# Patient Record
Sex: FEMALE | Race: WHITE | NOT HISPANIC OR LATINO | Employment: FULL TIME | URBAN - METROPOLITAN AREA
[De-identification: names, ages, dates, MRNs, and addresses within clinical notes are randomized per-mention and may not be internally consistent; named-entity substitution may affect disease eponyms.]

---

## 2017-01-16 ENCOUNTER — ALLSCRIPTS OFFICE VISIT (OUTPATIENT)
Dept: OTHER | Facility: OTHER | Age: 47
End: 2017-01-16

## 2017-05-25 DIAGNOSIS — Z12.31 ENCOUNTER FOR SCREENING MAMMOGRAM FOR MALIGNANT NEOPLASM OF BREAST: ICD-10-CM

## 2017-06-06 ENCOUNTER — HOSPITAL ENCOUNTER (OUTPATIENT)
Dept: MAMMOGRAPHY | Facility: MEDICAL CENTER | Age: 47
Discharge: HOME | End: 2017-06-06
Payer: COMMERCIAL

## 2017-06-06 DIAGNOSIS — Z12.31 ENCOUNTER FOR SCREENING MAMMOGRAM FOR MALIGNANT NEOPLASM OF BREAST: ICD-10-CM

## 2017-06-06 PROCEDURE — 77063 BREAST TOMOSYNTHESIS BI: CPT

## 2017-06-06 PROCEDURE — G0202 SCR MAMMO BI INCL CAD: HCPCS

## 2017-06-16 ENCOUNTER — GENERIC CONVERSION - ENCOUNTER (OUTPATIENT)
Dept: OTHER | Facility: OTHER | Age: 47
End: 2017-06-16

## 2017-06-16 ENCOUNTER — ALLSCRIPTS OFFICE VISIT (OUTPATIENT)
Dept: OTHER | Facility: OTHER | Age: 47
End: 2017-06-16

## 2017-06-27 ENCOUNTER — TRANSCRIBE ORDERS (OUTPATIENT)
Dept: ADMINISTRATIVE | Facility: HOSPITAL | Age: 47
End: 2017-06-27

## 2017-06-27 ENCOUNTER — ALLSCRIPTS OFFICE VISIT (OUTPATIENT)
Dept: OTHER | Facility: OTHER | Age: 47
End: 2017-06-27

## 2017-06-27 DIAGNOSIS — Z80.3 FAMILY HISTORY OF BREAST CANCER: ICD-10-CM

## 2017-06-27 DIAGNOSIS — Z12.39 SCREENING BREAST EXAMINATION: Primary | ICD-10-CM

## 2017-07-05 ENCOUNTER — ALLSCRIPTS OFFICE VISIT (OUTPATIENT)
Dept: OTHER | Facility: OTHER | Age: 47
End: 2017-07-05

## 2017-12-04 ENCOUNTER — HOSPITAL ENCOUNTER (OUTPATIENT)
Dept: RADIOLOGY | Facility: HOSPITAL | Age: 47
Discharge: HOME | End: 2017-12-04
Attending: SURGERY
Payer: COMMERCIAL

## 2017-12-04 DIAGNOSIS — Z80.3 FAMILY HISTORY OF MALIGNANT NEOPLASM OF BREAST: ICD-10-CM

## 2017-12-04 DIAGNOSIS — Z12.31 ENCOUNTER FOR SCREENING MAMMOGRAM FOR MALIGNANT NEOPLASM OF BREAST: ICD-10-CM

## 2017-12-04 PROCEDURE — C8908 MRI W/O FOL W/CONT, BREAST,: HCPCS

## 2017-12-04 PROCEDURE — 77059 HB MRI W/O FOL W/CONT, BREAST,: CPT

## 2017-12-04 PROCEDURE — A9585 GADOBUTROL INJECTION: HCPCS | Performed by: SURGERY

## 2017-12-04 RX ADMIN — GADOBUTROL 7 ML: 604.72 INJECTION INTRAVENOUS at 16:55

## 2018-01-13 VITALS
SYSTOLIC BLOOD PRESSURE: 120 MMHG | WEIGHT: 170.13 LBS | BODY MASS INDEX: 26.7 KG/M2 | DIASTOLIC BLOOD PRESSURE: 64 MMHG | HEIGHT: 67 IN

## 2018-01-13 VITALS
HEART RATE: 89 BPM | OXYGEN SATURATION: 98 % | DIASTOLIC BLOOD PRESSURE: 72 MMHG | HEIGHT: 67 IN | WEIGHT: 170 LBS | RESPIRATION RATE: 16 BRPM | BODY MASS INDEX: 26.68 KG/M2 | SYSTOLIC BLOOD PRESSURE: 116 MMHG | TEMPERATURE: 99.2 F

## 2018-01-13 NOTE — MISCELLANEOUS
Message   Date: 16 Jun 2017 7:57 AM EST, Recorded By: Ed Hyatt For: Henny Escort: Dipti Chambers, Edgardo   Phone: [phone] (Home), [phone] (Work)   Reason: Medical Complaint   pt has a vaginal pimple that is irritated and causing pain    pt will schedule apt for evaluation           Active Problems    1  Breast self examination education, encounter for (V65 49) (Z71 89)   2  Encounter for routine gynecological examination (V72 31) (Z01 419)   3  Flu vaccine need (V04 81) (Z23)   4  Visit for screening mammogram (V76 12) (Z12 31)    Current Meds   1  Biotin CAPS; Therapy: (Recorded:16Jan2017) to Recorded   2  Calcium CHEW;   Therapy: (Recorded:16Jan2017) to Recorded   3  Flonase Allergy Relief 50 MCG/ACT Nasal Suspension (Fluticasone Propionate); Therapy: (Recorded:16Jan2017) to Recorded   4  Vitamin D TABS; Therapy: (Recorded:16Jan2017) to Recorded    Allergies    1  No Known Drug Allergies    2   Seasonal    Signatures   Electronically signed by : Eloise Briggs, ; Jun 16 2017  7:58AM EST                       (Author)

## 2018-01-15 VITALS
WEIGHT: 167 LBS | HEIGHT: 67 IN | DIASTOLIC BLOOD PRESSURE: 62 MMHG | SYSTOLIC BLOOD PRESSURE: 118 MMHG | BODY MASS INDEX: 26.21 KG/M2

## 2018-02-27 ENCOUNTER — OFFICE VISIT (OUTPATIENT)
Dept: OBGYN CLINIC | Facility: CLINIC | Age: 48
End: 2018-02-27
Payer: COMMERCIAL

## 2018-02-27 VITALS
DIASTOLIC BLOOD PRESSURE: 70 MMHG | WEIGHT: 176 LBS | SYSTOLIC BLOOD PRESSURE: 117 MMHG | BODY MASS INDEX: 27.62 KG/M2 | HEIGHT: 67 IN

## 2018-02-27 DIAGNOSIS — Z12.31 VISIT FOR SCREENING MAMMOGRAM: ICD-10-CM

## 2018-02-27 DIAGNOSIS — R31.9 HEMATURIA, UNSPECIFIED TYPE: ICD-10-CM

## 2018-02-27 DIAGNOSIS — Z01.419 WOMEN'S ANNUAL ROUTINE GYNECOLOGICAL EXAMINATION: ICD-10-CM

## 2018-02-27 PROCEDURE — 99396 PREV VISIT EST AGE 40-64: CPT | Performed by: OBSTETRICS & GYNECOLOGY

## 2018-02-27 PROCEDURE — G0145 SCR C/V CYTO,THINLAYER,RESCR: HCPCS | Performed by: OBSTETRICS & GYNECOLOGY

## 2018-02-27 PROCEDURE — 87077 CULTURE AEROBIC IDENTIFY: CPT | Performed by: OBSTETRICS & GYNECOLOGY

## 2018-02-27 PROCEDURE — 81001 URINALYSIS AUTO W/SCOPE: CPT | Performed by: OBSTETRICS & GYNECOLOGY

## 2018-02-27 PROCEDURE — 87186 SC STD MICRODIL/AGAR DIL: CPT | Performed by: OBSTETRICS & GYNECOLOGY

## 2018-02-27 PROCEDURE — 87086 URINE CULTURE/COLONY COUNT: CPT | Performed by: OBSTETRICS & GYNECOLOGY

## 2018-02-27 RX ORDER — DIPHENOXYLATE HYDROCHLORIDE AND ATROPINE SULFATE 2.5; .025 MG/1; MG/1
1 TABLET ORAL EVERY MORNING
COMMUNITY

## 2018-02-27 NOTE — PROGRESS NOTES
Assessment/Plan:  1  Yearly exam-Pap smear done, self-breast awareness reviewed, calcium/vitamin-D recommendations discussed, 3D mammogram request given postdated for 6/7/18 with the patient has scheduled mammogram  2  Elevated breast cancer risk-patient's mother had ovarian/breast cancer, she was BRCA test negative although she did have some unknown variant  She does have elevated risk and was followed by Dr Yesy Buckner  She had MRI previously without significant issues  3D mammogram request was given, to be done June 2018 as directed by Dr Yesy Buckner  She will follow up with Dr Yesy Buckner after that  3   History of menorrhagia-patient status post endometrial ablation  She does note some light bleeding sporadically  She was counseled and will call with any menometrorrhagia  4   History of urethral hypermobility-patient status post TVT-O  5  Contraception- is status post vasectomy  6   Concern for hematuria-patient did have light menstrual bleeding which may account for her bleeding  She did leave urine specimen and we will send urinalysis with reflex urine culture  She will follow up in 1 year or as needed  No problem-specific Assessment & Plan notes found for this encounter  Diagnoses and all orders for this visit:    Visit for screening mammogram  -     Mammo screening bilateral w 3d & cad; Future    Other orders  -     multivitamin (THERAGRAN) TABS; Take 1 tablet by mouth daily          Subjective:      Patient ID: Caterina Rod is a 41 y o  female  Patient was seen today for yearly exam   Please see assessment plan for details  Gynecologic Exam   The patient's pertinent negatives include no pelvic pain or vaginal discharge  Pertinent negatives include no abdominal pain, back pain, chills, constipation, diarrhea, dysuria, fever, frequency, headaches, hematuria, nausea, rash, urgency or vomiting         The following portions of the patient's history were reviewed and updated as appropriate: allergies, current medications, past family history, past medical history, past social history, past surgical history and problem list     Review of Systems   Constitutional: Negative for chills, diaphoresis, fatigue and fever  Respiratory: Negative for apnea, cough, chest tightness, shortness of breath and wheezing  Cardiovascular: Negative for chest pain, palpitations and leg swelling  Gastrointestinal: Negative for abdominal distention, abdominal pain, anal bleeding, constipation, diarrhea, nausea, rectal pain and vomiting  Genitourinary: Positive for menstrual problem  Negative for difficulty urinating, dyspareunia, dysuria, frequency, hematuria, pelvic pain, urgency, vaginal bleeding, vaginal discharge and vaginal pain  Musculoskeletal: Negative for arthralgias, back pain and myalgias  Skin: Negative for color change and rash  Neurological: Negative for dizziness, syncope, light-headedness, numbness and headaches  Hematological: Negative for adenopathy  Does not bruise/bleed easily  Psychiatric/Behavioral: Negative for dysphoric mood and sleep disturbance  The patient is not nervous/anxious  Objective:      /70 (BP Location: Left arm, Patient Position: Sitting, Cuff Size: Standard)   Ht 5' 7" (1 702 m)   Wt 79 8 kg (176 lb)   BMI 27 57 kg/m²          Physical Exam    Objective      /70 (BP Location: Left arm, Patient Position: Sitting, Cuff Size: Standard)   Ht 5' 7" (1 702 m)   Wt 79 8 kg (176 lb)   BMI 27 57 kg/m²     General:   alert and oriented, in no acute distress, alert, appears stated age and cooperative   Neck: normal to inspection and palpation   Breast: normal appearance, no masses or tenderness   Heart:    Lungs:    Abdomen: soft, non-tender, without masses or organomegaly   Vulva: normal   Vagina: Small amount of vaginal blood, without erythema or lesions or discharge  Cervix: Small amount of blood noted at the os  Cervix well-healed  Without lesions or discharge or cervicitis noted  No Cervical motion tenderness     Uterus: top normal size, anteverted   Adnexa: no mass, fullness, tenderness   Rectum: negative

## 2018-03-01 LAB
BACTERIA UR QL AUTO: ABNORMAL /HPF
BILIRUB UR QL STRIP: NEGATIVE
CLARITY UR: ABNORMAL
COLOR UR: YELLOW
GLUCOSE UR STRIP-MCNC: NEGATIVE MG/DL
HGB UR QL STRIP.AUTO: ABNORMAL
KETONES UR STRIP-MCNC: NEGATIVE MG/DL
LEUKOCYTE ESTERASE UR QL STRIP: ABNORMAL
NITRITE UR QL STRIP: POSITIVE
NON-SQ EPI CELLS URNS QL MICRO: ABNORMAL /HPF
PH UR STRIP.AUTO: 5.5 [PH] (ref 4.5–8)
PROT UR STRIP-MCNC: NEGATIVE MG/DL
RBC #/AREA URNS AUTO: ABNORMAL /HPF
SP GR UR STRIP.AUTO: 1.02 (ref 1–1.03)
UROBILINOGEN UR QL STRIP.AUTO: 0.2 E.U./DL
WBC #/AREA URNS AUTO: ABNORMAL /HPF

## 2018-03-02 LAB — BACTERIA UR CULT: ABNORMAL

## 2018-03-05 DIAGNOSIS — N39.0 URINARY TRACT INFECTION WITHOUT HEMATURIA, SITE UNSPECIFIED: Primary | ICD-10-CM

## 2018-03-05 RX ORDER — NITROFURANTOIN 25; 75 MG/1; MG/1
100 CAPSULE ORAL 2 TIMES DAILY
Qty: 14 CAPSULE | Refills: 0 | Status: SHIPPED | OUTPATIENT
Start: 2018-03-05 | End: 2018-03-12

## 2018-03-05 RX ORDER — NITROFURANTOIN 25; 75 MG/1; MG/1
100 CAPSULE ORAL 2 TIMES DAILY
Qty: 14 CAPSULE | Refills: 0 | Status: CANCELLED | OUTPATIENT
Start: 2018-03-05 | End: 2018-03-12

## 2018-03-07 LAB
LAB AP GYN PRIMARY INTERPRETATION: NORMAL
Lab: NORMAL

## 2018-03-08 ENCOUNTER — TELEPHONE (OUTPATIENT)
Dept: OBGYN CLINIC | Facility: CLINIC | Age: 48
End: 2018-03-08

## 2018-03-28 ENCOUNTER — TELEPHONE (OUTPATIENT)
Dept: OBGYN CLINIC | Facility: CLINIC | Age: 48
End: 2018-03-28

## 2018-03-29 ENCOUNTER — ULTRASOUND (OUTPATIENT)
Dept: OBGYN CLINIC | Facility: CLINIC | Age: 48
End: 2018-03-29
Payer: COMMERCIAL

## 2018-03-29 ENCOUNTER — OFFICE VISIT (OUTPATIENT)
Dept: OBGYN CLINIC | Facility: CLINIC | Age: 48
End: 2018-03-29
Payer: COMMERCIAL

## 2018-03-29 VITALS
HEART RATE: 74 BPM | WEIGHT: 176 LBS | BODY MASS INDEX: 27.62 KG/M2 | HEIGHT: 67 IN | DIASTOLIC BLOOD PRESSURE: 68 MMHG | SYSTOLIC BLOOD PRESSURE: 120 MMHG

## 2018-03-29 DIAGNOSIS — N92.6 IRREGULAR BLEEDING: Primary | ICD-10-CM

## 2018-03-29 DIAGNOSIS — N83.202 LEFT OVARIAN CYST: ICD-10-CM

## 2018-03-29 DIAGNOSIS — R10.32 LEFT LOWER QUADRANT PAIN: ICD-10-CM

## 2018-03-29 PROBLEM — N60.11 BILATERAL FIBROCYSTIC BREAST CHANGES: Status: ACTIVE | Noted: 2017-06-27

## 2018-03-29 PROBLEM — N60.12 BILATERAL FIBROCYSTIC BREAST CHANGES: Status: ACTIVE | Noted: 2017-06-27

## 2018-03-29 PROCEDURE — 76830 TRANSVAGINAL US NON-OB: CPT

## 2018-03-29 PROCEDURE — 76830 TRANSVAGINAL US NON-OB: CPT | Performed by: OBSTETRICS & GYNECOLOGY

## 2018-03-29 PROCEDURE — 99213 OFFICE O/P EST LOW 20 MIN: CPT | Performed by: OBSTETRICS & GYNECOLOGY

## 2018-03-29 RX ORDER — MONTELUKAST SODIUM 10 MG/1
10 TABLET ORAL AS NEEDED
COMMUNITY
Start: 2014-10-26

## 2018-03-29 RX ORDER — MOMETASONE FUROATE 50 UG/1
SPRAY, METERED NASAL
COMMUNITY
Start: 2014-10-26 | End: 2021-08-17

## 2018-03-29 RX ORDER — LORATADINE 10 MG/1
10 TABLET ORAL AS NEEDED
COMMUNITY
Start: 2014-10-26

## 2018-03-29 NOTE — PROGRESS NOTES
Assessment/Plan:  Irregular spotting, which may be related to the friability of the cervical gland tissue, the ovarian cyst, or possible abnormality within the endometrial cavity  The patient will return for a follow-up pelvic ultrasound in 6-8 weeks and will reassess at that time  There are no diagnoses linked to this encounter  Subjective:     Patient ID: Caterina Rod is a 41 y o  female  Brown Jetty is seen today because of irregular spotting that she has been having for quite some time  The bleeding is bright red, painless, with no pattern  Historically the patient did have a NovaSure ablation in 2014 along with a suburethral sling  The patient did have a bladder infection quite recently and was treated for this  She also had a recent Pap smear that was within normal limits  An ultrasound today shows essentially normal findings with the exception of a 2 8 cm complex left ovarian cyst   The patient denies any overt hematuria, any overt rectal bleeding, fever, chills or other symptoms  Pelvic Pain   The patient's primary symptoms include vaginal bleeding  The patient's pertinent negatives include no pelvic pain  Vaginal Bleeding   The patient's primary symptoms include vaginal bleeding  The patient's pertinent negatives include no pelvic pain  Review of Systems   Genitourinary: Positive for vaginal bleeding  Negative for pelvic pain  Objective:     Physical Exam  the vulvar exam is within normal limits with respect to the glandular tissue  The vagina shows no abnormalities except for some old blood in the posterior fornix  The cervix shows gland tissue that is somewhat prominent and somewhat friable as it does bleed easily when touched  An attempt at an endometrial sampling is performed but unable to be accomplished secondary to the patient's history of uterine ablation  Bimanual exam is all within normal limits

## 2018-03-29 NOTE — PROGRESS NOTES
AMB US Pelvic Non OB  Date/Time: 3/29/2018 11:13 AM  Performed by: Michael Sellers  Authorized by: Dakota Altamirano     Procedure details:     Indications: non-obstetric vaginal bleeding      Technique:  Transvaginal US, Non-OB    Position: lithotomy exam    Uterine findings:     Diameter (mm):  37    Length (mm):  76    Width (mm):  45    Endometrial stripe: identified      Endometrium thickness (mm):  3 5  Left ovary findings:     Left ovary:  Visualized    Diameter (mm):  20 4    Length (mm):  38 2    Width (mm):  23 7  Right ovary findings:     Right ovary:  Visualized    Diameter (mm):  14    Length (mm):  24 5    Width (mm):  23 2  Other findings:     Free pelvic fluid: not identified      Free peritoneal fluid: not identified    Post-Procedure Details:     Impression:  Anteverted uterus and right ovary appear within normal limits  The left ovary demonstrates a complex cyst, 2 4cm  There is normal appearing blood flow on color doppler of the left ovary  No free fluid  Tolerance:   Tolerated well, no immediate complications    Complications: no complications

## 2018-05-15 ENCOUNTER — ULTRASOUND (OUTPATIENT)
Dept: OBGYN CLINIC | Facility: CLINIC | Age: 48
End: 2018-05-15
Payer: COMMERCIAL

## 2018-05-15 ENCOUNTER — OFFICE VISIT (OUTPATIENT)
Dept: OBGYN CLINIC | Facility: CLINIC | Age: 48
End: 2018-05-15
Payer: COMMERCIAL

## 2018-05-15 DIAGNOSIS — N83.201 CYST OF RIGHT OVARY: Primary | ICD-10-CM

## 2018-05-15 DIAGNOSIS — N83.202 CYST OF LEFT OVARY: ICD-10-CM

## 2018-05-15 PROCEDURE — 76830 TRANSVAGINAL US NON-OB: CPT

## 2018-05-15 PROCEDURE — 99213 OFFICE O/P EST LOW 20 MIN: CPT | Performed by: OBSTETRICS & GYNECOLOGY

## 2018-05-15 RX ORDER — PANTOPRAZOLE SODIUM 40 MG/1
40 TABLET, DELAYED RELEASE ORAL EVERY MORNING
COMMUNITY
Start: 2018-04-24

## 2018-05-15 NOTE — PROGRESS NOTES
Alem Nicole is seen today for discussion only  She had a complex cyst of the ovary in addition to some spotting back in March  A repeat ultrasound today shows resolution of the cyst, essentially no changes of the uterus with the exception of the endometrium changing due to the patient's hormonal status  The patient herself states that her spotting has improved  At this point in time nothing further than continue observation is recommended  Patient to call with any questions or concerns

## 2018-06-07 ENCOUNTER — HOSPITAL ENCOUNTER (OUTPATIENT)
Dept: MAMMOGRAPHY | Facility: MEDICAL CENTER | Age: 48
Discharge: HOME | End: 2018-06-07
Payer: COMMERCIAL

## 2018-06-07 DIAGNOSIS — Z12.31 ENCOUNTER FOR SCREENING MAMMOGRAM FOR MALIGNANT NEOPLASM OF BREAST: ICD-10-CM

## 2018-06-07 DIAGNOSIS — Z12.31 VISIT FOR SCREENING MAMMOGRAM: ICD-10-CM

## 2018-06-07 PROCEDURE — 77067 SCR MAMMO BI INCL CAD: CPT

## 2018-06-07 PROCEDURE — 77063 BREAST TOMOSYNTHESIS BI: CPT

## 2018-06-11 ENCOUNTER — TELEPHONE (OUTPATIENT)
Dept: OBGYN CLINIC | Facility: CLINIC | Age: 48
End: 2018-06-11

## 2018-06-11 DIAGNOSIS — R92.8 MAMMOGRAM ABNORMAL: Primary | ICD-10-CM

## 2018-06-11 NOTE — TELEPHONE ENCOUNTER
----- Message from Jose Lima MD sent at 6/8/2018  9:18 AM EDT -----  Patient needs right mammogram diagnostic views and right breast ultrasound as needed  Please arrange for this    Thanks

## 2018-06-13 ENCOUNTER — HOSPITAL ENCOUNTER (OUTPATIENT)
Dept: MAMMOGRAPHY | Facility: CLINIC | Age: 48
Discharge: HOME | End: 2018-06-13
Payer: COMMERCIAL

## 2018-06-13 ENCOUNTER — HOSPITAL ENCOUNTER (OUTPATIENT)
Dept: ULTRASOUND IMAGING | Facility: CLINIC | Age: 48
Discharge: HOME | End: 2018-06-13
Payer: COMMERCIAL

## 2018-06-13 DIAGNOSIS — R92.8 ABNORMAL MAMMOGRAM: ICD-10-CM

## 2018-06-13 PROCEDURE — G0279 TOMOSYNTHESIS, MAMMO: HCPCS

## 2018-06-13 PROCEDURE — 76642 ULTRASOUND BREAST LIMITED: CPT

## 2018-06-13 PROCEDURE — 77065 DX MAMMO INCL CAD UNI: CPT

## 2018-06-13 NOTE — PROGRESS NOTES
Met with patient and Dr Nemesio Mora regarding recommendation for;      __x___ RIGHT ______LEFT      _____Ultrasound guided  __x____Stereotactic  Breast biopsy  __x___Verbalized understanding        Blood thinners:  _____yes __x___no    Date stopped: ____n/a_______    Biopsy teaching sheet given:  __x_____yes ______no

## 2018-06-14 ENCOUNTER — HOSPITAL ENCOUNTER (OUTPATIENT)
Dept: MAMMOGRAPHY | Facility: CLINIC | Age: 48
Discharge: HOME | End: 2018-06-14
Admitting: RADIOLOGY
Payer: COMMERCIAL

## 2018-06-14 ENCOUNTER — HOSPITAL ENCOUNTER (OUTPATIENT)
Dept: MAMMOGRAPHY | Facility: CLINIC | Age: 48
Discharge: HOME | End: 2018-06-14
Payer: COMMERCIAL

## 2018-06-14 VITALS — HEART RATE: 60 BPM | DIASTOLIC BLOOD PRESSURE: 60 MMHG | SYSTOLIC BLOOD PRESSURE: 100 MMHG

## 2018-06-14 DIAGNOSIS — R92.8 ABNORMAL MAMMOGRAM: ICD-10-CM

## 2018-06-14 DIAGNOSIS — R92.8 OTHER ABNORMAL AND INCONCLUSIVE FINDINGS ON DIAGNOSTIC IMAGING OF BREAST: ICD-10-CM

## 2018-06-14 PROCEDURE — 88341 IMHCHEM/IMCYTCHM EA ADD ANTB: CPT | Performed by: PATHOLOGY

## 2018-06-14 PROCEDURE — 88305 TISSUE EXAM BY PATHOLOGIST: CPT | Performed by: PATHOLOGY

## 2018-06-14 PROCEDURE — 88342 IMHCHEM/IMCYTCHM 1ST ANTB: CPT | Performed by: PATHOLOGY

## 2018-06-14 PROCEDURE — 88361 TUMOR IMMUNOHISTOCHEM/COMPUT: CPT | Performed by: PATHOLOGY

## 2018-06-14 PROCEDURE — 19081 BX BREAST 1ST LESION STRTCTC: CPT

## 2018-06-14 RX ORDER — LIDOCAINE HYDROCHLORIDE AND EPINEPHRINE BITARTRATE 20; .01 MG/ML; MG/ML
10 INJECTION, SOLUTION SUBCUTANEOUS ONCE
Status: COMPLETED | OUTPATIENT
Start: 2018-06-14 | End: 2018-06-14

## 2018-06-14 RX ORDER — LIDOCAINE HYDROCHLORIDE 10 MG/ML
5 INJECTION, SOLUTION INFILTRATION; PERINEURAL ONCE
Status: COMPLETED | OUTPATIENT
Start: 2018-06-14 | End: 2018-06-14

## 2018-06-14 RX ADMIN — LIDOCAINE HYDROCHLORIDE AND EPINEPHRINE 10 ML: 20; 10 INJECTION, SOLUTION INFILTRATION; PERINEURAL at 13:10

## 2018-06-14 RX ADMIN — LIDOCAINE HYDROCHLORIDE 5 ML: 10 INJECTION, SOLUTION INFILTRATION; PERINEURAL at 13:10

## 2018-06-14 NOTE — PROGRESS NOTES
Procedure type:    _____ultrasound guided ____x_stereotactic    Breast:    _____Left ____x_Right    Location:    Needle:8ga Revolve    # of passes:3   2 cores with calcs and I core without calcs    Clip: EDEL heath    Performed by:Dr Rad Lockhart    Pressure held for 5 minutes by:  Su Schaffer(PCA)    Janny Handing Strips:    __X___yes _____no    Band aid:    ____X_yes_____no    Tape and guaze:    _____yes _____no    Tolerated procedure:    _____yes __X___no

## 2018-06-14 NOTE — PROGRESS NOTES
Ice pack given:    ____X_yes _____no    Discharge instructions signed by patient:    ___X__yes _____no    Discharge instructions given to patient:    ___X__yes _____no    Discharged via:    ___X__amulatory    _____wheelchair    _____stretcher    Stable on discharge:    ___XPOST LARGE CORE BREAST BIOPSY PATIENT INFORMATION      1  Place an ice pack inside your bra over the top of the dressing every hour for 20 minutes (20 minutes on, 60 minutes off)  Do this until bedtime  2  Do not shower or bathe until the following morning  3  You may bathe your breast carefully with the steri-strips in place  Be careful    Not to loosen them  The steri-strips will fall off in 3-5 days  4  You may have mild discomfort, and you may have some bruising where the   Needle entered the skin  This should clear within 5-7 days  5  If you need medicine for discomfort, take acetaminophen products such as   Tylenol  You may also take Advil or Motrin products  6  Do not participate in strenuous activities such as-tennis, aerobics, skiing,  Weight lifting, etc  for 24 hours  Refrain from swimming/soaking for 72 hours  7  Wearing a bra for sleeping may be more comfortable for the first 24-48 hours  8  Watch for continued bleeding, pain or fever over 101; please call with any questions or concerns  For procedures done at the [facility], [place], operated by [facility] call:  Riddhi Tran RN at [phone]  Sina Berman RN at [phone]                    *After 4 PM call the Interventional Radiology Department                    [phone] and ask to speak with the nurse on call  For procedures done at the [address] [place] [address] call:         Taylor Barthel RN at   *After 4 PM call the Interventional Radiology Department   [phone] and ask to speak with the nurse on call  For procedures done at [address] [place] [address] call:   The Radiology Nurse at [phone]  *After 4 PM call your physician, or go to the Emergency Department  9          The final results of your biopsy are usually available within one week  __yes ____no

## 2018-06-15 NOTE — PROGRESS NOTES
Post procedure call completed 6/15/18 @ 1328    Bleeding: _____yes __x___no    Pain: _____yes __x____no    Redness/Swelling: ______yes ___x___no    Band aid removed: __x___yes _____no    Steri-Strips intact: __x____yes _____no

## 2018-06-21 ENCOUNTER — TELEPHONE (OUTPATIENT)
Dept: MAMMOGRAPHY | Facility: CLINIC | Age: 48
End: 2018-06-21

## 2018-07-02 ENCOUNTER — OFFICE VISIT (OUTPATIENT)
Dept: SURGICAL ONCOLOGY | Facility: CLINIC | Age: 48
End: 2018-07-02
Payer: COMMERCIAL

## 2018-07-02 VITALS
WEIGHT: 178 LBS | HEART RATE: 85 BPM | TEMPERATURE: 99 F | BODY MASS INDEX: 27.94 KG/M2 | DIASTOLIC BLOOD PRESSURE: 80 MMHG | HEIGHT: 67 IN | SYSTOLIC BLOOD PRESSURE: 110 MMHG | RESPIRATION RATE: 16 BRPM

## 2018-07-02 DIAGNOSIS — Z17.0 MALIGNANT NEOPLASM OF LOWER-OUTER QUADRANT OF RIGHT BREAST OF FEMALE, ESTROGEN RECEPTOR POSITIVE (HCC): ICD-10-CM

## 2018-07-02 DIAGNOSIS — Z13.79 ASHKENAZI JEWISH ANCESTRY REQUIRING POPULATION-SPECIFIC GENETIC SCREENING: ICD-10-CM

## 2018-07-02 DIAGNOSIS — C50.511 MALIGNANT NEOPLASM OF LOWER-OUTER QUADRANT OF RIGHT BREAST OF FEMALE, ESTROGEN RECEPTOR POSITIVE (HCC): ICD-10-CM

## 2018-07-02 DIAGNOSIS — Z80.3 FAMILY HISTORY OF BREAST CANCER IN FEMALE: Primary | ICD-10-CM

## 2018-07-02 DIAGNOSIS — Z80.41 FAMILY HISTORY OF OVARIAN CARCINOMA: ICD-10-CM

## 2018-07-02 DIAGNOSIS — N60.11 BILATERAL FIBROCYSTIC BREAST CHANGES: ICD-10-CM

## 2018-07-02 DIAGNOSIS — N60.12 BILATERAL FIBROCYSTIC BREAST CHANGES: ICD-10-CM

## 2018-07-02 PROBLEM — Z12.39 BREAST CANCER SCREENING, HIGH RISK PATIENT: Status: RESOLVED | Noted: 2018-07-02 | Resolved: 2018-07-02

## 2018-07-02 PROCEDURE — 99215 OFFICE O/P EST HI 40 MIN: CPT | Performed by: SURGERY

## 2018-07-02 NOTE — PROGRESS NOTES
Breast Consultation-Surgical Oncology     [address]  [facility] [place] [address]    Name:  Sirena Garcia  YOB: 1970  MRN:  [id number]    Assessment/Plan   Diagnoses and all orders for this visit:    Family history of breast cancer in female  -     Ambulatory referral to Gynecologic Oncology; Future    Bilateral fibrocystic breast changes    Ashkenazi Jehovah's witness ancestry requiring population-specific genetic screening  -     Ambulatory referral to Gynecologic Oncology; Future    Malignant neoplasm of lower-outer quadrant of right breast of female, estrogen receptor positive ([facility] [address] )  -     Ambulatory referral to Genetics; Future  -     Ambulatory referral to Plastic Surgery; Future    Family history of ovarian carcinoma  -     Ambulatory referral to Gynecologic Oncology; Future        Assessment: right breast carcinoma    Plan: refer back to genetics, patient is considering bilateral mastectomy with reconstruction-needs plastics consult, would also like to see gyn oncology given mom's history of ovarian cancer; states she is seeking a second opinion as well    HPI: Sirena Garcia is a 57y o  year old female who presents with a right breast cancer  Pt had her screening mammogram completed on 18 which showed abnormal findings  She then had diagnostic imaging which led to a right breast biopsy  This revealed an IDC  Pt's biopsy site is intact and healing  Surgical treatment to date consisted of n/a  Oncology History:     No history exists         Pertinent reproductive history:  Age at menarche:  17  OB History      Para Term  AB Living    2 2            SAB TAB Ectopic Multiple Live Births                     Obstetric Comments    First pregnancy age 31  Menarche age 17  Hx birth control pills           Age at first live birth:  31  Age at menopause:  n/a      Problem List:   Patient Active Problem List   Diagnosis  Bilateral fibrocystic breast changes    Family history of breast cancer in female    Ashkenazi Jainism ancestry requiring population-specific genetic screening    Malignant neoplasm of lower-outer quadrant of right breast of female, estrogen receptor positive ([address] )     Past Medical History:   Diagnosis Date    Ashkenazi Jainism ancestry requiring population-specific genetic screening     Bilateral fibrocystic breast changes     Breast cancer screening, high risk patient     Family history of breast cancer in female     Human papilloma virus (HPV) infection     Postural lightheadedness     Sinus problem     Sinus problem     Vulvar abscess      Past Surgical History:   Procedure Laterality Date    BLADDER SURGERY  2013    bladder lift    CERVIX LESION DESTRUCTION      COLPOSCOPY W/ BIOPSY / CURETTAGE      GASTRIC BYPASS      GASTRIC BYPASS  2015    for morbid obesity    HYSTEROSCOPY W/ ENDOMETRIAL ABLATION  2013    KNEE ARTHROSCOPY Right 1990    NASAL SEPTUM SURGERY      2002, 2016     Family History   Problem Relation Age of Onset    Other Mother         BRCA negative    Breast cancer Mother 72    Ovarian cancer Mother 61    Diabetes Father     Heart disease Father     Hypertension Father     Migraines Sister     Diabetes Brother     Colon cancer Maternal Grandfather 80    Diabetes type II Maternal Grandfather     Heart disease Paternal Grandfather     Colon cancer Maternal Aunt 62    Skin cancer Maternal Aunt      Social History     Social History    Marital status: Single     Spouse name: N/A    Number of children: 2    Years of education: N/A     Occupational History    Not on file  Social History Main Topics    Smoking status: Never Smoker    Smokeless tobacco: Never Used    Alcohol use Yes      Comment: ocass      Drug use: No    Sexual activity: Not on file     Other Topics Concern    Not on file     Social History Narrative    Uses safety equipment, seatbelts Current Outpatient Prescriptions   Medication Sig Dispense Refill    loratadine (CLARITIN) 10 mg tablet 10 mg      mometasone (NASONEX) 50 mcg/act nasal spray 2 sprays in each nostril as needed      montelukast (SINGULAIR) 10 mg tablet 10 mg      multivitamin (THERAGRAN) TABS Take 1 tablet by mouth daily      pantoprazole (PROTONIX) 40 mg tablet        No current facility-administered medications for this visit  Allergies   Allergen Reactions    Other     Pollen Extract      Annotation - [id number]: trees, pollens         The following portions of the patient's history were reviewed and updated as appropriate: allergies, current medications, past family history, past medical history, past social history, past surgical history and problem list     Review of Systems:  Review of Systems   Constitutional: Negative  Negative for appetite change and fever  Eyes: Negative  Respiratory: Negative for shortness of breath  Cardiovascular: Negative  Gastrointestinal: Negative  Endocrine: Negative  Genitourinary: Negative  Musculoskeletal: Negative  Negative for arthralgias and myalgias  Skin: Negative  Allergic/Immunologic: Negative  Neurological: Negative  Hematological: Negative  Negative for adenopathy  Does not bruise/bleed easily  Psychiatric/Behavioral: Negative  Physical Exam:  Physical Exam   Constitutional: She is oriented to person, place, and time  She appears well-developed and well-nourished  HENT:   Head: Normocephalic and atraumatic  Cardiovascular: Normal heart sounds  Pulmonary/Chest: Breath sounds normal  Right breast exhibits skin change (well healing biopsy site with hematoma lower outer)  Right breast exhibits no inverted nipple, no mass, no nipple discharge and no tenderness  Left breast exhibits no inverted nipple, no mass, no nipple discharge, no skin change and no tenderness  Abdominal: Soft     Lymphadenopathy:        Right axillary: No pectoral and no lateral adenopathy present  Left axillary: No pectoral and no lateral adenopathy present  Right: No supraclavicular adenopathy present  Left: No supraclavicular adenopathy present  Neurological: She is alert and oriented to person, place, and time  Psychiatric: She has a normal mood and affect  Laboratory:  Right stereotactic biopsy    Pathology revealed: invasive ductal carcinoma    Histologic grade: low grade     Angiolymphatic invasion:  absent    Tumor node status: clinically Negative    Hormone receptor status:  Estrogen/progesterone receptor positive; HER2 -    Imagin18  3D Bilateral screening mammogram, B0(2) for calcs and density lower outer right breast  18 3D right diagnostic mammogram and ultrasound, B4 suspicious calcifications, density corresponds to cysts on  ultrasound  18 right breast stereotactic biopsy as noted     Imaging personally reviewed        Treatment options include:  Lumpectomy vs mastectomy    Discussion/Summary:47 yo female with a newly diagnosed carcinoma of the right breast  I discussed the multimodality treatment of breast cancer with her to include surgery, radiation and medical therapy  She previously met with our genetic counselor secondary to her family history and ancestry  Her mom had both breast and ovarian cancer and carried a VUS only  Given Jaylyn's diagnosis, I am recommending she meet again with our genetic counselor  She is leaning toward a bilateral mastectomy with reconstruction  I will refer her to plastics for consultation  She additionally inquired about having oophorectomy given mom's history  I will refer her to gyn oncology to discuss this further  She understands that if she decides to have breast conservation, she will require radiation therapy as well  She also understands that regardless of the type of surgery, she will need to meet with medical oncology to discuss adjuvant medical therapy  She verbalizes today that she has a second opinion set up at [facility]

## 2018-07-02 NOTE — PROGRESS NOTES
Breast Consultation-Surgical Oncology     [address]  [facility] [place] [address]    Name:  Rachel Ochoa  YOB: 1970  MRN:  [id number]    Assessment/Plan   Diagnoses and all orders for this visit:    Family history of breast cancer in female  -     Ambulatory referral to Gynecologic Oncology; Future    Bilateral fibrocystic breast changes    Ashkenazi Faith ancestry requiring population-specific genetic screening  -     Ambulatory referral to Gynecologic Oncology; Future    Malignant neoplasm of lower-outer quadrant of right breast of female, estrogen receptor positive ([facility] [address] )  -     Ambulatory referral to Genetics; Future  -     Ambulatory referral to Plastic Surgery; Future    Family history of ovarian carcinoma  -     Ambulatory referral to Gynecologic Oncology; Future        Assessment: right breast carcinoma    Plan: refer back to genetics, patient is considering bilateral mastectomy with reconstruction-needs plastics consult, would also like to see gyn oncology given mom's history of ovarian cancer; states she is seeking a second opinion as well    HPI: Rachel Ochoa is a 57y o  year old female who presents with a right breast cancer  Pt had her screening mammogram completed on 18 which showed abnormal findings  She then had diagnostic imaging which led to a right breast biopsy  This revealed an IDC  Pt's biopsy site is intact and healing  Surgical treatment to date consisted of n/a  Oncology History:     No history exists         Pertinent reproductive history:  Age at menarche:  17  OB History      Para Term  AB Living    2 2            SAB TAB Ectopic Multiple Live Births                     Obstetric Comments    First pregnancy age 31  Menarche age 17  Hx birth control pills           Age at first live birth:  31  Age at menopause:  n/a      Problem List:   Patient Active Problem List   Diagnosis  Bilateral fibrocystic breast changes    Family history of breast cancer in female    Ashkenazi Religious ancestry requiring population-specific genetic screening    Malignant neoplasm of lower-outer quadrant of right breast of female, estrogen receptor positive ([address] )     Past Medical History:   Diagnosis Date    Ashkenazi Religious ancestry requiring population-specific genetic screening     Bilateral fibrocystic breast changes     Breast cancer screening, high risk patient     Family history of breast cancer in female     Human papilloma virus (HPV) infection     Postural lightheadedness     Sinus problem     Sinus problem     Vulvar abscess      Past Surgical History:   Procedure Laterality Date    BLADDER SURGERY  2013    bladder lift    CERVIX LESION DESTRUCTION      COLPOSCOPY W/ BIOPSY / CURETTAGE      GASTRIC BYPASS      GASTRIC BYPASS  2015    for morbid obesity    HYSTEROSCOPY W/ ENDOMETRIAL ABLATION  2013    KNEE ARTHROSCOPY Right 1990    NASAL SEPTUM SURGERY      2002, 2016     Family History   Problem Relation Age of Onset    Other Mother         BRCA negative    Breast cancer Mother 72    Ovarian cancer Mother 61    Diabetes Father     Heart disease Father     Hypertension Father     Migraines Sister     Diabetes Brother     Colon cancer Maternal Grandfather 80    Diabetes type II Maternal Grandfather     Heart disease Paternal Grandfather     Colon cancer Maternal Aunt 62    Skin cancer Maternal Aunt      Social History     Social History    Marital status: Single     Spouse name: N/A    Number of children: 2    Years of education: N/A     Occupational History    Not on file  Social History Main Topics    Smoking status: Never Smoker    Smokeless tobacco: Never Used    Alcohol use Yes      Comment: ocass      Drug use: No    Sexual activity: Not on file     Other Topics Concern    Not on file     Social History Narrative    Uses safety equipment, seatbelts Current Outpatient Prescriptions   Medication Sig Dispense Refill    loratadine (CLARITIN) 10 mg tablet 10 mg      mometasone (NASONEX) 50 mcg/act nasal spray 2 sprays in each nostril as needed      montelukast (SINGULAIR) 10 mg tablet 10 mg      multivitamin (THERAGRAN) TABS Take 1 tablet by mouth daily      pantoprazole (PROTONIX) 40 mg tablet        No current facility-administered medications for this visit  Allergies   Allergen Reactions    Other     Pollen Extract      Annotation - [id number]: trees, pollens         The following portions of the patient's history were reviewed and updated as appropriate: allergies, current medications, past family history, past medical history, past social history, past surgical history and problem list     Review of Systems:  Review of Systems   Constitutional: Negative  Negative for appetite change and fever  Eyes: Negative  Respiratory: Negative for shortness of breath  Cardiovascular: Negative  Gastrointestinal: Negative  Endocrine: Negative  Genitourinary: Negative  Musculoskeletal: Negative  Negative for arthralgias and myalgias  Skin: Negative  Allergic/Immunologic: Negative  Neurological: Negative  Hematological: Negative  Negative for adenopathy  Does not bruise/bleed easily  Psychiatric/Behavioral: Negative  Physical Exam:  Physical Exam   Constitutional: She is oriented to person, place, and time  She appears well-developed and well-nourished  HENT:   Head: Normocephalic and atraumatic  Cardiovascular: Normal heart sounds  Pulmonary/Chest: Breath sounds normal  Right breast exhibits skin change (well healing biopsy site with hematoma lower outer)  Right breast exhibits no inverted nipple, no mass, no nipple discharge and no tenderness  Left breast exhibits no inverted nipple, no mass, no nipple discharge, no skin change and no tenderness  Abdominal: Soft     Lymphadenopathy:        Right axillary: No pectoral and no lateral adenopathy present  Left axillary: No pectoral and no lateral adenopathy present  Right: No supraclavicular adenopathy present  Left: No supraclavicular adenopathy present  Neurological: She is alert and oriented to person, place, and time  Psychiatric: She has a normal mood and affect  Laboratory:  Right stereotactic biopsy    Pathology revealed: invasive ductal carcinoma    Histologic grade: low grade     Angiolymphatic invasion:  absent    Tumor node status: clinically Negative    Hormone receptor status:  Estrogen/progesterone receptor positive; HER2 -    Imagin18  3D Bilateral screening mammogram, B0(2) for calcs and density lower outer right breast  18 3D right diagnostic mammogram and ultrasound, B4 suspicious calcifications, density corresponds to cysts on  ultrasound  18 right breast stereotactic biopsy as noted     Imaging personally reviewed        Treatment options include:  Lumpectomy vs mastectomy    Discussion/Summary:47 yo female with a newly diagnosed carcinoma of the right breast  I discussed the multimodality treatment of breast cancer with her to include surgery, radiation and medical therapy  She previously met with our genetic counselor secondary to her family history and ancestry  Her mom had both breast and ovarian cancer and carried a VUS only  Given Jaylyn's diagnosis, I am recommending she meet again with our genetic counselor  She is leaning toward a bilateral mastectomy with reconstruction  I will refer her to plastics for consultation  She additionally inquired about having oophorectomy given mom's history  I will refer her to gyn oncology to discuss this further  She understands that if she decides to have breast conservation, she will require radiation therapy as well  She also understands that regardless of the type of surgery, she will need to meet with medical oncology to discuss adjuvant medical therapy  She verbalizes today that she has a second opinion set up at [facility]

## 2018-07-03 ENCOUNTER — DOCUMENTATION (OUTPATIENT)
Dept: HEMATOLOGY ONCOLOGY | Facility: CLINIC | Age: 48
End: 2018-07-03

## 2018-07-03 NOTE — PROGRESS NOTES
Oncology Navigator Visit    Assessment:  Outreach made to pt for purpose of initial navigator outreach  L/M on home phone and sent Mendel  notecard with contact information to pt's home via mail  Will continue to follow up with pt on an as needed basis and PRN       Potential Barriers:    Care Coordination:    Communication/Education:    Cultural/Anglican/Spiritual:    Health Promotion:    Insurance/Medical Costs:    Logistical:    Psychosocial/Distress/Behavioral:    Work/School:    Interventions:    Referrals:        Comments:

## 2018-07-10 ENCOUNTER — OFFICE VISIT (OUTPATIENT)
Dept: GYNECOLOGIC ONCOLOGY | Facility: CLINIC | Age: 48
End: 2018-07-10
Payer: COMMERCIAL

## 2018-07-10 VITALS
TEMPERATURE: 98.9 F | SYSTOLIC BLOOD PRESSURE: 118 MMHG | WEIGHT: 178.4 LBS | RESPIRATION RATE: 18 BRPM | BODY MASS INDEX: 27.04 KG/M2 | DIASTOLIC BLOOD PRESSURE: 86 MMHG | HEIGHT: 68 IN | HEART RATE: 78 BPM

## 2018-07-10 DIAGNOSIS — Z80.3 FAMILY HISTORY OF BREAST CANCER IN FEMALE: ICD-10-CM

## 2018-07-10 DIAGNOSIS — Z17.0 MALIGNANT NEOPLASM OF LOWER-OUTER QUADRANT OF RIGHT BREAST OF FEMALE, ESTROGEN RECEPTOR POSITIVE (HCC): Primary | ICD-10-CM

## 2018-07-10 DIAGNOSIS — C50.511 MALIGNANT NEOPLASM OF LOWER-OUTER QUADRANT OF RIGHT BREAST OF FEMALE, ESTROGEN RECEPTOR POSITIVE (HCC): Primary | ICD-10-CM

## 2018-07-10 DIAGNOSIS — Z13.79 ASHKENAZI JEWISH ANCESTRY REQUIRING POPULATION-SPECIFIC GENETIC SCREENING: ICD-10-CM

## 2018-07-10 DIAGNOSIS — Z80.41 FAMILY HISTORY OF OVARIAN CARCINOMA: ICD-10-CM

## 2018-07-10 PROCEDURE — 99244 OFF/OP CNSLTJ NEW/EST MOD 40: CPT | Performed by: OBSTETRICS & GYNECOLOGY

## 2018-07-10 NOTE — LETTER
July 10, 2018     Maria Eugenia Ash MD  [address] [facility]  [address] [facility]    Patient: Shweta Schneider   YOB: 1970   Date of Visit: 7/10/2018       Dear Dr Ramiro Zepeda: Thank you for referring Shweta Schneider to me for evaluation  Below are my notes for this consultation  If you have questions, please do not hesitate to call me  I look forward to following your patient along with you           Sincerely,        Eduard Novak MD        CC: MD Renata Quinones MD Hunter Latch, MD

## 2018-07-10 NOTE — PATIENT INSTRUCTIONS
-  Surgical preparation as per surgical instructions  -   Keep appointment with Plastic surgery and genetic counselor as discussed

## 2018-07-10 NOTE — ASSESSMENT & PLAN NOTE
-   Patient with hormone receptor positive DCIS  She has a significant family history suggesting increase susceptibility to ovarian and breast cancer  Given home receptor positive tumor as well as increased risk for other malignancies and benefits and overall cancer reduction associated with salpingo-oophorectomy I have recommended and she has agreed to proceed with laparoscopic bilateral salpingo-oophorectomy  She would want to have this done as part of a joint procedure along with bilateral mastectomy and reconstruction  Hence, I have recommended against concurrent hysterectomy as that would increase risk of infectious complications and worse cosmetic results  She understands her abnormal uterine bleeding will resolve as result of surgical menopause  I also explained that the possibility of hysterectomy is not negated if this was indicated for some reason in the future  I will obtain same day type and screen  Otherwise preoperative testing per breast and plastic surgeons  I discussed with patient indication, risks, benefits and alternatives of surgical exploration  We discussed possibility of bleeding requiring blood transfusion, life-threatening infections requiring additional procedures, injuries to surrounding organs such as bladder, ureters, gastrointestinal tract and or neurovascular structures  Additionally, we discussed general risks associated to stress of surgery such as venous thromboembolism, acute myocardial events and stroke  All questions answered to patient's satisfaction  She agrees and wants to proceed  Informed consent form signed

## 2018-07-10 NOTE — H&P
Assessment/Plan:    Problem List Items Addressed This Visit        Other    Family history of breast cancer in female    Ashkenazi Jew ancestry requiring population-specific genetic screening     -   Patient's mother had history of metachronous breast and ovarian cancers  I reviewed genetic testing of her mother which revealed wild type BRCA 1/2  She did have evidence of variant of undetermined significance in SHERIN  I have strongly encouraged patient to undergo genetic testing herself now that she has personal history of early onset  Breast cancer  She is scheduled to see genetic counselor in the near future  Relevant Orders    Case request operating room: SALPINGO-OOPHORECTOMY, LAPAROSCOPIC (Completed)    Malignant neoplasm of lower-outer quadrant of right breast of female, estrogen receptor positive ([facility] [address] ) - Primary     -   Patient with hormone receptor positive DCIS  She has a significant family history suggesting increase susceptibility to ovarian and breast cancer  Given home receptor positive tumor as well as increased risk for other malignancies and benefits and overall cancer reduction associated with salpingo-oophorectomy I have recommended and she has agreed to proceed with laparoscopic bilateral salpingo-oophorectomy  She would want to have this done as part of a joint procedure along with bilateral mastectomy and reconstruction  Hence, I have recommended against concurrent hysterectomy as that would increase risk of infectious complications and worse cosmetic results  She understands her abnormal uterine bleeding will resolve as result of surgical menopause  I also explained that the possibility of hysterectomy is not negated if this was indicated for some reason in the future  I will obtain same day type and screen  Otherwise preoperative testing per breast and plastic surgeons       I discussed with patient indication, risks, benefits and alternatives of surgical exploration  We discussed possibility of bleeding requiring blood transfusion, life-threatening infections requiring additional procedures, injuries to surrounding organs such as bladder, ureters, gastrointestinal tract and or neurovascular structures  Additionally, we discussed general risks associated to stress of surgery such as venous thromboembolism, acute myocardial events and stroke  All questions answered to patient's satisfaction  She agrees and wants to proceed  Informed consent form signed  Relevant Orders    Case request operating room: SALPINGO-OOPHORECTOMY, LAPAROSCOPIC (Completed)    Family history of ovarian carcinoma    Relevant Orders    Case request operating room: SALPINGO-OOPHORECTOMY, LAPAROSCOPIC (Completed)              CHIEF COMPLAINT:       Here for consultation consideration of risk reduction / hormonal ablation by means of salpingo-oophorectomy and possible hysterectomy  Problem:  Cancer Staging  Malignant neoplasm of lower-outer quadrant of right breast of female, estrogen receptor positive ([facility] [address] )  Staging form: Breast, AJCC 8th Edition  - Clinical: cT1, cN0, cM0, ER: Positive, MI: Positive, HER2: Negative - Signed by Olesya Avalos MD on 7/2/2018      Patient ID: Brandee Flores is a 57 y o  female  HPI    82-year-old premenopausal white female, Ashkenazi Spiritism with history of prior endometrial ablation for abnormal uterine bleeding, history of anti incontinence procedure and bilateral tubal fulguration for contraception  She was recently found to have abnormal mammogram   Imaging directed biopsy confirmed ductal carcinoma in situ  She was evaluated by breast some college E and is scheduled to see a plastic surgeon for consideration of bilateral mastectomy with reconstruction in the near future    Given her family history as well as current early onset hormone receptor positive ductal carcinoma in situ of the breast patient was referred to us for consultation and management recommendations  Of note, she had a recent pelvic ultrasound that demonstrated an ovarian cyst    Given abnormal uterine bleeding apparently she was counseled about the possibility of repeat endometrial ablation ( I counseled patient strongly against this intervention today)  Patient's family history is highly concerning for genetic cancer susceptibility syndrome  Review of Systems    Abnormal uterine bleeding, temporarily improved after endometrial ablation  Ovarian cyst   Otherwise 12 point review of systems is unremarkable  Current Outpatient Prescriptions   Medication Sig Dispense Refill    loratadine (CLARITIN) 10 mg tablet 10 mg      mometasone (NASONEX) 50 mcg/act nasal spray 2 sprays in each nostril as needed      montelukast (SINGULAIR) 10 mg tablet 10 mg      multivitamin (THERAGRAN) TABS Take 1 tablet by mouth daily      pantoprazole (PROTONIX) 40 mg tablet        No current facility-administered medications for this visit          Allergies   Allergen Reactions    Other     Pollen Extract      [facility] [place] - [id number]: trees, pollens       Past Medical History:   Diagnosis Date    Ashkenazi Buddhism ancestry requiring population-specific genetic screening     Bilateral fibrocystic breast changes     Breast cancer screening, high risk patient     Family history of breast cancer in female     Human papilloma virus (HPV) infection     Postural lightheadedness     Sinus problem     Sinus problem     Vulvar abscess        Past Surgical History:   Procedure Laterality Date    BLADDER SURGERY      bladder lift    BREAST BIOPSY Right 2018    [facility]    CERVIX LESION DESTRUCTION      COLPOSCOPY W/ BIOPSY / CURETTAGE      GASTRIC BYPASS      GASTRIC BYPASS      for morbid obesity    HYSTEROSCOPY W/ ENDOMETRIAL ABLATION      KNEE ARTHROSCOPY Right     NASAL SEPTUM SURGERY      ,        OB History      Para Term  AB Living    2 2 SAB TAB Ectopic Multiple Live Births                     Obstetric Comments    First pregnancy age 31  Menarche age 17  Hx birth control pills             Family History   Problem Relation Age of Onset    Other Mother         BRCA negative    Breast cancer Mother 72    Ovarian cancer Mother 61    Diabetes Father     Heart disease Father     Hypertension Father     Migraines Sister     Diabetes Brother     Colon cancer Maternal Grandfather 80    Diabetes type II Maternal Grandfather     Heart disease Paternal Grandfather     Colon cancer Maternal Aunt 62    Skin cancer Maternal Aunt        The following portions of the patient's history were reviewed and updated as appropriate: allergies, current medications, past family history, past medical history, past social history, past surgical history and problem list       Objective:    Blood pressure 118/86, pulse 78, temperature 98 9 °F (37 2 °C), temperature source Oral, resp  rate 18, height 5' 7 5" (1 715 m), weight 80 9 kg (178 lb 6 4 oz)  Body mass index is 27 53 kg/m²  Physical Exam   Constitutional: She is oriented to person, place, and time  She appears well-developed and well-nourished  HENT:   Head: Normocephalic and atraumatic  Neck: Normal range of motion  Neck supple  No thyromegaly present  Cardiovascular: Normal rate and regular rhythm  No murmur heard  Pulmonary/Chest: Effort normal and breath sounds normal  She has no wheezes  Abdominal: Soft  She exhibits no distension and no mass  There is no rebound  Genitourinary:   Genitourinary Comments:   Normal genitalia  Vulva and vagina normal   No vaginal lesions  No blood  Cervix with small ectropion  No gross lesions  Uterus small and anteverted  No adnexal masses  Musculoskeletal: Normal range of motion  She exhibits no edema  Lymphadenopathy:     She has no cervical adenopathy  Neurological: She is alert and oriented to person, place, and time     Skin: Skin is warm and dry  No rash noted  Psychiatric: She has a normal mood and affect  Her behavior is normal    Vitals reviewed  5/15/2018: AMB US Pelvic Non OB  Date/Time: 5/15/2018 7:45 AM  Performed by: Jin Foss  Authorized by: Carlyle Bennett   Procedure details:     Indications: ovarian cysts      Technique:  Transvaginal US, Non-OB    Position: lithotomy exam    Uterine findings:     Diameter (mm):  37    Length (mm):  85    Width (mm):  49    Endometrial stripe: identified      Endometrium thickness (mm):  9 7  Left ovary findings:     Left ovary:  Visualized    Diameter (mm):  26    Length (mm):  37 1    Width (mm):  25 6  Right ovary findings:     Right ovary:  Visualized    Diameter (mm):  24 4    Length (mm):  42 6    Width (mm):  28 1  Other findings:     Free pelvic fluid: not identified      Free peritoneal fluid: not identified    Post-Procedure Details:     Impression:  Anteverted uterus and bilateral ovaries appear within normal limits  Each ovary contains a follicle; Rt 1 2FA and Lt 1 4cm  No free fluid  Tolerance:   Tolerated well, no immediate complications    Complications: no complications    Additional Procedure Comments:      Compared to 3/29/2018  Siemens Acuson X150 EC9-4 transvaginal transducer Serial # [id number] was used to perform the examination today and subsequently followed with high level disinfection utilizing Trophon EPR procedure              Electronically signed by Felix Gerardo at 5/15/2018  7:52 AM   Electronically signed by Radha Guerrero MD at 5/15/2018  8:30 AM              No results found for:   Lab Results   Component Value Date    WBC 5 62 08/31/2016    HGB 13 1 08/31/2016    HCT 39 2 08/31/2016    MCV 85 08/31/2016     08/31/2016     Lab Results   Component Value Date     08/31/2016    K 4 1 08/31/2016     08/31/2016    CO2 28 08/31/2016    ANIONGAP 8 08/31/2016    BUN 17 08/31/2016    CREATININE 0 75 08/31/2016    GLUCOSE 73 08/31/2016    CALCIUM 8 8 08/31/2016    EGFR >60 0 08/31/2016     Case Report   Surgical Pathology Report                         Case: [id number]                                    Authorizing Provider: Stephen Valdez MD         Collected:           06/14/2018 1354               Ordering Location:     [facility] Received:            06/14/2018 1638                                      Center                                                                        Pathologist:           Alanis Ambrose MD                                                          Specimens:   A) - Breast, Right, stereo bx, 2 cores with calcifications                                           B) - Breast, Right, stereo bx, 1 core without calcifications                               Final Diagnosis   A  Right breast, stereotactic core biopsy with calcifications:     - Invasive breast carcinoma of no special type (ductal NST/invasive ductal carcinoma)  -- Single focus measuring size of  mm        -- mBR Grade:  Grade I         - Duct formation:      Score 2 of 3         - Nuclear grade:       Score 2 of 3         - Mitotic rate:            Score 1 of 3     - In situ component:  Ductal carcinoma in situ present, extensive component within this specimen       -- Histologic type:  Cribriform       -- Nuclear grade:  Intermediate; Grade 2       -- Necrosis:  Present       -- Calcifications:  Present within DCIS as well as invasive tumor       -- DCIS comprises approximately 45% of lesion     - Lymphovascular invasion:  None identified  - Best representative tumor block:  A1       -- Sufficient tumor present for          Agendia Mammaprint/Blueprint (1 cm2 of invasive tumor in aggregate): No          MI Profile/Foundation One (at least 5 x 5 mm of tumor): No     B  Right breast, stereotactic core biopsy without calcifications:     - Adipose tissue only       - Negative for malignancy         Test Description (Block A)                    Result                                         Prognostic Interpretation  Estrogen Receptor (clone SP-1)              100%                                           Positive   Internal control: Positive                           Staining Intensity: Strong  External control: Positive                          Elaine Score*:  8                                                         Progesterone Receptor (clone 1E2)         100%                                           Positive   Internal control: Positive                           Staining Intensity: Strong  External control: Positive                          Elaine Score*:  8     HER2 by IHC (clone 4B5)                        1+                                   Negative     Fixative Duration of Fixation (hrs)             Cold Ischemia Time (mins)           Sample Adequacy  Formalin            10 hours                                      Not stated                                    Adequate     Appropriate positive and negative controls were reviewed      These immunohistochemical tests were performed at [facility] in [facility] and interpreted by Dr Shweta Coates  An electronic copy of this report will be kept on file in the Medical Records Department at [facility]      * The Elaine score is a semi quantitative system that takes into consideration the proportion of positive cells (scored on a scale of 0-5) and staining intensity (scored on a scale of (0-3)  The proportion and intensity are summed to produce total scores of 0 or 2 through 8  A score of 0-2 is regarded as negative while 3-8 as positive       Electronically signed by Nidia Meza MD on 6/21/2018 at  9:21 AM   Note   Intradepartmental consultation is in agreement  Results telephoned to the [facility]  on 6/21/18 at 0920 hours        Additional Information   All controls performed with the immunohistochemical stains reported above reacted appropriately  These tests were developed and their performance characteristics determined by [facility] or [place]  They may not be cleared or approved by the [place]  Food and Drug Administration  The FDA has determined that such clearance or approval is not necessary  These tests are used for clinical purposes  They should not be regarded as investigational or for research  This laboratory has been approved by [place] [id number], designated as a high-complexity laboratory and is qualified to perform these tests      - Interpretation performed at [facility], [address] [place] [address]       Gross Description   A  The specimen is received in formalin, labeled with the patient's name and hospital number, and is designated "stereotactic right breast biopsy, 2 cores with calcifications  The specimen consists of 3 tan-pink to yellow, focally friable fibrofatty tissue cores ranging from 1 2-3 3 cm in length and ranging from 0 2-0 6 cm in diameter  The specimen is entirely submitted in 2 cassettes  A1:  2 cores  A2:  1 core     B  The specimen is received in formalin, labeled with the patient's name and hospital number, and is designated "right breast stereotactic biopsy, 1 core without calcifications  The specimen consists of multiple yellow, focally friable fibrofatty tissue fragments measuring in aggregate 1 0 x 0 8 x 0 1 cm in greatest dimension    The specimen is entirely submitted in 1 cassette      Note: The estimated total formalin fixation time based upon information provided by the submitting clinician and the standard processing schedule is 10 hours        Zora Walker MD, Kourtney Ph[address]  7/10/2018  3:23 PM

## 2018-07-10 NOTE — ASSESSMENT & PLAN NOTE
-   Patient's mother had history of metachronous breast and ovarian cancers  I reviewed genetic testing of her mother which revealed wild type BRCA 1/2  She did have evidence of variant of undetermined significance in SHERIN  I have strongly encouraged patient to undergo genetic testing herself now that she has personal history of early onset  Breast cancer  She is scheduled to see genetic counselor in the near future

## 2018-07-11 ENCOUNTER — OFFICE VISIT (OUTPATIENT)
Dept: GYNECOLOGIC ONCOLOGY | Facility: CLINIC | Age: 48
End: 2018-07-11

## 2018-07-11 DIAGNOSIS — Z17.0 MALIGNANT NEOPLASM OF LOWER-OUTER QUADRANT OF RIGHT BREAST OF FEMALE, ESTROGEN RECEPTOR POSITIVE (HCC): ICD-10-CM

## 2018-07-11 DIAGNOSIS — C50.511 MALIGNANT NEOPLASM OF LOWER-OUTER QUADRANT OF RIGHT BREAST OF FEMALE, ESTROGEN RECEPTOR POSITIVE (HCC): ICD-10-CM

## 2018-07-11 NOTE — PROGRESS NOTES
Status:  Genetic testing pending, estimated turn around time: 3 weeks    Summary:    The patient returned to the office to discuss additional genetic testing options due to her recent diagnosis of breast cancer    Family information was reviewed and the pedigree was updated  The patient was recently diagnosed with breast cancer at the age of 57  The  family history is significant for her mother who was diagnosed with breast cancer at the age of 72 and ovarian cancer at the age of 79  The patient's mother had genetic testing using the [facility] [place] [facility] panel which was negative for mutations in BRCA1/2 and showed a VUS in SHERIN (c [id number])  Multiple labs classify this variant as benign or likely benign on ClinVar  Please see pedigree for additional family history details  We discussed genetic testing using a larger, multi-gene testing panel which would include the genes that her mother was previously tested for plus additional genes associated with breast and other cancer  We discussed that some of the genes on this panel are not yet well understood  Genetic Testing: We reviewed the genetic testing process, insurance concerns, and possible results  The patient elected to pursue genetic testing for genes associated with hereditary cancer  Informed consent was obtained and a DNA sample was collected  The sample was sent to [facility] for multi-cancer panel  Test results should be available in approximately 3 weeks  The patient elected to have results disclosed by phone and she will be contacted once results are available

## 2018-07-17 ENCOUNTER — OFFICE VISIT (OUTPATIENT)
Dept: SURGICAL ONCOLOGY | Facility: CLINIC | Age: 48
End: 2018-07-17
Payer: COMMERCIAL

## 2018-07-17 VITALS
WEIGHT: 178 LBS | TEMPERATURE: 98 F | HEART RATE: 87 BPM | RESPIRATION RATE: 16 BRPM | SYSTOLIC BLOOD PRESSURE: 100 MMHG | DIASTOLIC BLOOD PRESSURE: 70 MMHG | BODY MASS INDEX: 26.98 KG/M2 | HEIGHT: 68 IN

## 2018-07-17 DIAGNOSIS — Z17.0 MALIGNANT NEOPLASM OF LOWER-OUTER QUADRANT OF RIGHT BREAST OF FEMALE, ESTROGEN RECEPTOR POSITIVE (HCC): Primary | ICD-10-CM

## 2018-07-17 DIAGNOSIS — C50.511 MALIGNANT NEOPLASM OF LOWER-OUTER QUADRANT OF RIGHT BREAST OF FEMALE, ESTROGEN RECEPTOR POSITIVE (HCC): Primary | ICD-10-CM

## 2018-07-17 PROCEDURE — 99215 OFFICE O/P EST HI 40 MIN: CPT | Performed by: SURGERY

## 2018-07-17 NOTE — LETTER
July 17, 2018     [address]  [address] [address]    Patient: Brandee Flores   YOB: 1970   Date of Visit: 7/17/2018       Dear Dr Gina Kaba: Thank you for referring Brandee Flores to me for evaluation  Below are my notes for this consultation  If you have questions, please do not hesitate to call me  I look forward to following your patient along with you  Sincerely,        Olesya Avalos MD        CC: MD Yuri Rich MD Erline Gaba, MD  7/17/2018 10:01 AM  Sign at close encounter     Surgical Oncology Follow Up       [address]  1970  [id number]  [address] [place] [address] [address] [address]    Chief Complaint   Patient presents with    Breast Cancer     Pt is here for surgery consent        Assessment/Plan   Diagnoses and all orders for this visit:    Malignant neoplasm of lower-outer quadrant of right breast of female, estrogen receptor positive ([facility] [address] )    Other orders  -     ceFAZolin (ANCEF) IVPB (premix) 1,000 mg; Infuse 50 mL (1,000 mg total) into a venous catheter once   -     Apply Sequential Compression Device; Standing  -     enoxaparin (LOVENOX) subcutaneous injection 30 mg; Inject 0 3 mL (30 mg total) under the skin 60 minutes pre-procedure         Advance Care Planning/Advance Directives:  Did not discuss  with the patient  Oncology History:     No history exists  History of Present Illness: right breast cancer  -Interval History: met with genetics, plastics and gyn/onc    Review of Systems:  Review of Systems   Constitutional: Negative  Negative for appetite change and fever  Eyes: Negative  Respiratory: Negative for shortness of breath  Cardiovascular: Negative  Gastrointestinal: Negative  Endocrine: Negative  Genitourinary: Negative  Musculoskeletal: Negative  Negative for arthralgias and myalgias  Skin: Negative  Allergic/Immunologic: Negative  Neurological: Negative  Hematological: Negative  Negative for adenopathy  Does not bruise/bleed easily  Psychiatric/Behavioral: Negative          Patient Active Problem List   Diagnosis    Bilateral fibrocystic breast changes    Family history of breast cancer in female   [facility] Ashkenazi Christianity ancestry requiring population-specific genetic screening    Malignant neoplasm of lower-outer quadrant of right breast of female, estrogen receptor positive ([address] )    Family history of ovarian carcinoma     Past Medical History:   Diagnosis Date    Ashkenazi Christianity ancestry requiring population-specific genetic screening     Bilateral fibrocystic breast changes     Breast cancer screening, high risk patient     Family history of breast cancer in female     Human papilloma virus (HPV) infection     Postural lightheadedness     Sinus problem     Sinus problem     Vulvar abscess      Past Surgical History:   Procedure Laterality Date    BLADDER SURGERY  2013    bladder lift    BREAST BI[facility]SY Right 06/14/2018    [facility]    CERVIX LESION DESTRUCTION      COLPOSCOPY W/ BIOPSY / CURETTAGE      GASTRIC BYPASS      GASTRIC BYPASS  2015    for morbid obesity    HYSTEROSCOPY W/ ENDOMETRIAL ABLATION  2013    KNEE ARTHROSCOPY Right 1990    NASAL SEPTUM SURGERY      2002, 2016     Family History   Problem Relation Age of Onset    Other Mother         BRCA negative    Breast cancer Mother 72    Ovarian cancer Mother 61    Diabetes Father     Heart disease Father     Hypertension Father     Migraines Sister     Diabetes Brother     Colon cancer Maternal Grandfather 80    Diabetes type II Maternal Grandfather     Heart disease Paternal Grandfather     Colon cancer Maternal Aunt 62    Skin cancer Maternal Aunt      Social History     Social History    Marital status: Single     Spouse name: N/A    Number of children: 2    Years of education: N/A     Occupational History    Not on file  Social History Main Topics    Smoking status: Never Smoker    Smokeless tobacco: Never Used    Alcohol use Yes      Comment: ocass   Drug use: No    Sexual activity: Not on file     Other Topics Concern    Not on file     Social History Narrative    Uses safety equipment, seatbelts       Current Outpatient Prescriptions:     loratadine (CLARITIN) 10 mg tablet, 10 mg, Disp: , Rfl:     mometasone (NASONEX) 50 mcg/act nasal spray, 2 sprays in each nostril as needed, Disp: , Rfl:     montelukast (SINGULAIR) 10 mg tablet, 10 mg, Disp: , Rfl:     multivitamin (THERAGRAN) TABS, Take 1 tablet by mouth daily, Disp: , Rfl:     pantoprazole (PROTONIX) 40 mg tablet, , Disp: , Rfl:   Allergies   Allergen Reactions    Other     Pollen Extract      Annotation - [id number]: trees, pollens       The following portions of the patient's history were reviewed and updated as appropriate: allergies, current medications, past family history, past medical history, past social history, past surgical history and problem list         Vitals:    07/17/18 0922   BP: 100/70   Pulse: 87   Resp: 16   Temp: 98 °F (36 7 °C)       Physical Exam   Constitutional: She is oriented to person, place, and time  She appears well-developed and well-nourished  Neurological: She is alert and oriented to person, place, and time  Psychiatric: She has a normal mood and affect  Results:  Labs:  Genetic testing is pending      Discussion/Summary:  68-year-old female here today for a follow-up visit secondary to her right breast carcinoma  She met with our genetic counselor and genetic testing is pending  She has however decided to proceed with bilateral mastectomy and reconstruction  She met with Dr Min Castellon and is planning on expander/implant reconstruction    Additionally she met with Dr Imani Monson to discuss prophylactic salpingo oophorectomy  I reviewed the procedure of bilateral mastectomy along with right-sided lymphatic mapping and sentinel node biopsy  I answered all of her questions  Consent was signed today in the office  She prefers to have surgery toward the mid to latter portion of August   We are tentatively looking at August 17th    She will need to return for a preoperative history and physical

## 2018-07-17 NOTE — PROGRESS NOTES
Surgical Oncology Follow Up       [place]  [facility] [place]  [address] [facility] [address]    Margaret Mina  1970  [id number]  [address]  [facility] [place]  [address] [place] [address]    Chief Complaint   Patient presents with    Breast Cancer     Pt is here for surgery consent        Assessment/Plan   Diagnoses and all orders for this visit:    Malignant neoplasm of lower-outer quadrant of right breast of female, estrogen receptor positive ([address] )    Other orders  -     ceFAZolin (ANCEF) IVPB (premix) 1,000 mg; Infuse 50 mL (1,000 mg total) into a venous catheter once   -     Apply Sequential Compression Device; Standing  -     enoxaparin (LOVENOX) subcutaneous injection 30 mg; Inject 0 3 mL (30 mg total) under the skin 60 minutes pre-procedure         Advance Care Planning/Advance Directives:  Did not discuss  with the patient  Oncology History:     No history exists  History of Present Illness: right breast cancer  -Interval History: met with genetics, plastics and gyn/onc    Review of Systems:  Review of Systems   Constitutional: Negative  Negative for appetite change and fever  Eyes: Negative  Respiratory: Negative for shortness of breath  Cardiovascular: Negative  Gastrointestinal: Negative  Endocrine: Negative  Genitourinary: Negative  Musculoskeletal: Negative  Negative for arthralgias and myalgias  Skin: Negative  Allergic/Immunologic: Negative  Neurological: Negative  Hematological: Negative  Negative for adenopathy  Does not bruise/bleed easily  Psychiatric/Behavioral: Negative          Patient Active Problem List   Diagnosis    Bilateral fibrocystic breast changes    Family history of breast cancer in female    Ashkenazi Orthodox ancestry requiring population-specific genetic screening    Malignant neoplasm of lower-outer quadrant of right breast of female, estrogen receptor positive ([facility] [address] )    Family history of ovarian carcinoma     Past Medical History:   Diagnosis Date    Ashkenazi Amish ancestry requiring population-specific genetic screening     Bilateral fibrocystic breast changes     Breast cancer screening, high risk patient     Family history of breast cancer in female     Human papilloma virus (HPV) infection     Postural lightheadedness     Sinus problem     Sinus problem     Vulvar abscess      Past Surgical History:   Procedure Laterality Date    BLADDER SURGERY  2013    bladder lift    BREAST BIOPSY Right 06/14/2018    [facility]    CERVIX LESION DESTRUCTION      COLPOSCOPY W/ BIOPSY / CURETTAGE      GASTRIC BYPASS      GASTRIC BYPASS  2015    for morbid obesity    HYSTEROSCOPY W/ ENDOMETRIAL ABLATION  2013    KNEE ARTHROSCOPY Right 1990    NASAL SEPTUM SURGERY      2002, 2016     Family History   Problem Relation Age of Onset    Other Mother         BRCA negative    Breast cancer Mother 72    Ovarian cancer Mother 61    Diabetes Father     Heart disease Father     Hypertension Father     Migraines Sister     Diabetes Brother     Colon cancer Maternal Grandfather 80    Diabetes type II Maternal Grandfather     Heart disease Paternal Grandfather     Colon cancer Maternal Aunt 62    Skin cancer Maternal Aunt      Social History     Social History    Marital status: Single     Spouse name: N/A    Number of children: 2    Years of education: N/A     Occupational History    Not on file  Social History Main Topics    Smoking status: Never Smoker    Smokeless tobacco: Never Used    Alcohol use Yes      Comment: ocass      Drug use: No    Sexual activity: Not on file     Other Topics Concern    Not on file     Social History Narrative    Uses safety equipment, seatbelts       Current Outpatient Prescriptions:     loratadine (CLARITIN) 10 mg tablet, 10 mg, Disp: , Rfl:     mometasone (NASONEX) 50 mcg/act nasal spray, 2 sprays in each nostril as needed, Disp: , Rfl:     montelukast (SINGULAIR) 10 mg tablet, 10 mg, Disp: , Rfl:     multivitamin (THERAGRAN) TABS, Take 1 tablet by mouth daily, Disp: , Rfl:     pantoprazole (PROTONIX) 40 mg tablet, , Disp: , Rfl:   Allergies   Allergen Reactions    Other     Pollen Extract      Annotation - [id number]: trees, pollens       The following portions of the patient's history were reviewed and updated as appropriate: allergies, current medications, past family history, past medical history, past social history, past surgical history and problem list         Vitals:    07/17/18 0922   BP: 100/70   Pulse: 87   Resp: 16   Temp: 98 °F (36 7 °C)       Physical Exam   Constitutional: She is oriented to person, place, and time  She appears well-developed and well-nourished  Cardiovascular: Normal heart sounds  Pulmonary/Chest: Breath sounds normal    Neurological: She is alert and oriented to person, place, and time  Psychiatric: She has a normal mood and affect  Results:  Labs:  Genetic testing is pending      Discussion/Summary:  47-year-old female here today for a follow-up visit secondary to her right breast carcinoma  She met with our genetic counselor and genetic testing is pending  She has however decided to proceed with bilateral mastectomy and reconstruction  She met with Dr Adan Pappas and is planning on expander/implant reconstruction  Additionally she met with Dr Esau Thornton to discuss prophylactic salpingo oophorectomy  I reviewed the procedure of bilateral mastectomy along with right-sided lymphatic mapping and sentinel node biopsy  I answered all of her questions  Consent was signed today in the office

## 2018-07-17 NOTE — LETTER
July 17, 2018     [address]  [address] [address]    Patient: Lisa Delaney   YOB: 1970   Date of Visit: 7/17/2018       Dear Dr Serge Slater: Thank you for referring Lisa Delaney to me for evaluation  Below are my notes for this consultation  If you have questions, please do not hesitate to call me  I look forward to following your patient along with you  Sincerely,        Mat Martinez MD        CC: No Recipients  Mat Martinez MD  7/17/2018 10:01 AM  Sign at close encounter     Surgical Oncology Follow Up       [address]  1970  [id number]  [address] [place] [address] [address] Hyman [address]    Chief Complaint   Patient presents with    Breast Cancer     Pt is here for surgery consent        Assessment/Plan   Diagnoses and all orders for this visit:    Malignant neoplasm of lower-outer quadrant of right breast of female, estrogen receptor positive ([address] )    Other orders  -     ceFAZolin (ANCEF) IVPB (premix) 1,000 mg; Infuse 50 mL (1,000 mg total) into a venous catheter once   -     Apply Sequential Compression Device; Standing  -     enoxaparin (LOVENOX) subcutaneous injection 30 mg; Inject 0 3 mL (30 mg total) under the skin 60 minutes pre-procedure         Advance Care Planning/Advance Directives:  Did not discuss  with the patient  Oncology History:     No history exists  History of Present Illness: right breast cancer  -Interval History: met with genetics, plastics and gyn/onc    Review of Systems:  Review of Systems   Constitutional: Negative  Negative for appetite change and fever  Eyes: Negative  Respiratory: Negative for shortness of breath  Cardiovascular: Negative  Gastrointestinal: Negative  Endocrine: Negative  Genitourinary: Negative  Musculoskeletal: Negative  Negative for arthralgias and myalgias  Skin: Negative  Allergic/Immunologic: Negative  Neurological: Negative  Hematological: Negative  Negative for adenopathy  Does not bruise/bleed easily  Psychiatric/Behavioral: Negative          Patient Active Problem List   Diagnosis    Bilateral fibrocystic breast changes    Family history of breast cancer in female   [employer] Ashkenazi Rastafarian ancestry requiring population-specific genetic screening    Malignant neoplasm of lower-outer quadrant of right breast of female, estrogen receptor positive ([address] )    Family history of ovarian carcinoma     Past Medical History:   Diagnosis Date    Ashkenazi Rastafarian ancestry requiring population-specific genetic screening     Bilateral fibrocystic breast changes     Breast cancer screening, high risk patient     Family history of breast cancer in female     Human papilloma virus (HPV) infection     Postural lightheadedness     Sinus problem     Sinus problem     Vulvar abscess      Past Surgical History:   Procedure Laterality Date    BLADDER SURGERY  2013    bladder lift    BREAST BIOPSY Right 06/14/2018    [facility]    CERVIX LESION DESTRUCTION      COLPOSCOPY W/ BIOPSY / CURETTAGE      GASTRIC BYPASS      GASTRIC BYPASS  2015    for morbid obesity    HYSTEROSCOPY W/ ENDOMETRIAL ABLATION  2013    KNEE ARTHROSCOPY Right 1990    NASAL SEPTUM SURGERY      2002, 2016     Family History   Problem Relation Age of Onset    Other Mother         BRCA negative    Breast cancer Mother 72    Ovarian cancer Mother 61    Diabetes Father     Heart disease Father     Hypertension Father     Migraines Sister     Diabetes Brother     Colon cancer Maternal Grandfather 80    Diabetes type II Maternal Grandfather     Heart disease Paternal Grandfather     Colon cancer Maternal Aunt 62    Skin cancer Maternal Aunt      Social History     Social History    Marital status: Single     Spouse name: N/A    Number of children: 2    Years of education: N/A     Occupational History    Not on file  Social History Main Topics    Smoking status: Never Smoker    Smokeless tobacco: Never Used    Alcohol use Yes      Comment: ocass   Drug use: No    Sexual activity: Not on file     Other Topics Concern    Not on file     Social History Narrative    Uses safety equipment, seatbelts       Current Outpatient Prescriptions:     loratadine (CLARITIN) 10 mg tablet, 10 mg, Disp: , Rfl:     mometasone (NASONEX) 50 mcg/act nasal spray, 2 sprays in each nostril as needed, Disp: , Rfl:     montelukast (SINGULAIR) 10 mg tablet, 10 mg, Disp: , Rfl:     multivitamin (THERAGRAN) TABS, Take 1 tablet by mouth daily, Disp: , Rfl:     pantoprazole (PROTONIX) 40 mg tablet, , Disp: , Rfl:   Allergies   Allergen Reactions    Other     Pollen Extract      Annotation - [id number]: trees, pollens       The following portions of the patient's history were reviewed and updated as appropriate: allergies, current medications, past family history, past medical history, past social history, past surgical history and problem list         Vitals:    07/17/18 0922   BP: 100/70   Pulse: 87   Resp: 16   Temp: 98 °F (36 7 °C)       Physical Exam   Constitutional: She is oriented to person, place, and time  She appears well-developed and well-nourished  Neurological: She is alert and oriented to person, place, and time  Psychiatric: She has a normal mood and affect  Results:  Labs:  Genetic testing is pending      Discussion/Summary:  72-year-old female here today for a follow-up visit secondary to her right breast carcinoma  She met with our genetic counselor and genetic testing is pending  She has however decided to proceed with bilateral mastectomy and reconstruction  She met with Dr Daniel Bush and is planning on expander/implant reconstruction    Additionally she met with Dr Rosetta Calero to discuss prophylactic salpingo oophorectomy  I reviewed the procedure of bilateral mastectomy along with right-sided lymphatic mapping and sentinel node biopsy  I answered all of her questions  Consent was signed today in the office  She prefers to have surgery toward the mid to latter portion of August   We are tentatively looking at August 17th    She will need to return for a preoperative history and physical

## 2018-07-31 ENCOUNTER — TELEPHONE (OUTPATIENT)
Dept: GYNECOLOGIC ONCOLOGY | Facility: CLINIC | Age: 48
End: 2018-07-31

## 2018-08-02 ENCOUNTER — ANESTHESIA EVENT (OUTPATIENT)
Dept: PERIOP | Facility: HOSPITAL | Age: 48
DRG: 581 | End: 2018-08-02
Payer: COMMERCIAL

## 2018-08-02 ENCOUNTER — DOCUMENTATION (OUTPATIENT)
Dept: GYNECOLOGIC ONCOLOGY | Facility: CLINIC | Age: 48
End: 2018-08-02

## 2018-08-02 RX ORDER — SODIUM CHLORIDE 9 MG/ML
125 INJECTION, SOLUTION INTRAVENOUS CONTINUOUS
Status: CANCELLED | OUTPATIENT
Start: 2018-08-02 | End: 2019-08-02

## 2018-08-02 NOTE — PROGRESS NOTES
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 [id number]    Discussion-   Genetic testing results were disclosed to the patient  We discussed that a change was found in the [place] gene  At this time it is unclear if this particular variant is associated with in increased risk for cancer  The FH gene is associated with two two distinct syndromes: Fumarate Hydratase (FH) deficiency and papillary Renal Cell Carcinoma with Hereditary Leiomyomatosis (HLRCC)  Fumarate Hydratase deficiency is an autosomal recessive condition  For a person to have FH deficiency, they must carry two FH mutations  This FH variant is considered likely pathogenic for autosomal recessive FH  Fumarate Hydratase deficiency involves progressive neurological impairment in infants  Newborns and infants may experience muscle weakness, seizures, poor feeding, and failure to thrive  Hereditary Leiomyomatosis and Renal Cell Carcinoma (HLRCC) is also related to the [place] gene  HLRCC is an autosomal dominant condition only requiring one gene mutation  HLRCC involves cutaneous leiomyomata, one or more, in 76% of individuals  These are firm, skin colored or brown bumps on the skin ranging from 3-13mm  usually these are found by age 22 and can appear on limbs, face, or trunk  Some individuals experience pain or sensitivity around these lesions  Nearly all women with HLRCC also have uterine leiomyomata (fibroids)  In addition, 10-16% of individuals may have a single renal tumor, typically identified around age 36  Renal tumors can cause blood in ones urine and/or lower back pain  Current research indicates that this particular variant does not cause HLRCC  The patient does not report any personal or family history suggestive of HLRCC  This FH variant is not likely to be associated with the patient's personal or family history of breast cancer      No mutations were identified in any of the additional genes tested: ALK, APC, SHERIN, AXIN2, BAP1, BARD1, BLM, BMPR1A, BRCA1, BRCA2, BRIP1, CASR, CDC73, CDH1, CDK4, CDKN1B, CDKN1C, CDKN2A, CEBPA, CHEK2, DICER1, DIS3L2, EGFR, EPCAM, FLCN, GATA2, GPC3, GREM1, HOXB13, HRAS, KIT, MAX, MEN1, MET, MITF, MLH1, MSH2, MSH6, MUTYH, NBN, NF1, NF2 ,PALB2, PDGFRA, PHOX2B, PMS2, POLD1, POLE, POT1, PJDQS7Q, PTCH1, PTEN, RAD50, RAD51C, RAD51D, RB1, RECQL4, RET, RUNX1, SDHA, SDHAF2, SDHB, SDHC, SDHD, SMAD4, SMARCA4, SMARCB1, SMARCE1, STK11, SUFU, TERC, TERT, XDDM483, TP53, TSC1, TSC2, VHL, WRN, WT1 ([employer] Multi-Cancer Panel)    We discussed that these results do not explain the history of cancer in the family, and it is possible that an unidentifiable mutation or a mutation in another gene(s) is leading to an increased risk of cancer in the family  Therefore it is important that the patient and family members maintain regular cancer screening as directed by their physicians  T    The patients questions were answered and she was encouraged to call with any future questions or concerns

## 2018-08-03 ENCOUNTER — APPOINTMENT (OUTPATIENT)
Dept: PREADMISSION TESTING | Facility: HOSPITAL | Age: 48
End: 2018-08-03
Payer: COMMERCIAL

## 2018-08-03 ENCOUNTER — HOSPITAL ENCOUNTER (OUTPATIENT)
Dept: RADIOLOGY | Facility: HOSPITAL | Age: 48
Discharge: HOME | End: 2018-08-03
Attending: SURGERY
Payer: COMMERCIAL

## 2018-08-03 ENCOUNTER — HOSPITAL ENCOUNTER (OUTPATIENT)
Dept: NON INVASIVE DIAGNOSTICS | Facility: HOSPITAL | Age: 48
Discharge: HOME | End: 2018-08-03
Attending: SURGERY
Payer: COMMERCIAL

## 2018-08-03 ENCOUNTER — APPOINTMENT (OUTPATIENT)
Dept: LAB | Facility: HOSPITAL | Age: 48
End: 2018-08-03
Attending: SURGERY
Payer: COMMERCIAL

## 2018-08-03 ENCOUNTER — TELEPHONE (OUTPATIENT)
Dept: SURGICAL ONCOLOGY | Facility: CLINIC | Age: 48
End: 2018-08-03

## 2018-08-03 DIAGNOSIS — C50.511 MALIGNANT NEOPLASM OF LOWER-OUTER QUADRANT OF RIGHT BREAST OF FEMALE, ESTROGEN RECEPTOR POSITIVE (HCC): ICD-10-CM

## 2018-08-03 DIAGNOSIS — N30.01 ACUTE CYSTITIS WITH HEMATURIA: Primary | ICD-10-CM

## 2018-08-03 DIAGNOSIS — Z17.0 MALIGNANT NEOPLASM OF LOWER-OUTER QUADRANT OF RIGHT BREAST OF FEMALE, ESTROGEN RECEPTOR POSITIVE (HCC): ICD-10-CM

## 2018-08-03 PROBLEM — N39.0 UTI (URINARY TRACT INFECTION): Status: ACTIVE | Noted: 2018-08-03

## 2018-08-03 LAB
ALBUMIN SERPL BCP-MCNC: 3.4 G/DL (ref 3.5–5)
ALP SERPL-CCNC: 57 U/L (ref 46–116)
ALT SERPL W P-5'-P-CCNC: 14 U/L (ref 12–78)
ANION GAP SERPL CALCULATED.3IONS-SCNC: 3 MMOL/L (ref 4–13)
APTT PPP: 31 SECONDS (ref 24–36)
AST SERPL W P-5'-P-CCNC: 15 U/L (ref 5–45)
ATRIAL RATE: 73 BPM
BACTERIA UR QL AUTO: ABNORMAL /HPF
BASOPHILS # BLD AUTO: 0.02 THOUSANDS/ΜL (ref 0–0.1)
BASOPHILS NFR BLD AUTO: 0 % (ref 0–1)
BILIRUB SERPL-MCNC: 0.45 MG/DL (ref 0.2–1)
BILIRUB UR QL STRIP: NEGATIVE
BUN SERPL-MCNC: 13 MG/DL (ref 5–25)
CALCIUM SERPL-MCNC: 8.6 MG/DL (ref 8.3–10.1)
CHLORIDE SERPL-SCNC: 104 MMOL/L (ref 100–108)
CLARITY UR: CLEAR
CO2 SERPL-SCNC: 30 MMOL/L (ref 21–32)
COLOR UR: YELLOW
CREAT SERPL-MCNC: 0.78 MG/DL (ref 0.6–1.3)
EOSINOPHIL # BLD AUTO: 0.24 THOUSAND/ΜL (ref 0–0.61)
EOSINOPHIL NFR BLD AUTO: 5 % (ref 0–6)
ERYTHROCYTE [DISTWIDTH] IN BLOOD BY AUTOMATED COUNT: 13.1 % (ref 11.6–15.1)
GFR SERPL CREATININE-BSD FRML MDRD: 91 ML/MIN/1.73SQ M
GLUCOSE SERPL-MCNC: 72 MG/DL (ref 65–140)
GLUCOSE UR STRIP-MCNC: NEGATIVE MG/DL
HCT VFR BLD AUTO: 37.4 % (ref 34.8–46.1)
HGB BLD-MCNC: 12 G/DL (ref 11.5–15.4)
HGB UR QL STRIP.AUTO: ABNORMAL
INR PPP: 0.98 (ref 0.86–1.17)
KETONES UR STRIP-MCNC: NEGATIVE MG/DL
LEUKOCYTE ESTERASE UR QL STRIP: ABNORMAL
LYMPHOCYTES # BLD AUTO: 1.15 THOUSANDS/ΜL (ref 0.6–4.47)
LYMPHOCYTES NFR BLD AUTO: 25 % (ref 14–44)
MCH RBC QN AUTO: 27.2 PG (ref 26.8–34.3)
MCHC RBC AUTO-ENTMCNC: 32.1 G/DL (ref 31.4–37.4)
MCV RBC AUTO: 85 FL (ref 82–98)
MONOCYTES # BLD AUTO: 0.29 THOUSAND/ΜL (ref 0.17–1.22)
MONOCYTES NFR BLD AUTO: 6 % (ref 4–12)
NEUTROPHILS # BLD AUTO: 2.87 THOUSANDS/ΜL (ref 1.85–7.62)
NEUTS SEG NFR BLD AUTO: 63 % (ref 43–75)
NITRITE UR QL STRIP: POSITIVE
NON-SQ EPI CELLS URNS QL MICRO: ABNORMAL /HPF
NRBC BLD AUTO-RTO: 0 /100 WBCS
P AXIS: 0 DEGREES
PH UR STRIP.AUTO: 5.5 [PH] (ref 4.5–8)
PLATELET # BLD AUTO: 316 THOUSANDS/UL (ref 149–390)
PMV BLD AUTO: 11.4 FL (ref 8.9–12.7)
POTASSIUM SERPL-SCNC: 3.9 MMOL/L (ref 3.5–5.3)
PR INTERVAL: 126 MS
PROT SERPL-MCNC: 6.8 G/DL (ref 6.4–8.2)
PROT UR STRIP-MCNC: NEGATIVE MG/DL
PROTHROMBIN TIME: 13.1 SECONDS (ref 11.8–14.2)
QRS AXIS: 18 DEGREES
QRSD INTERVAL: 76 MS
QT INTERVAL: 406 MS
QTC INTERVAL: 447 MS
RBC # BLD AUTO: 4.41 MILLION/UL (ref 3.81–5.12)
RBC #/AREA URNS AUTO: ABNORMAL /HPF
SODIUM SERPL-SCNC: 137 MMOL/L (ref 136–145)
SP GR UR STRIP.AUTO: >=1.03 (ref 1–1.03)
T WAVE AXIS: 22 DEGREES
UROBILINOGEN UR QL STRIP.AUTO: 0.2 E.U./DL
VENTRICULAR RATE: 73 BPM
WBC # BLD AUTO: 4.57 THOUSAND/UL (ref 4.31–10.16)
WBC #/AREA URNS AUTO: ABNORMAL /HPF

## 2018-08-03 PROCEDURE — 80053 COMPREHEN METABOLIC PANEL: CPT

## 2018-08-03 PROCEDURE — 93005 ELECTROCARDIOGRAM TRACING: CPT

## 2018-08-03 PROCEDURE — 36415 COLL VENOUS BLD VENIPUNCTURE: CPT

## 2018-08-03 PROCEDURE — 85025 COMPLETE CBC W/AUTO DIFF WBC: CPT

## 2018-08-03 PROCEDURE — 85610 PROTHROMBIN TIME: CPT

## 2018-08-03 PROCEDURE — 81001 URINALYSIS AUTO W/SCOPE: CPT | Performed by: SURGERY

## 2018-08-03 PROCEDURE — 93010 ELECTROCARDIOGRAM REPORT: CPT | Performed by: INTERNAL MEDICINE

## 2018-08-03 PROCEDURE — 85730 THROMBOPLASTIN TIME PARTIAL: CPT

## 2018-08-03 PROCEDURE — 71046 X-RAY EXAM CHEST 2 VIEWS: CPT

## 2018-08-03 RX ORDER — IBUPROFEN 200 MG
400 TABLET ORAL EVERY 6 HOURS PRN
COMMUNITY
End: 2018-11-30 | Stop reason: HOSPADM

## 2018-08-03 RX ORDER — ALBUTEROL SULFATE 90 UG/1
2 AEROSOL, METERED RESPIRATORY (INHALATION) EVERY 6 HOURS PRN
COMMUNITY
End: 2020-06-02 | Stop reason: CLARIF

## 2018-08-03 RX ORDER — SCOLOPAMINE TRANSDERMAL SYSTEM 1 MG/1
1 PATCH, EXTENDED RELEASE TRANSDERMAL ONCE
Status: CANCELLED | OUTPATIENT
Start: 2018-08-10 | End: 2018-08-10

## 2018-08-03 RX ORDER — ACETAMINOPHEN 325 MG/1
650 TABLET ORAL EVERY 6 HOURS PRN
COMMUNITY

## 2018-08-03 RX ORDER — SULFAMETHOXAZOLE AND TRIMETHOPRIM 800; 160 MG/1; MG/1
1 TABLET ORAL EVERY 12 HOURS SCHEDULED
Qty: 10 TABLET | Refills: 0 | Status: SHIPPED | OUTPATIENT
Start: 2018-08-03 | End: 2018-08-08

## 2018-08-03 NOTE — PRE-PROCEDURE INSTRUCTIONS
Pre-Surgery Instructions:   Medication Instructions    acetaminophen (TYLENOL) 325 mg tablet Patient was instructed by Physician and understands   albuterol (PROVENTIL HFA,VENTOLIN HFA) 90 mcg/act inhaler Patient was instructed by Physician and understands   ibuprofen (MOTRIN) 200 mg tablet Patient was instructed by Physician and understands   loratadine (CLARITIN) 10 mg tablet Patient was instructed by Physician and understands   mometasone (NASONEX) 50 mcg/act nasal spray Patient was instructed by Physician and understands   montelukast (SINGULAIR) 10 mg tablet Patient was instructed by Physician and understands   multivitamin [facility]) TABS Patient was instructed by Physician and understands   pantoprazole (PROTONIX) 40 mg tablet Patient was instructed by Physician and understands  Seen by Dr Chip Tom and was told to stop NSAIDS and supplements one week preop and on DOS was told to use inhaler and take Protonix with sip of water  Instructed on use of Chlorhexidine for preoperative bathing per hospital prototocol

## 2018-08-03 NOTE — ANESTHESIA PREPROCEDURE EVALUATION
Review of Systems/Medical History  Patient summary reviewed  Chart reviewed  History of anesthetic complications PONV    Cardiovascular   Pulmonary  Asthma , well controlled/ stable Asthma type of rescue: daily inhaler,        GI/Hepatic    GERD well controlled, Bariatric surgery,        Negative  ROS        Endo/Other  Negative endo/other ROS      GYN    Breast cancer        Hematology  Negative hematology ROS      Musculoskeletal  Negative musculoskeletal ROS        Neurology  Negative neurology ROS      Psychology   Negative psychology ROS              Physical Exam    Airway    Mallampati score: II  TM Distance: >3 FB  Neck ROM: full     Dental   No notable dental hx     Cardiovascular  Rhythm: regular, Rate: normal, Cardiovascular exam normal    Pulmonary  Pulmonary exam normal Breath sounds clear to auscultation,     Other Findings        Anesthesia Plan  ASA Score- 2     Anesthesia Type- general with ASA Monitors  Additional Monitors:   Airway Plan: ETT  Comment: SCOP patch ordered  Plan Factors-Patient not instructed to abstain from smoking on day of procedure  Patient did not smoke on day of surgery  Induction- intravenous  Postoperative Plan- Plan for postoperative opioid use  Informed Consent- Anesthetic plan and risks discussed with patient

## 2018-08-03 NOTE — TELEPHONE ENCOUNTER
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

## 2018-08-09 RX ORDER — SODIUM CHLORIDE 9 MG/ML
125 INJECTION, SOLUTION INTRAVENOUS CONTINUOUS
Status: DISCONTINUED | OUTPATIENT
Start: 2018-08-09 | End: 2018-08-09

## 2018-08-10 ENCOUNTER — HOSPITAL ENCOUNTER (INPATIENT)
Facility: HOSPITAL | Age: 48
LOS: 1 days | Discharge: HOME HEALTH | DRG: 581 | End: 2018-08-11
Attending: SURGERY | Admitting: PLASTIC SURGERY
Payer: COMMERCIAL

## 2018-08-10 ENCOUNTER — ANESTHESIA (OUTPATIENT)
Dept: PERIOP | Facility: HOSPITAL | Age: 48
DRG: 581 | End: 2018-08-10
Payer: COMMERCIAL

## 2018-08-10 ENCOUNTER — HOSPITAL ENCOUNTER (OUTPATIENT)
Dept: NUCLEAR MEDICINE | Facility: HOSPITAL | Age: 48
Discharge: HOME | DRG: 581 | End: 2018-08-10
Attending: SURGERY
Payer: COMMERCIAL

## 2018-08-10 ENCOUNTER — CONVERSION ENCOUNTER (OUTPATIENT)
Dept: MAMMOGRAPHY | Facility: HOSPITAL | Age: 48
End: 2018-08-10

## 2018-08-10 DIAGNOSIS — Z80.41 FAMILY HISTORY OF OVARIAN CARCINOMA: ICD-10-CM

## 2018-08-10 DIAGNOSIS — Z17.0 MALIGNANT NEOPLASM OF LOWER-OUTER QUADRANT OF RIGHT BREAST OF FEMALE, ESTROGEN RECEPTOR POSITIVE (HCC): ICD-10-CM

## 2018-08-10 DIAGNOSIS — C50.511 MALIGNANT NEOPLASM OF LOWER-OUTER QUADRANT OF RIGHT BREAST OF FEMALE, ESTROGEN RECEPTOR POSITIVE (HCC): ICD-10-CM

## 2018-08-10 DIAGNOSIS — Z80.3 FAMILY HISTORY OF BREAST CANCER IN FEMALE: ICD-10-CM

## 2018-08-10 DIAGNOSIS — Z13.79 ASHKENAZI JEWISH ANCESTRY REQUIRING POPULATION-SPECIFIC GENETIC SCREENING: ICD-10-CM

## 2018-08-10 PROBLEM — Z90.722 STATUS POST BILATERAL SALPINGO-OOPHORECTOMY (BSO): Status: ACTIVE | Noted: 2018-08-10

## 2018-08-10 LAB
ABO GROUP BLD: NORMAL
BLD GP AB SCN SERPL QL: NEGATIVE
EXT PREGNANCY TEST URINE: NEGATIVE
RH BLD: POSITIVE
SPECIMEN EXPIRATION DATE: NORMAL

## 2018-08-10 PROCEDURE — 86900 BLOOD TYPING SEROLOGIC ABO: CPT | Performed by: OBSTETRICS & GYNECOLOGY

## 2018-08-10 PROCEDURE — 19303 MAST SIMPLE COMPLETE: CPT | Performed by: SURGERY

## 2018-08-10 PROCEDURE — C1781 MESH (IMPLANTABLE): HCPCS | Performed by: OBSTETRICS & GYNECOLOGY

## 2018-08-10 PROCEDURE — 88112 CYTOPATH CELL ENHANCE TECH: CPT | Performed by: PATHOLOGY

## 2018-08-10 PROCEDURE — 58661 LAPAROSCOPY REMOVE ADNEXA: CPT | Performed by: OBSTETRICS & GYNECOLOGY

## 2018-08-10 PROCEDURE — 88305 TISSUE EXAM BY PATHOLOGIST: CPT | Performed by: PATHOLOGY

## 2018-08-10 PROCEDURE — 78195 LYMPH SYSTEM IMAGING: CPT

## 2018-08-10 PROCEDURE — 88307 TISSUE EXAM BY PATHOLOGIST: CPT | Performed by: PATHOLOGY

## 2018-08-10 PROCEDURE — A9541 TC99M SULFUR COLLOID: HCPCS

## 2018-08-10 PROCEDURE — 86850 RBC ANTIBODY SCREEN: CPT | Performed by: OBSTETRICS & GYNECOLOGY

## 2018-08-10 PROCEDURE — 81025 URINE PREGNANCY TEST: CPT | Performed by: ANESTHESIOLOGY

## 2018-08-10 PROCEDURE — C1789 PROSTHESIS, BREAST, IMP: HCPCS | Performed by: OBSTETRICS & GYNECOLOGY

## 2018-08-10 PROCEDURE — 0HRV0JZ REPLACEMENT OF BILATERAL BREAST WITH SYNTHETIC SUBSTITUTE, OPEN APPROACH: ICD-10-PCS | Performed by: PLASTIC SURGERY

## 2018-08-10 PROCEDURE — 86901 BLOOD TYPING SEROLOGIC RH(D): CPT | Performed by: OBSTETRICS & GYNECOLOGY

## 2018-08-10 PROCEDURE — 0UT24ZZ RESECTION OF BILATERAL OVARIES, PERCUTANEOUS ENDOSCOPIC APPROACH: ICD-10-PCS | Performed by: PLASTIC SURGERY

## 2018-08-10 PROCEDURE — 07B50ZX EXCISION OF RIGHT AXILLARY LYMPHATIC, OPEN APPROACH, DIAGNOSTIC: ICD-10-PCS | Performed by: PLASTIC SURGERY

## 2018-08-10 PROCEDURE — 19303 MAST SIMPLE COMPLETE: CPT | Performed by: PHYSICIAN ASSISTANT

## 2018-08-10 PROCEDURE — 0UT74ZZ RESECTION OF BILATERAL FALLOPIAN TUBES, PERCUTANEOUS ENDOSCOPIC APPROACH: ICD-10-PCS | Performed by: PLASTIC SURGERY

## 2018-08-10 DEVICE — (320 SQ CM) FLEXHD PLIABLE BRST 16 X 20CM 0.7-1.4MM THCK: Type: IMPLANTABLE DEVICE | Site: BREAST | Status: FUNCTIONAL

## 2018-08-10 DEVICE — SMOOTH, HIGH PROFILE, SUTURE TABS, INTEGRAL INJECTION DOME, 500CC
Type: IMPLANTABLE DEVICE | Site: BREAST | Status: FUNCTIONAL
Brand: ARTOURA BREAST TISSUE EXPANDER

## 2018-08-10 RX ORDER — ALBUTEROL SULFATE 90 UG/1
2 AEROSOL, METERED RESPIRATORY (INHALATION) EVERY 6 HOURS PRN
Status: DISCONTINUED | OUTPATIENT
Start: 2018-08-10 | End: 2018-08-11 | Stop reason: HOSPADM

## 2018-08-10 RX ORDER — EPHEDRINE SULFATE 50 MG/ML
INJECTION, SOLUTION INTRAVENOUS AS NEEDED
Status: DISCONTINUED | OUTPATIENT
Start: 2018-08-10 | End: 2018-08-10 | Stop reason: SURG

## 2018-08-10 RX ORDER — ONDANSETRON 2 MG/ML
4 INJECTION INTRAMUSCULAR; INTRAVENOUS EVERY 4 HOURS PRN
Status: DISCONTINUED | OUTPATIENT
Start: 2018-08-10 | End: 2018-08-11 | Stop reason: HOSPADM

## 2018-08-10 RX ORDER — DEXAMETHASONE SODIUM PHOSPHATE 4 MG/ML
INJECTION, SOLUTION INTRA-ARTICULAR; INTRALESIONAL; INTRAMUSCULAR; INTRAVENOUS; SOFT TISSUE AS NEEDED
Status: DISCONTINUED | OUTPATIENT
Start: 2018-08-10 | End: 2018-08-10 | Stop reason: SURG

## 2018-08-10 RX ORDER — SODIUM CHLORIDE, SODIUM LACTATE, POTASSIUM CHLORIDE, CALCIUM CHLORIDE 600; 310; 30; 20 MG/100ML; MG/100ML; MG/100ML; MG/100ML
100 INJECTION, SOLUTION INTRAVENOUS CONTINUOUS
Status: DISCONTINUED | OUTPATIENT
Start: 2018-08-10 | End: 2018-08-11 | Stop reason: HOSPADM

## 2018-08-10 RX ORDER — PROMETHAZINE HYDROCHLORIDE 25 MG/1
25 TABLET ORAL EVERY 6 HOURS PRN
Status: DISCONTINUED | OUTPATIENT
Start: 2018-08-10 | End: 2018-08-11 | Stop reason: HOSPADM

## 2018-08-10 RX ORDER — FENTANYL CITRATE 50 UG/ML
INJECTION, SOLUTION INTRAMUSCULAR; INTRAVENOUS AS NEEDED
Status: DISCONTINUED | OUTPATIENT
Start: 2018-08-10 | End: 2018-08-10 | Stop reason: SURG

## 2018-08-10 RX ORDER — ROCURONIUM BROMIDE 10 MG/ML
INJECTION, SOLUTION INTRAVENOUS AS NEEDED
Status: DISCONTINUED | OUTPATIENT
Start: 2018-08-10 | End: 2018-08-10 | Stop reason: SURG

## 2018-08-10 RX ORDER — OXYCODONE HYDROCHLORIDE AND ACETAMINOPHEN 5; 325 MG/1; MG/1
2 TABLET ORAL EVERY 4 HOURS PRN
Status: DISCONTINUED | OUTPATIENT
Start: 2018-08-10 | End: 2018-08-11 | Stop reason: HOSPADM

## 2018-08-10 RX ORDER — BUPIVACAINE HYDROCHLORIDE 2.5 MG/ML
INJECTION, SOLUTION INFILTRATION; PERINEURAL AS NEEDED
Status: DISCONTINUED | OUTPATIENT
Start: 2018-08-10 | End: 2018-08-10 | Stop reason: HOSPADM

## 2018-08-10 RX ORDER — SODIUM CHLORIDE 9 MG/ML
INJECTION, SOLUTION INTRAVENOUS CONTINUOUS PRN
Status: DISCONTINUED | OUTPATIENT
Start: 2018-08-10 | End: 2018-08-10 | Stop reason: SURG

## 2018-08-10 RX ORDER — ONDANSETRON 2 MG/ML
4 INJECTION INTRAMUSCULAR; INTRAVENOUS EVERY 6 HOURS PRN
Status: DISCONTINUED | OUTPATIENT
Start: 2018-08-10 | End: 2018-08-10 | Stop reason: SDUPTHER

## 2018-08-10 RX ORDER — MIDAZOLAM HYDROCHLORIDE 1 MG/ML
INJECTION INTRAMUSCULAR; INTRAVENOUS AS NEEDED
Status: DISCONTINUED | OUTPATIENT
Start: 2018-08-10 | End: 2018-08-10 | Stop reason: SURG

## 2018-08-10 RX ORDER — FLUTICASONE PROPIONATE 50 MCG
1 SPRAY, SUSPENSION (ML) NASAL 2 TIMES DAILY PRN
Status: DISCONTINUED | OUTPATIENT
Start: 2018-08-10 | End: 2018-08-11 | Stop reason: HOSPADM

## 2018-08-10 RX ORDER — SODIUM CHLORIDE 9 MG/ML
125 INJECTION, SOLUTION INTRAVENOUS CONTINUOUS
Status: DISCONTINUED | OUTPATIENT
Start: 2018-08-10 | End: 2018-08-10 | Stop reason: HOSPADM

## 2018-08-10 RX ORDER — MAGNESIUM HYDROXIDE 1200 MG/15ML
LIQUID ORAL AS NEEDED
Status: DISCONTINUED | OUTPATIENT
Start: 2018-08-10 | End: 2018-08-10 | Stop reason: HOSPADM

## 2018-08-10 RX ORDER — MORPHINE SULFATE 4 MG/ML
3 INJECTION, SOLUTION INTRAMUSCULAR; INTRAVENOUS
Status: DISCONTINUED | OUTPATIENT
Start: 2018-08-10 | End: 2018-08-11 | Stop reason: HOSPADM

## 2018-08-10 RX ORDER — PROPOFOL 10 MG/ML
INJECTION, EMULSION INTRAVENOUS AS NEEDED
Status: DISCONTINUED | OUTPATIENT
Start: 2018-08-10 | End: 2018-08-10 | Stop reason: SURG

## 2018-08-10 RX ORDER — SCOLOPAMINE TRANSDERMAL SYSTEM 1 MG/1
1 PATCH, EXTENDED RELEASE TRANSDERMAL ONCE
Status: DISCONTINUED | OUTPATIENT
Start: 2018-08-10 | End: 2018-08-10

## 2018-08-10 RX ORDER — FENTANYL CITRATE 50 UG/ML
50 INJECTION, SOLUTION INTRAMUSCULAR; INTRAVENOUS
Status: DISCONTINUED | OUTPATIENT
Start: 2018-08-10 | End: 2018-08-10 | Stop reason: HOSPADM

## 2018-08-10 RX ORDER — DOCUSATE SODIUM 100 MG/1
100 CAPSULE, LIQUID FILLED ORAL 2 TIMES DAILY
Status: DISCONTINUED | OUTPATIENT
Start: 2018-08-10 | End: 2018-08-10 | Stop reason: SDUPTHER

## 2018-08-10 RX ORDER — SODIUM CHLORIDE 9 MG/ML
125 INJECTION, SOLUTION INTRAVENOUS CONTINUOUS
Status: DISCONTINUED | OUTPATIENT
Start: 2018-08-10 | End: 2018-08-11

## 2018-08-10 RX ORDER — PANTOPRAZOLE SODIUM 40 MG/1
40 TABLET, DELAYED RELEASE ORAL
Status: DISCONTINUED | OUTPATIENT
Start: 2018-08-11 | End: 2018-08-11 | Stop reason: HOSPADM

## 2018-08-10 RX ORDER — LORATADINE 10 MG/1
10 TABLET ORAL DAILY
Status: DISCONTINUED | OUTPATIENT
Start: 2018-08-11 | End: 2018-08-11 | Stop reason: HOSPADM

## 2018-08-10 RX ORDER — PROMETHAZINE HYDROCHLORIDE 25 MG/ML
12.5 INJECTION, SOLUTION INTRAMUSCULAR; INTRAVENOUS ONCE
Status: COMPLETED | OUTPATIENT
Start: 2018-08-10 | End: 2018-08-10

## 2018-08-10 RX ORDER — DOCUSATE SODIUM 100 MG/1
100 CAPSULE, LIQUID FILLED ORAL 2 TIMES DAILY
Status: DISCONTINUED | OUTPATIENT
Start: 2018-08-10 | End: 2018-08-11 | Stop reason: HOSPADM

## 2018-08-10 RX ORDER — GLYCOPYRROLATE 0.2 MG/ML
INJECTION INTRAMUSCULAR; INTRAVENOUS AS NEEDED
Status: DISCONTINUED | OUTPATIENT
Start: 2018-08-10 | End: 2018-08-10 | Stop reason: SURG

## 2018-08-10 RX ORDER — DIPHENHYDRAMINE HCL 25 MG
50 TABLET ORAL EVERY 6 HOURS PRN
Status: DISCONTINUED | OUTPATIENT
Start: 2018-08-10 | End: 2018-08-11 | Stop reason: HOSPADM

## 2018-08-10 RX ORDER — ONDANSETRON 2 MG/ML
4 INJECTION INTRAMUSCULAR; INTRAVENOUS EVERY 6 HOURS PRN
Status: DISCONTINUED | OUTPATIENT
Start: 2018-08-10 | End: 2018-08-10 | Stop reason: HOSPADM

## 2018-08-10 RX ORDER — ONDANSETRON 2 MG/ML
INJECTION INTRAMUSCULAR; INTRAVENOUS AS NEEDED
Status: DISCONTINUED | OUTPATIENT
Start: 2018-08-10 | End: 2018-08-10 | Stop reason: SURG

## 2018-08-10 RX ORDER — SODIUM CHLORIDE 9 MG/ML
INJECTION, SOLUTION INTRAVENOUS AS NEEDED
Status: DISCONTINUED | OUTPATIENT
Start: 2018-08-10 | End: 2018-08-10 | Stop reason: HOSPADM

## 2018-08-10 RX ORDER — VANCOMYCIN HYDROCHLORIDE 1 G/200ML
1000 INJECTION, SOLUTION INTRAVENOUS ONCE
Status: COMPLETED | OUTPATIENT
Start: 2018-08-10 | End: 2018-08-10

## 2018-08-10 RX ORDER — 0.9 % SODIUM CHLORIDE 0.9 %
VIAL (ML) INJECTION AS NEEDED
Status: DISCONTINUED | OUTPATIENT
Start: 2018-08-10 | End: 2018-08-10 | Stop reason: HOSPADM

## 2018-08-10 RX ORDER — TOBRAMYCIN 3 MG/ML
2 SOLUTION/ DROPS OPHTHALMIC 4 TIMES DAILY
Status: COMPLETED | OUTPATIENT
Start: 2018-08-10 | End: 2018-08-10

## 2018-08-10 RX ADMIN — ONDANSETRON 4 MG: 2 INJECTION INTRAMUSCULAR; INTRAVENOUS at 23:56

## 2018-08-10 RX ADMIN — SODIUM CHLORIDE, SODIUM LACTATE, POTASSIUM CHLORIDE, AND CALCIUM CHLORIDE 100 ML/HR: .6; .31; .03; .02 INJECTION, SOLUTION INTRAVENOUS at 20:37

## 2018-08-10 RX ADMIN — FENTANYL CITRATE 100 MCG: 50 INJECTION, SOLUTION INTRAMUSCULAR; INTRAVENOUS at 11:26

## 2018-08-10 RX ADMIN — HYDROMORPHONE HYDROCHLORIDE 0.5 MG: 1 INJECTION, SOLUTION INTRAMUSCULAR; INTRAVENOUS; SUBCUTANEOUS at 14:40

## 2018-08-10 RX ADMIN — FENTANYL CITRATE 50 MCG: 50 INJECTION INTRAMUSCULAR; INTRAVENOUS at 19:05

## 2018-08-10 RX ADMIN — DEXAMETHASONE SODIUM PHOSPHATE 4 MG: 4 INJECTION, SOLUTION INTRAMUSCULAR; INTRAVENOUS at 14:34

## 2018-08-10 RX ADMIN — FENTANYL CITRATE 50 MCG: 50 INJECTION, SOLUTION INTRAMUSCULAR; INTRAVENOUS at 13:06

## 2018-08-10 RX ADMIN — ENOXAPARIN SODIUM 30 MG: 30 INJECTION SUBCUTANEOUS at 09:25

## 2018-08-10 RX ADMIN — SODIUM CHLORIDE: 0.9 INJECTION, SOLUTION INTRAVENOUS at 11:22

## 2018-08-10 RX ADMIN — SODIUM CHLORIDE: 0.9 INJECTION, SOLUTION INTRAVENOUS at 11:33

## 2018-08-10 RX ADMIN — ROCURONIUM BROMIDE 20 MG: 10 INJECTION INTRAVENOUS at 13:49

## 2018-08-10 RX ADMIN — NEOSTIGMINE METHYLSULFATE 3 MG: 1 INJECTION, SOLUTION INTRAMUSCULAR; INTRAVENOUS; SUBCUTANEOUS at 17:48

## 2018-08-10 RX ADMIN — VANCOMYCIN HYDROCHLORIDE 1250 MG: 1 INJECTION, POWDER, LYOPHILIZED, FOR SOLUTION INTRAVENOUS at 23:43

## 2018-08-10 RX ADMIN — SCOPALAMINE 1 PATCH: 1 PATCH, EXTENDED RELEASE TRANSDERMAL at 09:16

## 2018-08-10 RX ADMIN — FENTANYL CITRATE 50 MCG: 50 INJECTION, SOLUTION INTRAMUSCULAR; INTRAVENOUS at 14:35

## 2018-08-10 RX ADMIN — FENTANYL CITRATE 50 MCG: 50 INJECTION INTRAMUSCULAR; INTRAVENOUS at 18:30

## 2018-08-10 RX ADMIN — OXYCODONE HYDROCHLORIDE AND ACETAMINOPHEN 2 TABLET: 5; 325 TABLET ORAL at 21:49

## 2018-08-10 RX ADMIN — LIDOCAINE HYDROCHLORIDE 100 MG: 20 INJECTION, SOLUTION INTRAVENOUS at 11:26

## 2018-08-10 RX ADMIN — HYDROMORPHONE HYDROCHLORIDE 0.5 MG: 1 INJECTION, SOLUTION INTRAMUSCULAR; INTRAVENOUS; SUBCUTANEOUS at 16:39

## 2018-08-10 RX ADMIN — FENTANYL CITRATE 50 MCG: 50 INJECTION, SOLUTION INTRAMUSCULAR; INTRAVENOUS at 13:34

## 2018-08-10 RX ADMIN — VANCOMYCIN HYDROCHLORIDE 1000 MG: 1 INJECTION, SOLUTION INTRAVENOUS at 11:25

## 2018-08-10 RX ADMIN — ONDANSETRON HYDROCHLORIDE 4 MG: 2 INJECTION, SOLUTION INTRAVENOUS at 12:08

## 2018-08-10 RX ADMIN — ONDANSETRON HYDROCHLORIDE 4 MG: 2 INJECTION, SOLUTION INTRAVENOUS at 16:01

## 2018-08-10 RX ADMIN — GLYCOPYRROLATE 0.6 MG: 0.2 INJECTION, SOLUTION INTRAMUSCULAR; INTRAVENOUS at 17:48

## 2018-08-10 RX ADMIN — EPHEDRINE SULFATE 10 MG: 50 INJECTION, SOLUTION INTRAMUSCULAR; INTRAVENOUS; SUBCUTANEOUS at 11:30

## 2018-08-10 RX ADMIN — PROMETHAZINE HYDROCHLORIDE 12.5 MG: 25 INJECTION INTRAMUSCULAR; INTRAVENOUS at 14:59

## 2018-08-10 RX ADMIN — CEFAZOLIN SODIUM 1000 MG: 1 SOLUTION INTRAVENOUS at 15:33

## 2018-08-10 RX ADMIN — MIDAZOLAM 2 MG: 1 INJECTION INTRAMUSCULAR; INTRAVENOUS at 11:17

## 2018-08-10 RX ADMIN — MORPHINE SULFATE 3 MG: 4 INJECTION, SOLUTION INTRAMUSCULAR; INTRAVENOUS at 20:19

## 2018-08-10 RX ADMIN — ROCURONIUM BROMIDE 50 MG: 10 INJECTION INTRAVENOUS at 11:26

## 2018-08-10 RX ADMIN — MORPHINE SULFATE 3 MG: 4 INJECTION, SOLUTION INTRAMUSCULAR; INTRAVENOUS at 23:44

## 2018-08-10 RX ADMIN — SODIUM CHLORIDE 125 ML/HR: 0.9 INJECTION, SOLUTION INTRAVENOUS at 09:36

## 2018-08-10 RX ADMIN — EPHEDRINE SULFATE 10 MG: 50 INJECTION, SOLUTION INTRAMUSCULAR; INTRAVENOUS; SUBCUTANEOUS at 11:41

## 2018-08-10 RX ADMIN — TOBRAMYCIN 2 DROP: 3 SOLUTION OPHTHALMIC at 23:44

## 2018-08-10 RX ADMIN — ROCURONIUM BROMIDE 30 MG: 10 INJECTION INTRAVENOUS at 12:36

## 2018-08-10 RX ADMIN — PROPOFOL 200 MG: 10 INJECTION, EMULSION INTRAVENOUS at 11:26

## 2018-08-10 RX ADMIN — DOCUSATE SODIUM 100 MG: 100 CAPSULE, LIQUID FILLED ORAL at 21:49

## 2018-08-10 RX ADMIN — DEXAMETHASONE SODIUM PHOSPHATE 4 MG: 4 INJECTION, SOLUTION INTRAMUSCULAR; INTRAVENOUS at 12:01

## 2018-08-10 RX ADMIN — FENTANYL CITRATE 50 MCG: 50 INJECTION, SOLUTION INTRAMUSCULAR; INTRAVENOUS at 13:01

## 2018-08-10 RX ADMIN — CEFAZOLIN SODIUM 1000 MG: 1 SOLUTION INTRAVENOUS at 11:34

## 2018-08-10 NOTE — OP NOTE
OPERATIVE REPORT  PATIENT NAME: Benita Hernández    :  1970  MRN: [id number]  Pt Location: [facility] OR ROOM 07    SURGERY DATE: 8/10/2018    Surgeon(s) and Role:  Panel 1:     * Angelina Oliveros MD - Primary     * Robin Tijerina DO - Assisting    Panel 2:     * Fred Jin MD - Primary     * Dieudonne Murry PA-C - Assisting    Panel 3:     * Clara Cevallos MD - Primary    Preop Diagnosis:  Malignant neoplasm of lower-outer quadrant of right breast of female, estrogen receptor positive ([facility] [address] ) [C50 511, Z17 0]  Family history of breast cancer in female [Z80 3]  Family history of ovarian carcinoma [Z80 41]  Ashkenazi Tenriism ancestry requiring population-specific genetic screening [Z13 79]    Post-Op Diagnosis Codes:     * Malignant neoplasm of lower-outer quadrant of right breast of female, estrogen receptor positive ([facility] [address] ) [C50 511, Z17 0]     * Family history of breast cancer in female [Z80 3]     * Family history of ovarian carcinoma [Z80 41]     * Ashkenazi Tenriism ancestry requiring population-specific genetic screening [Z13 79]    Procedure(s) (LRB):  SALPINGO-OOPHORECTOMY, LAPAROSCOPIC (N/A)  MASTECTOMY SIMPLE (Bilateral)  BIOPSY LYMPH NODE SENTINEL (Right)  INSERTION/PLACEMENT TISSUE EXPANDER (EXCHANGE) (Bilateral)  RECONSTRUCTION BREAST W/ IMPLANT (Bilateral)    Specimen(s):  ID Type Source Tests Collected by Time Destination   1 :  Washing Pelvic Washing NON-GYNECOLOGIC CYTOLOGY Angelina Oliveros MD 8/10/2018 1204    2 : BILATERAL OVARIES INCLUDED Tissue Fallopian Tubes, Bilateral TISSUE EXAM Angelina Oliveros MD 8/10/2018 1205    3 :  Tissue Breast, Left TISSUE EXAM Angelina Oliveros MD 8/10/2018 1355    4 : SENTINEL NODE #1 - RIGHT AXILLA Tissue Lymph Node, [place] TISSUE EXAM Fred Jin MD 8/10/2018 1450    5 : SENTINEL NODE #2 - RIGHT AXILLA Tissue Lymph Node, [place] TISSUE EXAM Fred Jin MD 8/10/2018 1450    6 :  Tissue Breast, Right TISSUE EXAM Fred Jin MD 8/10/[phone] Estimated Blood Loss:   50 mL    Drains:  Urethral Catheter Non-latex 16 Fr  (Active)   Number of days: 0       Anesthesia Type:   General    Operative Indications:  Malignant neoplasm of lower-outer quadrant of right breast of female, estrogen receptor positive ([address] ) [C50 511, Z17 0]  Family history of breast cancer in female [Z80 3]  Family history of ovarian carcinoma [Z80 41]  Ashkenazi Rastafari ancestry requiring population-specific genetic screening [Z13 79]      Operative Findings:  n/a    Complications:   None    Procedure and Technique:  Govind Castillo is a 44-year-old female who presented to my office with a right-sided carcinoma  Given her family history and her current age, she decided to proceed with bilateral mastectomy with reconstruction  She did meet with our genetic counselor to pursue genetic testing as well  Additionally she met with GYN Oncology and had prophylactic oophorectomy as well  Following this procedure, she was re-prepped and draped  She had a 5 cc injection of blue dye which was a combination of to methylene and three of injectable saline  This was into the right subareolar plexus for lymphatic mapping  Attention was turned initially to the left breast   An elliptical incision was created around the nipple areolar complex  Electrocautery was then used to dissect her flaps to the level of the clavicle superior, to the edge of latissimus dorsi muscle lateral, to the inframammary fold inferior and to the edge of the sternal border medial  The breast was then excised from the pectoralis including the underlying pectoralis fascia  This was marked with a short stitch superior and a long stitch lateral and sent to pathology in formalin  Her wound was irrigated and hemostasis was achieved  Attention was turned to the right breast   A similar elliptical incision was created around the nipple areolar complex    Electrocautery was used to dissect the mastectomy flaps to the level of the clavicle superior, to the medial sternal border medially, to the inframammary fold inferior and the lateral pectoralis edge laterally  Breast was then partially excised from the underlying pectoralis fascia in the upper outer aspect  Clavipectoral fascia was then incised  The lateral edge of the pectoralis was elevated using a Barba retractor  The gamma probe was employed  There was an area of increased radioactive uptake in the mid axillary line  This traced to two adjacent lymph nodes, which were radioactive in nature  These were elevated into the wound with an Allis clamp and excised completely  They were  in submitted and pathology as sentinel nodes from the right axilla, one and two  Following removal of these nodes, there were no additional radioactive, blue or palpable nodes  Remaining portion the breast was then excised from the pectoralis including the underlying fascia  This was marked with a short stitch superior and a long stitch lateral and sent to pathology in formalin  Both sentinel nodes were sent to pathology in formalin as well  Her wound was irrigated and hemostasis was achieved  All counts were correct  Patient remained intubated in stable condition for the remaining portion of her procedure which will be dictated by Dr Ginny Grubbs   A physician assistant was required during the procedure for retraction, tissue handling,dissection and suturing  No residents were available       Patient Disposition:  hemodynamically stable    SIGNATURE: Liliana Camacho MD  DATE: August 10, 2018  TIME: 3:10 PM

## 2018-08-10 NOTE — PROGRESS NOTES
Called by PACU nurse who stated that the patient was experiencing R eye pain with thwe feeling as if there was an eyelash in her eye  I requested that the nurse flush the right eye with normal saline  The patient stated that the saline irrigation helped a little but that she still felt right eye discomfort  I stained the right eye with Fluorescein and examined the cornea with a [employer]  A corneal abrasion was noted centrally over the cornea and pupil region  Tobrex eye drops were ordered and I will speak with Dr Shira Langley who is on call tomorrow for our service to see her  and follow up  I instructed the patient that if she is discharged tomorrow and the eye feels better that I will have her continue the Tobrex eye drops two drops 4 times a day for 4 days in the right eye

## 2018-08-10 NOTE — OP NOTE
OPERATIVE REPORT  PATIENT NAME: Sirena Garcia    :  1970  MRN: [id number]  Pt Location: [facility] OR ROOM 07    SURGERY DATE: 8/10/2018    Surgeon(s) and Role:  Panel 1:     * Lester Bartlett MD - Primary     * Bhumi Zepeda DO - Assisting    Panel 2:     * Dee Mac MD - Primary     * Cyndie Chung PA-C - Assisting     * Cyndie Chung PA-C - Assisting    Panel 3:     * Khang Briceño MD - Primary     * W Romayne Stengel Hohenshilt, PA-C - Assisting    Preop Diagnosis:  Malignant neoplasm of lower-outer quadrant of right breast of female, estrogen receptor positive ([facility] [address] ) [C50 511, Z17 0]  Family history of breast cancer in female [Z80 3]  Family history of ovarian carcinoma [Z80 41]  Ashkenazi Mandaeism ancestry requiring population-specific genetic screening [Z13 79]    Post-Op Diagnosis Codes:     * Malignant neoplasm of lower-outer quadrant of right breast of female, estrogen receptor positive ([facility] [address] ) [C50 511, Z17 0]     * Family history of breast cancer in female [Z80 3]     * Family history of ovarian carcinoma [Z80 41]     * Ashkenazi Mandaeism ancestry requiring population-specific genetic screening [Z13 79]    Procedure(s) (LRB):  SALPINGO-OOPHORECTOMY, LAPAROSCOPIC; PELVIC WASHINGS  (N/A)  MASTECTOMY SIMPLE (Bilateral)  BIOPSY LYMPH NODE SENTINEL (Right)  INSERTION/PLACEMENT TISSUE EXPANDER (EXCHANGE) (Bilateral)  RECONSTRUCTION BREAST W/ IMPLANT (Bilateral)    Specimen(s):  ID Type Source Tests Collected by Time Destination   1 :  Washing Pelvic Washing NON-GYNECOLOGIC CYTOLOGY Lester Bartlett MD 8/10/2018 1204    2 : BILATERAL OVARIES INCLUDED Tissue Fallopian Tubes, Bilateral TISSUE EXAM Lseter Bartlett MD 8/10/2018 1205    3 :  Tissue Breast, Left TISSUE EXAM Lester Bartlett MD 8/10/2018 1355    4 : SENTINEL NODE #1 - RIGHT AXILLA Tissue Lymph Node, Bruno TISSUE EXAM Dee Mac MD 8/10/2018 1450    5 : SENTINEL NODE #2 - RIGHT AXILLA Tissue Lymph Node, Bruno TISSUE EXAM Mickie Brennan Carrie Jones MD 8/10/2018 1450    6 :  Tissue Breast, Right TISSUE EXAM Zohra Swift MD 8/10/2018 1454        Estimated Blood Loss:   50 mL    Drains:  Closed/Suction Drain Right;Lateral Breast Bulb 15 Fr  (Active)   Number of days: 0       Closed/Suction Drain Left;Lateral Breast Bulb 15 Fr  (Active)   Number of days: 0       Urethral Catheter Non-latex 16 Fr  (Active)   Number of days: 0       Anesthesia Type:   General    Operative Indications:  Malignant neoplasm of lower-outer quadrant of right breast of female, estrogen receptor positive ([facility] [address] ) [C50 511, Z17 0]  Family history of breast cancer in female [Z80 3]  Family history of ovarian carcinoma [Z80 41]  Ashkenazi Holiness ancestry requiring population-specific genetic screening [Z13 79]      Operative Findings:      Complications:   None    Procedure and Technique:  The patient was marked while standing prior to surgery  The patient was brought to the operating room and placed supine on the operating room table  Time out procedure was performed, SCDs were applied and IV antibiotics were given  The patient underwent bilateral mastectomy and will be dictated separately  The chest was re-prepped and draped using standard surgical technique  Attention was turned to the right breast  The wound was examined and excellent hemostasis was obtained  The lateral border of the pectoralis major was grasped and subpectoral elevation was performed using electrocautery and a lighted retractor to the medal origin  The dissection was performed superficial to the pectoralis minor muscle  The Inframammary fold was reconstructed using an internal hiram flap  A 2-0 PDS suture was used to suture the deep dermis of the marked area of the inframammary fold  The skin was elevated 1 cm and secured to rib periosteum, this was performed across the length of the inframammary fold for and total flap plus elevation of 14cmsq   The superior extent of the subpectoral dissection was determined by the height of the expander  The inferior border of the pectoralis muscle was released  A piece of flexhd was rehydrated according to protocol  This was cut to the shape of the inframammary fold  The dermal element side was placed facing the skin  The flexhd was inset to the inframammary fold using 2-0 PDS in hoizontal mattress technique  A 15 Solomon Islander round bronson drain was tunneled laterally and brought out through the lateral chest  This was secured with a 2-0 nylon suture  Excellent hemostasis was achieved  The pocket was irrigated with antibiotic solution and allowed to dwell for 5 minutes  Attention was turned to the left breast  The wound was examined and excellent hemostasis was obtained  The lateral border of the pectoralis major was grasped and subpectoral elevation was performed using electrocautery and a lighted retractor to the medal origin  The dissection was performed superficial to the pectoralis minor muscle  The Inframammary fold was reconstructed using an internal hiram flap  A 2-0 PDS suture was used to suture the deep dermis of the marked area of the inframammary fold  The skin was elevated 1 cm and secured to rib periosteum, this was performed across the length of the inframammary fold for and total flap plus elevation of 14cmsq  The superior extent of the subpectoral dissection was determined by the height of the expander  The inferior border of the pectoralis muscle was released  A piece of flexhd was rehydrated according to protocol  This was cut to the shape of the inframammary fold  The dermal element side was placed facing the skin  The flexhd was inset to the inframammary fold using 2-0 PDS in hoizontal mattress technique  A 15 [place] Islander round bronson drain was tunneled laterally and brought out through the lateral chest  This was secured with a 2-0 nylon suture  Excellent hemostasis was achieved   The pocket was irrigated with antibiotic solution and allowed to dwell for 5 minutes  The skin was re prepped, all surgical team members changed their gloves  Pettigrew artoura 500ml high profile expanders were prepared  All air was removed from the devices  50 ml of saline was injected into each device  The expanders were placed into the subpectoral pockets  The expander tabs were inset to the inframammary folds using 2-0 PDS suture  The pectoralis muscles were placed under resting tension and the flexhd was cut to match the pectoralis muscles  The pectoralis muscles were sutured to the flexhd using 2-0 PDS in running technique  The skin was closed using 3-0 vicryl and 3-0 PDS suture in interrupted deep dermal technique  The superficial skin was closed using 3-0 monocryl suture in running subcuticular technique  The wounds were cleaned and dried  Benzoin, steri strips and skin glue were applied  Biopatches were placed around the drain sites  Sterile gauze and a surgical bra was placed  The patient tolerated the procedure well and was taken to the recovery room in stable condition         I was present for the entire procedure and A qualified resident physician was not available    Patient Disposition:  hemodynamically stable and extubated and stable    SIGNATURE: Tegan Luna MD  DATE: August 10, 2018  TIME: 6:01 PM

## 2018-08-10 NOTE — OP NOTE
OPERATIVE REPORT  PATIENT NAME: Hardik Wakefield    :  1970  MRN: [id number]  Pt Location: [facility] OR ROOM 07    SURGERY DATE: 8/10/2018    Surgeon(s) and Role:  Panel 1:     * Amy Gar MD - Primary     * Cheyenne Elliott DO - Assisting    Panel 2:     * Sara Calix MD - Primary    Panel 3:     * Reese Rogers MD - Primary    Preop Diagnosis:  Malignant neoplasm of lower-outer quadrant of right breast of female, estrogen receptor positive ([facility] [address] ) [C50 511, Z17 0]  Family history of breast cancer in female [Z80 3]  Family history of ovarian carcinoma [Z80 41]  Ashkenazi Adventism ancestry requiring population-specific genetic screening [Z13 79]    Post-Op Diagnosis Codes:     * Malignant neoplasm of lower-outer quadrant of right breast of female, estrogen receptor positive ([facility] [address] ) [C50 511, Z17 0]     * Family history of breast cancer in female [Z80 3]     * Family history of ovarian carcinoma [Z80 41]     * Ashkenazi Adventism ancestry requiring population-specific genetic screening [Z13 79]    Procedure(s) (LRB):  SALPINGO-OOPHORECTOMY, LAPAROSCOPIC (N/A)  MASTECTOMY SIMPLE (Bilateral)  BIOPSY LYMPH NODE SENTINEL (Right)  INSERTION/PLACEMENT TISSUE EXPANDER (EXCHANGE) (Bilateral)  RECONSTRUCTION BREAST W/ IMPLANT (Bilateral)    Specimen(s):  ID Type Source Tests Collected by Time Destination   1 :  Washing Pelvic Washing NON-GYNECOLOGIC CYTOLOGY Amy Gar MD 8/10/2018 1204    2 : BILATERAL OVARIES INCLUDED Tissue Fallopian Tubes, Bilateral TISSUE EXAM Amy Gar MD 8/10/2018 1205        Estimated Blood Loss:   Minimal    Drains:  Urethral Catheter Non-latex 16 Fr  (Active)   Number of days: 0       Anesthesia Type:   General    Operative Indications:  Patient with estrogen receptor positive breast cancer and concern for cancer genetic susceptibility to breast and ovarian cancer    After counseling, she elected to undergo definitive surgery by means of risk reduction/ovarian ablation laparoscopic bilateral salpingo-oophorectomy  Operative Findings:  1  Grossly normal bilateral fallopian tubes and ovaries  2   Grossly normal uterus  Complications:   None    Procedure and Technique:  The patient was taken to the operating room where general endotracheal anesthesia was induced without complications  Sequential compression devices were activated and medical deep vein thrombosis prophylaxis was administered per hospital protocol  The patient was placed in the dorsal lithotomy position using Robert stirrups and her abdomen, perineum and vagina were prepped and draped in the usual sterile fashion  Under direct visualization a uterine dilator and tenaculum were secured in place for uterine manipulation  A King catheter was inserted  Attention was turned to the abdomen  A 10 mm skin incision was made at the base of the umbilicus with an 11 blade knife  Please note that this and all other incisions were infiltrated with 0 25% bupivacaine at the beginning and completion of the procedure  Using a 5 mm Endopath Excel trocar and a 5 mm laparoscope the peritoneal cavity was entered under direct visualization  Good intraperitoneal location was confirmed and pneumoperitoneum was created to maximal pressure of 15 mmHg  The patient was placed in steep Trendelenburg and the above-mentioned findings were noted  Two  5 mm trocars were placed in a similar fashion in the right and left lower quadrants and the umbilical port was upsized 20 11 mm cannula under direct visualization without incident  The ureters were clearly identified on both sides  Each adnexa was elevated and the utero-ovarian ligaments and fallopian tubes were cauterized and transected at the cornual regions  Then, the anterior leaf of broad ligament was divided and the ovarian vessels were elevated above the level of the pelvic brim    The medial leaf of the broad ligament was then divided with above the level of the ureter and the ovarian vessels were cauterized and divided on both sides at the level of the pelvic brim  The tubes and ovaries were retrieved individually and extracted using a laparoscopic specimen retrieval bag through the largest trocar site  Then, copious irrigation was used and hemostasis was confirmed at low intraperitoneal pressures  The fascia at the umbilical trocar site was closed using a deep stitch of zero Vicryl   Laparoscopic assessment revealed good airtight umbilical trocar site closure  All trocars were removed and pneumoperitoneum was completely released with the assistance of Valsalva maneuvers  The skin at all port sites was closed using a subcuticular stitch of 4-0 Monocryl and Histoacryl was applied  Uterine manipulation instruments were removed and vaginal exam revealed excellent hemostasis  The King catheter was removed  The patient tolerated the procedure well, sponge, lap, needle and instrument counts were all reported as correct ×2  The patient remained in the operating room for subsequent procedures by ezequiel Potter      I was present for the entire procedure    Patient Disposition:  Remained in the operating room for subsequent procedure by ezequiel Rivera and Jace   Please see their note for details      SIGNATURE: Brittany Calero MD, Stephen Powell  DATE: August 10, 2018  TIME: 12:55 PM

## 2018-08-11 VITALS
HEART RATE: 67 BPM | OXYGEN SATURATION: 95 % | HEIGHT: 67 IN | RESPIRATION RATE: 20 BRPM | DIASTOLIC BLOOD PRESSURE: 51 MMHG | SYSTOLIC BLOOD PRESSURE: 100 MMHG | BODY MASS INDEX: 28.69 KG/M2 | TEMPERATURE: 99.2 F | WEIGHT: 182.8 LBS

## 2018-08-11 RX ORDER — ONDANSETRON 2 MG/ML
4 INJECTION INTRAMUSCULAR; INTRAVENOUS EVERY 8 HOURS PRN
Status: DISCONTINUED | OUTPATIENT
Start: 2018-08-11 | End: 2018-08-11 | Stop reason: HOSPADM

## 2018-08-11 RX ADMIN — MORPHINE SULFATE 3 MG: 4 INJECTION, SOLUTION INTRAMUSCULAR; INTRAVENOUS at 05:05

## 2018-08-11 RX ADMIN — MORPHINE SULFATE 3 MG: 4 INJECTION, SOLUTION INTRAMUSCULAR; INTRAVENOUS at 09:09

## 2018-08-11 RX ADMIN — ENOXAPARIN SODIUM 40 MG: 40 INJECTION SUBCUTANEOUS at 08:31

## 2018-08-11 RX ADMIN — MORPHINE SULFATE 3 MG: 4 INJECTION, SOLUTION INTRAMUSCULAR; INTRAVENOUS at 15:50

## 2018-08-11 RX ADMIN — PANTOPRAZOLE SODIUM 40 MG: 40 TABLET, DELAYED RELEASE ORAL at 05:06

## 2018-08-11 RX ADMIN — LORATADINE 10 MG: 10 TABLET ORAL at 08:31

## 2018-08-11 RX ADMIN — DOCUSATE SODIUM 100 MG: 100 CAPSULE, LIQUID FILLED ORAL at 08:31

## 2018-08-11 RX ADMIN — VANCOMYCIN HYDROCHLORIDE 1250 MG: 1 INJECTION, POWDER, LYOPHILIZED, FOR SOLUTION INTRAVENOUS at 11:29

## 2018-08-11 RX ADMIN — MORPHINE SULFATE 3 MG: 4 INJECTION, SOLUTION INTRAMUSCULAR; INTRAVENOUS at 13:57

## 2018-08-11 RX ADMIN — OXYCODONE HYDROCHLORIDE AND ACETAMINOPHEN 2 TABLET: 5; 325 TABLET ORAL at 11:01

## 2018-08-11 RX ADMIN — OXYCODONE HYDROCHLORIDE AND ACETAMINOPHEN 2 TABLET: 5; 325 TABLET ORAL at 16:39

## 2018-08-11 RX ADMIN — ONDANSETRON 4 MG: 2 INJECTION INTRAMUSCULAR; INTRAVENOUS at 05:06

## 2018-08-11 RX ADMIN — ONDANSETRON 4 MG: 2 INJECTION INTRAMUSCULAR; INTRAVENOUS at 09:09

## 2018-08-11 NOTE — NURSING NOTE
Discharge instructions reviewed with patient and mother  Pt verbalizes understanding of home medication regimen, follow-up appointments, s/s of infection, and when to call MD   Extra supplies for NIDHI drains and ABDs for breast incisions provided  Pt and mother deny questions/concerns at this time, will continue to monitor pt until discharge

## 2018-08-11 NOTE — DISCHARGE INSTRUCTIONS
[address], [address] [facility], [address] [facility], [facility], [address] [facility] Torres /K / [employer]       No heavy lifting >20 pounds    Do not raise hands above head    Consider using button up shirts so you don't have to raise your hands above your head to put the shirt on    Wear the surgical bra at all times except the shower  If the bra is tight and is leaving marks on the skin unclasp the bottom clasps of the bra    No ice or heating pack to chest    Ok to shower    Measure drain output three times per day    Keep the drains secured below the level of the breast  Securing at the waist level is best    No bathing    Change dressing daily    Apply antibiotic ointment to drain site three times/day if [place] dressing falls off    Do not lay on stomach    Use a necklace or lanyard in the shower to support drains    No smoking    No pushing or pulling    Call the office for an appointment in 5-10 days - [phone]      Salpingectomy   WHAT YOU NEED TO KNOW:   A salpingectomy is surgery to remove one or both of your fallopian tubes  The fallopian tubes carry eggs from the ovaries to the uterus  They are part of a woman's reproductive system  A salpingectomy may be done to treat an ectopic pregnancy, cancer, endometriosis, or an infection  It may also be done to prevent pregnancy or some types of cancer  DISCHARGE INSTRUCTIONS:   Call 911 for any of the following:   · You feel lightheaded, short of breath, and have chest pain  · You cough up blood  · You have trouble breathing  Seek care immediately if:   · Your arm or leg feels warm, tender, and painful  It may look swollen and red  · Blood soaks through your bandage  · Your stitches come apart  · You soak through 1 sanitary pad in 1 hour  · You have trouble urinating or cannot urinate at all    Contact your healthcare provider if:   · You have a fever or chills  · Your wound is red, swollen, or draining pus  · You have pus or a foul-smelling odor coming from your vagina  · Your pain does not get better after you take your medicine  · You have nausea or are vomiting  · Your skin is itchy, swollen, or you have a rash  · You have questions or concerns about your condition or care  Medicines: You may need any of the following:  · Prescription pain medicine  may be given  Ask your healthcare provider how to take this medicine safely  · NSAIDs , such as ibuprofen, help decrease swelling, pain, and fever  NSAIDs can cause stomach bleeding or kidney problems in certain people  If you take blood thinner medicine, always ask your healthcare provider if NSAIDs are safe for you  Always read the medicine label and follow directions  · Take your medicine as directed  Contact your healthcare provider if you think your medicine is not helping or if you have side effects  Tell him or her if you are allergic to any medicine  Keep a list of the medicines, vitamins, and herbs you take  Include the amounts, and when and why you take them  Bring the list or the pill bottles to follow-up visits  Carry your medicine list with you in case of an emergency  Care for your wound as directed:  Ask your healthcare provider when your wound can get wet  Do not take a bath until your healthcare provider says it is okay  Take a shower only  Carefully wash around the wound with soap and water  Let the soap and water gently run over your incision  Do not  scrub your incision  Dry the area and put on new, clean bandages as directed  Change your bandages when they get wet or dirty  If you have strips of medical tape, let them fall off on their own  Activity:  Ask your healthcare provider when you can return to your normal activities  Do not douche, use tampons, or have sex until your healthcare provider says it is okay  These activities may cause infection   Do not exercise or lift anything heavy until your healthcare provider says it is okay  This may put too much stress on your incision  Follow up with your healthcare provider as directed:  Write down your questions so you remember to ask them during your visits  © 2017 [employer] Information is for End User's use only and may not be sold, redistributed or otherwise used for commercial purposes  All illustrations and images included in CareNotes® are the copyrighted property of A D A M , Inc  or [employer]  The above information is an  only  It is not intended as medical advice for individual conditions or treatments  Talk to your doctor, nurse or pharmacist before following any medical regimen to see if it is safe and effective for you

## 2018-08-11 NOTE — PROGRESS NOTES
Rd Hook in to see patient early this afternoon  Order written for pt discharge, but order d/c reconciliation is not complete  Paged Rd Hook regarding incomplete d/c rec, was told he is unable to finalize the d/c because he is away  Was instructed not to contact the other surgeons on the case, but to contact nursing supervisor  Per nursing supervisor, only the MD can finalize this portion of the discharge, or patient cannot be discharged from our care  Page placed to general surgery/gynecologic oncology surgery to assess d/c issue

## 2018-08-11 NOTE — ANESTHESIA POSTPROCEDURE EVALUATION
Post-Op Assessment Note      CV Status:  Stable    Mental Status:  Alert and awake    Hydration Status:  Euvolemic    PONV Controlled:  Controlled    Airway Patency:  Patent  Airway: intubated    Post Op Vitals Reviewed: Yes          Staff: Anesthesiologist       Comments: corneal abrasion noted in PACU    see progress note          BP      Temp      Pulse     Resp      SpO2

## 2018-08-11 NOTE — PROGRESS NOTES
Took over care of this patient at 0100, patient resting comfortably in bed  No complaints or concerns at this time

## 2018-08-11 NOTE — CASE MANAGEMENT
Thank you,  Laisha Aqq  [facility] Utilization Review Department  Phone: [phone]; Fax [phone]  ATTENTION: The Network Utilization Review Department is now centralized for our 9 Facilities  Make a note that we have a new phone and fax numbers for our Department  Please call with any questions or concerns to [phone] and carefully follow the prompts so that you are directed to the right person  All voicemails are confidential  Fax any determinations, approvals, denials, and requests for initial or continue stay review clinical to [phone]  Due to HIGH CALL volume, it would be easier if you could please send faxed requests to expedite your requests and in part, help us provide discharge notifications faster      Initial Clinical Review  ELECTIVE INPATIENT SURGERY, [address], [address] [address] [address] AUTH# [id number]    Age/Sex: 57 y o  female    Surgery Date: 8/10/18    Procedure: SALPINGO-OOPHORECTOMY, LAPAROSCOPIC; PELVIC WASHINGS  (N/A)  MASTECTOMY SIMPLE (Bilateral)  BIOPSY LYMPH NODE SENTINEL (Right)  INSERTION/PLACEMENT TISSUE EXPANDER (EXCHANGE) (Bilateral)  RECONSTRUCTION BREAST W/ IMPLANT (Bilateral)    Anesthesia: general    Admission Orders: Date/Time/Statement: inpatient 8/10/18 @ [address]     Orders Placed This Encounter   Procedures    Inpatient Admission     Standing Status:   Standing     Number of Occurrences:   1     Order Specific Question:   Admitting Physician     Answer:   Ranjith Stewart     Order Specific Question:   Level of Care     Answer:   Med Surg [16]     Order Specific Question:   Estimated length of stay     Answer:   Inpatient Only Surgery       Vital Signs: /59 (BP Location: Right leg)   Pulse 63   Temp 98 3 °F (36 8 °C) (Temporal)   Resp 20   Ht 5' 7" (1 702 m)   Wt 82 9 kg (182 lb 12 8 oz)   LMP 07/31/2018   SpO2 96%   BMI 28 63 kg/m²     Diet:        Diet Orders            Start     Ordered    08/10/18 2012  Diet Regular; Regular House  Diet effective now     Question Answer Comment   Diet Type Regular    Regular Regular House    RD to adjust diet per protocol?  Yes        08/10/18 2011          Mobility: up as zara    DVT Prophylaxis: SCD's    Pain Control:   Pain Medications             acetaminophen (TYLENOL) 325 mg tablet Take 650 mg by mouth every 6 (six) hours as needed for mild pain    ibuprofen (MOTRIN) 200 mg tablet Take 400 mg by mouth every 6 (six) hours as needed for mild pain        PO percocet 2 tabs (x 2)  IV morphine prn (x 4)

## 2018-08-11 NOTE — PROGRESS NOTES
Pt seen POD #1 with reported corneal abrasion on day of surgery  rec tobrex drops overnight  Currently pt is asymptomatic denies pain blurry vision or photophobia  Eye looks normal no redness EOM intact   Pt relays no other complaints in regards to the eye    Will cont drops per protocol    Danae Fry DO

## 2018-08-11 NOTE — DISCHARGE SUMMARY
Discharge Summary - Medical Tomy Jain 57 y o  female MRN: [id number]    [address] Room / Bed: [place] 5 ite [address] 552/E5 2018 e [facility] Encounter: [id number]    BRIEF OVERVIEW  Admitting Provider: Sathya Diaz MD  Discharge Provider: Sathya Diaz MD  Primary Care Physician at Discharge: Sandor Holter, [phone]    Discharge To: Home      Admission Date: 8/10/2018     Discharge Date: No discharge date for patient encounter  Code Status: Level 1 - Full Code  Advance Directive and Living Will: <no information>  Power of :        Primary Discharge Diagnosis  Principal Problem:    Malignant neoplasm of lower-outer quadrant of right breast of female, estrogen receptor positive ([facility] [address] )  Active Problems:    Status post bilateral salpingo-oophorectomy (BSO)  Resolved Problems:    * No resolved hospital problems   *        Discharge Disposition: [address] [place] [address]    Presenting Problem/History of Present Illness  Malignant neoplasm of lower-outer quadrant of right breast of female, estrogen receptor positive ([facility] [address] )      Discharge Condition: stable    Patient tolerated the procedure well, recovered in PACU and was discharged home in stable condition    Sathya Diaz MD  8/11/2018  12:41 PM

## 2018-08-13 NOTE — CASE MANAGEMENT
Ivelisse Villafana, RN Registered Nurse Signed   Case Management Date of Service: 8/11/2018  1:14 PM         []Hide copied text  Thank you,  Laisha Aqq  [facility] Utilization Review Department  Phone: [phone]; Fax [phone]  ATTENTION: The Network Utilization Review Department is now centralized for our 9 Facilities  Make a note that we have a new phone and fax numbers for our Department  Please call with any questions or concerns to [phone] and carefully follow the prompts so that you are directed to the right person  All voicemails are confidential  Fax any determinations, approvals, denials, and requests for initial or continue stay review clinical to [phone]   Due to HIGH CALL volume, it would be easier if you could please send faxed requests to expedite your requests and in part, help us provide discharge notifications faster      Initial Clinical Review  ELECTIVE INPATIENT SURGERY, [address] [id number] AUTH# [id number]     Age/Sex: 57 y o  female     Surgery Date: 8/10/18     Procedure: SALPINGO-OOPHORECTOMY, LAPAROSCOPIC; PELVIC WASHINGS  (N/A)  MASTECTOMY SIMPLE (Bilateral)  BIOPSY LYMPH NODE SENTINEL (Right)  INSERTION/PLACEMENT TISSUE EXPANDER (EXCHANGE) (Bilateral)  RECONSTRUCTION BREAST W/ IMPLANT (Bilateral)     Anesthesia: general     Admission Orders: Date/Time/Statement: inpatient 8/10/18 @ 1813            Orders Placed This Encounter   Procedures    Inpatient Admission       Standing Status:   Standing       Number of Occurrences:   1       Order Specific Question:   Admitting Physician       Answer:   [de-identified] [[id number]]       Order Specific Question:   Level of Care       Answer:   Med Surg [16]       Order Specific Question:   Estimated length of stay       Answer:   Inpatient Only Surgery         Vital Signs: /59 (BP Location: Right leg)   Pulse 63   Temp 98 3 °F (36 8 °C) (Temporal)   Resp 20   Ht 5' 7" (1 702 m)   Wt 82 9 kg (182 lb 12 8 oz)   LMP 07/31/2018   SpO2 96%   BMI 28 63 kg/m²      Diet:                   Diet Orders                      Start     Ordered     08/10/18 2012   Diet Regular; Regular House  Diet effective now     Question Answer Comment   Diet Type Regular     Regular Regular House     RD to adjust diet per protocol?  Yes         08/10/18 2011             Mobility: up as zara     DVT Prophylaxis: SCD's     Pain Control:        Pain Medications                 acetaminophen (TYLENOL) 325 mg tablet Take 650 mg by mouth every 6 (six) hours as needed for mild pain     ibuprofen (MOTRIN) 200 mg tablet Take 400 mg by mouth every 6 (six) hours as needed for mild pain          PO percocet 2 tabs (x 2)  IV morphine prn (x 4)

## 2018-08-21 ENCOUNTER — OFFICE VISIT (OUTPATIENT)
Dept: SURGICAL ONCOLOGY | Facility: CLINIC | Age: 48
End: 2018-08-21

## 2018-08-21 VITALS
HEART RATE: 85 BPM | WEIGHT: 174 LBS | BODY MASS INDEX: 27.31 KG/M2 | HEIGHT: 67 IN | SYSTOLIC BLOOD PRESSURE: 100 MMHG | RESPIRATION RATE: 16 BRPM | TEMPERATURE: 98.3 F | DIASTOLIC BLOOD PRESSURE: 70 MMHG

## 2018-08-21 DIAGNOSIS — C50.511 MALIGNANT NEOPLASM OF LOWER-OUTER QUADRANT OF RIGHT BREAST OF FEMALE, ESTROGEN RECEPTOR POSITIVE (HCC): Primary | ICD-10-CM

## 2018-08-21 DIAGNOSIS — Z17.0 MALIGNANT NEOPLASM OF LOWER-OUTER QUADRANT OF RIGHT BREAST OF FEMALE, ESTROGEN RECEPTOR POSITIVE (HCC): Primary | ICD-10-CM

## 2018-08-21 PROCEDURE — 99024 POSTOP FOLLOW-UP VISIT: CPT | Performed by: SURGERY

## 2018-08-21 NOTE — PROGRESS NOTES
57 y o  female is here today s/p bilateral mastectomies, right SLNB, and bilateral reconstruction   She reports feeling well  She experiences mild discomfort from incision lines radiating around to her back  She has two remaining NIDHI drains which are draining 40cc/24 hour period  She is applying triple antibiotic cream to left breast incision as per Dr Jorge Alberto Calix      Physical Exam   Constitutional: She appears well-developed and well-nourished  Pulmonary/Chest:       Well healing mastectomy incisions bilateral with drains in place       Data:       Staging:    3 mm IDC  Estrogen receptor and progesterone receptor status 100%  HER2 status and test method negative   Lymph node assessment/status 0/2 sentinel nodes        Diagnoses and all orders for this visit:    Malignant neoplasm of lower-outer quadrant of right breast of female, estrogen receptor positive ([facility] [address] )  -     Ambulatory referral to Hematology / Oncology; Future        Assessment:  53-year-old female status post bilateral mastectomy for a right-sided carcinoma  She had residual DCIS in the breast   Her final staging as a T1 a N0 she is healing well with no signs of infection  She did have expanders placed by Dr Jorge Alberto Calix   She also had bilateral oophorectomy done at the same time  Plan:  I am referring her to Medical Oncology for consultation  She does not have a follow-up appointment with Dr Alyce Marshall  I encouraged her to schedule postop appointment with him  She will also continue her reconstruction with plastics  I will plan on seeing her again in three months or sooner should the need arise

## 2018-08-24 ENCOUNTER — TELEPHONE (OUTPATIENT)
Dept: SURGICAL ONCOLOGY | Facility: CLINIC | Age: 48
End: 2018-08-24

## 2018-08-28 ENCOUNTER — OFFICE VISIT (OUTPATIENT)
Dept: GYNECOLOGIC ONCOLOGY | Facility: CLINIC | Age: 48
End: 2018-08-28

## 2018-08-28 VITALS
SYSTOLIC BLOOD PRESSURE: 102 MMHG | BODY MASS INDEX: 27.31 KG/M2 | RESPIRATION RATE: 16 BRPM | WEIGHT: 174 LBS | DIASTOLIC BLOOD PRESSURE: 64 MMHG | HEIGHT: 67 IN

## 2018-08-28 DIAGNOSIS — Z90.722 STATUS POST BILATERAL SALPINGO-OOPHORECTOMY (BSO): Primary | ICD-10-CM

## 2018-08-28 PROBLEM — Z80.41 FAMILY HISTORY OF OVARIAN CARCINOMA: Status: RESOLVED | Noted: 2018-07-02 | Resolved: 2018-08-28

## 2018-08-28 PROCEDURE — 99024 POSTOP FOLLOW-UP VISIT: CPT | Performed by: OBSTETRICS & GYNECOLOGY

## 2018-08-28 NOTE — PROGRESS NOTES
Assessment/Plan:    Problem List Items Addressed This Visit        Other    Status post bilateral salpingo-oophorectomy (BSO) - Primary     -   Underwent laparoscopic bilateral salpingo-oophorectomy on August 10, 2018  At the same time, she underwent bilateral mastectomies  Final pathology demonstrated benign follicular cysts and no evidence of atypia or malignancy  Pelvic washings were negative  Patient has recovered well from surgery  At this time, she can resume all activities of daily living with no restrictions  I have recommended consideration of decrease core exercise activity for the next 1-2 weeks to prevent hernias  Her activities are limited anyways given ongoing breast reconstruction  Patient will return to routine gynecologic surveillance with her primary care providers  I would be happy to see her in the future if she has any questions or problems related to surgery  CHIEF COMPLAINT:       Postoperative follow-up  Problem:  Cancer Staging  Malignant neoplasm of lower-outer quadrant of right breast of female, estrogen receptor positive ([facility] [address] )  Staging form: Breast, AJCC 8th Edition  - Clinical: cT1, cN0, cM0, ER: Positive, AK: Positive, HER2: Negative - Signed by Sara Calix MD on 7/2/2018  - Pathologic: pT1a, pN0(sn), cM0, ER: Positive, AK: Positive, HER2: Negative - Signed by Sara Calix MD on 8/21/2018        Patient ID: Hardik Wakefield is a 57 y o  female  HPI    Patient underwent laparoscopic bilateral salpingo-oophorectomy, bilateral mastectomy and right sentinel lymph node biopsy followed by insertion of bilateral tissue expanders on August 10, 2018  Final pathology from tubes and ovaries demonstrated no evidence of atypia or malignancy  Small estrogen receptor positive breast carcinoma was identified as expected  Patient is recovering well  Denies vaginal bleeding, drainage or discharge  Normal appetite    She was seen today by Plastic surgery with removal of drains and injection of her tissue expanders  The following portions of the patient's history were reviewed and updated as appropriate: allergies, current medications, past family history, past medical history, past social history, past surgical history and problem list     Review of Systems   as above  Otherwise 12 point review of systems is unremarkable  Current Outpatient Prescriptions:     acetaminophen (TYLENOL) 325 mg tablet, Take 650 mg by mouth every 6 (six) hours as needed for mild pain, Disp: , Rfl:     albuterol (PROVENTIL HFA,VENTOLIN HFA) 90 mcg/act inhaler, Inhale 2 puffs every 6 (six) hours as needed for wheezing, Disp: , Rfl:     ibuprofen (MOTRIN) 200 mg tablet, Take 400 mg by mouth every 6 (six) hours as needed for mild pain, Disp: , Rfl:     loratadine (CLARITIN) 10 mg tablet, Take 10 mg by mouth as needed  , Disp: , Rfl:     mometasone (NASONEX) 50 mcg/act nasal spray, 2 sprays in each nostril as needed, Disp: , Rfl:     montelukast (SINGULAIR) 10 mg tablet, Take 10 mg by mouth as needed  , Disp: , Rfl:     multivitamin (THERAGRAN) TABS, Take 1 tablet by mouth daily, Disp: , Rfl:     pantoprazole (PROTONIX) 40 mg tablet, Take 40 mg by mouth every morning  , Disp: , Rfl:     Objective:    Blood pressure 102/64, resp  rate 16, height 5' 7" (1 702 m), weight 78 9 kg (174 lb), last menstrual period 07/31/2018  Body mass index is 27 25 kg/m²  Body surface area is 1 91 meters squared  Physical Exam      Abdomen: Soft, nontender, nondistended  Incisions well healed  No hernias        Brittany Calero MD, [address]  8/28/2018  3:38 PM

## 2018-08-28 NOTE — ASSESSMENT & PLAN NOTE
-   Underwent laparoscopic bilateral salpingo-oophorectomy on August 10, 2018  At the same time, she underwent bilateral mastectomies  Final pathology demonstrated benign follicular cysts and no evidence of atypia or malignancy  Pelvic washings were negative  Patient has recovered well from surgery  At this time, she can resume all activities of daily living with no restrictions  I have recommended consideration of decrease core exercise activity for the next 1-2 weeks to prevent hernias  Her activities are limited anyways given ongoing breast reconstruction  Patient will return to routine gynecologic surveillance with her primary care providers  I would be happy to see her in the future if she has any questions or problems related to surgery

## 2018-08-28 NOTE — PATIENT INSTRUCTIONS
-   Resume routine gynecologic care  Contact us if you have any questions or problems related to surgery

## 2018-09-05 ENCOUNTER — TELEPHONE (OUTPATIENT)
Dept: SURGICAL ONCOLOGY | Facility: CLINIC | Age: 48
End: 2018-09-05

## 2018-09-05 NOTE — TELEPHONE ENCOUNTER
Made f/u call re: auth for genetic testing-patient received notice that [facility] didn't cover due to no pre auth-has not gotten any lab bills-advised her to call me if she does receive any lab bill and I will assist her with handling with lab   Emailed her my contact info at her request

## 2018-09-07 ENCOUNTER — OFFICE VISIT (OUTPATIENT)
Dept: HEMATOLOGY ONCOLOGY | Facility: CLINIC | Age: 48
End: 2018-09-07
Payer: COMMERCIAL

## 2018-09-07 VITALS
WEIGHT: 173 LBS | OXYGEN SATURATION: 98 % | BODY MASS INDEX: 26.22 KG/M2 | DIASTOLIC BLOOD PRESSURE: 71 MMHG | TEMPERATURE: 97.2 F | SYSTOLIC BLOOD PRESSURE: 121 MMHG | RESPIRATION RATE: 18 BRPM | HEART RATE: 100 BPM | HEIGHT: 68 IN

## 2018-09-07 DIAGNOSIS — Z17.0 MALIGNANT NEOPLASM OF LOWER-OUTER QUADRANT OF RIGHT BREAST OF FEMALE, ESTROGEN RECEPTOR POSITIVE (HCC): Primary | ICD-10-CM

## 2018-09-07 DIAGNOSIS — C50.511 MALIGNANT NEOPLASM OF LOWER-OUTER QUADRANT OF RIGHT BREAST OF FEMALE, ESTROGEN RECEPTOR POSITIVE (HCC): Primary | ICD-10-CM

## 2018-09-07 PROCEDURE — 99245 OFF/OP CONSLTJ NEW/EST HI 55: CPT | Performed by: INTERNAL MEDICINE

## 2018-09-07 RX ORDER — VENLAFAXINE HYDROCHLORIDE 37.5 MG/1
37.5 CAPSULE, EXTENDED RELEASE ORAL DAILY
Qty: 90 CAPSULE | Refills: 3 | Status: SHIPPED | OUTPATIENT
Start: 2018-09-07 | End: 2018-11-29 | Stop reason: ALTCHOICE

## 2018-09-07 RX ORDER — METHOCARBAMOL 500 MG/1
500 TABLET, FILM COATED ORAL 3 TIMES DAILY
COMMUNITY
Start: 2018-08-24

## 2018-09-07 RX ORDER — ANASTROZOLE 1 MG/1
1 TABLET ORAL DAILY
Qty: 90 TABLET | Refills: 3 | Status: SHIPPED | OUTPATIENT
Start: 2018-09-07 | End: 2019-08-19 | Stop reason: SDUPTHER

## 2018-09-07 NOTE — PROGRESS NOTES
Oncology Consult Note  Alvenia Lennox 57 y o  female MRN: [id number]  Unit/Bed#:  Encounter: [id number]      Presenting Complaint: stage IA right-sided breast cancer, status post bilateral mastectomies, status post bilateral oophorectomy    History of Presenting Illness:   she is 57-year-old premenopausal female, now surgically postmenopausal with bilateral oophorectomy is who had an abnormal mammogram, initially in June 2017, diagnosed with fibrocystic changes and there is a family history of breast cancer and ovarian cancer in her mother, she had genetic counselor, she was tested negative for BRCA1/ 2 mutation however 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, she had an abnormal MRI of the right breast later on, and because of Ash[employer]azi [employer] inheritance, the patient elected to have bilateral oophorectomies and mastectomies  The patient underwent right breast core biopsy showed invasive breast carcinoma of no specific type, grade 1 with ductal carcinoma in situ present, comprising 45% of the lesion, % positive, % positive, her 2+ 1 by IHC, status post bilateral mastectomies with immediate expanders, final pathology showed invasive ductal carcinoma of no specific type, tumor size 3 mm, grade 1 no evidence of lymphovascular invasion stage IA ( pT1a, pN0, grade 1) with 2- sentinel lymph nodes  Later on she underwent bilateral oophorectomies no evidence of malignancy  She had a history of GERD, POTS, Raynaud phenomena,  She drinks liquor and wine like for glasses every week, she does not smoke cigarette   Family history significant again for breast and ovarian cancer in her mother who was tested negative for BRCA1/2 mutation  She denies any headache blurred vision nausea vomiting diarrhea constipation dysuria hematuria melena hematochezia    Review of Systems - As stated in the HPI otherwise the fourteen point review of systems was negative      Past Medical History:   Diagnosis Date    Ashkenazi Orthodox ancestry requiring population-specific genetic screening     Asthma     Bilateral fibrocystic breast changes     Breathing difficulty     after gastric bypass 2014-"felt elephant on chest"    Chest pain, non-cardiac     Seen in ER and is from esophageal spasms      Family history of breast cancer in female     GERD (gastroesophageal reflux disease)     History of MRSA infection     Leg    History of UTI     treated 8/1/2018    Human papilloma virus (HPV) infection     Hx MRSA infection     on leg-2009 treated    PONV (postoperative nausea and vomiting)     Postural lightheadedness     Raynaud's disease     Seasonal allergies     Sinus problem     Sinus problem        Social History     Social History    Marital status: Single     Spouse name: N/A    Number of children: 2    Years of education: N/A     Social History Main Topics    Smoking status: Never Smoker    Smokeless tobacco: Never Used    Alcohol use 2 4 oz/week     4 Glasses of wine per week      Comment: 2 weekly  -4  glasses total    Drug use: No    Sexual activity: Not on file     Other Topics Concern    Not on file     Social History Narrative    Uses safety equipment, seatbelts       Family History   Problem Relation Age of Onset    Other Mother         BRCA negative    Breast cancer Mother 72    Ovarian cancer Mother 61    Diabetes Father     Heart disease Father     Hypertension Father     Migraines Sister     Diabetes Brother     Colon cancer Maternal Grandfather 80    Diabetes type II Maternal Grandfather     Heart disease Paternal Grandfather     Colon cancer Maternal Aunt 62    Skin cancer Maternal Aunt        Allergies   Allergen Reactions    Pollen Extract      Annotation - [id number]: trees, pollens    Adhesive [Medical Tape] Rash         Current Outpatient Prescriptions:     acetaminophen (TYLENOL) 325 mg tablet, Take 650 mg by mouth every 6 (six) hours as needed for mild pain, Disp: , Rfl:     albuterol (PROVENTIL HFA,VENTOLIN HFA) 90 mcg/act inhaler, Inhale 2 puffs every 6 (six) hours as needed for wheezing, Disp: , Rfl:     ibuprofen (MOTRIN) 200 mg tablet, Take 400 mg by mouth every 6 (six) hours as needed for mild pain, Disp: , Rfl:     loratadine (CLARITIN) 10 mg tablet, Take 10 mg by mouth as needed  , Disp: , Rfl:     methocarbamol (ROBAXIN) 500 mg tablet, , Disp: , Rfl:     mometasone (NASONEX) 50 mcg/act nasal spray, 2 sprays in each nostril as needed, Disp: , Rfl:     montelukast (SINGULAIR) 10 mg tablet, Take 10 mg by mouth as needed  , Disp: , Rfl:     multivitamin (THERAGRAN) TABS, Take 1 tablet by mouth daily, Disp: , Rfl:     pantoprazole (PROTONIX) 40 mg tablet, Take 40 mg by mouth every morning  , Disp: , Rfl:     anastrozole (ARIMIDEX) 1 mg tablet, Take 1 tablet (1 mg total) by mouth daily, Disp: 90 tablet, Rfl: 3    venlafaxine (EFFEXOR-XR) 37 5 mg 24 hr capsule, Take 1 capsule (37 5 mg total) by mouth daily, Disp: 90 capsule, Rfl: 3      /71 (BP Location: Left arm, Cuff Size: Large)   Pulse 100   Temp (!) 97 2 °F (36 2 °C) (Tympanic)   Resp 18   Ht 5' 7 72" (1 72 m)   Wt 78 5 kg (173 lb)   SpO2 98%   BMI 26 52 kg/m²       General Appearance:    Alert, oriented        Eyes:    PERRL   Ears:    Normal external ear canals, both ears   Nose:   Nares normal, septum midline   Throat:   Mucosa moist  Pharynx without injection  Neck:   Supple       Lungs:     Clear to auscultation bilaterally   Chest Wall:    No tenderness or deformity the bilateral mastectomy sites are healing very well with immediate expanders    Heart:    Regular rate and rhythm       Abdomen:     Soft, non-tender, bowel sounds +, no organomegaly           Extremities:   Extremities no cyanosis or edema       Skin:   no rash or icterus      Lymph nodes:   Cervical, supraclavicular, and axillary nodes normal   Neurologic:   CNII-XII intact, normal strength, sensation and reflexes Throughout               No results found for this or any previous visit (from the past 48 hour(s))  Nm Lymphatic Breast    Result Date: 8/10/2018  Narrative: SENTINEL NODE LYMPHOSCINTIGRAPHY INDICATION: Right breast carcinoma FINDINGS: 0 52 mCi Tc-99m sulfur colloid (0 6 cc volume) was administered in divided doses in the right periareolar region  Scintigraphic images were obtained over the right hemithorax and axilla in multiple projections  Right axillary node identified  Using scintigraphic guidance, the corresponding skin site was marked with an indelible marker  The patient was transferred to the operating room in satisfactory condition  Impression: [place] lymph node localized to right axilla  Workstation performed: [id number]       Assessment and plan:  1  Stage IA ( pT1a, N0, grade 1) invasive ductal carcinoma of the right breast non otherwise specified, status post bilateral mastectomies, the left 1 was done for prophylactic measures, status post immediate expanders, the patient is from Ep[employer] inheritance, her mother was diagnosed with ovarian and breast cancer, the patient herself was tested negative for BRCA1 / 2 mutation, she was found to have FH mutation of unknown significance, the patient mother was tested negative for BRCA1/ 2 mutation  2  Status post bilateral oophorectomy, negative for malignancy, now she is surgically postmenopausal  3  The patient is candidate for aromatase inhibitor, we talked about anastrozole 1 mg p  O  Daily for the next 5 years, side effects of anastrozole that are but not limited to bloating, arthralgia, DVT, osteopenia/ osteoporosis, she agree to proceed   4  Patient to have DEXA scan in the next 2 year  5  She also inquired about hot flashes treatment, I believe treatment with Effexor 37 5 mg ER 1 capsule every night is good idea  6   Follow-up in 6 months with CBC, CMP

## 2018-09-07 NOTE — LETTER
September 7, 2018     Chavez Trinidad MD  [address] [facility]  [address] [facility],[address]    Patient: Tomy Jain   YOB: 1970   Date of Visit: 9/7/2018       Dear Dr Cornell Manual: Thank you for referring Toym Jain to me for evaluation  Below are my notes for this consultation  If you have questions, please do not hesitate to call me  I look forward to following your patient along with you           Sincerely,        Uriel Sorenson MD        CC: Sandor Holter, MD Lebron Dear, MD Gina Bates MD Victory Brock, MD  9/7/2018 11:30 AM  Sign at close encounter  Oncology Consult Note  Tomy Jain 57 y o  female MRN: [id number]  Unit/Bed#:  Encounter: [id number]      Presenting Complaint: stage IA right-sided breast cancer, status post bilateral mastectomies, status post bilateral oophorectomy    History of Presenting Illness:   she is 72-year-old premenopausal female, now surgically postmenopausal with bilateral oophorectomy is who had an abnormal mammogram, initially in June 2017, diagnosed with fibrocystic changes and there is a family history of breast cancer and ovarian cancer in her mother, she had genetic counselor, she was tested negative for BRCA1/ 2 mutation however 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, she had an abnormal MRI of the right breast later on, and because of Ashkenazi Islam inheritance, the patient elected to have bilateral oophorectomies and mastectomies  The patient underwent right breast core biopsy showed invasive breast carcinoma of no specific type, grade 1 with ductal carcinoma in situ present, comprising 45% of the lesion, % positive, % positive, her 2+ 1 by IHC, status post bilateral mastectomies with immediate expanders, final pathology showed invasive ductal carcinoma of no specific type, tumor size 3 mm, grade 1 no evidence of lymphovascular invasion stage IA ( pT1a, pN0, grade 1) with 2- sentinel lymph nodes  Later on she underwent bilateral oophorectomies no evidence of malignancy  She had a history of GERD, POTS, Raynaud phenomena,  She drinks liquor and wine like for glasses every week, she does not smoke cigarette   Family history significant again for breast and ovarian cancer in her mother who was tested negative for BRCA1/2 mutation  She denies any headache blurred vision nausea vomiting diarrhea constipation dysuria hematuria melena hematochezia    Review of Systems - As stated in the HPI otherwise the fourteen point review of systems was negative  Past Medical History:   Diagnosis Date    Ashkenazi Temple ancestry requiring population-specific genetic screening     Asthma     Bilateral fibrocystic breast changes     Breathing difficulty     after gastric bypass 2014-"felt elephant on chest"    Chest pain, non-cardiac     Seen in ER and is from esophageal spasms      Family history of breast cancer in female     GERD (gastroesophageal reflux disease)     History of MRSA infection     Leg    History of UTI     treated 8/1/2018    Human papilloma virus (HPV) infection     Hx MRSA infection     on leg-2009 treated    PONV (postoperative nausea and vomiting)     Postural lightheadedness     Raynaud's disease     Seasonal allergies     Sinus problem     Sinus problem        Social History     Social History    Marital status: Single     Spouse name: N/A    Number of children: 2    Years of education: N/A     Social History Main Topics    Smoking status: Never Smoker    Smokeless tobacco: Never Used    Alcohol use 2 4 oz/week     4 Glasses of wine per week      Comment: 2 weekly  -4  glasses total    Drug use: No    Sexual activity: Not on file     Other Topics Concern    Not on file     Social History Narrative    Uses safety equipment, seatbelts       Family History   Problem Relation Age of Onset    Other Mother         BRCA negative    Breast cancer Mother 72    Ovarian cancer Mother 61    Diabetes Father     Heart disease Father     Hypertension Father     Migraines Sister     Diabetes Brother     Colon cancer Maternal Grandfather 80    Diabetes type II Maternal Grandfather     Heart disease Paternal Grandfather     Colon cancer Maternal Aunt 62    Skin cancer Maternal Aunt        Allergies   Allergen Reactions    Pollen Extract      Annotation - [id number]: trees, pollens    Adhesive [Medical Tape] Rash         Current Outpatient Prescriptions:     acetaminophen (TYLENOL) 325 mg tablet, Take 650 mg by mouth every 6 (six) hours as needed for mild pain, Disp: , Rfl:     albuterol (PROVENTIL HFA,VENTOLIN HFA) 90 mcg/act inhaler, Inhale 2 puffs every 6 (six) hours as needed for wheezing, Disp: , Rfl:     ibuprofen (MOTRIN) 200 mg tablet, Take 400 mg by mouth every 6 (six) hours as needed for mild pain, Disp: , Rfl:     loratadine (CLARITIN) 10 mg tablet, Take 10 mg by mouth as needed  , Disp: , Rfl:     methocarbamol (ROBAXIN) 500 mg tablet, , Disp: , Rfl:     mometasone (NASONEX) 50 mcg/act nasal spray, 2 sprays in each nostril as needed, Disp: , Rfl:     montelukast (SINGULAIR) 10 mg tablet, Take 10 mg by mouth as needed  , Disp: , Rfl:     multivitamin (THERAGRAN) TABS, Take 1 tablet by mouth daily, Disp: , Rfl:     pantoprazole (PROTONIX) 40 mg tablet, Take 40 mg by mouth every morning  , Disp: , Rfl:     anastrozole (ARIMIDEX) 1 mg tablet, Take 1 tablet (1 mg total) by mouth daily, Disp: 90 tablet, Rfl: 3    venlafaxine (EFFEXOR-XR) 37 5 mg 24 hr capsule, Take 1 capsule (37 5 mg total) by mouth daily, Disp: 90 capsule, Rfl: 3      /71 (BP Location: Left arm, Cuff Size: Large)   Pulse 100   Temp (!) 97 2 °F (36 2 °C) (Tympanic)   Resp 18   Ht 5' 7 72" (1 72 m)   Wt 78 5 kg (173 lb)   SpO2 98%   BMI 26 52 kg/m²        General Appearance:    Alert, oriented        Eyes:    PERRL   Ears:    Normal external ear canals, both ears   Nose:   Nares normal, septum midline   Throat:   Mucosa moist  Pharynx without injection  Neck:   Supple       Lungs:     Clear to auscultation bilaterally   Chest Wall:    No tenderness or deformity the bilateral mastectomy sites are healing very well with immediate expanders    Heart:    Regular rate and rhythm       Abdomen:     Soft, non-tender, bowel sounds +, no organomegaly           Extremities:   Extremities no cyanosis or edema       Skin:   no rash or icterus  Lymph nodes:   Cervical, supraclavicular, and axillary nodes normal   Neurologic:   CNII-XII intact, normal strength, sensation and reflexes     Throughout               No results found for this or any previous visit (from the past 48 hour(s))  Nm Lymphatic Breast    Result Date: 8/10/2018  Narrative: SENTINEL NODE LYMPHOSCINTIGRAPHY INDICATION: Right breast carcinoma FINDINGS: 0 52 mCi Tc-99m sulfur colloid (0 6 cc volume) was administered in divided doses in the right periareolar region  Scintigraphic images were obtained over the right hemithorax and axilla in multiple projections  Right axillary node identified  Using scintigraphic guidance, the corresponding skin site was marked with an indelible marker  The patient was transferred to the operating room in satisfactory condition  Impression: [place] lymph node localized to right axilla  Workstation performed: [id number]       Assessment and plan:  1  Stage IA ( pT1a, N0, grade 1) invasive ductal carcinoma of the right breast non otherwise specified, status post bilateral mastectomies, the left 1 was done for prophylactic measures, status post immediate expanders, the patient is from Orthodox inheritance, her mother was diagnosed with ovarian and breast cancer, the patient herself was tested negative for BRCA1 / 2 mutation, she was found to have SHERIN mutation of unknown significance, the patient mother was tested negative for BRCA1/ 2 mutation  2   Status post bilateral oophorectomy, negative for malignancy, now she is surgically postmenopausal  3  The patient is candidate for aromatase inhibitor, we talked about anastrozole 1 mg p  O  Daily for the next 5 years, side effects of anastrozole that are but not limited to bloating, arthralgia, DVT, osteopenia/ osteoporosis, she agree to proceed   4  Patient to have DEXA scan in the next 2 year  5  She also inquired about hot flashes treatment, I believe treatment with Effexor 37 5 mg ER 1 capsule every night is good idea  6   Follow-up in 6 months with CBC, CMP

## 2018-09-13 ENCOUNTER — DOCUMENTATION (OUTPATIENT)
Dept: INFUSION CENTER | Facility: HOSPITAL | Age: 48
End: 2018-09-13

## 2018-09-13 NOTE — SOCIAL WORK
MSW received a Distress Thermometer from [facility], where the pt self scored a 2 to indicate her level of stress  The pt did not kumar off any psychosocial stressors  The pt marked off 5 out of 21 physical stressors  Due to the pt's score, no further MSW intervention is warranted  If the pt requests or is referred, a cancer counselor will be available to her

## 2018-11-27 ENCOUNTER — ANESTHESIA EVENT (OUTPATIENT)
Dept: PERIOP | Facility: HOSPITAL | Age: 48
End: 2018-11-27
Payer: COMMERCIAL

## 2018-11-27 ENCOUNTER — OFFICE VISIT (OUTPATIENT)
Dept: SURGICAL ONCOLOGY | Facility: CLINIC | Age: 48
End: 2018-11-27
Payer: COMMERCIAL

## 2018-11-27 VITALS
SYSTOLIC BLOOD PRESSURE: 118 MMHG | RESPIRATION RATE: 16 BRPM | BODY MASS INDEX: 28.04 KG/M2 | HEART RATE: 93 BPM | HEIGHT: 68 IN | TEMPERATURE: 98.6 F | WEIGHT: 185 LBS | DIASTOLIC BLOOD PRESSURE: 80 MMHG

## 2018-11-27 DIAGNOSIS — Z79.811 USE OF AROMATASE INHIBITORS: ICD-10-CM

## 2018-11-27 DIAGNOSIS — Z17.0 MALIGNANT NEOPLASM OF LOWER-OUTER QUADRANT OF RIGHT BREAST OF FEMALE, ESTROGEN RECEPTOR POSITIVE (HCC): Primary | ICD-10-CM

## 2018-11-27 DIAGNOSIS — C50.511 MALIGNANT NEOPLASM OF LOWER-OUTER QUADRANT OF RIGHT BREAST OF FEMALE, ESTROGEN RECEPTOR POSITIVE (HCC): Primary | ICD-10-CM

## 2018-11-27 PROBLEM — N60.12 BILATERAL FIBROCYSTIC BREAST CHANGES: Status: RESOLVED | Noted: 2017-06-27 | Resolved: 2018-11-27

## 2018-11-27 PROBLEM — N60.11 BILATERAL FIBROCYSTIC BREAST CHANGES: Status: RESOLVED | Noted: 2017-06-27 | Resolved: 2018-11-27

## 2018-11-27 PROCEDURE — 99214 OFFICE O/P EST MOD 30 MIN: CPT | Performed by: SURGERY

## 2018-11-27 RX ORDER — ALPRAZOLAM 0.25 MG/1
0.25 TABLET ORAL
Qty: 5 TABLET | Refills: 0 | Status: SHIPPED | OUTPATIENT
Start: 2018-11-27 | End: 2018-11-29 | Stop reason: SDDI

## 2018-11-27 RX ORDER — LIDOCAINE 50 MG/G
1 PATCH TOPICAL DAILY
COMMUNITY
End: 2021-06-03

## 2018-11-27 NOTE — PROGRESS NOTES
Surgical Oncology Follow Up       [facility] [address]  [place] [address]    Lane Rivera  1970  [id number]  [facility] [place]  [address] [place] [address]    Chief Complaint   Patient presents with    Breast Cancer     Pt is here for 6 month follow up        Assessment/Plan   Diagnoses and all orders for this visit:    Malignant neoplasm of lower-outer quadrant of right breast of female, estrogen receptor positive ([facility] [address] )  -     ALPRAZolam (XANAX) 0 25 mg tablet; Take 1 tablet (0 25 mg total) by mouth daily at bedtime as needed for anxiety for up to 5 days    Use of aromatase inhibitors    Other orders  -     lidocaine (LIDODERM) 5 %; Apply 1 patch topically daily Remove & Discard patch within 12 hours or as directed by MD        Advance Care Planning/Advance Directives:  Did not discuss  with the patient  Oncology History:     No history exists  History of Present Illness: breast cancer follow up   -Interval History: none    Review of Systems:  Review of Systems   Constitutional: Negative for appetite change and fever  Hot flashes   Eyes: Negative  Respiratory: Negative for shortness of breath  Cardiovascular: Negative  Gastrointestinal: Negative  Endocrine: Negative  Genitourinary: Negative  Musculoskeletal: Negative  Negative for arthralgias and myalgias  Skin: Negative  Allergic/Immunologic: Negative  Neurological: Negative  Hematological: Negative  Negative for adenopathy  Does not bruise/bleed easily  Psychiatric/Behavioral: Positive for sleep disturbance         Patient Active Problem List   Diagnosis    Family history of breast cancer in female   [facility] Ashkenazi Mandaeism ancestry requiring population-specific genetic screening    Malignant neoplasm of lower-outer quadrant of right breast of female, estrogen receptor positive ([facility] [address] )    UTI (urinary tract infection)    Status post bilateral salpingo-oophorectomy (BSO)    Use of aromatase inhibitors     Past Medical History:   Diagnosis Date    Ashkenazi Taoism ancestry requiring population-specific genetic screening     Asthma     Bilateral fibrocystic breast changes     Breathing difficulty     after gastric bypass 2014-"felt elephant on chest"    Chest pain, non-cardiac     Seen in ER and is from esophageal spasms   Family history of breast cancer in female     GERD (gastroesophageal reflux disease)     History of MRSA infection     Leg    History of UTI     treated 8/1/2018    Human papilloma virus (HPV) infection     Hx MRSA infection     on leg-2009 treated    PONV (postoperative nausea and vomiting)     Postural lightheadedness     Raynaud's disease     Seasonal allergies     Sinus problem     Sinus problem      Past Surgical History:   Procedure Laterality Date    BLADDER SURGERY  2013    bladder lift    BREAST BIOPSY Right 06/14/2018    [facility]    CERVIX LESION DESTRUCTION      COLONOSCOPY      COLPOSCOPY W/ BIOPSY / CURETTAGE      ENDOMETRIAL ABLATION      GASTRIC BYPASS  2014 2014 wt loss 90 lbs(regained 20 lbs)    HYSTEROSCOPIC STERILIZATION W/ IMPLANTS      HYSTEROSCOPY W/ ENDOMETRIAL ABLATION  2013    KNEE ARTHROSCOPY Right 1990    LYMPH NODE BIOPSY Right 8/10/2018    Procedure: BIOPSY LYMPH NODE SENTINEL;  Surgeon: Charanjit Sidhu MD;  [facility];  Service: Surgical Oncology    MAMMO STEREOTACTIC BREAST BIOPSY RIGHT (ALL INC) Right 6/14/2018    NASAL SEPTUM SURGERY      2002, 2016    GA BREAST RECONSTRUC W TISS EXPANDR Bilateral 8/10/2018    Procedure: INSERTION/PLACEMENT TISSUE EXPANDER (EXCHANGE);   Surgeon: Scotty Rivera MD;  [facility];  Service: Plastics    GA IMPLNT BIO IMPLNT FOR SOFT TISSUE REINFORCEMENT Bilateral 8/10/2018    Procedure: RECONSTRUCTION BREAST W/ IMPLANT;  Surgeon: Scotty Rivera MD;  [facility];  Service: Plastics    MI LAP,RMV  ADNEXAL STRUCTURE N/A 8/10/2018    Procedure: SALPINGO-OOPHORECTOMY, LAPAROSCOPIC; PELVIC WASHINGS ;  Surgeon: Sarah Caldwell MD;  [facility];  Service: Gynecology Oncology    MI MASTECTOMY, SIMPLE, COMPLETE Bilateral 8/10/2018    Procedure: MASTECTOMY SIMPLE;  Surgeon: Ana Maya MD;  [facility];  Service: Surgical Oncology    SINUS SURGERY       Family History   Problem Relation Age of Onset    Other Mother         BRCA negative    Breast cancer Mother 72    Ovarian cancer Mother 61    Diabetes Father     Heart disease Father     Hypertension Father     Migraines Sister     Diabetes Brother     Colon cancer Maternal Grandfather 80    Diabetes type II Maternal Grandfather     Heart disease Paternal Grandfather     Colon cancer Maternal Aunt 62    Skin cancer Maternal Aunt      Social History     Social History    Marital status: Single     Spouse name: N/A    Number of children: 2    Years of education: N/A     Occupational History    Not on file       Social History Main Topics    Smoking status: Never Smoker    Smokeless tobacco: Never Used    Alcohol use 2 4 oz/week     4 Glasses of wine per week      Comment: 2 weekly  -4  glasses total    Drug use: No    Sexual activity: Not on file     Other Topics Concern    Not on file     Social History Narrative    Uses safety equipment, seatbelts       Current Outpatient Prescriptions:     acetaminophen (TYLENOL) 325 mg tablet, Take 650 mg by mouth every 6 (six) hours as needed for mild pain, Disp: , Rfl:     albuterol (PROVENTIL HFA,VENTOLIN HFA) 90 mcg/act inhaler, Inhale 2 puffs every 6 (six) hours as needed for wheezing, Disp: , Rfl:     anastrozole (ARIMIDEX) 1 mg tablet, Take 1 tablet (1 mg total) by mouth daily, Disp: 90 tablet, Rfl: 3    lidocaine (LIDODERM) 5 %, Apply 1 patch topically daily Remove & Discard patch within 12 hours or as directed by MD, Disp: , Rfl:     loratadine (CLARITIN) 10 mg tablet, Take 10 mg by mouth as needed  , Disp: , Rfl:     methocarbamol (ROBAXIN) 500 mg tablet, , Disp: , Rfl:     mometasone (NASONEX) 50 mcg/act nasal spray, 2 sprays in each nostril as needed, Disp: , Rfl:     montelukast (SINGULAIR) 10 mg tablet, Take 10 mg by mouth as needed  , Disp: , Rfl:     multivitamin (THERAGRAN) TABS, Take 1 tablet by mouth daily, Disp: , Rfl:     pantoprazole (PROTONIX) 40 mg tablet, Take 40 mg by mouth every morning  , Disp: , Rfl:     ALPRAZolam (XANAX) 0 25 mg tablet, Take 1 tablet (0 25 mg total) by mouth daily at bedtime as needed for anxiety for up to 5 days, Disp: 5 tablet, Rfl: 0    ibuprofen (MOTRIN) 200 mg tablet, Take 400 mg by mouth every 6 (six) hours as needed for mild pain, Disp: , Rfl:     venlafaxine (EFFEXOR-XR) 37 5 mg 24 hr capsule, Take 1 capsule (37 5 mg total) by mouth daily (Patient not taking: Reported on 11/27/2018 ), Disp: 90 capsule, Rfl: 3  Allergies   Allergen Reactions    Pollen Extract      [facility] - [id number]: trees, pollens    Adhesive [Medical Tape] Rash       The following portions of the patient's history were reviewed and updated as appropriate: allergies, current medications, past family history, past medical history, past social history, past surgical history and problem list         Vitals:    11/27/18 0837   BP: 118/80   Pulse: 93   Resp: 16   Temp: 98 6 °F (37 °C)       Physical Exam   Constitutional: She is oriented to person, place, and time  She appears well-developed and well-nourished  HENT:   Head: Normocephalic and atraumatic  Cardiovascular: Normal heart sounds  Pulmonary/Chest: Breath sounds normal  Right breast exhibits skin change (mastectomy scar and expander)  Right breast exhibits no mass and no tenderness  Left breast exhibits skin change (mastectomy scar and expander)  Left breast exhibits no mass and no tenderness  Abdominal: Soft     Lymphadenopathy:        Right axillary: No pectoral and no lateral adenopathy present  Left axillary: No pectoral and no lateral adenopathy present  Right: No supraclavicular adenopathy present  Left: No supraclavicular adenopathy present  Neurological: She is alert and oriented to person, place, and time  Psychiatric: She has a normal mood and affect  Discussion/Summary:  53-year-old female status post bilateral mastectomy and reconstruction for a right-sided stage IA carcinoma  She also had bilateral salpingo oophorectomy and is currently on anastrozole  She reports hot flashes  She does not want to take the Effexor that was prescribed  I advised her to try evening primrose oil to see if that has any effect  She is also having sleep difficulty and asked for prescription for a sleep aid  I advised her to discuss any long-term options with her PCP  I will provide her with five days of low-dose Xanax  I will see her again in six months or sooner should the need arise

## 2018-11-29 RX ORDER — ASPIRIN 81 MG/1
81 TABLET ORAL 2 TIMES WEEKLY
COMMUNITY
End: 2018-11-30 | Stop reason: HOSPADM

## 2018-11-29 NOTE — PRE-PROCEDURE INSTRUCTIONS
Pre-Surgery Instructions:   Medication Instructions    acetaminophen (TYLENOL) 325 mg tablet Instructed patient per Anesthesia Guidelines   albuterol (PROVENTIL HFA,VENTOLIN HFA) 90 mcg/act inhaler Instructed patient per Anesthesia Guidelines   anastrozole (ARIMIDEX) 1 mg tablet Instructed patient per Anesthesia Guidelines   BIOTIN PO Instructed patient per Anesthesia Guidelines   ibuprofen (MOTRIN) 200 mg tablet Instructed patient per Anesthesia Guidelines   lidocaine (LIDODERM) 5 % Instructed patient per Anesthesia Guidelines   loratadine (CLARITIN) 10 mg tablet Instructed patient per Anesthesia Guidelines   methocarbamol (ROBAXIN) 500 mg tablet Instructed patient per Anesthesia Guidelines   mometasone (NASONEX) 50 mcg/act nasal spray Instructed patient per Anesthesia Guidelines   montelukast (SINGULAIR) 10 mg tablet Instructed patient per Anesthesia Guidelines   multivitamin (THERAGRAN) TABS Instructed patient per Anesthesia Guidelines   pantoprazole (PROTONIX) 40 mg tablet Instructed patient per Anesthesia Guidelines   Probiotic Product (PRO-BIOTIC BLEND) CAPS Instructed patient per Anesthesia Guidelines  Patient  instructed to take protonix with a sip of water the morning of surgery  Patient instructed on use of chlorhexidine soap per hospital protocol, pt states she has soap    Patient instructed to stop all ASA, NSAIDS, vitamins and herbal supplements today for surgery  11/30/2018

## 2018-11-30 ENCOUNTER — ANESTHESIA (OUTPATIENT)
Dept: PERIOP | Facility: HOSPITAL | Age: 48
End: 2018-11-30
Payer: COMMERCIAL

## 2018-11-30 ENCOUNTER — HOSPITAL ENCOUNTER (OUTPATIENT)
Facility: HOSPITAL | Age: 48
Setting detail: OUTPATIENT SURGERY
Discharge: HOME | End: 2018-11-30
Attending: PLASTIC SURGERY | Admitting: PLASTIC SURGERY
Payer: COMMERCIAL

## 2018-11-30 VITALS
WEIGHT: 180 LBS | TEMPERATURE: 97.7 F | RESPIRATION RATE: 15 BRPM | DIASTOLIC BLOOD PRESSURE: 55 MMHG | OXYGEN SATURATION: 93 % | SYSTOLIC BLOOD PRESSURE: 93 MMHG | HEART RATE: 72 BPM | HEIGHT: 67 IN | BODY MASS INDEX: 28.25 KG/M2

## 2018-11-30 DIAGNOSIS — N65.1 DISPROPORTION OF RECONSTRUCTED BREAST: ICD-10-CM

## 2018-11-30 DIAGNOSIS — N65.0 DEFORMITY OF RECONSTRUCTED BREAST: ICD-10-CM

## 2018-11-30 LAB — EXT PREGNANCY TEST URINE: NEGATIVE

## 2018-11-30 PROCEDURE — 81025 URINE PREGNANCY TEST: CPT | Performed by: ANESTHESIOLOGY

## 2018-11-30 PROCEDURE — 88304 TISSUE EXAM BY PATHOLOGIST: CPT | Performed by: PATHOLOGY

## 2018-11-30 PROCEDURE — 19340 INSJ BREAST IMPLT SM D MAST: CPT | Performed by: PHYSICIAN ASSISTANT

## 2018-11-30 PROCEDURE — 19371 PERI-IMPLT CAPSLC BRST COMPL: CPT | Performed by: PHYSICIAN ASSISTANT

## 2018-11-30 PROCEDURE — C1789 PROSTHESIS, BREAST, IMP: HCPCS | Performed by: PLASTIC SURGERY

## 2018-11-30 DEVICE — SMOOTH ROUND HIGH PROFILE GEL-FILLED BREAST IMPLANT, 750CC  SMOOTH ROUND SILICONE
Type: IMPLANTABLE DEVICE | Site: BREAST | Status: FUNCTIONAL
Brand: MENTOR MEMORYGEL BREAST IMPLANT

## 2018-11-30 RX ORDER — ONDANSETRON 2 MG/ML
4 INJECTION INTRAMUSCULAR; INTRAVENOUS ONCE AS NEEDED
Status: COMPLETED | OUTPATIENT
Start: 2018-11-30 | End: 2018-11-30

## 2018-11-30 RX ORDER — SCOLOPAMINE TRANSDERMAL SYSTEM 1 MG/1
1 PATCH, EXTENDED RELEASE TRANSDERMAL ONCE AS NEEDED
Status: DISCONTINUED | OUTPATIENT
Start: 2018-11-30 | End: 2018-11-30 | Stop reason: HOSPADM

## 2018-11-30 RX ORDER — SODIUM CHLORIDE 9 MG/ML
125 INJECTION, SOLUTION INTRAVENOUS CONTINUOUS
Status: DISCONTINUED | OUTPATIENT
Start: 2018-11-30 | End: 2018-11-30 | Stop reason: HOSPADM

## 2018-11-30 RX ORDER — OXYCODONE HYDROCHLORIDE AND ACETAMINOPHEN 5; 325 MG/1; MG/1
2 TABLET ORAL EVERY 4 HOURS PRN
Status: DISCONTINUED | OUTPATIENT
Start: 2018-11-30 | End: 2018-11-30 | Stop reason: HOSPADM

## 2018-11-30 RX ORDER — ONDANSETRON 2 MG/ML
INJECTION INTRAMUSCULAR; INTRAVENOUS AS NEEDED
Status: DISCONTINUED | OUTPATIENT
Start: 2018-11-30 | End: 2018-11-30 | Stop reason: SURG

## 2018-11-30 RX ORDER — BUPIVACAINE HYDROCHLORIDE AND EPINEPHRINE 2.5; 5 MG/ML; UG/ML
INJECTION, SOLUTION EPIDURAL; INFILTRATION; INTRACAUDAL; PERINEURAL AS NEEDED
Status: DISCONTINUED | OUTPATIENT
Start: 2018-11-30 | End: 2018-11-30 | Stop reason: HOSPADM

## 2018-11-30 RX ORDER — PROPOFOL 10 MG/ML
INJECTION, EMULSION INTRAVENOUS AS NEEDED
Status: DISCONTINUED | OUTPATIENT
Start: 2018-11-30 | End: 2018-11-30 | Stop reason: SURG

## 2018-11-30 RX ORDER — ROCURONIUM BROMIDE 10 MG/ML
INJECTION, SOLUTION INTRAVENOUS AS NEEDED
Status: DISCONTINUED | OUTPATIENT
Start: 2018-11-30 | End: 2018-11-30 | Stop reason: SURG

## 2018-11-30 RX ORDER — HYDROMORPHONE HCL/PF 1 MG/ML
0.5 SYRINGE (ML) INJECTION
Status: DISCONTINUED | OUTPATIENT
Start: 2018-11-30 | End: 2018-11-30 | Stop reason: HOSPADM

## 2018-11-30 RX ORDER — METOCLOPRAMIDE HYDROCHLORIDE 5 MG/ML
10 INJECTION INTRAMUSCULAR; INTRAVENOUS ONCE AS NEEDED
Status: DISCONTINUED | OUTPATIENT
Start: 2018-11-30 | End: 2018-11-30 | Stop reason: HOSPADM

## 2018-11-30 RX ORDER — DEXAMETHASONE SODIUM PHOSPHATE 4 MG/ML
INJECTION, SOLUTION INTRA-ARTICULAR; INTRALESIONAL; INTRAMUSCULAR; INTRAVENOUS; SOFT TISSUE AS NEEDED
Status: DISCONTINUED | OUTPATIENT
Start: 2018-11-30 | End: 2018-11-30 | Stop reason: SURG

## 2018-11-30 RX ORDER — VANCOMYCIN HYDROCHLORIDE 1 G/200ML
1000 INJECTION, SOLUTION INTRAVENOUS
Status: DISCONTINUED | OUTPATIENT
Start: 2018-11-30 | End: 2018-11-30 | Stop reason: HOSPADM

## 2018-11-30 RX ORDER — LIDOCAINE HYDROCHLORIDE AND EPINEPHRINE 10; 10 MG/ML; UG/ML
INJECTION, SOLUTION INFILTRATION; PERINEURAL AS NEEDED
Status: DISCONTINUED | OUTPATIENT
Start: 2018-11-30 | End: 2018-11-30 | Stop reason: HOSPADM

## 2018-11-30 RX ORDER — FENTANYL CITRATE 50 UG/ML
INJECTION, SOLUTION INTRAMUSCULAR; INTRAVENOUS AS NEEDED
Status: DISCONTINUED | OUTPATIENT
Start: 2018-11-30 | End: 2018-11-30 | Stop reason: SURG

## 2018-11-30 RX ORDER — MIDAZOLAM HYDROCHLORIDE 1 MG/ML
INJECTION INTRAMUSCULAR; INTRAVENOUS AS NEEDED
Status: DISCONTINUED | OUTPATIENT
Start: 2018-11-30 | End: 2018-11-30 | Stop reason: SURG

## 2018-11-30 RX ORDER — ONDANSETRON 2 MG/ML
4 INJECTION INTRAMUSCULAR; INTRAVENOUS EVERY 8 HOURS PRN
Status: DISCONTINUED | OUTPATIENT
Start: 2018-11-30 | End: 2018-11-30 | Stop reason: HOSPADM

## 2018-11-30 RX ADMIN — PROPOFOL 200 MG: 10 INJECTION, EMULSION INTRAVENOUS at 11:44

## 2018-11-30 RX ADMIN — FENTANYL CITRATE 50 MCG: 50 INJECTION, SOLUTION INTRAMUSCULAR; INTRAVENOUS at 13:23

## 2018-11-30 RX ADMIN — ONDANSETRON HYDROCHLORIDE 4 MG: 2 INJECTION, SOLUTION INTRAVENOUS at 11:52

## 2018-11-30 RX ADMIN — SODIUM CHLORIDE: 0.9 INJECTION, SOLUTION INTRAVENOUS at 13:02

## 2018-11-30 RX ADMIN — ROCURONIUM BROMIDE 10 MG: 10 INJECTION INTRAVENOUS at 12:24

## 2018-11-30 RX ADMIN — VANCOMYCIN HYDROCHLORIDE 1000 MG: 1 INJECTION, SOLUTION INTRAVENOUS at 11:45

## 2018-11-30 RX ADMIN — ROCURONIUM BROMIDE 50 MG: 10 INJECTION INTRAVENOUS at 11:44

## 2018-11-30 RX ADMIN — SODIUM CHLORIDE 125 ML/HR: 0.9 INJECTION, SOLUTION INTRAVENOUS at 14:39

## 2018-11-30 RX ADMIN — HYDROMORPHONE HYDROCHLORIDE 0.5 MG: 1 INJECTION, SOLUTION INTRAMUSCULAR; INTRAVENOUS; SUBCUTANEOUS at 14:43

## 2018-11-30 RX ADMIN — LIDOCAINE HYDROCHLORIDE 60 MG: 20 INJECTION, SOLUTION INTRAVENOUS at 11:44

## 2018-11-30 RX ADMIN — SCOPALAMINE 1 PATCH: 1 PATCH, EXTENDED RELEASE TRANSDERMAL at 10:06

## 2018-11-30 RX ADMIN — FENTANYL CITRATE 50 MCG: 50 INJECTION, SOLUTION INTRAMUSCULAR; INTRAVENOUS at 13:24

## 2018-11-30 RX ADMIN — MIDAZOLAM 2 MG: 1 INJECTION INTRAMUSCULAR; INTRAVENOUS at 11:32

## 2018-11-30 RX ADMIN — ONDANSETRON 4 MG: 2 INJECTION INTRAMUSCULAR; INTRAVENOUS at 14:31

## 2018-11-30 RX ADMIN — HYDROMORPHONE HYDROCHLORIDE 0.5 MG: 1 INJECTION, SOLUTION INTRAMUSCULAR; INTRAVENOUS; SUBCUTANEOUS at 14:30

## 2018-11-30 RX ADMIN — SODIUM CHLORIDE 125 ML/HR: 0.9 INJECTION, SOLUTION INTRAVENOUS at 09:59

## 2018-11-30 RX ADMIN — FENTANYL CITRATE 100 MCG: 50 INJECTION, SOLUTION INTRAMUSCULAR; INTRAVENOUS at 11:44

## 2018-11-30 RX ADMIN — DEXAMETHASONE SODIUM PHOSPHATE 4 MG: 4 INJECTION, SOLUTION INTRAMUSCULAR; INTRAVENOUS at 11:52

## 2018-11-30 RX ADMIN — HYDROMORPHONE HYDROCHLORIDE 0.5 MG: 1 INJECTION, SOLUTION INTRAMUSCULAR; INTRAVENOUS; SUBCUTANEOUS at 15:00

## 2018-11-30 NOTE — DISCHARGE INSTRUCTIONS
[address] [facility], [address] [facility], [address] [facility], [facility], [address] [facility]   [facility] /F / [employer]  [employer]       No heavy lifting >20 pounds    Do not raise hands above head    Consider using button up shirts so you don't have to raise your hands above your head to put the shirt on    Wear the surgical bra at all times except the shower   If the bra is tight and is leaving marks on the skin unclasp the bottom clasps of the bra    No ice or heating pack to chest    Ok to shower    No bathing    Change dressing daily    Do not lay on stomach    No smoking    No pushing or pulling    Call the office for an appointment in 5-10 days - [phone]

## 2018-11-30 NOTE — OP NOTE
OPERATIVE REPORT  PATIENT NAME: Herman Hopkins    :  1970  MRN: [id number]  Pt Location: [facility] 04    SURGERY DATE: 2018    Surgeon(s) and Role:     Tyrese Keenan MD - Primary     * Reyes Murguia PA-C - Assisting    Preop Diagnosis:  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]  Disproportion of reconstructed breast [N65 1]    Post-Op Diagnosis Codes: * 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]     * Disproportion of reconstructed breast [N65 1]    Procedure(s) (LRB):  BREAST IMPLANT EXCHANGE (Bilateral)  CAPSULECTOMY (Bilateral)    Specimen(s):  ID Type Source Tests Collected by Time Destination   1 : left breast capsule Tissue Breast, Left TISSUE EXAM Leda Moraes MD 2018 132    2 : right breast capsule Tissue Breast, Right TISSUE EXAM Leda Moraes MD 2018 1325        Estimated Blood Loss:   Minimal    Drains:  Closed/Suction Drain Right;Lateral Breast Bulb 15 Fr  (Active)   Number of days: 112       Closed/Suction Drain Left;Lateral Breast Bulb 15 Fr  (Active)   Number of days: 112       Anesthesia Type:   General    Operative Indications:  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]  Disproportion of reconstructed breast [N65 1]      Operative Findings:      Complications:   None    Procedure and Technique:  The patient was marked while standing prior to surgery  The patient was brought to the operating room and placed supine on the operating room table  Time out procedure was performed, SCDs were applied and IV antibiotics were given  The chest was prepped and draped using standard surgical technique  Attention was turned to the right breast  The previous scar was excised and dissection was carried down to the capsule using electrocautery  The capsule was found to be very thick and stiff  The capsule was opened and the fluid was removed from the expander with a cesar suction  The expander was removed  Due to the thick capsule the decision was made to perform a capsulectomy   A total capsulectomy was performed  The capsule was sent as a specimen  The pocket was opened to fit the exact dimensions of the chosen implant  Attention was turned to the left breast  The previous scar was excised and dissection was carried down to the capsule using electrocautery  The capsule was found to be very thick and stiff  The capsule was opened and the fluid was removed from the expander with a cesar suction  The expander was removed  Due to the thick capsule the decision was made to perform a capsulectomy  A total capsulectomy was performed   The capsule was sent as a specimen  The pocket was opened to fit the exact dimensions of the chosen implant  The pockets were irrigated with antibiotic solution which was allowed to dwell for 5 minutes  Excellent hemostasis was achieved  15 round bronson drains were placed into the subpectoral pockets, tunneled to the lateral chest wall and secured with a 2-0 nylon suture  The skin was re prepped, all surgical team members changed their gloves  Silicone sizers were prepared  The sizers were placed into the subpectoral pockets and excellent shape and contour was confirmed  [place] 550DO high profile silicone implants were placed into the subpectoral pockets  The orientation markers were used to confirm anatomic position of the implant  The deep soft tissue was re approximated using 2-0 PDS suture in running technique  The skin was closed using 3-0 vicryl and 3-0 PDS suture in interrupted deep dermal technique  The superficial skin was closed using 3-0 monocryl suture in running subcuticular technique  The wounds were cleaned and dried  Benzoin, steri strips and skin glue were applied  Biopatches were placed around the drain sites  Sterile gauze and a surgical bra was placed  The patient tolerated the procedure well and was taken to the recovery room in stable condition         I was present for the entire procedure and A qualified resident physician was not available    Patient Disposition:  hemodynamically stable and extubated and stable    SIGNATURE: Neha Carcamo MD  DATE: November 30, 2018  TIME: 2:01 PM

## 2018-11-30 NOTE — PROGRESS NOTES
D/C instructions reviewed w/ pt & father, verbalized understanding  B/L horizontal breast incisions remain CD&I, closed w/ hstocryl & HENNA  Loose kirlex between surgical bra  Given extra for home  HOB remained elevated   Has Rx filled at home

## 2018-11-30 NOTE — ANESTHESIA PREPROCEDURE EVALUATION
Review of Systems/Medical History  Patient summary reviewed  Chart reviewed  History of anesthetic complications PONV    Cardiovascular  Negative cardio ROS    Pulmonary  Asthma , well controlled/ stable Asthma type of rescue: daily inhaler,        GI/Hepatic    GERD well controlled, Bariatric surgery,        Negative  ROS        Endo/Other  Negative endo/other ROS      GYN    Breast cancer Right mastectomy and left mastectomy       Hematology  Negative hematology ROS      Musculoskeletal  Negative musculoskeletal ROS        Neurology  Negative neurology ROS      Psychology   Negative psychology ROS              Physical Exam    Airway    Mallampati score: II  TM Distance: >3 FB  Neck ROM: full     Dental   No notable dental hx     Cardiovascular  Comment: Negative ROS, Rhythm: regular, Rate: normal, Cardiovascular exam normal    Pulmonary  Pulmonary exam normal Breath sounds clear to auscultation,     Other Findings        Anesthesia Plan  ASA Score- 2     Anesthesia Type- general with ASA Monitors  Additional Monitors:   Airway Plan: ETT  Comment: SCOP patch ordered  Used inhaler in SDS  Had a corneal abrasion with 8/18 surgery  Eye doctor recommended lubricant and taping corners of eyes        Plan Factors-Patient not instructed to abstain from smoking on day of procedure  Patient did not smoke on day of surgery  Induction- intravenous  Postoperative Plan- Plan for postoperative opioid use  Informed Consent- Anesthetic plan and risks discussed with patient

## 2018-11-30 NOTE — DISCHARGE SUMMARY
Discharge Summary - Medical Steven Valdes 57 y o  female MRN: [id number]    [facility] Room / Bed: OR POOL/OR POOL Encounter: [id number]    BRIEF OVERVIEW  Admitting Provider: Lottie Branch MD  Discharge Provider: Lottie Branch MD  Primary Care Physician at Discharge: Jody Anguiano [phone]    Discharge To: Home      Admission Date: 11/30/2018     Discharge Date: No discharge date for patient encounter  Code Status: Prior  Advance Directive and Living Will: <no information>  Power of :        Primary Discharge Diagnosis  Active Problems:    * No active hospital problems  *  Resolved Problems:    * No resolved hospital problems   *        Discharge Disposition: Home with [address]    Presenting Problem/History of Present Illness  <principal problem not specified>      Discharge Condition: stable    Patient tolerated the procedure well, recovered in PACU and was discharged home in stable condition    Lottie Branch MD  11/30/2018  11:51 AM

## 2018-11-30 NOTE — ANESTHESIA POSTPROCEDURE EVALUATION
Post-Op Assessment Note      CV Status:  Stable    Mental Status:  Alert and awake    Hydration Status:  Euvolemic    PONV Controlled:  Controlled    Airway Patency:  Patent    Post Op Vitals Reviewed:  Yes              BP 95/54 (11/30/18 1441)    Temp      Pulse 64 (11/30/18 1443)   Resp 13 (11/30/18 1443)    SpO2 100 % (11/30/18 1443)

## 2019-02-04 NOTE — PRE-PROCEDURE INSTRUCTIONS
Pre-Surgery Instructions:   Medication Instructions    acetaminophen (TYLENOL) 325 mg tablet Instructed patient per Anesthesia Guidelines   albuterol (PROVENTIL HFA,VENTOLIN HFA) 90 mcg/act inhaler Instructed patient per Anesthesia Guidelines   anastrozole (ARIMIDEX) 1 mg tablet Instructed patient per Anesthesia Guidelines   BIOTIN PO Instructed patient per Anesthesia Guidelines   lidocaine (LIDODERM) 5 % Instructed patient per Anesthesia Guidelines   Lifitegrast (Princella Golas OP) Instructed patient per Anesthesia Guidelines   loratadine (CLARITIN) 10 mg tablet Instructed patient per Anesthesia Guidelines   methocarbamol (ROBAXIN) 500 mg tablet Instructed patient per Anesthesia Guidelines   mometasone (NASONEX) 50 mcg/act nasal spray Instructed patient per Anesthesia Guidelines   montelukast (SINGULAIR) 10 mg tablet Instructed patient per Anesthesia Guidelines   multivitamin (THERAGRAN) TABS Instructed patient per Anesthesia Guidelines   pantoprazole (PROTONIX) 40 mg tablet Instructed patient per Anesthesia Guidelines   Probiotic Product (PRO-BIOTIC BLEND) CAPS Instructed patient per Anesthesia Guidelines  Patient via TC and per anesth guidelines  instructed to take protonix with a sip of water the morning of surgery  Patient given/ instructed on use of chlorhexidine soap per hospital protocol    Patient instructed to stop all ASA, NSAIDS, vitamins and herbal supplements one week prior to surgery or per Dr Saqib Banks

## 2019-02-15 ENCOUNTER — APPOINTMENT (OUTPATIENT)
Dept: LAB | Age: 49
End: 2019-02-15
Payer: COMMERCIAL

## 2019-02-15 ENCOUNTER — TRANSCRIBE ORDERS (OUTPATIENT)
Dept: ADMINISTRATIVE | Facility: HOSPITAL | Age: 49
End: 2019-02-15

## 2019-02-15 DIAGNOSIS — Z85.3 PERSONAL HISTORY OF MALIGNANT NEOPLASM OF BREAST: ICD-10-CM

## 2019-02-15 DIAGNOSIS — Z17.0 MALIGNANT NEOPLASM OF LOWER-OUTER QUADRANT OF RIGHT BREAST OF FEMALE, ESTROGEN RECEPTOR POSITIVE (HCC): ICD-10-CM

## 2019-02-15 DIAGNOSIS — N65.0 DEFORMITY OF RECONSTRUCTED BREAST: ICD-10-CM

## 2019-02-15 DIAGNOSIS — Z01.818 PREOP EXAMINATION: ICD-10-CM

## 2019-02-15 DIAGNOSIS — Z01.812 PRE-OPERATIVE LABORATORY EXAMINATION: ICD-10-CM

## 2019-02-15 DIAGNOSIS — N65.0 DEFORMITY OF RECONSTRUCTED BREAST: Primary | ICD-10-CM

## 2019-02-15 DIAGNOSIS — C50.511 MALIGNANT NEOPLASM OF LOWER-OUTER QUADRANT OF RIGHT BREAST OF FEMALE, ESTROGEN RECEPTOR POSITIVE (HCC): ICD-10-CM

## 2019-02-15 LAB
ALBUMIN SERPL BCP-MCNC: 3.8 G/DL (ref 3.5–5)
ALP SERPL-CCNC: 93 U/L (ref 46–116)
ALT SERPL W P-5'-P-CCNC: 22 U/L (ref 12–78)
ANION GAP SERPL CALCULATED.3IONS-SCNC: 5 MMOL/L (ref 4–13)
AST SERPL W P-5'-P-CCNC: 20 U/L (ref 5–45)
BASOPHILS # BLD AUTO: 0.03 THOUSANDS/ΜL (ref 0–0.1)
BASOPHILS NFR BLD AUTO: 1 % (ref 0–1)
BILIRUB SERPL-MCNC: 0.47 MG/DL (ref 0.2–1)
BUN SERPL-MCNC: 18 MG/DL (ref 5–25)
CALCIUM SERPL-MCNC: 9.2 MG/DL (ref 8.3–10.1)
CHLORIDE SERPL-SCNC: 104 MMOL/L (ref 100–108)
CO2 SERPL-SCNC: 29 MMOL/L (ref 21–32)
CREAT SERPL-MCNC: 0.73 MG/DL (ref 0.6–1.3)
EOSINOPHIL # BLD AUTO: 0.25 THOUSAND/ΜL (ref 0–0.61)
EOSINOPHIL NFR BLD AUTO: 5 % (ref 0–6)
ERYTHROCYTE [DISTWIDTH] IN BLOOD BY AUTOMATED COUNT: 13 % (ref 11.6–15.1)
GFR SERPL CREATININE-BSD FRML MDRD: 99 ML/MIN/1.73SQ M
GLUCOSE P FAST SERPL-MCNC: 83 MG/DL (ref 65–99)
HCT VFR BLD AUTO: 39.8 % (ref 34.8–46.1)
HGB BLD-MCNC: 12.8 G/DL (ref 11.5–15.4)
IMM GRANULOCYTES # BLD AUTO: 0.02 THOUSAND/UL (ref 0–0.2)
IMM GRANULOCYTES NFR BLD AUTO: 0 % (ref 0–2)
LYMPHOCYTES # BLD AUTO: 1.34 THOUSANDS/ΜL (ref 0.6–4.47)
LYMPHOCYTES NFR BLD AUTO: 26 % (ref 14–44)
MCH RBC QN AUTO: 26.7 PG (ref 26.8–34.3)
MCHC RBC AUTO-ENTMCNC: 32.2 G/DL (ref 31.4–37.4)
MCV RBC AUTO: 83 FL (ref 82–98)
MONOCYTES # BLD AUTO: 0.33 THOUSAND/ΜL (ref 0.17–1.22)
MONOCYTES NFR BLD AUTO: 7 % (ref 4–12)
NEUTROPHILS # BLD AUTO: 3.11 THOUSANDS/ΜL (ref 1.85–7.62)
NEUTS SEG NFR BLD AUTO: 61 % (ref 43–75)
NRBC BLD AUTO-RTO: 0 /100 WBCS
PLATELET # BLD AUTO: 341 THOUSANDS/UL (ref 149–390)
PMV BLD AUTO: 11.5 FL (ref 8.9–12.7)
POTASSIUM SERPL-SCNC: 4.2 MMOL/L (ref 3.5–5.3)
PROT SERPL-MCNC: 7.7 G/DL (ref 6.4–8.2)
RBC # BLD AUTO: 4.8 MILLION/UL (ref 3.81–5.12)
SODIUM SERPL-SCNC: 138 MMOL/L (ref 136–145)
WBC # BLD AUTO: 5.08 THOUSAND/UL (ref 4.31–10.16)

## 2019-02-15 PROCEDURE — 85025 COMPLETE CBC W/AUTO DIFF WBC: CPT

## 2019-02-15 PROCEDURE — 36415 COLL VENOUS BLD VENIPUNCTURE: CPT

## 2019-02-15 PROCEDURE — 80053 COMPREHEN METABOLIC PANEL: CPT

## 2019-02-21 ENCOUNTER — ANESTHESIA EVENT (OUTPATIENT)
Dept: PERIOP | Facility: HOSPITAL | Age: 49
End: 2019-02-21
Payer: COMMERCIAL

## 2019-02-22 ENCOUNTER — ANESTHESIA (OUTPATIENT)
Dept: PERIOP | Facility: HOSPITAL | Age: 49
End: 2019-02-22
Payer: COMMERCIAL

## 2019-02-22 ENCOUNTER — HOSPITAL ENCOUNTER (OUTPATIENT)
Facility: HOSPITAL | Age: 49
Setting detail: OUTPATIENT SURGERY
Discharge: HOME | End: 2019-02-22
Attending: PLASTIC SURGERY | Admitting: PLASTIC SURGERY
Payer: COMMERCIAL

## 2019-02-22 VITALS
HEIGHT: 67 IN | OXYGEN SATURATION: 99 % | HEART RATE: 109 BPM | RESPIRATION RATE: 14 BRPM | DIASTOLIC BLOOD PRESSURE: 68 MMHG | TEMPERATURE: 98.4 F | WEIGHT: 180 LBS | SYSTOLIC BLOOD PRESSURE: 117 MMHG | BODY MASS INDEX: 28.25 KG/M2

## 2019-02-22 RX ORDER — BUPIVACAINE HYDROCHLORIDE AND EPINEPHRINE 2.5; 5 MG/ML; UG/ML
INJECTION, SOLUTION EPIDURAL; INFILTRATION; INTRACAUDAL; PERINEURAL AS NEEDED
Status: DISCONTINUED | OUTPATIENT
Start: 2019-02-22 | End: 2019-02-22 | Stop reason: HOSPADM

## 2019-02-22 RX ORDER — ONDANSETRON 2 MG/ML
INJECTION INTRAMUSCULAR; INTRAVENOUS AS NEEDED
Status: DISCONTINUED | OUTPATIENT
Start: 2019-02-22 | End: 2019-02-22 | Stop reason: SURG

## 2019-02-22 RX ORDER — FENTANYL CITRATE 50 UG/ML
INJECTION, SOLUTION INTRAMUSCULAR; INTRAVENOUS AS NEEDED
Status: DISCONTINUED | OUTPATIENT
Start: 2019-02-22 | End: 2019-02-22 | Stop reason: SURG

## 2019-02-22 RX ORDER — SCOLOPAMINE TRANSDERMAL SYSTEM 1 MG/1
1 PATCH, EXTENDED RELEASE TRANSDERMAL ONCE AS NEEDED
Status: DISCONTINUED | OUTPATIENT
Start: 2019-02-22 | End: 2019-02-22 | Stop reason: HOSPADM

## 2019-02-22 RX ORDER — MINERAL OIL
OIL (ML) MISCELLANEOUS AS NEEDED
Status: DISCONTINUED | OUTPATIENT
Start: 2019-02-22 | End: 2019-02-22 | Stop reason: HOSPADM

## 2019-02-22 RX ORDER — DEXAMETHASONE SODIUM PHOSPHATE 4 MG/ML
INJECTION, SOLUTION INTRA-ARTICULAR; INTRALESIONAL; INTRAMUSCULAR; INTRAVENOUS; SOFT TISSUE AS NEEDED
Status: DISCONTINUED | OUTPATIENT
Start: 2019-02-22 | End: 2019-02-22 | Stop reason: SURG

## 2019-02-22 RX ORDER — MIDAZOLAM HYDROCHLORIDE 1 MG/ML
INJECTION INTRAMUSCULAR; INTRAVENOUS AS NEEDED
Status: DISCONTINUED | OUTPATIENT
Start: 2019-02-22 | End: 2019-02-22 | Stop reason: SURG

## 2019-02-22 RX ORDER — OXYCODONE HYDROCHLORIDE AND ACETAMINOPHEN 5; 325 MG/1; MG/1
2 TABLET ORAL EVERY 4 HOURS PRN
Status: DISCONTINUED | OUTPATIENT
Start: 2019-02-22 | End: 2019-02-22 | Stop reason: HOSPADM

## 2019-02-22 RX ORDER — FENTANYL CITRATE/PF 50 MCG/ML
25 SYRINGE (ML) INJECTION
Status: DISCONTINUED | OUTPATIENT
Start: 2019-02-22 | End: 2019-02-22 | Stop reason: HOSPADM

## 2019-02-22 RX ORDER — EPHEDRINE SULFATE 50 MG/ML
INJECTION, SOLUTION INTRAVENOUS AS NEEDED
Status: DISCONTINUED | OUTPATIENT
Start: 2019-02-22 | End: 2019-02-22 | Stop reason: SURG

## 2019-02-22 RX ORDER — ONDANSETRON 2 MG/ML
4 INJECTION INTRAMUSCULAR; INTRAVENOUS ONCE
Status: COMPLETED | OUTPATIENT
Start: 2019-02-22 | End: 2019-02-22

## 2019-02-22 RX ORDER — PROPOFOL 10 MG/ML
INJECTION, EMULSION INTRAVENOUS AS NEEDED
Status: DISCONTINUED | OUTPATIENT
Start: 2019-02-22 | End: 2019-02-22 | Stop reason: SURG

## 2019-02-22 RX ORDER — ONDANSETRON 2 MG/ML
4 INJECTION INTRAMUSCULAR; INTRAVENOUS EVERY 8 HOURS PRN
Status: DISCONTINUED | OUTPATIENT
Start: 2019-02-22 | End: 2019-02-22 | Stop reason: HOSPADM

## 2019-02-22 RX ORDER — HYDROMORPHONE HCL/PF 1 MG/ML
0.5 SYRINGE (ML) INJECTION
Status: DISCONTINUED | OUTPATIENT
Start: 2019-02-22 | End: 2019-02-22 | Stop reason: HOSPADM

## 2019-02-22 RX ORDER — VANCOMYCIN HYDROCHLORIDE 1 G/200ML
1000 INJECTION, SOLUTION INTRAVENOUS
Status: DISCONTINUED | OUTPATIENT
Start: 2019-02-22 | End: 2019-02-22 | Stop reason: HOSPADM

## 2019-02-22 RX ORDER — SODIUM CHLORIDE 9 MG/ML
125 INJECTION, SOLUTION INTRAVENOUS CONTINUOUS
Status: DISCONTINUED | OUTPATIENT
Start: 2019-02-22 | End: 2019-02-22 | Stop reason: HOSPADM

## 2019-02-22 RX ORDER — LIDOCAINE HYDROCHLORIDE AND EPINEPHRINE 10; 10 MG/ML; UG/ML
INJECTION, SOLUTION INFILTRATION; PERINEURAL AS NEEDED
Status: DISCONTINUED | OUTPATIENT
Start: 2019-02-22 | End: 2019-02-22 | Stop reason: HOSPADM

## 2019-02-22 RX ADMIN — LIDOCAINE HYDROCHLORIDE 80 MG: 20 INJECTION, SOLUTION INTRAVENOUS at 12:57

## 2019-02-22 RX ADMIN — OXYCODONE HYDROCHLORIDE AND ACETAMINOPHEN 1 TABLET: 5; 325 TABLET ORAL at 16:17

## 2019-02-22 RX ADMIN — FENTANYL CITRATE 25 MCG: 50 INJECTION, SOLUTION INTRAMUSCULAR; INTRAVENOUS at 14:11

## 2019-02-22 RX ADMIN — SODIUM CHLORIDE 125 ML/HR: 0.9 INJECTION, SOLUTION INTRAVENOUS at 15:47

## 2019-02-22 RX ADMIN — EPHEDRINE SULFATE 10 MG: 50 INJECTION, SOLUTION INTRAMUSCULAR; INTRAVENOUS; SUBCUTANEOUS at 13:27

## 2019-02-22 RX ADMIN — FENTANYL CITRATE 25 MCG: 50 INJECTION, SOLUTION INTRAMUSCULAR; INTRAVENOUS at 13:05

## 2019-02-22 RX ADMIN — ONDANSETRON 4 MG: 2 INJECTION INTRAMUSCULAR; INTRAVENOUS at 15:01

## 2019-02-22 RX ADMIN — EPHEDRINE SULFATE 5 MG: 50 INJECTION, SOLUTION INTRAMUSCULAR; INTRAVENOUS; SUBCUTANEOUS at 13:31

## 2019-02-22 RX ADMIN — PROPOFOL 200 MG: 10 INJECTION, EMULSION INTRAVENOUS at 12:57

## 2019-02-22 RX ADMIN — FENTANYL CITRATE 25 MCG: 50 INJECTION, SOLUTION INTRAMUSCULAR; INTRAVENOUS at 13:41

## 2019-02-22 RX ADMIN — MIDAZOLAM 2 MG: 1 INJECTION INTRAMUSCULAR; INTRAVENOUS at 12:50

## 2019-02-22 RX ADMIN — SCOPALAMINE 1 PATCH: 1 PATCH, EXTENDED RELEASE TRANSDERMAL at 11:37

## 2019-02-22 RX ADMIN — ONDANSETRON 4 MG: 2 INJECTION INTRAMUSCULAR; INTRAVENOUS at 14:13

## 2019-02-22 RX ADMIN — FENTANYL CITRATE 25 MCG: 50 INJECTION, SOLUTION INTRAMUSCULAR; INTRAVENOUS at 13:38

## 2019-02-22 RX ADMIN — SODIUM CHLORIDE 125 ML/HR: 0.9 INJECTION, SOLUTION INTRAVENOUS at 11:37

## 2019-02-22 RX ADMIN — EPHEDRINE SULFATE 5 MG: 50 INJECTION, SOLUTION INTRAMUSCULAR; INTRAVENOUS; SUBCUTANEOUS at 13:33

## 2019-02-22 RX ADMIN — SODIUM CHLORIDE: 0.9 INJECTION, SOLUTION INTRAVENOUS at 13:44

## 2019-02-22 RX ADMIN — EPHEDRINE SULFATE 10 MG: 50 INJECTION, SOLUTION INTRAMUSCULAR; INTRAVENOUS; SUBCUTANEOUS at 13:37

## 2019-02-22 RX ADMIN — FENTANYL CITRATE 25 MCG: 50 INJECTION, SOLUTION INTRAMUSCULAR; INTRAVENOUS at 15:11

## 2019-02-22 RX ADMIN — FENTANYL CITRATE 25 MCG: 50 INJECTION, SOLUTION INTRAMUSCULAR; INTRAVENOUS at 14:03

## 2019-02-22 RX ADMIN — DEXAMETHASONE SODIUM PHOSPHATE 4 MG: 4 INJECTION, SOLUTION INTRAMUSCULAR; INTRAVENOUS at 12:57

## 2019-02-22 RX ADMIN — VANCOMYCIN HYDROCHLORIDE 1000 MG: 1 INJECTION, SOLUTION INTRAVENOUS at 13:43

## 2019-02-22 RX ADMIN — FENTANYL CITRATE 25 MCG: 50 INJECTION, SOLUTION INTRAMUSCULAR; INTRAVENOUS at 14:54

## 2019-02-22 RX ADMIN — FENTANYL CITRATE 25 MCG: 50 INJECTION, SOLUTION INTRAMUSCULAR; INTRAVENOUS at 14:59

## 2019-02-22 RX ADMIN — FENTANYL CITRATE 25 MCG: 50 INJECTION, SOLUTION INTRAMUSCULAR; INTRAVENOUS at 13:19

## 2019-02-22 NOTE — OP NOTE
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]     * Personal history of malignant neoplasm of breast [Z85 3]    Procedure(s) (LRB):  BREAST MOUND REVISION; FAT GRAFTING (Bilateral)    Specimen(s):  * No specimens in log *    Estimated Blood Loss:   Minimal    Drains:  Closed/Suction Drain Right;Lateral Breast Bulb 15 Fr  (Active)   Number of days: 196       Closed/Suction Drain Left;Lateral Breast Bulb 15 Fr  (Active)   Number of days: 196       Anesthesia Type:   General    Operative Indications:  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]  Personal history of malignant neoplasm of breast [Z85 3]      Operative Findings:      Complications:   None    Procedure and Technique:  The patient was marked while standing in the preoperative holding    area  The patient was brought to the operating room and placed supine    on the operating table  A time-out procedure was performed  Sequential    compression devices were applied  IV antibiotics were given  After    adequate anesthesia was obtained, the abdomen and chest were prepped    and draped using standard surgical technique       Stab incisions were made in the abdomen  Next, 1 liter of tumescent    anesthesia was infiltrated in a subcutaneous plane in the abdomen and    flank areas  After an adequate time for hemostatic effect, liposuction    was performed in a sub-Alexey plane using a 3-Peruvian Mercedes-style    cannula for the upper and lower abdomen and flanks  This was    connected to the mBlox system, and the fat was prepared according to    protocol   The marked areas to be injected of both breasts were    injected with 1% lidocaine with epinephrine       Attention was first turned to the right breast  Stab incisions were    made, and a 1-Stateless fat grafting cannula was used to inject 100 mL in    a multithreading technique with feathering at the borders of the    injected areas  This was performed in the upper, medial and lateral    poles of the breast       Attention was first turned to the left breast  Stab incisions were    made, and a 1-Stateless fat grafting cannula was used to inject 150 mL in    a multithreading technique with feathering at the borders of the    injected areas  This was performed in the upper, medial and lateral    poles of the breast       All the wounds were cleaned and dried     The abdominal wounds were closed using 4-0 PDS suture in an    interrupted buried deep dermal technique  The chest incisions were    closed using 6-0 Prolene suture in an interrupted technique  All the    wounds were cleaned and dried  Skin glue, ABD dressings, a bra and a    binder were applied  The patient tolerated the procedure well, was    extubated in the operating room and taken to the recovery room in    stable condition        I was present for the entire procedure and A qualified resident physician was not available    Patient Disposition:  hemodynamically stable and extubated and stable    SIGNATURE: Tanmay Leahy MD  DATE: February 22, 2019  TIME: 12:57 PM

## 2019-02-22 NOTE — ANESTHESIA PREPROCEDURE EVALUATION
Review of Systems/Medical History  Patient summary reviewed  Chart reviewed  History of anesthetic complications PONV    Cardiovascular  Negative cardio ROS    Pulmonary  Asthma , well controlled/ stable Asthma type of rescue: PRN medication usage,        GI/Hepatic    GERD well controlled, Bariatric surgery,   Comment: Gastric bypass surgery     H/o esophageal spasms      Negative  ROS        Endo/Other  Negative endo/other ROS      GYN    Breast cancer Right mastectomy and left mastectomy  Comment: B/l salpingo-oopherectomy     Hematology  Negative hematology ROS      Musculoskeletal  Negative musculoskeletal ROS        Neurology      Comment: Raynaud s  Psychology   Negative psychology ROS              Physical Exam    Airway    Mallampati score: II  TM Distance: >3 FB  Neck ROM: full     Dental   No notable dental hx     Cardiovascular  Comment: Negative ROS, Rhythm: regular, Rate: normal, Cardiovascular exam normal    Pulmonary  Pulmonary exam normal Breath sounds clear to auscultation,     Other Findings        Anesthesia Plan  ASA Score- 2     Anesthesia Type- general with ASA Monitors  Additional Monitors:   Airway Plan:     Comment: SCOP patch ordered  Had a corneal abrasion with 8/18 surgery  Eye doctor recommended lubricant and taping corners of eyes  --    please use eye lube   Plan Factors-Patient not instructed to abstain from smoking on day of procedure  Patient did not smoke on day of surgery  Induction- intravenous  Postoperative Plan- Plan for postoperative opioid use  Informed Consent- Anesthetic plan and risks discussed with patient  I personally reviewed this patient with the CRNA  Discussed and agreed on the Anesthesia Plan with the CRNA  Gerhardt Sep

## 2019-02-22 NOTE — DISCHARGE SUMMARY
Discharge Summary - Medical Pauly Smalls 57 y o  female MRN: [id number]    [facility] Room / Bed: OR POOL/OR POOL Encounter: [id number]    BRIEF OVERVIEW  Admitting Provider: Zulma Faust MD  Discharge Provider: Zulma Faust MD  Primary Care Physician at Discharge: Brendan Bai [phone]    Discharge To: Home      Admission Date: 2/22/2019     Discharge Date: No discharge date for patient encounter  Code Status: Prior  Advance Directive and Living Will: <no information>  Power of :        Primary Discharge Diagnosis  Active Problems:    * No active hospital problems  *  Resolved Problems:    * No resolved hospital problems   *        Discharge Disposition: [address] [place] [address]    Presenting Problem/History of Present Illness  <principal problem not specified>      Discharge Condition: stable    Patient tolerated the procedure well, recovered in PACU and was discharged home in stable condition    Zulma Faust MD  2/22/2019  1:00 PM

## 2019-02-22 NOTE — DISCHARGE INSTRUCTIONS
[address] Hector Joy, [address] [facility], [address] [facility], [facility], [address] [facility] [employer] /[employer]       No strenuous activity 1 week    Apply gauze to incision sites daily    No ice or heating pack    Wear your binder/bra for 3 weeks    Drainage from the incision sites is normal    Avoid aspirin/motrin/advil/alleve for 1 week    No smoking    It is ok to shower    Swelling and bruising is normal    Call [phone] for an appointment in 10-14 days          Scopolamine (Absorbed through the skin)   Scopolamine ([id number])  Treat nausea and vomiting  Brand Name(s): Transderm Scop   There may be other brand names for this medicine  When This Medicine Should Not Be Used: You should not use this medicine if you have had an allergic reaction to scopolamine, or if you have narrow angle glaucoma  How to Use This Medicine:   Patch  · Your doctor will tell you how many patches to use, where to apply them, and how often to apply them  Do not use more patches or apply them more often than your doctor tells you to  · Read and follow the patient instructions that come with this medicine  Talk to your doctor or pharmacist if you have any questions  · To prevent motion sickness, apply the patch at least 4 hours before you need it  · Wash and dry your hands thoroughly before applying the patch  · Leave the patch in its sealed wrapper until you are ready to put it on  Tear the wrapper open carefully  NEVER CUT the wrapper or the patch with scissors  Do not use any patch that has been cut by accident  · Take the liner off the sticky side before applying  · Apply the patch to dry, hairless skin behind the ear  · If the patch is loose or falls off, apply a new patch at a different place behind the ear  · After you take off the patch, wash the place where the patch was and your hands thoroughly    · Only one patch should be used at any time   If a dose is missed:   · If you forget to wear or change a patch, put one on as soon as you can  If it is almost time to put on your next patch, wait until then to apply a new patch and skip the one you missed  Do not apply extra patches to make up for a missed dose  How to Store and Dispose of This Medicine:   · Store the patches at room temperature in a closed container, away from heat, moisture, and direct light  · Fold the used patch in half with the sticky sides together  Throw any used patch away so that children or pets cannot get to it  You will also need to throw away old patches after the expiration date has passed  · Keep all medicine out of the reach of children  Never share your medicine with anyone  Drugs and Foods to Avoid:   Ask your doctor or pharmacist before using any other medicine, including over-the-counter medicines, vitamins, and herbal products  · Tell your doctor if you use anything else that makes you sleepy  Some examples are allergy medicine, narcotic pain medicine, and alcohol  · Do not drink alcohol while you are using this medicine  Warnings While Using This Medicine:   · Make sure your doctor knows if you are pregnant or breastfeeding, or if you have glaucoma, prostate problems, trouble urinating, blocked bowels, liver disease, kidney disease, or a history of seizures or mental illness  · This medicine can cause blurring of vision and other vision problems if it comes in contact with the eyes  This medicine may also cause problems with urination  If any of these reactions occur, remove the patch and call your doctor right away  · This medicine may make you dizzy or drowsy  Avoid driving, using machines, or doing anything else that could be dangerous if you are not alert  If you plan to participate in underwater sports, this medicine may cause disorienting effects  If this is a concern for you, talk with your doctor    · This medicine may make you sweat less and cause your body to get too hot  Be careful in hot weather, when you are exercising, or if using a sauna or whirlpool  · Tell any doctor or dentist who treats you that you are using this medicine  This medicine may affect certain medical test results  · Skin burns have been reported at the patch site in several patients wearing an aluminized transdermal system during a magnetic resonance imaging scan (MRI)  Because Transderm Sc?p® contains aluminum, it is recommended to remove the system before undergoing an MRI  Possible Side Effects While Using This Medicine:   Call your doctor right away if you notice any of these side effects:  · Allergic reaction: Itching or hives, swelling in your face or hands, swelling or tingling in your mouth or throat, chest tightness, trouble breathing  · Blurred vision  · Confusion or memory loss  · Fast, slow, or uneven heartbeat  · Lightheadedness, dizziness, drowsiness, or fainting  · Seeing, hearing, or feeling things that are not there  · Severe eye pain  · Trouble urinating  If you notice these less serious side effects, talk with your doctor:   · Dry mouth  · Dry, itchy, or red eyes  · Restlessness  · Skin rash or redness  If you notice other side effects that you think are caused by this medicine, tell your doctor  Call your doctor for medical advice about side effects  You may report side effects to FDA at 2-191-FDA-0134  © 2017 [address] [employer] Information is for End User's use only and may not be sold, redistributed or otherwise used for commercial purposes  The above information is an  only  It is not intended as medical advice for individual conditions or treatments  Talk to your doctor, nurse or pharmacist before following any medical regimen to see if it is safe and effective for you

## 2019-03-14 PROBLEM — J33.9 NASAL POLYPOSIS: Status: ACTIVE | Noted: 2019-03-14

## 2019-03-26 ENCOUNTER — TELEPHONE (OUTPATIENT)
Dept: HEMATOLOGY ONCOLOGY | Facility: CLINIC | Age: 49
End: 2019-03-26

## 2019-03-27 ENCOUNTER — TELEPHONE (OUTPATIENT)
Dept: HEMATOLOGY ONCOLOGY | Facility: CLINIC | Age: 49
End: 2019-03-27

## 2019-04-02 ENCOUNTER — OFFICE VISIT (OUTPATIENT)
Dept: HEMATOLOGY ONCOLOGY | Facility: CLINIC | Age: 49
End: 2019-04-02
Payer: COMMERCIAL

## 2019-04-02 VITALS
DIASTOLIC BLOOD PRESSURE: 76 MMHG | HEART RATE: 97 BPM | HEIGHT: 67 IN | BODY MASS INDEX: 29.03 KG/M2 | SYSTOLIC BLOOD PRESSURE: 128 MMHG | RESPIRATION RATE: 16 BRPM | WEIGHT: 185 LBS | TEMPERATURE: 98.9 F

## 2019-04-02 DIAGNOSIS — Z90.722 STATUS POST BILATERAL SALPINGO-OOPHORECTOMY (BSO): ICD-10-CM

## 2019-04-02 DIAGNOSIS — Z17.0 MALIGNANT NEOPLASM OF LOWER-OUTER QUADRANT OF RIGHT BREAST OF FEMALE, ESTROGEN RECEPTOR POSITIVE (HCC): ICD-10-CM

## 2019-04-02 DIAGNOSIS — Z80.3 FAMILY HISTORY OF BREAST CANCER IN FEMALE: Primary | ICD-10-CM

## 2019-04-02 DIAGNOSIS — Z79.811 USE OF AROMATASE INHIBITORS: ICD-10-CM

## 2019-04-02 DIAGNOSIS — C50.511 MALIGNANT NEOPLASM OF LOWER-OUTER QUADRANT OF RIGHT BREAST OF FEMALE, ESTROGEN RECEPTOR POSITIVE (HCC): ICD-10-CM

## 2019-04-02 PROBLEM — Z98.84 HISTORY OF GASTRIC BYPASS: Status: ACTIVE | Noted: 2019-04-02

## 2019-04-02 PROCEDURE — 99214 OFFICE O/P EST MOD 30 MIN: CPT | Performed by: PHYSICIAN ASSISTANT

## 2019-04-02 RX ORDER — MULTIVITAMIN WITH IRON
100 TABLET ORAL
COMMUNITY

## 2019-04-02 RX ORDER — FERROUS SULFATE 325(65) MG
TABLET ORAL DAILY
COMMUNITY

## 2019-04-02 RX ORDER — COVID-19 ANTIGEN TEST
KIT MISCELLANEOUS AS NEEDED
COMMUNITY

## 2019-04-10 ENCOUNTER — TELEPHONE (OUTPATIENT)
Dept: SURGICAL ONCOLOGY | Facility: CLINIC | Age: 49
End: 2019-04-10

## 2019-05-23 ENCOUNTER — ANNUAL EXAM (OUTPATIENT)
Dept: OBGYN CLINIC | Facility: CLINIC | Age: 49
End: 2019-05-23
Payer: COMMERCIAL

## 2019-05-23 VITALS
SYSTOLIC BLOOD PRESSURE: 102 MMHG | WEIGHT: 188.2 LBS | BODY MASS INDEX: 29.54 KG/M2 | HEIGHT: 67 IN | DIASTOLIC BLOOD PRESSURE: 60 MMHG

## 2019-05-23 DIAGNOSIS — C50.511 MALIGNANT NEOPLASM OF LOWER-OUTER QUADRANT OF RIGHT BREAST OF FEMALE, ESTROGEN RECEPTOR POSITIVE (HCC): ICD-10-CM

## 2019-05-23 DIAGNOSIS — N95.1 MENOPAUSAL SYMPTOMS: ICD-10-CM

## 2019-05-23 DIAGNOSIS — Z11.3 SCREENING FOR STD (SEXUALLY TRANSMITTED DISEASE): ICD-10-CM

## 2019-05-23 DIAGNOSIS — Z01.411 ENCOUNTER FOR GYNECOLOGICAL EXAMINATION WITH ABNORMAL FINDING: Primary | ICD-10-CM

## 2019-05-23 DIAGNOSIS — Z17.0 MALIGNANT NEOPLASM OF LOWER-OUTER QUADRANT OF RIGHT BREAST OF FEMALE, ESTROGEN RECEPTOR POSITIVE (HCC): ICD-10-CM

## 2019-05-23 DIAGNOSIS — Z11.51 SCREENING FOR HPV (HUMAN PAPILLOMAVIRUS): ICD-10-CM

## 2019-05-23 PROCEDURE — 99396 PREV VISIT EST AGE 40-64: CPT | Performed by: OBSTETRICS & GYNECOLOGY

## 2019-05-23 PROCEDURE — G0145 SCR C/V CYTO,THINLAYER,RESCR: HCPCS | Performed by: OBSTETRICS & GYNECOLOGY

## 2019-05-23 PROCEDURE — 87624 HPV HI-RISK TYP POOLED RSLT: CPT | Performed by: OBSTETRICS & GYNECOLOGY

## 2019-05-23 RX ORDER — ZOLPIDEM TARTRATE 5 MG/1
5 TABLET ORAL
Qty: 7 TABLET | Refills: 1 | Status: SHIPPED | OUTPATIENT
Start: 2019-05-23 | End: 2019-10-02

## 2019-05-23 RX ORDER — CHOLECALCIFEROL (VITAMIN D3) 125 MCG
TABLET ORAL
COMMUNITY

## 2019-05-24 LAB
HPV HR 12 DNA CVX QL NAA+PROBE: POSITIVE
HPV16 DNA CVX QL NAA+PROBE: NEGATIVE
HPV18 DNA CVX QL NAA+PROBE: NEGATIVE

## 2019-05-31 LAB
LAB AP GYN PRIMARY INTERPRETATION: NORMAL
Lab: NORMAL

## 2019-06-07 ENCOUNTER — TELEPHONE (OUTPATIENT)
Dept: HEMATOLOGY ONCOLOGY | Facility: CLINIC | Age: 49
End: 2019-06-07

## 2019-06-11 ENCOUNTER — OFFICE VISIT (OUTPATIENT)
Dept: SURGICAL ONCOLOGY | Facility: CLINIC | Age: 49
End: 2019-06-11
Payer: COMMERCIAL

## 2019-06-11 VITALS
TEMPERATURE: 98.4 F | WEIGHT: 186.2 LBS | RESPIRATION RATE: 16 BRPM | DIASTOLIC BLOOD PRESSURE: 70 MMHG | SYSTOLIC BLOOD PRESSURE: 112 MMHG | BODY MASS INDEX: 29.22 KG/M2 | HEART RATE: 88 BPM | HEIGHT: 67 IN

## 2019-06-11 DIAGNOSIS — C50.511 MALIGNANT NEOPLASM OF LOWER-OUTER QUADRANT OF RIGHT BREAST OF FEMALE, ESTROGEN RECEPTOR POSITIVE (HCC): Primary | ICD-10-CM

## 2019-06-11 DIAGNOSIS — Z79.811 USE OF AROMATASE INHIBITORS: ICD-10-CM

## 2019-06-11 DIAGNOSIS — Z17.0 MALIGNANT NEOPLASM OF LOWER-OUTER QUADRANT OF RIGHT BREAST OF FEMALE, ESTROGEN RECEPTOR POSITIVE (HCC): Primary | ICD-10-CM

## 2019-06-11 DIAGNOSIS — Z13.71 BRCA NEGATIVE: ICD-10-CM

## 2019-06-11 PROCEDURE — 99214 OFFICE O/P EST MOD 30 MIN: CPT | Performed by: SURGERY

## 2019-08-19 DIAGNOSIS — Z17.0 MALIGNANT NEOPLASM OF LOWER-OUTER QUADRANT OF RIGHT BREAST OF FEMALE, ESTROGEN RECEPTOR POSITIVE (HCC): ICD-10-CM

## 2019-08-19 DIAGNOSIS — C50.511 MALIGNANT NEOPLASM OF LOWER-OUTER QUADRANT OF RIGHT BREAST OF FEMALE, ESTROGEN RECEPTOR POSITIVE (HCC): ICD-10-CM

## 2019-08-19 RX ORDER — ANASTROZOLE 1 MG/1
TABLET ORAL
Qty: 90 TABLET | Refills: 4 | Status: SHIPPED | OUTPATIENT
Start: 2019-08-19 | End: 2020-10-14 | Stop reason: SDUPTHER

## 2019-09-26 ENCOUNTER — HOSPITAL ENCOUNTER (OUTPATIENT)
Dept: BONE DENSITY | Facility: MEDICAL CENTER | Age: 49
Discharge: HOME | End: 2019-09-26
Payer: COMMERCIAL

## 2019-09-26 DIAGNOSIS — Z80.3 FAMILY HISTORY OF BREAST CANCER IN FEMALE: ICD-10-CM

## 2019-09-26 DIAGNOSIS — Z79.811 USE OF AROMATASE INHIBITORS: ICD-10-CM

## 2019-09-26 PROCEDURE — 77080 DXA BONE DENSITY AXIAL: CPT

## 2019-09-27 ENCOUNTER — APPOINTMENT (OUTPATIENT)
Dept: LAB | Age: 49
End: 2019-09-27
Payer: COMMERCIAL

## 2019-09-27 DIAGNOSIS — Z90.722 STATUS POST BILATERAL SALPINGO-OOPHORECTOMY (BSO): ICD-10-CM

## 2019-09-27 DIAGNOSIS — Z80.3 FAMILY HISTORY OF BREAST CANCER IN FEMALE: ICD-10-CM

## 2019-09-27 LAB
ALBUMIN SERPL BCP-MCNC: 4.1 G/DL (ref 3.5–5)
ALP SERPL-CCNC: 90 U/L (ref 46–116)
ALT SERPL W P-5'-P-CCNC: 18 U/L (ref 12–78)
ANION GAP SERPL CALCULATED.3IONS-SCNC: 6 MMOL/L (ref 4–13)
AST SERPL W P-5'-P-CCNC: 13 U/L (ref 5–45)
BASOPHILS # BLD AUTO: 0.02 THOUSANDS/ΜL (ref 0–0.1)
BASOPHILS NFR BLD AUTO: 1 % (ref 0–1)
BILIRUB SERPL-MCNC: 0.51 MG/DL (ref 0.2–1)
BUN SERPL-MCNC: 16 MG/DL (ref 5–25)
CALCIUM SERPL-MCNC: 9.6 MG/DL (ref 8.3–10.1)
CHLORIDE SERPL-SCNC: 106 MMOL/L (ref 100–108)
CO2 SERPL-SCNC: 30 MMOL/L (ref 21–32)
CREAT SERPL-MCNC: 0.77 MG/DL (ref 0.6–1.3)
EOSINOPHIL # BLD AUTO: 0.14 THOUSAND/ΜL (ref 0–0.61)
EOSINOPHIL NFR BLD AUTO: 3 % (ref 0–6)
ERYTHROCYTE [DISTWIDTH] IN BLOOD BY AUTOMATED COUNT: 12.2 % (ref 11.6–15.1)
FERRITIN SERPL-MCNC: 18 NG/ML (ref 8–388)
GFR SERPL CREATININE-BSD FRML MDRD: 92 ML/MIN/1.73SQ M
GLUCOSE SERPL-MCNC: 79 MG/DL (ref 65–140)
HCT VFR BLD AUTO: 42.4 % (ref 34.8–46.1)
HGB BLD-MCNC: 13.7 G/DL (ref 11.5–15.4)
IMM GRANULOCYTES # BLD AUTO: 0.01 THOUSAND/UL (ref 0–0.2)
IMM GRANULOCYTES NFR BLD AUTO: 0 % (ref 0–2)
IRON SATN MFR SERPL: 25 %
IRON SERPL-MCNC: 92 UG/DL (ref 50–170)
LYMPHOCYTES # BLD AUTO: 1.25 THOUSANDS/ΜL (ref 0.6–4.47)
LYMPHOCYTES NFR BLD AUTO: 30 % (ref 14–44)
MCH RBC QN AUTO: 29.3 PG (ref 26.8–34.3)
MCHC RBC AUTO-ENTMCNC: 32.3 G/DL (ref 31.4–37.4)
MCV RBC AUTO: 91 FL (ref 82–98)
MONOCYTES # BLD AUTO: 0.33 THOUSAND/ΜL (ref 0.17–1.22)
MONOCYTES NFR BLD AUTO: 8 % (ref 4–12)
NEUTROPHILS # BLD AUTO: 2.45 THOUSANDS/ΜL (ref 1.85–7.62)
NEUTS SEG NFR BLD AUTO: 58 % (ref 43–75)
NRBC BLD AUTO-RTO: 0 /100 WBCS
PLATELET # BLD AUTO: 284 THOUSANDS/UL (ref 149–390)
PMV BLD AUTO: 11.2 FL (ref 8.9–12.7)
POTASSIUM SERPL-SCNC: 3.9 MMOL/L (ref 3.5–5.3)
PROT SERPL-MCNC: 7.3 G/DL (ref 6.4–8.2)
RBC # BLD AUTO: 4.68 MILLION/UL (ref 3.81–5.12)
SODIUM SERPL-SCNC: 142 MMOL/L (ref 136–145)
TIBC SERPL-MCNC: 373 UG/DL (ref 250–450)
VIT B12 SERPL-MCNC: 922 PG/ML (ref 100–900)
WBC # BLD AUTO: 4.2 THOUSAND/UL (ref 4.31–10.16)

## 2019-09-27 PROCEDURE — 85025 COMPLETE CBC W/AUTO DIFF WBC: CPT

## 2019-09-27 PROCEDURE — 82607 VITAMIN B-12: CPT

## 2019-09-27 PROCEDURE — 83550 IRON BINDING TEST: CPT

## 2019-09-27 PROCEDURE — 82728 ASSAY OF FERRITIN: CPT

## 2019-09-27 PROCEDURE — 83540 ASSAY OF IRON: CPT

## 2019-09-27 PROCEDURE — 36415 COLL VENOUS BLD VENIPUNCTURE: CPT

## 2019-09-27 PROCEDURE — 80053 COMPREHEN METABOLIC PANEL: CPT

## 2019-09-30 ENCOUNTER — TELEPHONE (OUTPATIENT)
Dept: HEMATOLOGY ONCOLOGY | Facility: CLINIC | Age: 49
End: 2019-09-30

## 2019-09-30 NOTE — TELEPHONE ENCOUNTER
Patient called  She is going to get a tattoo  She recently had lab work done and looked on my chart to see that her WBC was low  She has 2 questions  1 ) Can she still have a tattoo done? 2 ) Does she need to be on a antibiotic when she gets it done? Please reach out to the patient   Thanks

## 2019-10-02 ENCOUNTER — OFFICE VISIT (OUTPATIENT)
Dept: HEMATOLOGY ONCOLOGY | Facility: CLINIC | Age: 49
End: 2019-10-02
Payer: COMMERCIAL

## 2019-10-02 VITALS
DIASTOLIC BLOOD PRESSURE: 88 MMHG | OXYGEN SATURATION: 98 % | WEIGHT: 192 LBS | HEIGHT: 68 IN | HEART RATE: 76 BPM | SYSTOLIC BLOOD PRESSURE: 122 MMHG | RESPIRATION RATE: 16 BRPM | TEMPERATURE: 98.5 F | BODY MASS INDEX: 29.1 KG/M2

## 2019-10-02 DIAGNOSIS — C50.511 MALIGNANT NEOPLASM OF LOWER-OUTER QUADRANT OF RIGHT BREAST OF FEMALE, ESTROGEN RECEPTOR POSITIVE (HCC): Primary | ICD-10-CM

## 2019-10-02 DIAGNOSIS — F51.01 PRIMARY INSOMNIA: ICD-10-CM

## 2019-10-02 DIAGNOSIS — Z17.0 MALIGNANT NEOPLASM OF LOWER-OUTER QUADRANT OF RIGHT BREAST OF FEMALE, ESTROGEN RECEPTOR POSITIVE (HCC): Primary | ICD-10-CM

## 2019-10-02 DIAGNOSIS — Z98.84 HISTORY OF GASTRIC BYPASS: ICD-10-CM

## 2019-10-02 PROCEDURE — 99214 OFFICE O/P EST MOD 30 MIN: CPT | Performed by: INTERNAL MEDICINE

## 2019-10-02 RX ORDER — ZOLPIDEM TARTRATE 5 MG/1
5 TABLET ORAL
Qty: 30 TABLET | Refills: 3 | Status: SHIPPED | OUTPATIENT
Start: 2019-10-02 | End: 2021-12-28 | Stop reason: SDUPTHER

## 2019-10-02 NOTE — PROGRESS NOTES
Hematology Outpatient Follow - Up Note  Kandis Stevenson 54 y o  female MRN: @ Encounter: [id number]        Date:  10/2/2019        Assessment/ Plan:    1  Stage IA invasive ductal carcinoma the right breast diagnosed on 06/2018 status post bilateral mastectomies (pT1a, N0, grade 1) %, %, HER2 negative, left breast was done for prophylactic measures negative for malignancy, status post immediate expanders and bilateral salpingo oophorectomy in August 2018, negative for BRCA1/2 mutation, she was found to have FH mutation of unknown significance    Anastrozole 1 mg p o  Daily initiated on 09/2018, no evidence of disease    2  Osteopenia on the DEXA scan, calcium plus vitamin-D 1-2 tablets every day  3  Insomnia with hot flashes Ambien 5 mg p o  Q h s  P r n  30 tablet prescription with 3 refills only    Follow-up in 1 year          HPI: Kandis Stevenson was seen initially 9/7/2018 re: right sided, ER/%, her-2 negative stage IA breast cancer  She is status post bilateral mastectomy      She underwent screening mammogram 6/7/2018 which showed abnormal findings  Diagnostic imaging led to right breast biopsy 6/14/2018 which identified invasive breast carcinoma  Right breast core biopsy showed invasive breast carcinoma of no specific type, grade 1 with ductal carcinoma in situ present, comprising 45% of the lesion, % positive, % positive, her 2+ 1 by IHC  She elected to proceed with bilateral mastectomy and Bilateral oophorectomy performed by doctors Brooklynn Munoz and Chucky respectively 8/10/2018  Expander placement/reconstruction per Dr Shae Man     Final pathology showed invasive ductal carcinoma of no specific type, tumor size 3 mm, grade 1 no evidence of lymphovascular invasion stage IA ( pT1a, pN0, grade 1) with 2- sentinel lymph nodes     She has family history of breast and ovarian cancer in her mother    Patient's genetic testing was negative for BRCA1 or 2 mutation, her mother was tested negative for BRCA1/2 mutation, the patient was found to have FH mutation of unknown significance    Initiated on anastrozole 1 mg p o  Daily on 09/07/2018    She had a history of GERD, gastric bypass surgery, Raynaud phenomena  Interval History:        Previous Treatment:         Test Results:    Imaging: Dxa Bone Density Spine Hip And Pelvis    Result Date: 9/27/2019  Narrative: CENTRAL  DXA SCAN CLINICAL HISTORY:   54year old post-menopausal  female risk factors include estrogen deficiency  Personal history breast cancer 2018 and with bone losing medication  TECHNIQUE: Bone densitometry was performed using a [employer] W bone densitometer  Regions of interest appear properly placed  There are no obvious fractures or other confounding variables which could limit the study  COMPARISON:  Baseline  RESULTS: LUMBAR SPINE:  L1-L4: BMD 0 909 gm/cm2 T-score below normal, -1 3 Z-score -0 7 LEFT TOTAL HIP: BMD 0 841 gm/cm2 T-score normal, -0 8 Z-score -0 5 A LEFT FEMORAL NECK: BMD 0 664 gm/cm2 T-score below normal, -1 7 Z-score -1 1     Impression: 1  Based on the [facility] classification, this study identifies a diagnosis of osteopenia, moderate at the femoral neck area and the patient is considered at current low risk for fracture  2  A daily intake of calcium of at least 1200 mg and vitamin D, 800-1000 IU, as well as weight bearing and muscle strengthening exercise, fall prevention and avoidance of tobacco and excessive alcohol intake as basic preventive measures are recommended  3  Repeat DXA scan on the same equipment in 24-36 months as clinically indicated  The 10 year risk of hip fracture is 0 4%, with the 10 year risk of major osteoporotic fracture being 3 8%, as calculated by the [facility] fracture risk assessment tool (FRAX)  The current NOF guidelines recommend treating patients with FRAX 10 year risk score  of >3% for hip fracture and >20% for major osteoporotic fracture   WHO CLASSIFICATION: Normal (a T-score of -1 0 or higher) Low bone mineral density (a T-score of less than -1 0 but higher than -2 5) Osteoporosis (a T-score of -2 5 or less) Severe osteoporosis (a T-score of -2 5 or less with a fragility fracture) Thank you for allowing us the opportunity to participate in your patient care  The expanded DEXA report will no longer be arriving in your mail  If you desire to view the full report please contact [facility]'s medical records or access the PACS system Workstation performed: [id number]       Labs:   Lab Results   Component Value Date    WBC 4 20 (L) 09/27/2019    HGB 13 7 09/27/2019    HCT 42 4 09/27/2019    MCV 91 09/27/2019     09/27/2019     Lab Results   Component Value Date    K 3 9 09/27/2019     09/27/2019    CO2 30 09/27/2019    BUN 16 09/27/2019    CREATININE 0 77 09/27/2019    GLUCOSE 129 07/14/2016    GLUF 83 02/15/2019    CALCIUM 9 6 09/27/2019    AST 13 09/27/2019    ALT 18 09/27/2019    ALKPHOS 90 09/27/2019    EGFR 92 09/27/2019       Lab Results   Component Value Date    IRON 92 09/27/2019    TIBC 373 09/27/2019    FERRITIN 18 09/27/2019       Lab Results   Component Value Date    ECNBLENH94 922 (H) 09/27/2019         ROS:   Review of Systems   Constitutional: Negative for activity change, appetite change, diaphoresis, fatigue, fever and unexpected weight change  HENT: Negative for facial swelling, hearing loss, rhinorrhea, sinus pressure, sinus pain, sneezing, sore throat and tinnitus  Eyes: Negative for photophobia, pain, discharge, redness, itching and visual disturbance  Respiratory: Negative for apnea and chest tightness  Cardiovascular: Negative for chest pain, palpitations and leg swelling  Gastrointestinal: Negative for abdominal distention, abdominal pain, blood in stool, constipation, diarrhea, nausea, rectal pain and vomiting  Endocrine: Negative for cold intolerance, heat intolerance, polydipsia and polyphagia     Genitourinary: Negative for difficulty urinating, dyspareunia, frequency, hematuria, pelvic pain and urgency  Musculoskeletal: Negative for arthralgias, back pain, gait problem, joint swelling and myalgias  Skin: Negative for color change, pallor and rash  Allergic/Immunologic: Negative for environmental allergies and food allergies  Neurological: Negative for dizziness, tremors, seizures, syncope, speech difficulty, numbness and headaches  Insomnia bothering at night   Hematological: Negative for adenopathy  Does not bruise/bleed easily  Psychiatric/Behavioral: Negative for agitation, confusion, dysphoric mood, hallucinations and suicidal ideas  Current Medications: Reviewed  Allergies: Reviewed  PMH/FH/SH:  Reviewed      Physical Exam:    Body surface area is 2 01 meters squared  Wt Readings from Last 3 Encounters:   10/02/19 87 1 kg (192 lb)   06/11/19 84 5 kg (186 lb 3 2 oz)   05/23/19 85 4 kg (188 lb 3 2 oz)        Temp Readings from Last 3 Encounters:   10/02/19 98 5 °F (36 9 °C) (Tympanic)   06/11/19 98 4 °F (36 9 °C) (Tympanic)   04/02/19 98 9 °F (37 2 °C) (Oral)        BP Readings from Last 3 Encounters:   10/02/19 122/88   06/11/19 112/70   05/23/19 102/60         Pulse Readings from Last 3 Encounters:   10/02/19 76   06/11/19 88   04/02/19 97        Physical Exam   Constitutional: She is oriented to person, place, and time  She appears well-developed and well-nourished  No distress  HENT:   Head: Normocephalic and atraumatic  Eyes: Conjunctivae are normal    Neck: Normal range of motion  Neck supple  No tracheal deviation present  Cardiovascular: Normal rate and regular rhythm  Exam reveals no gallop and no friction rub  No murmur heard  Pulmonary/Chest: Effort normal and breath sounds normal  No respiratory distress  She has no wheezes  She has no rales  She exhibits no tenderness  Abdominal: Soft  She exhibits no distension  There is no tenderness     Lymphadenopathy:     She has no cervical adenopathy  Neurological: She is alert and oriented to person, place, and time  Skin: Skin is warm and dry  She is not diaphoretic  No erythema  No pallor  Psychiatric: She has a normal mood and affect  Her behavior is normal  Judgment and thought content normal    Vitals reviewed  ECOG:    Goals and Barriers:  Current Goal: Minimize effects of disease  Barriers: None  Patient's Capacity to Self Care:  Patient is able to self care      Code Status: @[facility]@

## 2019-10-15 ENCOUNTER — AMB VIDEO VISIT (OUTPATIENT)
Dept: OTHER | Facility: HOSPITAL | Age: 49
End: 2019-10-15

## 2019-10-15 PROCEDURE — EVISIT: Performed by: FAMILY MEDICINE

## 2019-10-15 NOTE — CARE ANYWHERE EVISITS
Visit Summary for [facility] - Gender: Female - Date of Birth: [id number]  Date: [id number] - Duration: 4 minutes  Patient: Sadiq Crouch   [facility]  Provider: Asuncion Flores    Patient Contact Information  Address  [address]   [address] [facility]; [address] [place] [address]  [phone]    Visit Topics    Conversation Transcripts  [0A][0A] [Notification] You are connected with Asuncion Flores, Family Physician [0A][Notification] Maryan Cunningham is located in [place]  [0A][Notification] Maryan Pritchardtering has shared health history  Santiago Beckett  [0A][Notification] Asuncion Flores has added a   diagnosis/procedure code (see the "Visit Notes" tab)  [0A][Notification] Asuncion Flores has added a diagnosis/procedure code (see the "Visit Notes" tab)  [0A][Notification] Asuncion Flores has added a prescription (see the "Visit Notes" tab)  [0A]    Diagnosis  Influenza due to oth ident influenza virus w oth manifest    Procedures  Value: [id number] Code: CPT-4 ONLINE E/M BY PHYS/QHP    Medications Prescribed    Tamiflu  Dose : 1 capsule  Route : oral  Frequency : 2 times a day  Refills : 0  Instructions to the Pharmacist : Substitutions allowed      Provider Notes  [0A][0A] HPI:  Pt with 24 hrs of fever and chills - tmax 104 - feels ill and is worsening - chest tightness and cough - has scant mucus - has no sinus or ST - has no GI s/s  [0A]NKDA,[0A]takes Protonix, Ambien, Anastrazole  [0A]PMH:  breast CA, insomnia,   GErd[0A]PE: [0A]Pt is alert in NAD or severe pain â pt is breathing well  [0A]A/P:  Flu - will tx with Tamiflu[0A]Reason[0A]for treatment: Flu[0A]Make[0A]sure to hydrate well and use the otc meds we discussed for symptomatic relief,   NSAID[0A]Pharmacy[0A]line is [phone], please call this number with any questions about your[0A]prescription  [0A]Please[0A]reconnect or message with any questions or changes in illness or condition you[0A]would like to discuss    If you   are[0A]worsening despite the treatment we discussed please see your PCP or urgent[0A]care/ER provider [0A]    Electronically signed by:  Jose Luis Medrano(NPI [id number])

## 2019-12-02 ENCOUNTER — OFFICE VISIT (OUTPATIENT)
Dept: SURGICAL ONCOLOGY | Facility: CLINIC | Age: 49
End: 2019-12-02
Payer: COMMERCIAL

## 2019-12-02 VITALS
TEMPERATURE: 97.6 F | SYSTOLIC BLOOD PRESSURE: 120 MMHG | BODY MASS INDEX: 29.4 KG/M2 | WEIGHT: 194 LBS | HEIGHT: 68 IN | RESPIRATION RATE: 16 BRPM | HEART RATE: 100 BPM | DIASTOLIC BLOOD PRESSURE: 68 MMHG

## 2019-12-02 DIAGNOSIS — Z79.811 USE OF AROMATASE INHIBITORS: ICD-10-CM

## 2019-12-02 DIAGNOSIS — Z17.0 MALIGNANT NEOPLASM OF LOWER-OUTER QUADRANT OF RIGHT BREAST OF FEMALE, ESTROGEN RECEPTOR POSITIVE (HCC): Primary | ICD-10-CM

## 2019-12-02 DIAGNOSIS — C50.511 MALIGNANT NEOPLASM OF LOWER-OUTER QUADRANT OF RIGHT BREAST OF FEMALE, ESTROGEN RECEPTOR POSITIVE (HCC): Primary | ICD-10-CM

## 2019-12-02 PROCEDURE — 99214 OFFICE O/P EST MOD 30 MIN: CPT | Performed by: SURGERY

## 2019-12-02 NOTE — PROGRESS NOTES
Surgical Oncology Follow Up       [facility]  [facility] SURGICAL ONCOLOGY [facility] [address]    [facility]  1970  [id number]  [address]  [facility] SURGICAL ONCOLOGY [place]  [address] [place] [address]    Chief Complaint   Patient presents with    Follow-up       Assessment/Plan   Diagnoses and all orders for this visit:    Malignant neoplasm of lower-outer quadrant of right breast of female, estrogen receptor positive ([facility] [address] )    Use of aromatase inhibitors        Advance Care Planning/Advance Directives:  Discussed disease status, cancer treatment plans and/or cancer treatment goals with the patient  Oncology History:     No history exists  History of Present Illness: breast cancer follow up, continues on anastrazole, worried about left implant  -Interval History: none    Review of Systems:  Review of Systems   Constitutional: Negative  Negative for appetite change and fever  Eyes: Negative  Respiratory: Negative for shortness of breath  Cardiovascular: Negative  Gastrointestinal: Negative  Endocrine: Negative  Genitourinary: Negative  Musculoskeletal: Negative  Negative for arthralgias and myalgias  Skin: Negative  Allergic/Immunologic: Negative  Neurological: Negative  Hematological: Negative  Negative for adenopathy  Does not bruise/bleed easily  Psychiatric/Behavioral: Negative          Patient Active Problem List   Diagnosis    Family history of breast cancer in female   Yana Longo Ashkenazi Restorationist ancestry requiring population-specific genetic screening    Malignant neoplasm of lower-outer quadrant of right breast of female, estrogen receptor positive ([address] )    UTI (urinary tract infection)    Status post bilateral salpingo-oophorectomy (BSO)    Use of aromatase inhibitors    Deformity of reconstructed breast    Nasal polyposis    History of gastric bypass    BRCA negative     Past Medical History:   Diagnosis Date    Abnormal Pap smear of cervix     Ashkenazi Bahai ancestry requiring population-specific genetic screening     Asthma     Breathing difficulty     after gastric bypass 2014-"felt elephant on chest"    Chest pain, non-cardiac     Seen in ER and is from esophageal spasms   Constipation     occasional    Family history of breast cancer in female     GERD (gastroesophageal reflux disease)     History of MRSA infection     Leg    History of UTI     treated 8/1/2018    Human papilloma virus (HPV) infection     Hx MRSA infection     on leg-2009 treated    Pollen allergies     PONV (postoperative nausea and vomiting)     nausea    Postural lightheadedness     Raynaud's disease     Seasonal allergies     Sinus problem     Sinus problem      Past Surgical History:   Procedure Laterality Date    BLADDER SURGERY  2013    bladder lift    BREAST BIOPSY Right 06/14/2018    IDC    CERVIX LESION DESTRUCTION      COLONOSCOPY      COLPOSCOPY W/ BIOPSY / CURETTAGE      ENDOMETRIAL ABLATION      GASTRIC BYPASS  2014 2014 wt loss 90 lbs(regained 20 lbs)    HYSTEROSCOPIC STERILIZATION W/ IMPLANTS      HYSTEROSCOPY W/ ENDOMETRIAL ABLATION  2013    KNEE ARTHROSCOPY Right 1990    LYMPH NODE BIOPSY Right 8/10/2018    Procedure: BIOPSY LYMPH NODE SENTINEL;  Surgeon: Katheryn Kramer MD;  [facility];  Service: Surgical Oncology    MAMMO STEREOTACTIC BREAST BIOPSY RIGHT (ALL INC) Right 6/14/2018    MASTECTOMY Bilateral     NASAL SEPTUM SURGERY      2002, 2016    WA BREAST RECONSTRUC W TISS EXPANDR Bilateral 8/10/2018    Procedure: INSERTION/PLACEMENT TISSUE EXPANDER (EXCHANGE);   Surgeon: Tolu Veras MD;  [facility];  Service: Plastics    WA DELAY BREAST PROS AFTER BREAST SURG Bilateral 11/30/2018    Procedure: BREAST IMPLANT EXCHANGE;  Surgeon: Tolu Veras MD;  [facility];  Service: Plastics    WA IMPLNT BIO IMPLNT FOR SOFT TISSUE REINFORCEMENT Bilateral 8/10/2018    Procedure: RECONSTRUCTION BREAST W/ IMPLANT;  Surgeon: Humberto Robles MD;  [facility];  Service: Plastics    WY LAP,RMV  ADNEXAL STRUCTURE N/A 8/10/2018    Procedure: SALPINGO-OOPHORECTOMY, LAPAROSCOPIC; PELVIC WASHINGS ;  Surgeon: Nazanin Cerrato MD;  [facility];  Service: Gynecology Oncology    WY MASTECTOMY, SIMPLE, COMPLETE Bilateral 8/10/2018    Procedure: MASTECTOMY SIMPLE;  Surgeon: Niharika Carlton MD;  [facility];  Service: Surgical Oncology    WY REMOVAL OF BREAST CAPSULE Bilateral 11/30/2018    Procedure: CAPSULECTOMY;  Surgeon: Humberto Robles MD;  [facility];  Service: Plastics    WY REVISE BREAST RECONSTRUCTION Bilateral 2/22/2019    Procedure: BREAST MOUND REVISION; FAT GRAFTING;  Surgeon: Humberto Robles MD;  [facility];  Service: Plastics    SINUS SURGERY       Family History   Problem Relation Age of Onset    Other Mother         BRCA negative    Breast cancer Mother 72    Ovarian cancer Mother 61    Diabetes Father     Heart disease Father     Hypertension Father     Migraines Sister     Diabetes Brother     Colon cancer Maternal Grandfather 80    Diabetes type II Maternal Grandfather     Heart disease Paternal Grandfather     Colon cancer Maternal Aunt 62    Skin cancer Maternal Aunt      Social History     Socioeconomic History    Marital status: Single     Spouse name: Not on file    Number of children: 2    Years of education: Not on file    Highest education level: Not on file   Occupational History    Not on file   Social Needs    Financial resource strain: Not on file    Food insecurity:     Worry: Not on file     Inability: Not on file    Transportation needs:     Medical: Not on file     Non-medical: Not on file   Tobacco Use    Smoking status: Never Smoker    Smokeless tobacco: Never Used   Substance and Sexual Activity    Alcohol use:  Yes     Alcohol/week: 4 0 standard drinks     Types: 4 Glasses of wine per week     Drinks per session: 3 or 4     Comment: 2 weekly  -4  glasses total    Drug use: Yes     Types: Marijuana     Comment: edible- last time 11/23/2018      Sexual activity: Not on file   Lifestyle    Physical activity:     Days per week: Not on file     Minutes per session: Not on file    Stress: Not on file   Relationships    Social connections:     Talks on phone: Not on file     Gets together: Not on file     Attends Judaism service: Not on file     Active member of club or organization: Not on file     Attends meetings of clubs or organizations: Not on file     Relationship status: Not on file    Intimate partner violence:     Fear of current or ex partner: Not on file     Emotionally abused: Not on file     Physically abused: Not on file     Forced sexual activity: Not on file   Other Topics Concern    Not on file   Social History Narrative    Uses safety equipment, seatbelts       Current Outpatient Medications:     acetaminophen (TYLENOL) 325 mg tablet, Take 650 mg by mouth every 6 (six) hours as needed for mild pain, Disp: , Rfl:     albuterol (PROVENTIL HFA,VENTOLIN HFA) 90 mcg/act inhaler, Inhale 2 puffs every 6 (six) hours as needed for wheezing, Disp: , Rfl:     anastrozole (ARIMIDEX) 1 mg tablet, TAKE 1 TABLET DAILY, Disp: 90 tablet, Rfl: 4    BIOTIN PO, Take 1 tablet by mouth every morning, Disp: , Rfl:     Calcium Carb-Cholecalciferol (CALCIUM 1000 + D) 1000-800 MG-UNIT TABS, calcium, Disp: , Rfl:     calcium-vitamin D 250-100 MG-UNIT per tablet, Take 1 tablet by mouth, Disp: , Rfl:     cyanocobalamin 50 MCG tablet, Take 50 mcg by mouth, Disp: , Rfl:     Diclofenac Epolamine (FLECTOR) 1 3 % PTCH, as needed, Disp: , Rfl:     Ergocalciferol (VITAMIN D2 PO), Vitamin D2 50,000 unit capsule  Take 1 capsule every week by oral route , Disp: , Rfl:     ferrous sulfate 325 (65 Fe) mg tablet, Daily, Disp: , Rfl:     Inulin-Calcium-Vitamin D 2-250-100 GM-MG-UNIT CHEW, Fiber Plus Mulitvitamin, Disp: , Rfl:     lidocaine (LIDODERM) 5 %, Apply 1 patch topically daily Remove & Discard patch within 12 hours or as directed by MD  , Disp: , Rfl:     lidocaine 0 5% 10 mL and sodium bicarb 8 4% 1 mL, 8 mL, Disp: , Rfl:     Lifitegrast (XIIDRA OP), Apply 1 vial to eye 2 (two) times a day, Disp: , Rfl:     loratadine (CLARITIN) 10 mg tablet, Take 10 mg by mouth as needed  , Disp: , Rfl:     methocarbamol (ROBAXIN) 500 mg tablet, Take 500 mg by mouth 3 (three) times a day  , Disp: , Rfl:     mometasone (NASONEX) 50 mcg/act nasal spray, 2 sprays in each nostril as needed, Disp: , Rfl:     montelukast (SINGULAIR) 10 mg tablet, Take 10 mg by mouth as needed  , Disp: , Rfl:     Multiple Vitamins-Minerals (HAIR SKIN & NAILS ADVANCED PO), Hair, Skin, Nails with Biotin, Disp: , Rfl:     multivitamin (THERAGRAN) TABS, Take 1 tablet by mouth every morning  , Disp: , Rfl:     Naproxen Sodium (ALEVE) 220 MG CAPS, as needed, Disp: , Rfl:     pantoprazole (PROTONIX) 40 mg tablet, Take 40 mg by mouth every morning  , Disp: , Rfl:     Probiotic Product (PRO-BIOTIC BLEND) CAPS, Take 1 capsule by mouth every morning, Disp: , Rfl:     pyridoxine (VITAMIN B6) 100 mg tablet, Take 100 mg by mouth, Disp: , Rfl:     zolpidem (AMBIEN) 5 mg tablet, Take 1 tablet (5 mg total) by mouth daily at bedtime as needed for sleep, Disp: 30 tablet, Rfl: 3    albuterol (PROVENTIL HFA,VENTOLIN HFA) 90 mcg/act inhaler, Proventil HFA 90 mcg/actuation aerosol inhaler, Disp: , Rfl:     Ergocalciferol (VITAMIN D2) 2000 units TABS, Vitamin D2 50,000 unit capsule, Disp: , Rfl:     Influenza Vac Split Quad (AFLURIA QUADRIVALENT IM), Afluria Quad 1527-0695 (PF) 60 mcg (15 mcg x 4)/0 5 mL IM syringe  TO BE ADMINISTERED BY PHARMACIST FOR IMMUNIZATION, Disp: , Rfl:     Probiotic Product (PROBIOTIC-10 PO), Probiotic, Disp: , Rfl:   Allergies   Allergen Reactions    Adhesive [Medical Tape] Rash     And small blister at sight       The following portions of the patient's history were reviewed and updated as appropriate: allergies, current medications, past family history, past medical history, past social history, past surgical history and problem list         Vitals:    12/02/19 0844   BP: 120/68   Pulse: 100   Resp: 16   Temp: 97 6 °F (36 4 °C)       Physical Exam   Constitutional: She is oriented to person, place, and time  She appears well-developed and well-nourished  HENT:   Head: Normocephalic and atraumatic  Cardiovascular: Normal heart sounds  Pulmonary/Chest: Breath sounds normal  Right breast exhibits no mass, no skin change (mastectomy scar and implant) and no tenderness  Left breast exhibits no mass, no skin change (mastectomy scar and implant) and no tenderness  Abdominal: Soft  Lymphadenopathy:        Right axillary: No pectoral and no lateral adenopathy present  Left axillary: No pectoral and no lateral adenopathy present  Right: No supraclavicular adenopathy present  Left: No supraclavicular adenopathy present  Neurological: She is alert and oriented to person, place, and time  Psychiatric: She has a normal mood and affect  Discussion/Summary:  82-year-old female status post bilateral mastectomy and reconstruction for a stage IA invasive ductal carcinoma of the right breast   She continues on anastrozole with no concerns  She does however have some concern about her left implant  She experiences some rippling when in the lateral decubitus position  I have no concerns on my exam   I advised her to discuss this further with Dr Nba Thompson   I will see her again in six months or sooner should the need arise

## 2020-01-28 ENCOUNTER — APPOINTMENT (OUTPATIENT)
Dept: URGENT CARE | Age: 50
End: 2020-01-28
Payer: COMMERCIAL

## 2020-01-28 ENCOUNTER — APPOINTMENT (OUTPATIENT)
Dept: RADIOLOGY | Age: 50
End: 2020-01-28
Payer: COMMERCIAL

## 2020-01-28 DIAGNOSIS — M25.521 RIGHT ELBOW PAIN: ICD-10-CM

## 2020-01-28 DIAGNOSIS — M54.2 NECK PAIN: ICD-10-CM

## 2020-01-28 DIAGNOSIS — M25.521 RIGHT ELBOW PAIN: Primary | ICD-10-CM

## 2020-01-28 PROCEDURE — 72040 X-RAY EXAM NECK SPINE 2-3 VW: CPT

## 2020-01-28 PROCEDURE — G0382 LEV 3 HOSP TYPE B ED VISIT: HCPCS | Performed by: PHYSICIAN ASSISTANT

## 2020-01-28 PROCEDURE — S9083 URGENT CARE CENTER GLOBAL: HCPCS | Performed by: PHYSICIAN ASSISTANT

## 2020-01-28 PROCEDURE — 73080 X-RAY EXAM OF ELBOW: CPT

## 2020-02-04 ENCOUNTER — APPOINTMENT (OUTPATIENT)
Dept: URGENT CARE | Age: 50
End: 2020-02-04
Payer: COMMERCIAL

## 2020-02-04 PROCEDURE — 99213 OFFICE O/P EST LOW 20 MIN: CPT | Performed by: PHYSICIAN ASSISTANT

## 2020-02-11 ENCOUNTER — APPOINTMENT (OUTPATIENT)
Dept: URGENT CARE | Age: 50
End: 2020-02-11
Payer: COMMERCIAL

## 2020-02-11 PROCEDURE — 99213 OFFICE O/P EST LOW 20 MIN: CPT | Performed by: PHYSICIAN ASSISTANT

## 2020-03-11 ENCOUNTER — OFFICE VISIT (OUTPATIENT)
Dept: OBGYN CLINIC | Facility: CLINIC | Age: 50
End: 2020-03-11
Payer: COMMERCIAL

## 2020-03-11 VITALS
WEIGHT: 200 LBS | HEART RATE: 71 BPM | BODY MASS INDEX: 30.31 KG/M2 | SYSTOLIC BLOOD PRESSURE: 113 MMHG | HEIGHT: 68 IN | DIASTOLIC BLOOD PRESSURE: 77 MMHG

## 2020-03-11 DIAGNOSIS — M50.30 DDD (DEGENERATIVE DISC DISEASE), CERVICAL: Primary | ICD-10-CM

## 2020-03-11 PROCEDURE — 99204 OFFICE O/P NEW MOD 45 MIN: CPT | Performed by: ORTHOPAEDIC SURGERY

## 2020-03-11 RX ORDER — LORAZEPAM 1 MG/1
1 TABLET ORAL
Qty: 2 TABLET | Refills: 0 | Status: SHIPPED | OUTPATIENT
Start: 2020-03-11 | End: 2021-03-11
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
## 2020-03-17 ENCOUNTER — OFFICE VISIT (OUTPATIENT)
Dept: PAIN MEDICINE | Facility: CLINIC | Age: 50
End: 2020-03-17
Payer: COMMERCIAL

## 2020-03-17 VITALS
DIASTOLIC BLOOD PRESSURE: 72 MMHG | HEART RATE: 80 BPM | BODY MASS INDEX: 30.31 KG/M2 | TEMPERATURE: 98.3 F | RESPIRATION RATE: 18 BRPM | SYSTOLIC BLOOD PRESSURE: 118 MMHG | WEIGHT: 200 LBS | HEIGHT: 68 IN

## 2020-03-17 DIAGNOSIS — M50.30 DDD (DEGENERATIVE DISC DISEASE), CERVICAL: ICD-10-CM

## 2020-03-17 PROCEDURE — 99244 OFF/OP CNSLTJ NEW/EST MOD 40: CPT | Performed by: PHYSICAL MEDICINE & REHABILITATION
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
## 2020-03-17 NOTE — PROGRESS NOTES
Assessment  1  DDD (degenerative disc disease), cervical        Plan  Ms Lillie Pedroza is a pleasant 44-year-old female who presents for initial evaluation regarding neck pain radiating to the right upper extremity as well as low back pain  She has been following with Dr Sendy Irving and is considering surgical intervention regarding cervical DDD with areas of foraminal stenosis  She will be going for closed cervical MRI tomorrow March 18, 2020  During today's evaluation she is demonstrating clinical and diagnostic evidence of cervical radiculopathy with radicular symptoms into the right upper extremity  In office we discussed possible epidural injections prior to surgery and the patient is weighing her options now  She is interested in pursuing this direction prior to surgery but will wait for cervical MRI results  I have advised her to discuss results with Dr Cholo Finney office and if she plans on pursuing an epidural 1st I have no problem doing so  We will await her call and review the cervical MRI results when available  My impressions and treatment recommendations were discussed in detail with the patient who verbalized understanding and had no further questions  Discharge instructions were provided  I personally saw and examined the patient and I agree with the above discussed plan of care  No orders of the defined types were placed in this encounter  No orders of the defined types were placed in this encounter  History of Present Illness    Kandis Stevenson is a 54 y o  female presents to [place] [address] [facility] with 2 months duration of neck, midback and low back pain  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 and suffered subsequent neck and midback pain soon after  She has been evaluated by Dr Sendy Irving as a 2nd opinion for surgical consideration    Her for surgeon had advised ACDF C5-C6 and C6-C7, however, Dr Sendy Irving is recommending a 2nd MRI prior to surgical consideration and also pain management evaluation for possible epidurals verses facet blocks  During today's evaluation patient is reporting 8/10 pain that is moderate to severe in the neck and low back  Pain is nearly constant 60-95% of the time that is worse in the morning and evening  Describes the pain as cramping, numbness, dull and aching and admits to intermittent upper extremity weakness  Pain is worsened with sitting and she has had moderate relief with physical therapy, heat and ice, acupuncture, chiropractic treatments  Over-the-counter NSAIDs have provided minimal relief including Tylenol and Motrin and currently taking Robaxin which is providing some relief  Presents today for evaluation of worsening neck and low back pain  I have personally reviewed and/or updated the patient's past medical history, past surgical history, family history, social history, current medications, allergies, and vital signs today  Review of Systems   Constitutional: Negative for fever and unexpected weight change  HENT: Negative for trouble swallowing  Eyes: Negative for visual disturbance  Respiratory: Negative for shortness of breath and wheezing  Cardiovascular: Negative for chest pain and palpitations  Gastrointestinal: Negative for constipation, diarrhea, nausea and vomiting  Endocrine: Negative for cold intolerance, heat intolerance and polydipsia  Genitourinary: Negative for difficulty urinating and frequency  Musculoskeletal: Negative for arthralgias, gait problem, joint swelling and myalgias  Skin: Negative for rash  Neurological: Positive for numbness and headaches  Negative for dizziness, seizures, syncope and weakness  Hematological: Does not bruise/bleed easily  Psychiatric/Behavioral: Negative for dysphoric mood  All other systems reviewed and are negative        Patient Active Problem List   Diagnosis    Family history of breast cancer in female   Jayna Cole Ashkenazi Pentecostal ancestry requiring population-specific genetic screening    Malignant neoplasm of lower-outer quadrant of right breast of female, estrogen receptor positive ([address] )    UTI (urinary tract infection)    Status post bilateral salpingo-oophorectomy (BSO)    Use of aromatase inhibitors    Deformity of reconstructed breast    Nasal polyposis    History of gastric bypass    BRCA negative    DDD (degenerative disc disease), cervical       Past Medical History:   Diagnosis Date    Abnormal Pap smear of cervix     Ashkenazi Pentecostal ancestry requiring population-specific genetic screening     Asthma     Breathing difficulty     after gastric bypass 2014-"felt elephant on chest"    Chest pain, non-cardiac     Seen in ER and is from esophageal spasms      Constipation     occasional    Family history of breast cancer in female     GERD (gastroesophageal reflux disease)     History of MRSA infection     Leg    History of UTI     treated 8/1/2018    Human papilloma virus (HPV) infection     Hx MRSA infection     on leg-2009 treated    Pollen allergies     PONV (postoperative nausea and vomiting)     nausea    Postural lightheadedness     Raynaud's disease     Seasonal allergies     Sinus problem     Sinus problem        Past Surgical History:   Procedure Laterality Date    BLADDER SURGERY  2013    bladder lift    BREAST BIOPSY Right 06/14/2018    [facility]    CERVIX LESION DESTRUCTION      COLONOSCOPY      COLPOSCOPY W/ BIOPSY / CURETTAGE      ENDOMETRIAL ABLATION      GASTRIC BYPASS  2014 2014 wt loss 90 lbs(regained 20 lbs)    HYSTEROSCOPIC STERILIZATION W/ IMPLANTS      HYSTEROSCOPY W/ ENDOMETRIAL ABLATION  2013    KNEE ARTHROSCOPY Right 1990    LYMPH NODE BIOPSY Right 8/10/2018    Procedure: BIOPSY LYMPH NODE SENTINEL;  Surgeon: Lexie Mayfield MD;  [facility];  Service: Surgical Oncology    MAMMO STEREOTACTIC BREAST BIOPSY RIGHT (ALL INC) Right 6/14/2018    MASTECTOMY Bilateral     NASAL SEPTUM SURGERY      2002, 2016    OR BREAST RECONSTRUC W TISS EXPANDR Bilateral 8/10/2018    Procedure: INSERTION/PLACEMENT TISSUE EXPANDER (EXCHANGE);   Surgeon: Mini Daily MD;  [facility];  Service: Plastics    OR DELAY BREAST PROS AFTER BREAST SURG Bilateral 11/30/2018    Procedure: BREAST IMPLANT EXCHANGE;  Surgeon: Mini Daily MD;  [facility];  Service: Plastics    OR IMPLNT BIO IMPLNT FOR SOFT TISSUE REINFORCEMENT Bilateral 8/10/2018    Procedure: RECONSTRUCTION BREAST W/ IMPLANT;  Surgeon: Mini Daily MD;  [facility];  Service: Plastics    OR LAP,RMV  ADNEXAL STRUCTURE N/A 8/10/2018    Procedure: SALPINGO-OOPHORECTOMY, LAPAROSCOPIC; PELVIC WASHINGS ;  Surgeon: Talia Sims MD;  [facility];  Service: Gynecology Oncology    OR MASTECTOMY, SIMPLE, COMPLETE Bilateral 8/10/2018    Procedure: MASTECTOMY SIMPLE;  Surgeon: Verona Dela Cruz MD;  [facility];  Service: Surgical Oncology    OR REMOVAL OF BREAST CAPSULE Bilateral 11/30/2018    Procedure: CAPSULECTOMY;  Surgeon: Mini Daily MD;  [facility];  Service: Plastics    OR REVISE BREAST RECONSTRUCTION Bilateral 2/22/2019    Procedure: BREAST MOUND REVISION; FAT GRAFTING;  Surgeon: Mini Daily MD;  [facility];  Service: Plastics    SINUS SURGERY         Family History   Problem Relation Age of Onset    Other Mother         BRCA negative    Breast cancer Mother 72    Ovarian cancer Mother 61    Diabetes Father     Heart disease Father     Hypertension Father     Migraines Sister     Diabetes Brother     Colon cancer Maternal Grandfather 80    Diabetes type II Maternal Grandfather     Heart disease Paternal Grandfather     Colon cancer Maternal Aunt 62    Skin cancer Maternal Aunt        Social History     Occupational History    Not on file   Tobacco Use    Smoking status: Never Smoker    Smokeless tobacco: Never Used   Substance and Sexual Activity    Alcohol use: Yes     Alcohol/week: 4 0 standard drinks     Types: 4 Glasses of wine per week     Drinks per session: 3 or 4     Comment: 2 weekly  -4  glasses total    Drug use: Yes     Types: Marijuana     Comment: edible- last time 11/23/2018   Sexual activity: Not on file       Current Outpatient Medications on File Prior to Visit   Medication Sig    acetaminophen (TYLENOL) 325 mg tablet Take 650 mg by mouth every 6 (six) hours as needed for mild pain    albuterol (PROVENTIL HFA,VENTOLIN HFA) 90 mcg/act inhaler Inhale 2 puffs every 6 (six) hours as needed for wheezing    albuterol (PROVENTIL HFA,VENTOLIN HFA) 90 mcg/act inhaler Proventil HFA 90 mcg/actuation aerosol inhaler    anastrozole (ARIMIDEX) 1 mg tablet TAKE 1 TABLET DAILY    BIOTIN PO Take 1 tablet by mouth every morning    Calcium Carb-Cholecalciferol (CALCIUM 1000 + D) 1000-800 MG-UNIT TABS calcium    calcium-vitamin D 250-100 MG-UNIT per tablet Take 1 tablet by mouth    cyanocobalamin 50 MCG tablet Take 50 mcg by mouth    Diclofenac Epolamine (FLECTOR) 1 3 % PTCH as needed    Ergocalciferol (VITAMIN D2 PO) Vitamin D2 50,000 unit capsule   Take 1 capsule every week by oral route      Ergocalciferol (VITAMIN D2) 2000 units TABS Vitamin D2 50,000 unit capsule    ferrous sulfate 325 (65 Fe) mg tablet Daily    Influenza Vac Split Quad (AFLURIA QUADRIVALENT IM) Afluria Quad 2974-5745 (PF) 60 mcg (15 mcg x 4)/0 5 mL IM syringe   TO BE ADMINISTERED BY PHARMACIST FOR IMMUNIZATION    Inulin-Calcium-Vitamin D 2-250-100 GM-MG-UNIT CHEW Fiber Plus Mulitvitamin    lidocaine (LIDODERM) 5 % Apply 1 patch topically daily Remove & Discard patch within 12 hours or as directed by MD Grant Flores (XIIDRA OP) Apply 1 vial to eye 2 (two) times a day    loratadine (CLARITIN) 10 mg tablet Take 10 mg by mouth as needed      LORazepam (ATIVAN) 1 mg tablet Take 1 tablet (1 mg total) by mouth once in imaging (to be taken 30 min before cervical spine MRI) for up to 2 doses    methocarbamol (ROBAXIN) 500 mg tablet Take 500 mg by mouth 3 (three) times a day      mometasone (NASONEX) 50 mcg/act nasal spray 2 sprays in each nostril as needed    montelukast (SINGULAIR) 10 mg tablet Take 10 mg by mouth as needed      Multiple Vitamins-Minerals (HAIR SKIN & NAILS ADVANCED PO) Hair, Skin, Nails with Biotin    multivitamin (THERAGRAN) TABS Take 1 tablet by mouth every morning      Naproxen Sodium (ALEVE) 220 MG CAPS as needed    pantoprazole (PROTONIX) 40 mg tablet Take 40 mg by mouth every morning      Probiotic Product (PRO-BIOTIC BLEND) CAPS Take 1 capsule by mouth every morning    Probiotic Product (PROBIOTIC-10 PO) Probiotic    pyridoxine (VITAMIN B6) 100 mg tablet Take 100 mg by mouth    zolpidem (AMBIEN) 5 mg tablet Take 1 tablet (5 mg total) by mouth daily at bedtime as needed for sleep    lidocaine 0 5% 10 mL and sodium bicarb 8 4% 1 mL 8 mL     No current facility-administered medications on file prior to visit  Allergies   Allergen Reactions    Adhesive [Medical Tape] Rash     And small blister at sight       Physical Exam    /72   Pulse 80   Temp 98 3 °F (36 8 °C)   Resp 18   Ht 5' 8" (1 727 m)   Wt 90 7 kg (200 lb)   LMP 07/31/2018   BMI 30 41 kg/m²   General: Well-developed, well-nourished individual in no acute distress  Mental: Appropriate mood and affect  Grossly oriented with coherent speech and thought processing  Neuro:  Cranial nerves: Cranial nerve function is grossly intact bilaterally  Strength: Bilateral upper extremity strength is normal and symmetric  No atrophy or tone abnormalities noted  Reflexes: Bilateral upper extremity muscle stretch reflexes are physiologic and symmetric  No Carter sign  Sensation:  Decreased sensation to light touch in patchy distribution right upper extremity    Foraminal Compression Maneuvers:  Spurling sign is positive with radicular symptoms into the right upper extremity  Gait:  Gait/gross motor: Gait is normal  Station is normal      Musculoskeletal:  Palpation: Inspection and palpation of the spine and extremities are remarkable for tenderness to palpation bilateral cervical paraspinals  Negative cervical compression testing  Spine:  Decreased active and passive range of motion with cervical flexion and extension limited by pain  No gross axial skeletal deformities  Skin: Skin inspection grossly negative for erythema, breakdown, or concerning lesions in affected area  Lymph: No lymphadenopathy is appreciated in the involved extremity  Vessels: No lower extremity francy  Lungs: Breathing is comfortable and regular  No dyspnea noted during examination  Eyes: Visual field grossly intact to confrontation  No redness appreciated  ENT: No craniofacial deformities or asymmetry  No neck masses appreciated         Imaging

## 2020-03-20 ENCOUNTER — TRANSCRIBE ORDERS (OUTPATIENT)
Dept: ADMINISTRATIVE | Facility: HOSPITAL | Age: 50
End: 2020-03-20

## 2020-03-20 ENCOUNTER — HOSPITAL ENCOUNTER (OUTPATIENT)
Dept: RADIOLOGY | Facility: IMAGING CENTER | Age: 50
Discharge: HOME | End: 2020-03-20
Payer: COMMERCIAL

## 2020-03-20 DIAGNOSIS — M50.30 DDD (DEGENERATIVE DISC DISEASE), CERVICAL: ICD-10-CM

## 2020-03-20 PROCEDURE — 72141 MRI NECK SPINE W/O DYE: CPT

## 2020-03-23 ENCOUNTER — TELEPHONE (OUTPATIENT)
Dept: PAIN MEDICINE | Facility: CLINIC | Age: 50
End: 2020-03-23

## 2020-06-02 ENCOUNTER — ANNUAL EXAM (OUTPATIENT)
Dept: OBGYN CLINIC | Facility: CLINIC | Age: 50
End: 2020-06-02
Payer: COMMERCIAL

## 2020-06-02 VITALS — BODY MASS INDEX: 32.3 KG/M2 | DIASTOLIC BLOOD PRESSURE: 80 MMHG | WEIGHT: 206.2 LBS | SYSTOLIC BLOOD PRESSURE: 126 MMHG

## 2020-06-02 DIAGNOSIS — Z17.0 MALIGNANT NEOPLASM OF LOWER-OUTER QUADRANT OF RIGHT BREAST OF FEMALE, ESTROGEN RECEPTOR POSITIVE (HCC): ICD-10-CM

## 2020-06-02 DIAGNOSIS — Z01.419 ENCOUNTER FOR GYNECOLOGICAL EXAMINATION WITHOUT ABNORMAL FINDING: Primary | ICD-10-CM

## 2020-06-02 DIAGNOSIS — Z90.722 STATUS POST BILATERAL SALPINGO-OOPHORECTOMY (BSO): ICD-10-CM

## 2020-06-02 DIAGNOSIS — C50.511 MALIGNANT NEOPLASM OF LOWER-OUTER QUADRANT OF RIGHT BREAST OF FEMALE, ESTROGEN RECEPTOR POSITIVE (HCC): ICD-10-CM

## 2020-06-02 PROCEDURE — 99396 PREV VISIT EST AGE 40-64: CPT | Performed by: OBSTETRICS & GYNECOLOGY

## 2020-06-10 ENCOUNTER — TELEPHONE (OUTPATIENT)
Dept: GYNECOLOGIC ONCOLOGY | Facility: CLINIC | Age: 50
End: 2020-06-10

## 2020-06-10 ENCOUNTER — TELEPHONE (OUTPATIENT)
Dept: HEMATOLOGY ONCOLOGY | Facility: CLINIC | Age: 50
End: 2020-06-10

## 2020-06-15 ENCOUNTER — OFFICE VISIT (OUTPATIENT)
Dept: SURGICAL ONCOLOGY | Facility: CLINIC | Age: 50
End: 2020-06-15
Payer: COMMERCIAL

## 2020-06-15 VITALS
HEIGHT: 68 IN | WEIGHT: 209.8 LBS | BODY MASS INDEX: 31.8 KG/M2 | SYSTOLIC BLOOD PRESSURE: 120 MMHG | TEMPERATURE: 98 F | RESPIRATION RATE: 18 BRPM | HEART RATE: 76 BPM | DIASTOLIC BLOOD PRESSURE: 80 MMHG

## 2020-06-15 DIAGNOSIS — N64.9 BREAST VARIANT: ICD-10-CM

## 2020-06-15 DIAGNOSIS — Z17.0 MALIGNANT NEOPLASM OF LOWER-OUTER QUADRANT OF RIGHT BREAST OF FEMALE, ESTROGEN RECEPTOR POSITIVE (HCC): Primary | ICD-10-CM

## 2020-06-15 DIAGNOSIS — N65.0 DEFORMITY OF RECONSTRUCTED BREAST: ICD-10-CM

## 2020-06-15 DIAGNOSIS — C50.511 MALIGNANT NEOPLASM OF LOWER-OUTER QUADRANT OF RIGHT BREAST OF FEMALE, ESTROGEN RECEPTOR POSITIVE (HCC): Primary | ICD-10-CM

## 2020-06-15 DIAGNOSIS — Z79.811 USE OF AROMATASE INHIBITORS: ICD-10-CM

## 2020-06-15 PROCEDURE — 99214 OFFICE O/P EST MOD 30 MIN: CPT | Performed by: SURGERY

## 2020-06-15 RX ORDER — ALPRAZOLAM 0.5 MG/1
0.5 TABLET ORAL
Qty: 2 TABLET | Refills: 0 | Status: SHIPPED | OUTPATIENT
Start: 2020-06-15 | End: 2021-03-11

## 2020-06-30 NOTE — ANESTHESIA POSTPROCEDURE EVALUATION
Post-Op Assessment Note    CV Status:  Stable    Pain management: adequate     Mental Status:  Alert and awake   Hydration Status:  Euvolemic   PONV Controlled:  Controlled   Airway Patency:  Patent   Post Op Vitals Reviewed:  Yes              BP      Temp      Pulse    Resp      SpO2 Alert and oriented, no focal deficits, no motor or sensory deficits.

## 2020-08-11 ENCOUNTER — PATIENT MESSAGE (OUTPATIENT)
Dept: HEMATOLOGY ONCOLOGY | Facility: CLINIC | Age: 50
End: 2020-08-11

## 2020-08-12 ENCOUNTER — TELEPHONE (OUTPATIENT)
Dept: HEMATOLOGY ONCOLOGY | Facility: CLINIC | Age: 50
End: 2020-08-12

## 2020-08-12 NOTE — TELEPHONE ENCOUNTER
Spoke with Radha Loyd advised her forms are ready to be picked up, she requested for them to be emailed  Regarding: Non-Urgent Medical Question  Please let patient know complete    ----- Message -----  From: Marci Ball MA  Sent: 8/12/2020  12:45 PM EDT  To: Rashida Boyd PA-C  Subject: Non-Urgent Medical Question                      Please review  ----- Message -----  From: Chino Paiz MA  Sent: 8/12/2020  11:52 AM EDT  To: Marci Ball MA  Subject: Non-Urgent Medical Question                      ----- Message from Chino Paiz MA sent at 8/12/2020 11:52 AM EDT -----       ----- Message from Angelika Riojas to Aida Jordan MD sent at 8/11/2020  8:51 AM -----   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  Concerned as most of the world with return to work   The distract has said technically I am working virtually from the building but in reality I will be the first call for:   the non compliant, disruptive, spitters and upset buggery criers who wipe on everything, kids afraid to leave their parents and need to be physically assisted to class or out of class when disruptive/noncompliant  If you feel comfortable completing the attached accomodation form it is appreciated  (and understood if you do not  Thank you for your time and consideration   Hoping that you, your family and staff are all well

## 2020-09-02 ENCOUNTER — APPOINTMENT (OUTPATIENT)
Dept: LAB | Facility: CLINIC | Age: 50
End: 2020-09-02
Payer: COMMERCIAL

## 2020-09-02 ENCOUNTER — TRANSCRIBE ORDERS (OUTPATIENT)
Dept: LAB | Facility: CLINIC | Age: 50
End: 2020-09-02

## 2020-09-02 DIAGNOSIS — F51.01 PRIMARY INSOMNIA: ICD-10-CM

## 2020-09-02 DIAGNOSIS — C50.511 MALIGNANT NEOPLASM OF LOWER-OUTER QUADRANT OF RIGHT BREAST OF FEMALE, ESTROGEN RECEPTOR POSITIVE (HCC): ICD-10-CM

## 2020-09-02 DIAGNOSIS — Z17.0 MALIGNANT NEOPLASM OF LOWER-OUTER QUADRANT OF RIGHT BREAST OF FEMALE, ESTROGEN RECEPTOR POSITIVE (HCC): ICD-10-CM

## 2020-09-02 DIAGNOSIS — Z98.84 HISTORY OF GASTRIC BYPASS: ICD-10-CM

## 2020-09-02 LAB
ALBUMIN SERPL BCP-MCNC: 3.8 G/DL (ref 3.5–5)
ALP SERPL-CCNC: 95 U/L (ref 46–116)
ALT SERPL W P-5'-P-CCNC: 23 U/L (ref 12–78)
ANION GAP SERPL CALCULATED.3IONS-SCNC: 8 MMOL/L (ref 4–13)
AST SERPL W P-5'-P-CCNC: 18 U/L (ref 5–45)
BASOPHILS # BLD AUTO: 0.03 THOUSANDS/ΜL (ref 0–0.1)
BASOPHILS NFR BLD AUTO: 1 % (ref 0–1)
BILIRUB SERPL-MCNC: 0.38 MG/DL (ref 0.2–1)
BUN SERPL-MCNC: 15 MG/DL (ref 5–25)
CALCIUM SERPL-MCNC: 8.9 MG/DL (ref 8.3–10.1)
CHLORIDE SERPL-SCNC: 103 MMOL/L (ref 100–108)
CO2 SERPL-SCNC: 29 MMOL/L (ref 21–32)
CREAT SERPL-MCNC: 0.81 MG/DL (ref 0.6–1.3)
EOSINOPHIL # BLD AUTO: 0.29 THOUSAND/ΜL (ref 0–0.61)
EOSINOPHIL NFR BLD AUTO: 5 % (ref 0–6)
ERYTHROCYTE [DISTWIDTH] IN BLOOD BY AUTOMATED COUNT: 12.6 % (ref 11.6–15.1)
GFR SERPL CREATININE-BSD FRML MDRD: 86 ML/MIN/1.73SQ M
GLUCOSE SERPL-MCNC: 83 MG/DL (ref 65–140)
HCT VFR BLD AUTO: 42.9 % (ref 34.8–46.1)
HGB BLD-MCNC: 13.7 G/DL (ref 11.5–15.4)
IMM GRANULOCYTES # BLD AUTO: 0.02 THOUSAND/UL (ref 0–0.2)
IMM GRANULOCYTES NFR BLD AUTO: 0 % (ref 0–2)
LYMPHOCYTES # BLD AUTO: 1.68 THOUSANDS/ΜL (ref 0.6–4.47)
LYMPHOCYTES NFR BLD AUTO: 27 % (ref 14–44)
MCH RBC QN AUTO: 29.3 PG (ref 26.8–34.3)
MCHC RBC AUTO-ENTMCNC: 31.9 G/DL (ref 31.4–37.4)
MCV RBC AUTO: 92 FL (ref 82–98)
MONOCYTES # BLD AUTO: 0.59 THOUSAND/ΜL (ref 0.17–1.22)
MONOCYTES NFR BLD AUTO: 9 % (ref 4–12)
NEUTROPHILS # BLD AUTO: 3.74 THOUSANDS/ΜL (ref 1.85–7.62)
NEUTS SEG NFR BLD AUTO: 58 % (ref 43–75)
NRBC BLD AUTO-RTO: 0 /100 WBCS
PLATELET # BLD AUTO: 305 THOUSANDS/UL (ref 149–390)
PMV BLD AUTO: 10.9 FL (ref 8.9–12.7)
POTASSIUM SERPL-SCNC: 4.2 MMOL/L (ref 3.5–5.3)
PROT SERPL-MCNC: 7.5 G/DL (ref 6.4–8.2)
RBC # BLD AUTO: 4.68 MILLION/UL (ref 3.81–5.12)
SODIUM SERPL-SCNC: 140 MMOL/L (ref 136–145)
WBC # BLD AUTO: 6.35 THOUSAND/UL (ref 4.31–10.16)

## 2020-09-02 PROCEDURE — 36415 COLL VENOUS BLD VENIPUNCTURE: CPT

## 2020-09-02 PROCEDURE — 80053 COMPREHEN METABOLIC PANEL: CPT

## 2020-09-02 PROCEDURE — 85025 COMPLETE CBC W/AUTO DIFF WBC: CPT

## 2020-10-13 ENCOUNTER — DOCUMENTATION (OUTPATIENT)
Dept: HEMATOLOGY ONCOLOGY | Facility: MEDICAL CENTER | Age: 50
End: 2020-10-13

## 2020-10-14 ENCOUNTER — TELEPHONE (OUTPATIENT)
Dept: HEMATOLOGY ONCOLOGY | Facility: CLINIC | Age: 50
End: 2020-10-14

## 2020-10-14 ENCOUNTER — OFFICE VISIT (OUTPATIENT)
Dept: HEMATOLOGY ONCOLOGY | Facility: CLINIC | Age: 50
End: 2020-10-14
Payer: COMMERCIAL

## 2020-10-14 VITALS
OXYGEN SATURATION: 97 % | RESPIRATION RATE: 16 BRPM | HEART RATE: 79 BPM | TEMPERATURE: 98 F | BODY MASS INDEX: 32.86 KG/M2 | DIASTOLIC BLOOD PRESSURE: 80 MMHG | WEIGHT: 216.8 LBS | SYSTOLIC BLOOD PRESSURE: 120 MMHG | HEIGHT: 68 IN

## 2020-10-14 DIAGNOSIS — Z17.0 MALIGNANT NEOPLASM OF LOWER-OUTER QUADRANT OF RIGHT BREAST OF FEMALE, ESTROGEN RECEPTOR POSITIVE (HCC): ICD-10-CM

## 2020-10-14 DIAGNOSIS — C50.511 MALIGNANT NEOPLASM OF LOWER-OUTER QUADRANT OF RIGHT BREAST OF FEMALE, ESTROGEN RECEPTOR POSITIVE (HCC): ICD-10-CM

## 2020-10-14 DIAGNOSIS — Z98.84 HISTORY OF GASTRIC BYPASS: Primary | ICD-10-CM

## 2020-10-14 PROCEDURE — 99213 OFFICE O/P EST LOW 20 MIN: CPT | Performed by: PHYSICIAN ASSISTANT

## 2020-10-14 RX ORDER — ANASTROZOLE 1 MG/1
1 TABLET ORAL DAILY
Qty: 90 TABLET | Refills: 4 | Status: SHIPPED | OUTPATIENT
Start: 2020-10-14 | End: 2021-12-20

## 2020-10-14 RX ORDER — ANASTROZOLE 1 MG/1
1 TABLET ORAL DAILY
Qty: 90 TABLET | Refills: 4 | Status: SHIPPED | OUTPATIENT
Start: 2020-10-14 | End: 2020-10-14 | Stop reason: SDUPTHER

## 2020-10-24 ENCOUNTER — HOSPITAL ENCOUNTER (OUTPATIENT)
Dept: RADIOLOGY | Facility: HOSPITAL | Age: 50
Discharge: HOME | End: 2020-10-24
Attending: SURGERY
Payer: COMMERCIAL

## 2020-10-24 DIAGNOSIS — C50.511 MALIGNANT NEOPLASM OF LOWER-OUTER QUADRANT OF RIGHT BREAST OF FEMALE, ESTROGEN RECEPTOR POSITIVE (HCC): ICD-10-CM

## 2020-10-24 DIAGNOSIS — N64.9 BREAST VARIANT: ICD-10-CM

## 2020-10-24 DIAGNOSIS — Z17.0 MALIGNANT NEOPLASM OF LOWER-OUTER QUADRANT OF RIGHT BREAST OF FEMALE, ESTROGEN RECEPTOR POSITIVE (HCC): ICD-10-CM

## 2020-10-24 DIAGNOSIS — N65.0 DEFORMITY OF RECONSTRUCTED BREAST: ICD-10-CM

## 2020-10-24 PROCEDURE — C8908 MRI W/O FOL W/CONT, BREAST,: HCPCS

## 2020-10-24 PROCEDURE — 77049 MRI BREAST C-+ W/CAD BI: CPT

## 2020-10-24 PROCEDURE — A9585 GADOBUTROL INJECTION: HCPCS | Performed by: SURGERY

## 2020-10-24 RX ADMIN — GADOBUTROL 9 ML: 604.72 INJECTION INTRAVENOUS at 14:32

## 2020-12-23 ENCOUNTER — TELEPHONE (OUTPATIENT)
Dept: SURGICAL ONCOLOGY | Facility: CLINIC | Age: 50
End: 2020-12-23

## 2021-01-24 ENCOUNTER — IMMUNIZATIONS (OUTPATIENT)
Dept: FAMILY MEDICINE CLINIC | Facility: HOSPITAL | Age: 51
End: 2021-01-24

## 2021-01-24 DIAGNOSIS — Z23 ENCOUNTER FOR IMMUNIZATION: Primary | ICD-10-CM

## 2021-01-24 PROCEDURE — 0011A SARS-COV-2 / COVID-19 MRNA VACCINE (MODERNA) 100 MCG: CPT

## 2021-01-24 PROCEDURE — 91301 SARS-COV-2 / COVID-19 MRNA VACCINE (MODERNA) 100 MCG: CPT

## 2021-01-29 ENCOUNTER — TELEPHONE (OUTPATIENT)
Dept: SURGICAL ONCOLOGY | Facility: CLINIC | Age: 51
End: 2021-01-29

## 2021-02-24 ENCOUNTER — IMMUNIZATIONS (OUTPATIENT)
Dept: FAMILY MEDICINE CLINIC | Facility: HOSPITAL | Age: 51
End: 2021-02-24

## 2021-02-24 DIAGNOSIS — Z23 ENCOUNTER FOR IMMUNIZATION: Primary | ICD-10-CM

## 2021-02-24 PROCEDURE — 0012A SARS-COV-2 / COVID-19 MRNA VACCINE (MODERNA) 100 MCG: CPT

## 2021-02-24 PROCEDURE — 91301 SARS-COV-2 / COVID-19 MRNA VACCINE (MODERNA) 100 MCG: CPT

## 2021-03-03 ENCOUNTER — OFFICE VISIT (OUTPATIENT)
Dept: SURGERY | Facility: CLINIC | Age: 51
End: 2021-03-03
Payer: COMMERCIAL

## 2021-03-03 VITALS
BODY MASS INDEX: 33.04 KG/M2 | SYSTOLIC BLOOD PRESSURE: 122 MMHG | HEIGHT: 68 IN | DIASTOLIC BLOOD PRESSURE: 60 MMHG | WEIGHT: 218 LBS | TEMPERATURE: 98.4 F | HEART RATE: 104 BPM

## 2021-03-03 DIAGNOSIS — K80.10 CALCULOUS CHOLECYSTITIS: Primary | ICD-10-CM

## 2021-03-03 DIAGNOSIS — K80.20 GALL BLADDER STONES: ICD-10-CM

## 2021-03-03 PROCEDURE — 1036F TOBACCO NON-USER: CPT | Performed by: SPECIALIST

## 2021-03-03 PROCEDURE — 99204 OFFICE O/P NEW MOD 45 MIN: CPT | Performed by: SPECIALIST

## 2021-03-03 PROCEDURE — 3008F BODY MASS INDEX DOCD: CPT | Performed by: SPECIALIST

## 2021-03-03 RX ORDER — ONDANSETRON 4 MG/1
TABLET, ORALLY DISINTEGRATING ORAL
COMMUNITY
End: 2021-12-28

## 2021-03-03 NOTE — H&P
Chief Complaint:  Symptomatic gallstones      History of Present Illness:  Patient is a 44-year-old white female who is status post laparoscopic Bennett-en-Y gastric bypass for morbid obesity 09/22/2014  At that time she weighed 246 lb  She lost almost 100 lb and was doing well  Since that time she has developed carcinoma of the left breast  In 2018 she underwent bilateral mastectomies with immediate reconstruction  She subsequently underwent bilateral salpingo oophorectomy  After that she actually put on some weight  At that time she developed some right flank and right upper quadrant abdominal pain that she thought was musculoskeletal in origin  It has recurred over the past few years and is becoming worse and lasting longer  She recently underwent an MRI of the  "breasts" and an incidental finding was cholelithiasis  After doing some research she felt that the gallstones were causing her symptoms  Symptoms primarily or postprandial abdominal pain with bloating and occasional heartburn  Also right upper quadrant abdominal pain radiating to the right flank  She is here to discuss the situation and also laparoscopic cholecystectomy  Past Medical History:   Past Medical History:   Diagnosis Date    Abnormal Pap smear of cervix     Ashkenazi Mormonism ancestry requiring population-specific genetic screening     Asthma     BRCA1 negative     BRCA2 negative     Breathing difficulty     after gastric bypass 2014-"felt elephant on chest"    Chest pain, non-cardiac     Seen in ER and is from esophageal spasms      Constipation     occasional    Family history of breast cancer in female     GERD (gastroesophageal reflux disease)     History of MRSA infection     Leg    History of UTI     treated 8/1/2018    Human papilloma virus (HPV) infection     Hx MRSA infection     on leg-2009 treated    Pollen allergies     PONV (postoperative nausea and vomiting)     nausea    Postural lightheadedness     Raynaud's disease     Seasonal allergies     Sinus problem     Sinus problem          Past Surgical History:    Past Surgical History:   Procedure Laterality Date    BLADDER SURGERY  2013    bladder lift    BREAST BIOPSY Right 06/14/2018    [facility]    CERVIX LESION DESTRUCTION      COLONOSCOPY      COLPOSCOPY W/ BIOPSY / CURETTAGE      ENDOMETRIAL ABLATION      GASTRIC BYPASS  2014 2014 wt loss 90 lbs(regained 20 lbs)    HYSTEROSCOPIC STERILIZATION W/ IMPLANTS      HYSTEROSCOPY W/ ENDOMETRIAL ABLATION  2013    KNEE ARTHROSCOPY Right 1990    LYMPH NODE BIOPSY Right 8/10/2018    Procedure: BIOPSY LYMPH NODE SENTINEL;  Surgeon: Lawyer Chuy MD;  [facility];  Service: Surgical Oncology    MAMMO STEREOTACTIC BREAST BIOPSY RIGHT (ALL INC) Right 6/14/2018    MASTECTOMY Bilateral     NASAL SEPTUM SURGERY      2002, 2016    ND IMPLNT BIO IMPLNT FOR SOFT TISSUE REINFORCEMENT Bilateral 8/10/2018    Procedure: RECONSTRUCTION BREAST W/ IMPLANT;  Surgeon: Pegge Cogan, MD;  [facility];  Service: Plastics    ND INSJ/RPLCMT BREAST IMPLANT SEP DAY MASTECTOMY Bilateral 11/30/2018    Procedure: BREAST IMPLANT EXCHANGE;  Surgeon: Pegge Cogan, MD;  [facility];  Service: Plastics    ND LAP,RMV  ADNEXAL STRUCTURE N/A 8/10/2018    Procedure: SALPINGO-OOPHORECTOMY, LAPAROSCOPIC; PELVIC WASHINGS ;  Surgeon: Niru Whiteside MD;  [facility];  Service: Gynecology Oncology    ND MASTECTOMY, SIMPLE, COMPLETE Bilateral 8/10/2018    Procedure: MASTECTOMY SIMPLE;  Surgeon: Lawyer Chuy MD;  [facility];  Service: Surgical Oncology    ND LENNIE-IMPLANT CAPSULECTOMY BREAST COMPLETE Bilateral 11/30/2018    Procedure: CAPSULECTOMY;  Surgeon: Pegge Cogan, MD;  [facility];  Service: Plastics    ND REVISION OF RECONSTRUCTED BREAST Bilateral 2/22/2019    Procedure: BREAST MOUND REVISION; FAT GRAFTING;  Surgeon: Pegge Cogan, MD;  [facility];  Service: Plastics    ND TISSUE EXPANDER PLACEMENT BREAST RECONSTRUCTION Bilateral 8/10/2018    Procedure: INSERTION/PLACEMENT TISSUE EXPANDER (EXCHANGE); Surgeon: Lenny Powell MD;  [facility];  Service: Plastics    SINUS SURGERY           Allergies:     Allergies   Allergen Reactions    Adhesive [Medical Tape] Rash     And small blister at sight         Medications:    Current Outpatient Medications:     acetaminophen (TYLENOL) 325 mg tablet, Take 650 mg by mouth every 6 (six) hours as needed for mild pain, Disp: , Rfl:     albuterol (PROVENTIL HFA,VENTOLIN HFA) 90 mcg/act inhaler, Proventil HFA 90 mcg/actuation aerosol inhaler, Disp: , Rfl:     anastrozole (ARIMIDEX) 1 mg tablet, Take 1 tablet (1 mg total) by mouth daily, Disp: 90 tablet, Rfl: 4    BIOTIN PO, Take 1 tablet by mouth every morning, Disp: , Rfl:     Calcium Carb-Cholecalciferol (CALCIUM 1000 + D) 1000-800 MG-UNIT TABS, calcium, Disp: , Rfl:     Diclofenac Epolamine (FLECTOR) 1 3 % PTCH, as needed, Disp: , Rfl:     Ergocalciferol (VITAMIN D2) 2000 units TABS, Vitamin D2 50,000 unit capsule, Disp: , Rfl:     ferrous sulfate 325 (65 Fe) mg tablet, Daily, Disp: , Rfl:     Influenza Vac Split Quad (AFLURIA QUADRIVALENT IM), Afluria Quad 1003-2580 (PF) 60 mcg (15 mcg x 4)/0 5 mL IM syringe  TO BE ADMINISTERED BY PHARMACIST FOR IMMUNIZATION, Disp: , Rfl:     Lifitegrast (XIIDRA OP), Apply 1 vial to eye 2 (two) times a day, Disp: , Rfl:     loratadine (CLARITIN) 10 mg tablet, Take 10 mg by mouth as needed  , Disp: , Rfl:     methocarbamol (ROBAXIN) 500 mg tablet, Take 500 mg by mouth 3 (three) times a day  , Disp: , Rfl:     mometasone (NASONEX) 50 mcg/act nasal spray, 2 sprays in each nostril as needed, Disp: , Rfl:     montelukast (SINGULAIR) 10 mg tablet, Take 10 mg by mouth as needed  , Disp: , Rfl:     Multiple Vitamins-Minerals (HAIR SKIN & NAILS ADVANCED PO), Hair, Skin, Nails with Biotin, Disp: , Rfl:     multivitamin (THERAGRAN) TABS, Take 1 tablet by mouth every morning  , Disp: , Rfl:     Naproxen Sodium (ALEVE) 220 MG CAPS, as needed, Disp: , Rfl:     pantoprazole (PROTONIX) 40 mg tablet, Take 40 mg by mouth every morning  , Disp: , Rfl:     pyridoxine (VITAMIN B6) 100 mg tablet, Take 100 mg by mouth, Disp: , Rfl:     zolpidem (AMBIEN) 5 mg tablet, Take 1 tablet (5 mg total) by mouth daily at bedtime as needed for sleep, Disp: 30 tablet, Rfl: 3    ALPRAZolam (XANAX) 0 5 mg tablet, Take 1 tablet (0 5 mg total) by mouth 30 min pre-procedure (Patient not taking: Reported on 3/3/2021), Disp: 2 tablet, Rfl: 0    calcium-vitamin D 250-100 MG-UNIT per tablet, Take 1 tablet by mouth, Disp: , Rfl:     cyanocobalamin 50 MCG tablet, Take 50 mcg by mouth, Disp: , Rfl:     Diclofenac Epolamine (Flector) 1 3 % PTCH, Flector 1 3 % transdermal 12 hour patch, Disp: , Rfl:     Inulin-Calcium-Vitamin D 2-250-100 GM-MG-UNIT CHEW, Fiber Plus Mulitvitamin, Disp: , Rfl:     lidocaine (LIDODERM) 5 %, Apply 1 patch topically daily Remove & Discard patch within 12 hours or as directed by MD  , Disp: , Rfl:     lidocaine 0 5% 10 mL and sodium bicarb 8 4% 1 mL, 8 mL, Disp: , Rfl:     LORazepam (ATIVAN) 1 mg tablet, Take 1 tablet (1 mg total) by mouth once in imaging (to be taken 30 min before cervical spine MRI) for up to 2 doses (Patient not taking: Reported on 6/15/2020), Disp: 2 tablet, Rfl: 0    Probiotic Product (PROBIOTIC-10 PO), Probiotic, Disp: , Rfl:       Social History:  Social History     Social History     Substance and Sexual Activity   Alcohol Use Yes    Alcohol/week: 4 0 standard drinks    Types: 4 Glasses of wine per week    Drinks per session: 3 or 4    Comment: 2 weekly  -4  glasses total     Social History     Substance and Sexual Activity   Drug Use Yes    Types: Marijuana    Comment: edible- last time 11/23/2018       Social History     Tobacco Use   Smoking Status Never Smoker   Smokeless Tobacco Never Used         Family History:    Family History Problem Relation Age of Onset    Other Mother         BRCA negative    Breast cancer Mother 72    Ovarian cancer Mother 61    Diabetes Father     Heart disease Father     Hypertension Father     Migraines Sister     Diabetes Brother     Colon cancer Maternal Grandfather 80    Diabetes type II Maternal Grandfather     Heart disease Paternal Grandfather     Colon cancer Maternal Aunt 62    Skin cancer Maternal Aunt          Review of Systems:    As per the HPI  Occasional right upper quadrant abdominal pain postprandially  Radiating to the right flank  Heartburn  Weight gain  Mild left upper extremity edema  She denies fever chills night sweats chest pain nausea vomiting diarrhea constipation shortness of breath chronic cough dysuria headaches blurry vision double vision  [employer]  All other review of systems are negative      Vitals:  Vitals:    03/03/21 1058   BP: 122/60   Pulse: 104   Temp: 98 4 °F (36 9 °C)       Physical Exam:    Patient is an adult white female 5 ft 8 in 218 lb  She is awake alert no distress   Vital signs as above   Skin warm dry  Head normocephalic and atraumatic   Eyes FLAKITO a EOM intact  Ears nose within normal limits  Throat gag reflex intact  Neck no masses thyromegaly lymphadenopathy palpable  Back no CVA or spinal tenderness   Lungs clear to a and P  Cor regular rate and rhythm no murmurs carotid bruits   Abdomen obese soft mild right upper quadrant tenderness  Old laparoscopic incisions are healing well with no evidence of infection or hernia  Excellent cosmetic result  Extremities negative CC E   Neurologically A&O x3 cranial nerves 2-12 intact   Lymphatics no obvious lymphadenopathy palpable  Lab Results: I have personally reviewed pertinent reports  See below  Imaging: I have personally reviewed pertinent   Imaging studies primarily the MRI of the  "breasts"  EKG, Pathology, and Other Studies: I have personally reviewed pertinent reports       No visits with results within 1 Day(s) from this visit  Latest known visit with results is:   Appointment on 09/02/2020   Component Date Value    WBC 09/02/2020 6 35     RBC 09/02/2020 4 68     Hemoglobin 09/02/2020 13 7     Hematocrit 09/02/2020 42 9     MCV 09/02/2020 92     MCH 09/02/2020 29 3     MCHC 09/02/2020 31 9     RDW 09/02/2020 12 6     MPV 09/02/2020 10 9     Platelets 09/33/8747 305     nRBC 09/02/2020 0     Neutrophils Relative 09/02/2020 58     Immat GRANS % 09/02/2020 0     Lymphocytes Relative 09/02/2020 27     Monocytes Relative 09/02/2020 9     Eosinophils Relative 09/02/2020 5     Basophils Relative 09/02/2020 1     Neutrophils Absolute 09/02/2020 3 74     Immature Grans Absolute 09/02/2020 0 02     Lymphocytes Absolute 09/02/2020 1 68     Monocytes Absolute 09/02/2020 0 59     Eosinophils Absolute 09/02/2020 0 29     Basophils Absolute 09/02/2020 0 03     Sodium 09/02/2020 140     Potassium 09/02/2020 4 2     Chloride 09/02/2020 103     CO2 09/02/2020 29     ANION GAP 09/02/2020 8     BUN 09/02/2020 15     Creatinine 09/02/2020 0 81     Glucose 09/02/2020 83     Calcium 09/02/2020 8 9     AST 09/02/2020 18     ALT 09/02/2020 23     Alkaline Phosphatase 09/02/2020 95     Total Protein 09/02/2020 7 5     Albumin 09/02/2020 3 8     Total Bilirubin 09/02/2020 0 38     eGFR 09/02/2020 86          Impression:    Symptomatic gallstones  Plan: At this time we will schedule the patient for laparoscopic cholecystectomy with cholangiogram at the earliest possible date

## 2021-03-03 NOTE — H&P (VIEW-ONLY)
Chief Complaint:  Symptomatic gallstones      History of Present Illness:  Patient is a 56-year-old white female who is status post laparoscopic Bennett-en-Y gastric bypass for morbid obesity 09/22/2014  At that time she weighed 246 lb  She lost almost 100 lb and was doing well  Since that time she has developed carcinoma of the left breast  In 2018 she underwent bilateral mastectomies with immediate reconstruction  She subsequently underwent bilateral salpingo oophorectomy  After that she actually put on some weight  At that time she developed some right flank and right upper quadrant abdominal pain that she thought was musculoskeletal in origin  It has recurred over the past few years and is becoming worse and lasting longer  She recently underwent an MRI of the  "breasts" and an incidental finding was cholelithiasis  After doing some research she felt that the gallstones were causing her symptoms  Symptoms primarily or postprandial abdominal pain with bloating and occasional heartburn  Also right upper quadrant abdominal pain radiating to the right flank  She is here to discuss the situation and also laparoscopic cholecystectomy  Past Medical History:   Past Medical History:   Diagnosis Date    Abnormal Pap smear of cervix     Ashkenazi Caodaism ancestry requiring population-specific genetic screening     Asthma     BRCA1 negative     BRCA2 negative     Breathing difficulty     after gastric bypass 2014-"felt elephant on chest"    Chest pain, non-cardiac     Seen in ER and is from esophageal spasms      Constipation     occasional    Family history of breast cancer in female     GERD (gastroesophageal reflux disease)     History of MRSA infection     Leg    History of UTI     treated 8/1/2018    Human papilloma virus (HPV) infection     Hx MRSA infection     on leg-2009 treated    Pollen allergies     PONV (postoperative nausea and vomiting)     nausea    Postural lightheadedness     Raynaud's disease     Seasonal allergies     Sinus problem     Sinus problem          Past Surgical History:    Past Surgical History:   Procedure Laterality Date    BLADDER SURGERY  2013    bladder lift    BRE[facility] BI[facility]SY Right 06/14/2018    [facility]    CERVIX LESION DESTRUCTION      COLONOSCOPY      COLPOSCOPY W/ BIOPSY / CURETTAGE      ENDOMETRIAL ABLATION      GASTRIC BYPASS  2014 2014 wt loss 90 lbs(regained 20 lbs)    HYSTEROSCOPIC STERILIZATION W/ IMPLANTS      HYSTEROSCOPY W/ ENDOMETRIAL ABLATION  2013    KNEE ARTHROSCOPY Right 1990    LYMPH NODE BIOPSY Right 8/10/2018    Procedure: BIOPSY LYMPH NODE SENTINEL;  Surgeon: Catherine Magdaleno MD;  [facility];  Service: Surgical Oncology    MAMMO STEREOTACTIC BREAST BIOPSY RIGHT (ALL INC) Right 6/14/2018    MASTECTOMY Bilateral     NASAL SEPTUM SURGERY      2002, 2016    MT IMPLNT BIO IMPLNT FOR SOFT TISSUE REINFORCEMENT Bilateral 8/10/2018    Procedure: RECONSTRUCTION BREAST W/ IMPLANT;  Surgeon: Wayne Devine MD;  [facility];  Service: Plastics    MT INSJ/RPLCMT BREAST IMPLANT SEP DAY MASTECTOMY Bilateral 11/30/2018    Procedure: BREAST IMPLANT EXCHANGE;  Surgeon: Wayne Devine MD;  [facility];  Service: Plastics    MT LAP,RMV  ADNEXAL STRUCTURE N/A 8/10/2018    Procedure: SALPINGO-OOPHORECTOMY, LAPAROSCOPIC; PELVIC WASHINGS ;  Surgeon: Eula Julian MD;  [facility];  Service: Gynecology Oncology    MT MASTECTOMY, SIMPLE, COMPLETE Bilateral 8/10/2018    Procedure: MASTECTOMY SIMPLE;  Surgeon: Catherine Magdaleno MD;  [facility];  Service: Surgical Oncology    MT LENNIE-IMPLANT CAPSULECTOMY BREAST COMPLETE Bilateral 11/30/2018    Procedure: CAPSULECTOMY;  Surgeon: Wayne Devine MD;  [facility];  Service: Plastics    MT REVISION OF RECONSTRUCTED BREAST Bilateral 2/22/2019    Procedure: BREAST MOUND REVISION; FAT GRAFTING;  Surgeon: Wayne Devine MD;  [facility];  Service: Plastics    MT TISSUE EXPANDER PLACEMENT BREAST RECONSTRUCTION Bilateral 8/10/2018    Procedure: INSERTION/PLACEMENT TISSUE EXPANDER (EXCHANGE); Surgeon: Ric Katz MD;  [facility];  Service: Plastics    SINUS SURGERY           Allergies:     Allergies   Allergen Reactions    Adhesive [Medical Tape] Rash     And small blister at sight         Medications:    Current Outpatient Medications:     acetaminophen (TYLENOL) 325 mg tablet, Take 650 mg by mouth every 6 (six) hours as needed for mild pain, Disp: , Rfl:     albuterol (PROVENTIL HFA,VENTOLIN HFA) 90 mcg/act inhaler, Proventil HFA 90 mcg/actuation aerosol inhaler, Disp: , Rfl:     anastrozole (ARIMIDEX) 1 mg tablet, Take 1 tablet (1 mg total) by mouth daily, Disp: 90 tablet, Rfl: 4    BIOTIN PO, Take 1 tablet by mouth every morning, Disp: , Rfl:     Calcium Carb-Cholecalciferol (CALCIUM 1000 + D) 1000-800 MG-UNIT TABS, calcium, Disp: , Rfl:     Diclofenac Epolamine (FLECTOR) 1 3 % PTCH, as needed, Disp: , Rfl:     Ergocalciferol (VITAMIN D2) 2000 units TABS, Vitamin D2 50,000 unit capsule, Disp: , Rfl:     ferrous sulfate 325 (65 Fe) mg tablet, Daily, Disp: , Rfl:     Influenza Vac Split Quad (AFLURIA QUADRIVALENT IM), Afluria Quad 2221-3590 (PF) 60 mcg (15 mcg x 4)/0 5 mL IM syringe  TO BE ADMINISTERED BY PHARMACIST FOR IMMUNIZATION, Disp: , Rfl:     Lifitegrast (XIIDRA OP), Apply 1 vial to eye 2 (two) times a day, Disp: , Rfl:     loratadine (CLARITIN) 10 mg tablet, Take 10 mg by mouth as needed  , Disp: , Rfl:     methocarbamol (ROBAXIN) 500 mg tablet, Take 500 mg by mouth 3 (three) times a day  , Disp: , Rfl:     mometasone (NASONEX) 50 mcg/act nasal spray, 2 sprays in each nostril as needed, Disp: , Rfl:     montelukast (SINGULAIR) 10 mg tablet, Take 10 mg by mouth as needed  , Disp: , Rfl:     Multiple Vitamins-Minerals (HAIR SKIN & NAILS ADVANCED PO), Hair, Skin, Nails with Biotin, Disp: , Rfl:     multivitamin (THERAGRAN) TABS, Take 1 tablet by mouth every morning  , Disp: , Rfl:     Naproxen Sodium (ALEVE) 220 MG CAPS, as needed, Disp: , Rfl:     pantoprazole (PROTONIX) 40 mg tablet, Take 40 mg by mouth every morning  , Disp: , Rfl:     pyridoxine (VITAMIN B6) 100 mg tablet, Take 100 mg by mouth, Disp: , Rfl:     zolpidem (AMBIEN) 5 mg tablet, Take 1 tablet (5 mg total) by mouth daily at bedtime as needed for sleep, Disp: 30 tablet, Rfl: 3    ALPRAZolam (XANAX) 0 5 mg tablet, Take 1 tablet (0 5 mg total) by mouth 30 min pre-procedure (Patient not taking: Reported on 3/3/2021), Disp: 2 tablet, Rfl: 0    calcium-vitamin D 250-100 MG-UNIT per tablet, Take 1 tablet by mouth, Disp: , Rfl:     cyanocobalamin 50 MCG tablet, Take 50 mcg by mouth, Disp: , Rfl:     Diclofenac Epolamine (Flector) 1 3 % PTCH, Flector 1 3 % transdermal 12 hour patch, Disp: , Rfl:     Inulin-Calcium-Vitamin D 2-250-100 GM-MG-UNIT CHEW, Fiber Plus Mulitvitamin, Disp: , Rfl:     lidocaine (LIDODERM) 5 %, Apply 1 patch topically daily Remove & Discard patch within 12 hours or as directed by MD  , Disp: , Rfl:     lidocaine 0 5% 10 mL and sodium bicarb 8 4% 1 mL, 8 mL, Disp: , Rfl:     LORazepam (ATIVAN) 1 mg tablet, Take 1 tablet (1 mg total) by mouth once in imaging (to be taken 30 min before cervical spine MRI) for up to 2 doses (Patient not taking: Reported on 6/15/2020), Disp: 2 tablet, Rfl: 0    Probiotic Product (PROBIOTIC-10 PO), Probiotic, Disp: , Rfl:       Social History:  Social History     Social History     Substance and Sexual Activity   Alcohol Use Yes    Alcohol/week: 4 0 standard drinks    Types: 4 Glasses of wine per week    Drinks per session: 3 or 4    Comment: 2 weekly  -4  glasses total     Social History     Substance and Sexual Activity   Drug Use Yes    Types: Marijuana    Comment: edible- last time 11/23/2018       Social History     Tobacco Use   Smoking Status Never Smoker   Smokeless Tobacco Never Used         Family History:    Family History Problem Relation Age of Onset    Other Mother         BRCA negative    Breast cancer Mother 72    Ovarian cancer Mother 61    Diabetes Father     Heart disease Father     Hypertension Father     Migraines Sister     Diabetes Brother     Colon cancer Maternal Grandfather 80    Diabetes type II Maternal Grandfather     Heart disease Paternal Grandfather     Colon cancer Maternal Aunt 62    Skin cancer Maternal Aunt          Review of Systems:    As per the HPI  Occasional right upper quadrant abdominal pain postprandially  Radiating to the right flank  Heartburn  Weight gain  Mild left upper extremity edema  She denies fever chills night sweats chest pain nausea vomiting diarrhea constipation shortness of breath chronic cough dysuria headaches blurry vision double vision  [employer]  All other review of systems are negative      Vitals:  Vitals:    03/03/21 1058   BP: 122/60   Pulse: 104   Temp: 98 4 °F (36 9 °C)       Physical Exam:    Patient is an adult white female 5 ft 8 in 218 lb  She is awake alert no distress   Vital signs as above   Skin warm dry  Head normocephalic and atraumatic   Eyes FLAKITO a EOM intact  Ears nose within normal limits  Throat gag reflex intact  Neck no masses thyromegaly lymphadenopathy palpable  Back no CVA or spinal tenderness   Lungs clear to a and P  Cor regular rate and rhythm no murmurs carotid bruits   Abdomen obese soft mild right upper quadrant tenderness  Old laparoscopic incisions are healing well with no evidence of infection or hernia  Excellent cosmetic result  Extremities negative CC E   Neurologically A&O x3 cranial nerves 2-12 intact   Lymphatics no obvious lymphadenopathy palpable  Lab Results: I have personally reviewed pertinent reports  See below  Imaging: I have personally reviewed pertinent   Imaging studies primarily the MRI of the  "breasts"  EKG, Pathology, and Other Studies: I have personally reviewed pertinent reports       No visits with results within 1 Day(s) from this visit  Latest known visit with results is:   Appointment on 09/02/2020   Component Date Value    WBC 09/02/2020 6 35     RBC 09/02/2020 4 68     Hemoglobin 09/02/2020 13 7     Hematocrit 09/02/2020 42 9     MCV 09/02/2020 92     MCH 09/02/2020 29 3     MCHC 09/02/2020 31 9     RDW 09/02/2020 12 6     MPV 09/02/2020 10 9     Platelets 92/64/8718 305     nRBC 09/02/2020 0     Neutrophils Relative 09/02/2020 58     Immat GRANS % 09/02/2020 0     Lymphocytes Relative 09/02/2020 27     Monocytes Relative 09/02/2020 9     Eosinophils Relative 09/02/2020 5     Basophils Relative 09/02/2020 1     Neutrophils Absolute 09/02/2020 3 74     Immature Grans Absolute 09/02/2020 0 02     Lymphocytes Absolute 09/02/2020 1 68     Monocytes Absolute 09/02/2020 0 59     Eosinophils Absolute 09/02/2020 0 29     Basophils Absolute 09/02/2020 0 03     Sodium 09/02/2020 140     Potassium 09/02/2020 4 2     Chloride 09/02/2020 103     CO2 09/02/2020 29     ANION GAP 09/02/2020 8     BUN 09/02/2020 15     Creatinine 09/02/2020 0 81     Glucose 09/02/2020 83     Calcium 09/02/2020 8 9     AST 09/02/2020 18     ALT 09/02/2020 23     Alkaline Phosphatase 09/02/2020 95     Total Protein 09/02/2020 7 5     Albumin 09/02/2020 3 8     Total Bilirubin 09/02/2020 0 38     eGFR 09/02/2020 86          Impression:    Symptomatic gallstones  Plan: At this time we will schedule the patient for laparoscopic cholecystectomy with cholangiogram at the earliest possible date

## 2021-03-11 NOTE — PRE-PROCEDURE INSTRUCTIONS
Pre-Surgery Instructions:   Medication Instructions    acetaminophen (TYLENOL) 325 mg tablet Instructed patient per Anesthesia Guidelines   albuterol (PROVENTIL HFA,VENTOLIN HFA) 90 mcg/act inhaler Instructed patient per Anesthesia Guidelines   anastrozole (ARIMIDEX) 1 mg tablet Instructed patient per Anesthesia Guidelines   BIOTIN PO Instructed patient per Anesthesia Guidelines   Calcium Carb-Cholecalciferol (CALCIUM 1000 + D) 1000-800 MG-UNIT TABS Instructed patient per Anesthesia Guidelines   calcium-vitamin D 250-100 MG-UNIT per tablet Instructed patient per Anesthesia Guidelines   cyanocobalamin 50 MCG tablet Instructed patient per Anesthesia Guidelines   Diclofenac Epolamine (FLECTOR) 1 3 % PTCH Instructed patient per Anesthesia Guidelines   Diclofenac Epolamine (Flector) 1 3 % PTCH Instructed patient per Anesthesia Guidelines   Ergocalciferol (VITAMIN D2) 2000 units TABS Instructed patient per Anesthesia Guidelines   ferrous sulfate 325 (65 Fe) mg tablet Instructed patient per Anesthesia Guidelines   Inulin-Calcium-Vitamin D 2-250-100 GM-MG-UNIT CHEW Instructed patient per Anesthesia Guidelines   lidocaine (LIDODERM) 5 % Instructed patient per Anesthesia Guidelines   lidocaine 0 5% 10 mL and sodium bicarb 8 4% 1 mL Instructed patient per Anesthesia Guidelines   Lifitegrast (Johny Cheney OP) Instructed patient per Anesthesia Guidelines   loratadine (CLARITIN) 10 mg tablet Instructed patient per Anesthesia Guidelines   methocarbamol (ROBAXIN) 500 mg tablet Instructed patient per Anesthesia Guidelines   mometasone (NASONEX) 50 mcg/act nasal spray Instructed patient per Anesthesia Guidelines   montelukast (SINGULAIR) 10 mg tablet Instructed patient per Anesthesia Guidelines   Multiple Vitamins-Minerals (HAIR SKIN & NAILS ADVANCED PO) Instructed patient per Anesthesia Guidelines   multivitamin (THERAGRAN) TABS Instructed patient per Anesthesia Guidelines      Naproxen Sodium (ALEVE) 220 MG CAPS Instructed patient per Anesthesia Guidelines   pantoprazole (PROTONIX) 40 mg tablet Instructed patient per Anesthesia Guidelines   pyridoxine (VITAMIN B6) 100 mg tablet Instructed patient per Anesthesia Guidelines   zolpidem (AMBIEN) 5 mg tablet Instructed patient per Anesthesia Guidelines  Pre procedure instructions reviewed verbalizes understanding  NPO after MN  Bathing reviewed  May take morning meds with water  Hold supplements/vitamins one week prior  No NSAIDS 3 days prior

## 2021-03-18 ENCOUNTER — ANESTHESIA EVENT (OUTPATIENT)
Dept: PERIOP | Facility: HOSPITAL | Age: 51
End: 2021-03-18
Payer: COMMERCIAL

## 2021-03-18 PROBLEM — Z98.84 H/O BARIATRIC SURGERY: Status: ACTIVE | Noted: 2021-03-18

## 2021-03-18 NOTE — ANESTHESIA PREPROCEDURE EVALUATION
Procedure:  CHOLECYSTECTOMY LAPAROSCOPIC W/ INTRAOP CHOLANGIOGRAM (N/A Abdomen)    Relevant Problems   ANESTHESIA  old charts reviewed      CARDIO (within normal limits)      ENDO (within normal limits)      GI/HEPATIC  chronic cholecystits asymp    (+) H/O bariatric surgery      /RENAL (within normal limits)      GYN  left breast CA  b/l mastectomies with reconstruction  Arimidex tx   (+) Malignant neoplasm of lower-outer quadrant of right breast of female, estrogen receptor positive ([address] )      HEMATOLOGY (within normal limits)      MUSCULOSKELETAL   (+) DDD (degenerative disc disease), cervical      NEURO/PSYCH (within normal limits)      PULMONARY   (-) Shortness of breath   (-) Smoking   (-) URI (upper respiratory infection)        Physical Exam    Airway    Mallampati score: II  TM Distance: >3 FB  Neck ROM: full     Dental       Cardiovascular  Cardiovascular exam normal    Pulmonary  Pulmonary exam normal     Other Findings  Fixed upper and lower teeth and in good repair  Easily vent/intubated      Anesthesia Plan  ASA Score- 2     Anesthesia Type- general with ASA Monitors  Additional Monitors:   Airway Plan: ETT  Plan Factors-Exercise tolerance (METS): >4 METS  Chart reviewed  EKG reviewed  Existing labs reviewed  Patient summary reviewed  Patient is not a current smoker  Patient not instructed to abstain from smoking on day of procedure  Patient did not smoke on day of surgery  Obstructive sleep apnea risk education given perioperatively  Induction- intravenous  Postoperative Plan- Plan for postoperative opioid use  Informed Consent- Anesthetic plan and risks discussed with patient  I personally reviewed this patient with the CRNA  Discussed and agreed on the Anesthesia Plan with the CRNA  Lou Alfaro

## 2021-03-19 ENCOUNTER — HOSPITAL ENCOUNTER (OUTPATIENT)
Facility: HOSPITAL | Age: 51
Setting detail: OUTPATIENT SURGERY
Discharge: HOME | End: 2021-03-19
Attending: SPECIALIST | Admitting: SPECIALIST
Payer: COMMERCIAL

## 2021-03-19 ENCOUNTER — ANESTHESIA (OUTPATIENT)
Dept: PERIOP | Facility: HOSPITAL | Age: 51
End: 2021-03-19
Payer: COMMERCIAL

## 2021-03-19 ENCOUNTER — APPOINTMENT (OUTPATIENT)
Dept: RADIOLOGY | Facility: HOSPITAL | Age: 51
End: 2021-03-19
Payer: COMMERCIAL

## 2021-03-19 VITALS
DIASTOLIC BLOOD PRESSURE: 74 MMHG | RESPIRATION RATE: 18 BRPM | SYSTOLIC BLOOD PRESSURE: 134 MMHG | OXYGEN SATURATION: 97 % | HEIGHT: 68 IN | HEART RATE: 52 BPM | BODY MASS INDEX: 33.34 KG/M2 | TEMPERATURE: 97.1 F | WEIGHT: 220 LBS

## 2021-03-19 DIAGNOSIS — K80.20 GALL BLADDER STONES: Primary | ICD-10-CM

## 2021-03-19 PROCEDURE — 88304 TISSUE EXAM BY PATHOLOGIST: CPT | Performed by: PATHOLOGY

## 2021-03-19 PROCEDURE — 74300 X-RAY BILE DUCTS/PANCREAS: CPT

## 2021-03-19 PROCEDURE — 47563 LAPARO CHOLECYSTECTOMY/GRAPH: CPT | Performed by: SPECIALIST

## 2021-03-19 RX ORDER — OXYCODONE HYDROCHLORIDE AND ACETAMINOPHEN 5; 325 MG/1; MG/1
1 TABLET ORAL EVERY 4 HOURS PRN
Qty: 20 TABLET | Refills: 0 | Status: SHIPPED | OUTPATIENT
Start: 2021-03-19 | End: 2021-03-29

## 2021-03-19 RX ORDER — ROCURONIUM BROMIDE 10 MG/ML
INJECTION, SOLUTION INTRAVENOUS AS NEEDED
Status: DISCONTINUED | OUTPATIENT
Start: 2021-03-19 | End: 2021-03-19

## 2021-03-19 RX ORDER — OXYCODONE HYDROCHLORIDE AND ACETAMINOPHEN 5; 325 MG/1; MG/1
1 TABLET ORAL EVERY 4 HOURS PRN
Status: DISCONTINUED | OUTPATIENT
Start: 2021-03-19 | End: 2021-03-19 | Stop reason: HOSPADM

## 2021-03-19 RX ORDER — SCOLOPAMINE TRANSDERMAL SYSTEM 1 MG/1
1 PATCH, EXTENDED RELEASE TRANSDERMAL ONCE
Status: DISCONTINUED | OUTPATIENT
Start: 2021-03-19 | End: 2021-03-19 | Stop reason: HOSPADM

## 2021-03-19 RX ORDER — PROMETHAZINE HYDROCHLORIDE 25 MG/ML
12.5 INJECTION, SOLUTION INTRAMUSCULAR; INTRAVENOUS ONCE AS NEEDED
Status: COMPLETED | OUTPATIENT
Start: 2021-03-19 | End: 2021-03-19

## 2021-03-19 RX ORDER — FENTANYL CITRATE/PF 50 MCG/ML
12.5 SYRINGE (ML) INJECTION
Status: DISCONTINUED | OUTPATIENT
Start: 2021-03-19 | End: 2021-03-19 | Stop reason: HOSPADM

## 2021-03-19 RX ORDER — LIDOCAINE HYDROCHLORIDE 10 MG/ML
INJECTION, SOLUTION EPIDURAL; INFILTRATION; INTRACAUDAL; PERINEURAL AS NEEDED
Status: DISCONTINUED | OUTPATIENT
Start: 2021-03-19 | End: 2021-03-19

## 2021-03-19 RX ORDER — FENTANYL CITRATE 50 UG/ML
INJECTION, SOLUTION INTRAMUSCULAR; INTRAVENOUS AS NEEDED
Status: DISCONTINUED | OUTPATIENT
Start: 2021-03-19 | End: 2021-03-19

## 2021-03-19 RX ORDER — KETOROLAC TROMETHAMINE 30 MG/ML
30 INJECTION, SOLUTION INTRAMUSCULAR; INTRAVENOUS ONCE
Status: COMPLETED | OUTPATIENT
Start: 2021-03-19 | End: 2021-03-19

## 2021-03-19 RX ORDER — FENTANYL CITRATE/PF 50 MCG/ML
25 SYRINGE (ML) INJECTION
Status: DISCONTINUED | OUTPATIENT
Start: 2021-03-19 | End: 2021-03-19 | Stop reason: HOSPADM

## 2021-03-19 RX ORDER — PROMETHAZINE HYDROCHLORIDE 25 MG/ML
12.5 INJECTION, SOLUTION INTRAMUSCULAR; INTRAVENOUS ONCE
Status: COMPLETED | OUTPATIENT
Start: 2021-03-19 | End: 2021-03-19

## 2021-03-19 RX ORDER — MIDAZOLAM HYDROCHLORIDE 2 MG/2ML
INJECTION, SOLUTION INTRAMUSCULAR; INTRAVENOUS AS NEEDED
Status: DISCONTINUED | OUTPATIENT
Start: 2021-03-19 | End: 2021-03-19

## 2021-03-19 RX ORDER — DEXAMETHASONE SODIUM PHOSPHATE 10 MG/ML
INJECTION, SOLUTION INTRAMUSCULAR; INTRAVENOUS AS NEEDED
Status: DISCONTINUED | OUTPATIENT
Start: 2021-03-19 | End: 2021-03-19

## 2021-03-19 RX ORDER — CEFAZOLIN SODIUM 2 G/50ML
SOLUTION INTRAVENOUS AS NEEDED
Status: DISCONTINUED | OUTPATIENT
Start: 2021-03-19 | End: 2021-03-19

## 2021-03-19 RX ORDER — BUPIVACAINE HYDROCHLORIDE 5 MG/ML
INJECTION, SOLUTION PERINEURAL AS NEEDED
Status: DISCONTINUED | OUTPATIENT
Start: 2021-03-19 | End: 2021-03-19 | Stop reason: HOSPADM

## 2021-03-19 RX ORDER — SODIUM CHLORIDE 9 MG/ML
INJECTION, SOLUTION INTRAVENOUS AS NEEDED
Status: DISCONTINUED | OUTPATIENT
Start: 2021-03-19 | End: 2021-03-19 | Stop reason: HOSPADM

## 2021-03-19 RX ORDER — DIPHENHYDRAMINE HYDROCHLORIDE 50 MG/ML
12.5 INJECTION INTRAMUSCULAR; INTRAVENOUS ONCE AS NEEDED
Status: DISCONTINUED | OUTPATIENT
Start: 2021-03-19 | End: 2021-03-19 | Stop reason: HOSPADM

## 2021-03-19 RX ORDER — PROPOFOL 10 MG/ML
INJECTION, EMULSION INTRAVENOUS AS NEEDED
Status: DISCONTINUED | OUTPATIENT
Start: 2021-03-19 | End: 2021-03-19

## 2021-03-19 RX ORDER — GLYCOPYRROLATE 0.2 MG/ML
INJECTION INTRAMUSCULAR; INTRAVENOUS AS NEEDED
Status: DISCONTINUED | OUTPATIENT
Start: 2021-03-19 | End: 2021-03-19

## 2021-03-19 RX ORDER — KETOROLAC TROMETHAMINE 30 MG/ML
INJECTION, SOLUTION INTRAMUSCULAR; INTRAVENOUS AS NEEDED
Status: DISCONTINUED | OUTPATIENT
Start: 2021-03-19 | End: 2021-03-19

## 2021-03-19 RX ORDER — ONDANSETRON 2 MG/ML
INJECTION INTRAMUSCULAR; INTRAVENOUS AS NEEDED
Status: DISCONTINUED | OUTPATIENT
Start: 2021-03-19 | End: 2021-03-19

## 2021-03-19 RX ORDER — OXYCODONE HYDROCHLORIDE AND ACETAMINOPHEN 5; 325 MG/1; MG/1
2 TABLET ORAL EVERY 4 HOURS PRN
Status: DISCONTINUED | OUTPATIENT
Start: 2021-03-19 | End: 2021-03-19 | Stop reason: HOSPADM

## 2021-03-19 RX ORDER — SODIUM CHLORIDE, SODIUM LACTATE, POTASSIUM CHLORIDE, CALCIUM CHLORIDE 600; 310; 30; 20 MG/100ML; MG/100ML; MG/100ML; MG/100ML
75 INJECTION, SOLUTION INTRAVENOUS CONTINUOUS
Status: DISCONTINUED | OUTPATIENT
Start: 2021-03-19 | End: 2021-03-19 | Stop reason: HOSPADM

## 2021-03-19 RX ORDER — MEPERIDINE HYDROCHLORIDE 25 MG/ML
12.5 INJECTION INTRAMUSCULAR; INTRAVENOUS; SUBCUTANEOUS
Status: DISCONTINUED | OUTPATIENT
Start: 2021-03-19 | End: 2021-03-19 | Stop reason: HOSPADM

## 2021-03-19 RX ORDER — CEFAZOLIN SODIUM 2 G/50ML
2000 SOLUTION INTRAVENOUS ONCE
Status: DISCONTINUED | OUTPATIENT
Start: 2021-03-19 | End: 2021-03-19 | Stop reason: HOSPADM

## 2021-03-19 RX ORDER — NEOSTIGMINE METHYLSULFATE 1 MG/ML
INJECTION INTRAVENOUS AS NEEDED
Status: DISCONTINUED | OUTPATIENT
Start: 2021-03-19 | End: 2021-03-19

## 2021-03-19 RX ORDER — MAGNESIUM HYDROXIDE 1200 MG/15ML
LIQUID ORAL AS NEEDED
Status: DISCONTINUED | OUTPATIENT
Start: 2021-03-19 | End: 2021-03-19 | Stop reason: HOSPADM

## 2021-03-19 RX ORDER — DEXAMETHASONE SODIUM PHOSPHATE 4 MG/ML
4 INJECTION, SOLUTION INTRA-ARTICULAR; INTRALESIONAL; INTRAMUSCULAR; INTRAVENOUS; SOFT TISSUE ONCE AS NEEDED
Status: COMPLETED | OUTPATIENT
Start: 2021-03-19 | End: 2021-03-19

## 2021-03-19 RX ORDER — ONDANSETRON 2 MG/ML
4 INJECTION INTRAMUSCULAR; INTRAVENOUS EVERY 8 HOURS PRN
Status: DISCONTINUED | OUTPATIENT
Start: 2021-03-19 | End: 2021-03-19 | Stop reason: HOSPADM

## 2021-03-19 RX ADMIN — FENTANYL CITRATE 100 MCG: 50 INJECTION, SOLUTION INTRAMUSCULAR; INTRAVENOUS at 09:45

## 2021-03-19 RX ADMIN — KETOROLAC TROMETHAMINE 30 MG: 30 INJECTION, SOLUTION INTRAMUSCULAR; INTRAVENOUS at 10:48

## 2021-03-19 RX ADMIN — PROPOFOL 200 MG: 10 INJECTION, EMULSION INTRAVENOUS at 09:47

## 2021-03-19 RX ADMIN — SODIUM CHLORIDE, SODIUM LACTATE, POTASSIUM CHLORIDE, AND CALCIUM CHLORIDE 75 ML/HR: .6; .31; .03; .02 INJECTION, SOLUTION INTRAVENOUS at 14:57

## 2021-03-19 RX ADMIN — PROMETHAZINE HYDROCHLORIDE 12.5 MG: 25 INJECTION INTRAMUSCULAR; INTRAVENOUS at 11:43

## 2021-03-19 RX ADMIN — CEFAZOLIN SODIUM 2000 MG: 2 SOLUTION INTRAVENOUS at 09:30

## 2021-03-19 RX ADMIN — MIDAZOLAM HYDROCHLORIDE 2 MG: 1 INJECTION, SOLUTION INTRAMUSCULAR; INTRAVENOUS at 09:44

## 2021-03-19 RX ADMIN — DEXAMETHASONE SODIUM PHOSPHATE 8 MG: 10 INJECTION, SOLUTION INTRAMUSCULAR; INTRAVENOUS at 09:56

## 2021-03-19 RX ADMIN — KETOROLAC TROMETHAMINE 30 MG: 30 INJECTION, SOLUTION INTRAMUSCULAR; INTRAVENOUS at 16:02

## 2021-03-19 RX ADMIN — SODIUM CHLORIDE, SODIUM LACTATE, POTASSIUM CHLORIDE, AND CALCIUM CHLORIDE: .6; .31; .03; .02 INJECTION, SOLUTION INTRAVENOUS at 09:31

## 2021-03-19 RX ADMIN — NEOSTIGMINE METHYLSULFATE 4 MG: 1 INJECTION INTRAVENOUS at 11:09

## 2021-03-19 RX ADMIN — SCOPALAMINE 1 PATCH: 1 PATCH, EXTENDED RELEASE TRANSDERMAL at 13:11

## 2021-03-19 RX ADMIN — FENTANYL CITRATE 25 MCG: 50 INJECTION, SOLUTION INTRAMUSCULAR; INTRAVENOUS at 11:47

## 2021-03-19 RX ADMIN — FENTANYL CITRATE 25 MCG: 50 INJECTION, SOLUTION INTRAMUSCULAR; INTRAVENOUS at 12:11

## 2021-03-19 RX ADMIN — LIDOCAINE HYDROCHLORIDE 50 MG: 10 INJECTION, SOLUTION EPIDURAL; INFILTRATION; INTRACAUDAL; PERINEURAL at 09:46

## 2021-03-19 RX ADMIN — OXYCODONE HYDROCHLORIDE AND ACETAMINOPHEN 1 TABLET: 5; 325 TABLET ORAL at 14:07

## 2021-03-19 RX ADMIN — ROCURONIUM BROMIDE 50 MG: 10 INJECTION, SOLUTION INTRAVENOUS at 09:48

## 2021-03-19 RX ADMIN — GLYCOPYRROLATE 0.4 MG: 0.2 INJECTION, SOLUTION INTRAMUSCULAR; INTRAVENOUS at 11:10

## 2021-03-19 RX ADMIN — PROMETHAZINE HYDROCHLORIDE 12.5 MG: 25 INJECTION INTRAMUSCULAR; INTRAVENOUS at 13:12

## 2021-03-19 RX ADMIN — OXYCODONE HYDROCHLORIDE AND ACETAMINOPHEN 1 TABLET: 5; 325 TABLET ORAL at 14:01

## 2021-03-19 RX ADMIN — FENTANYL CITRATE 100 MCG: 50 INJECTION, SOLUTION INTRAMUSCULAR; INTRAVENOUS at 10:16

## 2021-03-19 RX ADMIN — ONDANSETRON HYDROCHLORIDE 4 MG: 2 INJECTION, SOLUTION INTRAMUSCULAR; INTRAVENOUS at 09:57

## 2021-03-19 RX ADMIN — DEXAMETHASONE SODIUM PHOSPHATE 4 MG: 4 INJECTION, SOLUTION INTRAMUSCULAR; INTRAVENOUS at 12:13

## 2021-03-19 RX ADMIN — FENTANYL CITRATE 25 MCG: 50 INJECTION, SOLUTION INTRAMUSCULAR; INTRAVENOUS at 12:06

## 2021-03-19 NOTE — ANESTHESIA POSTPROCEDURE EVALUATION
Post-Op Assessment Note    CV Status:  Stable  Pain Score: 0    Pain management: adequate     Mental Status:  Awake and sleepy   Hydration Status:  Euvolemic   PONV Controlled:  Controlled   Airway Patency:  Patent      Post Op Vitals Reviewed: Yes      Staff: CRNA         No complications documented      BP      Temp     Pulse     Resp      SpO2

## 2021-03-19 NOTE — ANESTHESIA POSTPROCEDURE EVALUATION
Post-Op Assessment Note    CV Status:  Stable  Pain Score: 4    Pain management: adequate     Mental Status:  Alert and awake   Hydration Status:  Euvolemic   PONV Controlled:  Controlled   Airway Patency:  Patent  Airway: intubated      Post Op Vitals Reviewed: Yes      Staff: Anesthesiologist   Comments: ACV NDPA        No complications documented      /90 (03/19/21 1230)    Temp     Pulse (!) 53 (03/19/21 1230)   Resp 20 (03/19/21 1230)    SpO2 93 % (03/19/21 1230)

## 2021-03-19 NOTE — NURSING NOTE
Pt is awake,alert,feeling better  Nausea subsided, tolerated liquids and crackers  Pain has subsided to 3/10  Assisted OOB to BR, voided QS  Pt seen and instructed by DR Arrington  Written and verbal instructions given, pt verbalizes an understanding

## 2021-03-19 NOTE — OP NOTE
OPERATIVE REPORT  PATIENT NAME: Thomas Lema    :  1970  MRN: [id number]  Pt Location:  OR ROOM 07    SURGERY DATE: 3/19/2021    Surgeon(s) and Role:     Lorelei Villalobos MD - Primary    Preop Diagnosis:  Gall bladder stones [K80 20] acute and chronic calculous cholecystitis    Post-Op Diagnosis Codes:     * Gall bladder stones [K80 20] acute and chronic calculous cholecystitis  Procedure(s) (LRB):  CHOLECYSTECTOMY LAPAROSCOPIC W/ INTRAOP CHOLANGIOGRAM (N/A)    Specimen(s):  ID Type Source Tests Collected by Time Destination   1 :  Tissue Gallbladder TISSUE EXAM Livan Kwon MD 3/19/2021 10:10 AM        Estimated Blood Loss:   Minimal    Drains:  Closed/Suction Drain Right;Lateral Breast Bulb 15 Fr  (Active)   Number of days: 952       Closed/Suction Drain Left;Lateral Breast Bulb 15 Fr  (Active)   Number of days: 952       Anesthesia Type:   General    Operative Indications:  Gall bladder stones [K80 20]  Acute and chronic calculous cholecystitis    Operative Findings:      Complications:   None    Procedure and Technique:  Patient brought the operating room postop table supine position  Under adequate general endotracheal anesthesia abdomen was prepped and draped in usual sterile fashion  A small incision made the umbilicus the Veress needle was inserted  The abdomen was insufflated with CO2 to 15 mmHg  Needle was removed 10 mm port is inserted  10 mm scope was inserted the abdomen was visually explored  There was noted be no trauma from needle port placement  The abdomen appeared fairly unremarkable except for and he usually distended gallbladder  Additional ports were placed subxiphoid right subcostal right upper quadrant area  These were 5 mm ports placed under direct vision  At that point patient is placed in reverse Trendelenburg and rotated to the left  The fundus of the gallbladder was grasped retracted up toward the right subphrenic area    The gallbladder was huge distended and packed with stones  The infundibular area was grasped and gently retracted superior laterally  The common duct was visualized immediately it appeared to be distended  A significant amount of scar tissue was noted along the distal end of the gallbladder along with a fairly large amount of fatty tissue  This was dissected free carefully freeing up the infundibulum to allow to be rotated more laterally  Once again the common duct was quite visible and appeared to be somewhat dilated  The cystic duct was identified and the cystic duct common duct junction was also identified  The cystic duct was circumferentially dissected and then clipped toward the gallbladder side  A small nick was made in the cystic duct  A cholangiocath was passed in the right subcostal area and then placed in the cystic duct  It was clipped in place  Cholangiogram was performed and this demonstrated what appeared to be a dilated common duct but no filling defects excellent flow into the duodenum and good proximal filling  At that time the catheter was removed from the cystic duct  Doubly clipped proximally and then divided  Cystic artery was inferior to this and this was discussed dissected free and dealt with in a likewise fashion  The gallbladder was taken down from liver bed using the hook cautery  Once again it was quite large and packed with stones  Was attempted to be brought but out from the umbilical port site but this was quite difficult and took well over 20 minutes as the fascial defect was opened 2 or 3 times  Eventually the gallbladder was removed through the umbilical port site  It had a significant amount of impacted stones in the fundus which made it difficult to remove from the port site  It was sent to pathology for histological diagnosis  The operative site was they were inspected for bleeding  Minimal amount of bleeding was controlled with electrocautery in the liver bed    The areas irrigated with saline solution this was suction from the field  All clips were inspected noted be intact and stable  No bleeding was noted  Any residual fluid suction and removed  All CO2 and ports removed  The fascial defect at the umbilicus was closed with a running 0 Vicryl suture  All ports were infiltrated with 0 5% Marcaine  Skin closed with 4 Monocryl subcuticular fashion  Benzoin Steri-Strips applied  The estimated blood loss minimal patient tolerated procedure well delivered to recovery in stable condition  Thanks     I was present for the entire procedure    Patient Disposition:  PACU     SIGNATURE: Margie Bower MD  DATE: March 19, 2021  TIME: 11:45 AM

## 2021-03-19 NOTE — DISCHARGE INSTRUCTIONS
Laparoscopic Cholecystectomy   WHAT YOU NEED TO KNOW:   Laparoscopic cholecystectomy is surgery to remove your gallbladder  DISCHARGE INSTRUCTIONS:   Medicines: You may need any of the following:  · Prescription pain medicine  helps decrease pain  Do not wait until the pain is severe before you take this medicine  · NSAIDs  decrease swelling and pain  This medicine can be bought with or without a doctor's order  This medicine can cause stomach bleeding or kidney problems in certain people  If you take blood thinner medicine, always ask your healthcare provider if NSAIDs are safe for you  Read the medicine label and follow the directions on it before using this medicine  · Take your medicine as directed  Contact your healthcare provider if you think your medicine is not helping or if you have side effects  Tell him or her if you are allergic to any medicine  Keep a list of the medicines, vitamins, and herbs you take  Include the amounts, and when and why you take them  Bring the list or the pill bottles to follow-up visits  Carry your medicine list with you in case of an emergency  Follow up with your healthcare provider 2 weeks after surgery, or as directed:  Write down your questions so you remember to ask them during your visits  Wound care:  Care for your surgical wounds as directed  Keep the wounds clean and dry  You may take a shower the day after your surgery  What to eat after surgery:  Eat low-fat foods for 4 to 6 weeks while your body learns to digest fat without a gallbladder  Slowly increase the amount of fat that you eat  Drink plenty of liquids  Ask how much liquid to drink and which liquids are best for you  When to return to work and other activities: You may return to work or other activities as soon as your pain is controlled and you feel comfortable  For many people, this is 5 to 7 days after surgery     Contact your healthcare provider if:   · You have a fever over 101°F (38°C) or chills  · You have pain or nausea that is not relieved by medicine  · You have redness and swelling around your incisions, or blood or pus is leaking from your incisions  · You are constipated or have diarrhea  · Your skin or eyes are yellow, or your bowel movements are pale  · You have questions or concerns about your surgery, condition, or care  Seek care immediately or call 911 if:   · You cannot stop vomiting  · Your bowel movements are black or bloody  · You have pain in your abdomen and it is swollen or hard  · Your arm or leg feels warm, tender, and painful  It may look swollen and red  · You feel lightheaded, short of breath, and have chest pain  · You cough up blood  © Copyright [employer] 2018 Information is for End User's use only and may not be sold, redistributed or otherwise used for commercial purposes  All illustrations and images included in CareNotes® are the copyrighted property of [employer]  or [facility] Mikey Rangel   The above information is an  only  It is not intended as medical advice for individual conditions or treatments  Talk to your doctor, nurse or pharmacist before following any medical regimen to see if it is safe and effective for you

## 2021-03-19 NOTE — INTERVAL H&P NOTE
H&P reviewed  After examining the patient I find no changes in the patients condition since the H&P had been written      Vitals:    03/19/21 0756   BP: 122/86   Pulse: 78   Resp: 18   Temp: (!) 97 3 °F (36 3 °C)   SpO2: 95%

## 2021-03-23 ENCOUNTER — TELEPHONE (OUTPATIENT)
Dept: SURGERY | Facility: CLINIC | Age: 51
End: 2021-03-23

## 2021-04-06 ENCOUNTER — OFFICE VISIT (OUTPATIENT)
Dept: SURGERY | Facility: CLINIC | Age: 51
End: 2021-04-06

## 2021-04-06 VITALS
BODY MASS INDEX: 32.43 KG/M2 | SYSTOLIC BLOOD PRESSURE: 130 MMHG | HEIGHT: 68 IN | HEART RATE: 82 BPM | WEIGHT: 214 LBS | TEMPERATURE: 98.2 F | DIASTOLIC BLOOD PRESSURE: 72 MMHG

## 2021-04-06 DIAGNOSIS — Z98.84 BARIATRIC SURGERY STATUS: ICD-10-CM

## 2021-04-06 DIAGNOSIS — K80.10 CALCULOUS CHOLECYSTITIS: Primary | ICD-10-CM

## 2021-04-06 PROCEDURE — 99024 POSTOP FOLLOW-UP VISIT: CPT | Performed by: SPECIALIST

## 2021-04-06 NOTE — PROGRESS NOTES
Dyllan Michael presents today for her follow-up visit status post laparoscopic cholecystectomy for symptomatic gallstones  She also had a intraoperative cholangiogram that was negative  She has a previous laparoscopic Bennett-en-Y gastric bypass patient of ours  Today in the office she says she feels fine  Her preoperative symptoms are totally gone  She is tolerating her diet is good GI function  Physical exam:  Adult white female awake alert no distress     Abdomen soft flat nontender laparoscopic incisions are healing well with no evidence of infection or hernia  She has excellent cosmetic result  Impression:  Doing well status post laparoscopic cholecystectomy with negative cholangiogram for symptomatic gallstones  She has questions about medical manipulation to jump start her weight loss  We had talked about that previously  Plan: At this time will give her prescriptions for medication  We will bring her back in 6-8 weeks for re-evaluation

## 2021-04-14 ENCOUNTER — OFFICE VISIT (OUTPATIENT)
Dept: SURGICAL ONCOLOGY | Facility: CLINIC | Age: 51
End: 2021-04-14
Payer: COMMERCIAL

## 2021-04-14 VITALS
TEMPERATURE: 97.9 F | WEIGHT: 214 LBS | BODY MASS INDEX: 32.43 KG/M2 | HEIGHT: 68 IN | HEART RATE: 97 BPM | DIASTOLIC BLOOD PRESSURE: 80 MMHG | SYSTOLIC BLOOD PRESSURE: 110 MMHG

## 2021-04-14 DIAGNOSIS — Z13.71 BRCA NEGATIVE: ICD-10-CM

## 2021-04-14 DIAGNOSIS — N64.9 BREAST VARIANT: ICD-10-CM

## 2021-04-14 DIAGNOSIS — C50.511 MALIGNANT NEOPLASM OF LOWER-OUTER QUADRANT OF RIGHT BREAST OF FEMALE, ESTROGEN RECEPTOR POSITIVE (HCC): Primary | ICD-10-CM

## 2021-04-14 DIAGNOSIS — Z17.0 MALIGNANT NEOPLASM OF LOWER-OUTER QUADRANT OF RIGHT BREAST OF FEMALE, ESTROGEN RECEPTOR POSITIVE (HCC): Primary | ICD-10-CM

## 2021-04-14 DIAGNOSIS — Z79.811 USE OF AROMATASE INHIBITORS: ICD-10-CM

## 2021-04-14 PROCEDURE — 99214 OFFICE O/P EST MOD 30 MIN: CPT | Performed by: SURGERY

## 2021-04-14 PROCEDURE — 3008F BODY MASS INDEX DOCD: CPT | Performed by: SURGERY

## 2021-04-14 PROCEDURE — 1036F TOBACCO NON-USER: CPT | Performed by: SURGERY

## 2021-04-14 NOTE — PROGRESS NOTES
Surgical Oncology Follow Up       [facility] SURGICAL ONCOLOGY [facility] [id number]    Tiera Pleitez  1970  [id number]  [facility] SURGICAL ONCOLOGY [place]  [place] [id number]    Chief Complaint   Patient presents with    Follow-up       Assessment/Plan   Diagnoses and all orders for this visit:    Malignant neoplasm of lower-outer quadrant of right breast of female, estrogen receptor positive ([facility] [address] )    BRCA negative    Breast variant    Use of aromatase inhibitors        Advance Care Planning/Advance Directives:  Discussed disease status, cancer treatment plans and/or cancer treatment goals with the patient  Oncology History:    Oncology History    No history exists  History of Present Illness:  Breast cancer follow-up, no breast referable concerns, continues on anastrozole  -Interval History: recent cholecystectomy    Review of Systems:  Review of Systems   Constitutional: Negative  Negative for appetite change and fever  Eyes: Negative  Respiratory: Negative for shortness of breath  Cardiovascular: Negative  Gastrointestinal:        Recent cholecystectomy   Endocrine: Negative  Genitourinary: Negative  Musculoskeletal: Negative  Negative for arthralgias and myalgias  Skin: Negative  Allergic/Immunologic: Negative  Neurological: Negative  Hematological: Negative  Negative for adenopathy  Does not bruise/bleed easily  Psychiatric/Behavioral: Negative          Patient Active Problem List   Diagnosis    Family history of breast cancer in female   [facility] Ashkenazi Muslim ancestry requiring population-specific genetic screening    Malignant neoplasm of lower-outer quadrant of right breast of female, estrogen receptor positive ([address] )    UTI (urinary tract infection)    Status post bilateral salpingo-oophorectomy (BSO)    Use of aromatase inhibitors    Deformity of reconstructed breast    Nasal polyposis    History of gastric bypass    BRCA negative    DDD (degenerative disc disease), cervical    Breast variant    H/O bariatric surgery     Past Medical History:   Diagnosis Date    Abnormal Pap smear of cervix     Ashkenazi Jew ancestry requiring population-specific genetic screening     Asthma     Breathing difficulty     after gastric bypass 2014-"felt elephant on chest"    Chest pain, non-cardiac     Seen in ER and is from esophageal spasms      Constipation     occasional    Family history of breast cancer in female     GERD (gastroesophageal reflux disease)     History of MRSA infection     Leg    History of UTI     treated 8/1/2018    Human papilloma virus (HPV) infection     Hx MRSA infection     on leg-2009 treated    Pollen allergies     PONV (postoperative nausea and vomiting)     nausea    Postural lightheadedness     Raynaud's disease     Seasonal allergies     Sinus problem     Sinus problem      Past Surgical History:   Procedure Laterality Date    BILATERAL OOPHORECTOMY      BLADDER SURGERY  2013    bladder lift    BREAST BIOPSY Right 06/14/2018    [facility]    CERVIX LESION DESTRUCTION      COLONOSCOPY      COLPOSCOPY W/ BIOPSY / CURETTAGE      ENDOMETRIAL ABLATION      GASTRIC BYPASS  2014 2014 wt loss 90 lbs(regained 20 lbs)    HYSTEROSCOPIC STERILIZATION W/ IMPLANTS      HYSTEROSCOPY W/ ENDOMETRIAL ABLATION  2013    KNEE ARTHROSCOPY Right 1990    LYMPH NODE BIOPSY Right 8/10/2018    Procedure: BIOPSY LYMPH NODE SENTINEL;  Surgeon: Malini Young MD;  [facility];  Service: Surgical Oncology    MAMMO STEREOTACTIC BREAST BIOPSY RIGHT (ALL INC) Right 6/14/2018    MASTECTOMY Bilateral     NASAL SEPTUM SURGERY      2002, 2016    MT IMPLNT BIO IMPLNT FOR SOFT TISSUE REINFORCEMENT Bilateral 8/10/2018    Procedure: RECONSTRUCTION BREAST W/ IMPLANT;  Surgeon: Ant Lockhart MD;  [facility];  Service: Plastics    OR INSJ/RPLCMT BREAST IMPLANT SEP DAY MASTECTOMY Bilateral 11/30/2018    Procedure: BREAST IMPLANT EXCHANGE;  Surgeon: Tania Ribeiro MD;  [facility];  Service: Plastics    OR LAP,CHOLECYSTECTOMY/GRAPH N/A 3/19/2021    Procedure: CHOLECYSTECTOMY LAPAROSCOPIC W/ INTRAOP CHOLANGIOGRAM;  Surgeon: Maryjane Flores MD;  Location: [address] [place] [address] MAIN OR;  Service: General    OR LAP,RMV  ADNEXAL STRUCTURE N/A 8/10/2018    Procedure: SALPINGO-OOPHORECTOMY, LAPAROSCOPIC; PELVIC WASHINGS ;  Surgeon: Flex Evangelista MD;  [facility];  Service: Gynecology Oncology    OR MASTECTOMY, SIMPLE, COMPLETE Bilateral 8/10/2018    Procedure: MASTECTOMY SIMPLE;  Surgeon: Clara Lopez MD;  [facility];  Service: Surgical Oncology    OR LENNIE-IMPLANT CAPSULECTOMY BREAST COMPLETE Bilateral 11/30/2018    Procedure: CAPSULECTOMY;  Surgeon: Tania Ribeiro MD;  [facility];  Service: Plastics    OR REVISION OF RECONSTRUCTED BREAST Bilateral 2/22/2019    Procedure: BREAST MOUND REVISION; FAT GRAFTING;  Surgeon: Tania Ribeiro MD;  [facility];  Service: Plastics    OR TISSUE EXPANDER PLACEMENT BREAST RECONSTRUCTION Bilateral 8/10/2018    Procedure: INSERTION/PLACEMENT TISSUE EXPANDER (EXCHANGE);   Surgeon: Tania Ribeiro MD;  [facility];  Service: Plastics    SINUS SURGERY       Family History   Problem Relation Age of Onset    Other Mother         BRCA negative    Breast cancer Mother 72    Ovarian cancer Mother 61    Diabetes Father     Heart disease Father     Hypertension Father     Migraines Sister     Diabetes Brother     Colon cancer Maternal Grandfather 80    Diabetes type II Maternal Grandfather     Heart disease Paternal Grandfather     Colon cancer Maternal Aunt 62    Skin cancer Maternal Aunt      Social History     Socioeconomic History    Marital status: Single     Spouse name: Not on file    Number of children: 2    Years of education: Not on file    Highest education level: Not on file   Occupational History    Not on file   Social Needs    Financial resource strain: Not on file    Food insecurity     Worry: Not on file     Inability: Not on file    Transportation needs     Medical: Not on file     Non-medical: Not on file   Tobacco Use    Smoking status: Never Smoker    Smokeless tobacco: Never Used   Substance and Sexual Activity    Alcohol use: Yes     Alcohol/week: 4 0 standard drinks     Types: 4 Glasses of wine per week     Drinks per session: 3 or 4     Comment: 2 weekly  -4  glasses total    Drug use: Yes     Types: Marijuana     Comment: edible- last time 11/23/2018      Sexual activity: Not Currently   Lifestyle    Physical activity     Days per week: Not on file     Minutes per session: Not on file    Stress: Not on file   Relationships    Social connections     Talks on phone: Not on file     Gets together: Not on file     Attends Mandaeism service: Not on file     Active member of club or organization: Not on file     Attends meetings of clubs or organizations: Not on file     Relationship status: Not on file    Intimate partner violence     Fear of current or ex partner: Not on file     Emotionally abused: Not on file     Physically abused: Not on file     Forced sexual activity: Not on file   Other Topics Concern    Not on file   Social History Narrative    Uses safety equipment, seatbelts       Current Outpatient Medications:     acetaminophen (TYLENOL) 325 mg tablet, Take 650 mg by mouth every 6 (six) hours as needed for mild pain, Disp: , Rfl:     albuterol (PROVENTIL HFA,VENTOLIN HFA) 90 mcg/act inhaler, Proventil HFA 90 mcg/actuation aerosol inhaler, Disp: , Rfl:     anastrozole (ARIMIDEX) 1 mg tablet, Take 1 tablet (1 mg total) by mouth daily, Disp: 90 tablet, Rfl: 4    BIOTIN PO, Take 1 tablet by mouth every morning, Disp: , Rfl:     Calcium Carb-Cholecalciferol (CALCIUM 1000 + D) 1000-800 MG-UNIT TABS, calcium, Disp: , Rfl:    calcium-vitamin D 250-100 MG-UNIT per tablet, Take 1 tablet by mouth, Disp: , Rfl:     cyanocobalamin 50 MCG tablet, Take 50 mcg by mouth, Disp: , Rfl:     Diclofenac Epolamine (Flector) 1 3 % PTCH, Flector 1 3 % transdermal 12 hour patch, Disp: , Rfl:     Ergocalciferol (VITAMIN D2) 2000 units TABS, Vitamin D2 50,000 unit capsule, Disp: , Rfl:     ferrous sulfate 325 (65 Fe) mg tablet, Daily, Disp: , Rfl:     Influenza Vac Split Quad (AFLURIA QUADRIVALENT IM), Afluria Quad 1197-1660 (PF) 60 mcg (15 mcg x 4)/0 5 mL IM syringe  TO BE ADMINISTERED BY PHARMACIST FOR IMMUNIZATION, Disp: , Rfl:     Inulin-Calcium-Vitamin D 2-250-100 GM-MG-UNIT CHEW, Fiber Plus Mulitvitamin, Disp: , Rfl:     lidocaine (LIDODERM) 5 %, Apply 1 patch topically daily Remove & Discard patch within 12 hours or as directed by MD  , Disp: , Rfl:     lidocaine 0 5% 10 mL and sodium bicarb 8 4% 1 mL, 8 mL, Disp: , Rfl:     Lifitegrast (XIIDRA OP), Apply 1 vial to eye 2 (two) times a day, Disp: , Rfl:     loratadine (CLARITIN) 10 mg tablet, Take 10 mg by mouth as needed  , Disp: , Rfl:     methocarbamol (ROBAXIN) 500 mg tablet, Take 500 mg by mouth 3 (three) times a day  , Disp: , Rfl:     mometasone (NASONEX) 50 mcg/act nasal spray, 2 sprays in each nostril as needed, Disp: , Rfl:     montelukast (SINGULAIR) 10 mg tablet, Take 10 mg by mouth as needed  , Disp: , Rfl:     Multiple Vitamins-Minerals (HAIR SKIN & NAILS ADVANCED PO), Hair, Skin, Nails with Biotin, Disp: , Rfl:     multivitamin (THERAGRAN) TABS, Take 1 tablet by mouth every morning  , Disp: , Rfl:     Naproxen Sodium (ALEVE) 220 MG CAPS, as needed, Disp: , Rfl:     pantoprazole (PROTONIX) 40 mg tablet, Take 40 mg by mouth every morning  , Disp: , Rfl:     pyridoxine (VITAMIN B6) 100 mg tablet, Take 100 mg by mouth, Disp: , Rfl:     Diclofenac Epolamine (FLECTOR) 1 3 % PTCH, as needed, Disp: , Rfl:     zolpidem (AMBIEN) 5 mg tablet, Take 1 tablet (5 mg total) by mouth daily at bedtime as needed for sleep (Patient not taking: Reported on 4/6/2021), Disp: 30 tablet, Rfl: 3  Allergies   Allergen Reactions    Adhesive [Medical Tape] Rash     And small blister at sight       The following portions of the patient's history were reviewed and updated as appropriate: allergies, current medications, past family history, past medical history, past social history, past surgical history and problem list         Vitals:    04/14/21 1551   BP: 110/80   Pulse: 97   Temp: 97 9 °F (36 6 °C)       Physical Exam  Constitutional:       General: She is not in acute distress  Appearance: She is well-developed  HENT:      Head: Normocephalic and atraumatic  Cardiovascular:      Heart sounds: Normal heart sounds  Pulmonary:      Breath sounds: Normal breath sounds  Chest:      Breasts:         Right: Skin change (  Mastectomy scar with implant) present  No mass or tenderness  Left: Skin change ( mastectomy scar with implant) present  No mass or tenderness  Abdominal:      Palpations: Abdomen is soft  Lymphadenopathy:      Upper Body:      Right upper body: No supraclavicular, axillary or pectoral adenopathy  Left upper body: No supraclavicular, axillary or pectoral adenopathy  Neurological:      Mental Status: She is alert and oriented to person, place, and time  Psychiatric:         Mood and Affect: Mood normal            Results:  Labs:      Imaging   10/24/2020 bilateral breast MRI was benign    I reviewed the above imaging data  Discussion/Summary: 53-year-old female status post bilateral mastectomy and reconstruction for a right-sided invasive ductal carcinoma  She carries a variant in SHERIN but no mutations  She continues on anastrozole with no concerns  She reports a recent cholecystectomy  There is no evidence of disease based on examination today  I will plan to see her again in six months or sooner should the need arise

## 2021-04-23 DIAGNOSIS — Z98.84 HISTORY OF GASTRIC BYPASS: ICD-10-CM

## 2021-04-23 DIAGNOSIS — C50.511 MALIGNANT NEOPLASM OF LOWER-OUTER QUADRANT OF RIGHT BREAST OF FEMALE, ESTROGEN RECEPTOR POSITIVE (HCC): ICD-10-CM

## 2021-04-23 DIAGNOSIS — Z17.0 MALIGNANT NEOPLASM OF LOWER-OUTER QUADRANT OF RIGHT BREAST OF FEMALE, ESTROGEN RECEPTOR POSITIVE (HCC): ICD-10-CM

## 2021-04-23 DIAGNOSIS — F51.01 PRIMARY INSOMNIA: ICD-10-CM

## 2021-04-23 RX ORDER — ZOLPIDEM TARTRATE 5 MG/1
TABLET ORAL
Qty: 30 TABLET | Refills: 0 | OUTPATIENT
Start: 2021-04-23

## 2021-05-04 ENCOUNTER — TELEPHONE (OUTPATIENT)
Dept: OBGYN CLINIC | Facility: HOSPITAL | Age: 51
End: 2021-05-04

## 2021-05-04 NOTE — TELEPHONE ENCOUNTER
Patient saw Dr Catherine Paulino a year ago  She has numbness and increased neuropathy of her hands and feet lately  She wants to know who Dr Catherine Paulino would refer her to for a nerve study  She would like this done before electing to do back surgery  Can Dr Catherine Paulino put in an order for this test or would she have to come in again first   She has been avoiding coming in due to the pandemic, otherwise she can come in  Please advise      Callback ZE#[phone]

## 2021-05-06 NOTE — TELEPHONE ENCOUNTER
Thank you  I called patient to advise  Her PCP (MARILOU) ordered the test she was requesting  She will have the doctor share the results with you when done  She did not want to schedule an appt right now but she will call back to schedule

## 2021-06-03 ENCOUNTER — TELEPHONE (OUTPATIENT)
Dept: OBGYN CLINIC | Facility: CLINIC | Age: 51
End: 2021-06-03

## 2021-06-03 ENCOUNTER — APPOINTMENT (OUTPATIENT)
Dept: LAB | Facility: CLINIC | Age: 51
End: 2021-06-03
Payer: COMMERCIAL

## 2021-06-03 ENCOUNTER — ANNUAL EXAM (OUTPATIENT)
Dept: OBGYN CLINIC | Facility: CLINIC | Age: 51
End: 2021-06-03
Payer: COMMERCIAL

## 2021-06-03 VITALS
WEIGHT: 216.2 LBS | DIASTOLIC BLOOD PRESSURE: 90 MMHG | BODY MASS INDEX: 32.77 KG/M2 | SYSTOLIC BLOOD PRESSURE: 128 MMHG | HEIGHT: 68 IN

## 2021-06-03 DIAGNOSIS — Z17.0 MALIGNANT NEOPLASM OF LOWER-OUTER QUADRANT OF RIGHT BREAST OF FEMALE, ESTROGEN RECEPTOR POSITIVE (HCC): ICD-10-CM

## 2021-06-03 DIAGNOSIS — Z12.4 CERVICAL CANCER SCREENING: ICD-10-CM

## 2021-06-03 DIAGNOSIS — Z11.3 SCREEN FOR STD (SEXUALLY TRANSMITTED DISEASE): ICD-10-CM

## 2021-06-03 DIAGNOSIS — C50.511 MALIGNANT NEOPLASM OF LOWER-OUTER QUADRANT OF RIGHT BREAST OF FEMALE, ESTROGEN RECEPTOR POSITIVE (HCC): ICD-10-CM

## 2021-06-03 DIAGNOSIS — Z01.411 ENCOUNTER FOR GYNECOLOGICAL EXAMINATION WITH ABNORMAL FINDING: Primary | ICD-10-CM

## 2021-06-03 DIAGNOSIS — B00.9 HERPES: Primary | ICD-10-CM

## 2021-06-03 DIAGNOSIS — M85.88 OSTEOPENIA OF LUMBAR SPINE: ICD-10-CM

## 2021-06-03 LAB
HAV IGM SER QL: NORMAL
HBV CORE IGM SER QL: NORMAL
HBV SURFACE AG SER QL: NORMAL
HCV AB SER QL: NORMAL

## 2021-06-03 PROCEDURE — 80074 ACUTE HEPATITIS PANEL: CPT

## 2021-06-03 PROCEDURE — G0143 SCR C/V CYTO,THINLAYER,RESCR: HCPCS | Performed by: PATHOLOGY

## 2021-06-03 PROCEDURE — 87624 HPV HI-RISK TYP POOLED RSLT: CPT | Performed by: OBSTETRICS & GYNECOLOGY

## 2021-06-03 PROCEDURE — 87255 GENET VIRUS ISOLATE HSV: CPT | Performed by: OBSTETRICS & GYNECOLOGY

## 2021-06-03 PROCEDURE — 1036F TOBACCO NON-USER: CPT | Performed by: OBSTETRICS & GYNECOLOGY

## 2021-06-03 PROCEDURE — 88141 CYTOPATH C/V INTERPRET: CPT | Performed by: PATHOLOGY

## 2021-06-03 PROCEDURE — 99396 PREV VISIT EST AGE 40-64: CPT | Performed by: OBSTETRICS & GYNECOLOGY

## 2021-06-03 PROCEDURE — 86592 SYPHILIS TEST NON-TREP QUAL: CPT

## 2021-06-03 PROCEDURE — 86695 HERPES SIMPLEX TYPE 1 TEST: CPT

## 2021-06-03 PROCEDURE — 86696 HERPES SIMPLEX TYPE 2 TEST: CPT

## 2021-06-03 PROCEDURE — 36415 COLL VENOUS BLD VENIPUNCTURE: CPT

## 2021-06-03 PROCEDURE — 87389 HIV-1 AG W/HIV-1&-2 AB AG IA: CPT

## 2021-06-03 RX ORDER — VALACYCLOVIR HYDROCHLORIDE 500 MG/1
500 TABLET, FILM COATED ORAL 2 TIMES DAILY
Qty: 10 TABLET | Refills: 2 | Status: SHIPPED | OUTPATIENT
Start: 2021-06-03 | End: 2021-10-07

## 2021-06-03 NOTE — PROGRESS NOTES
CC:  Annual exam    HPI: Steven Valdes presents for,  Annual gyn exam   The patient is doing well but has noted over the past 1-2 weeks, discomfort in the pelvic and vulvar region  The patient has trouble describing it exactly but has noted some burning at times especially when she urinates in addition to a possible lump or ingrown hair around the vulvar region  She denies any history of STDs, dysuria except when the urine hits the outside scan, abnormal bleeding, and pelvic pain that is constant or has a woken her from sleep  She does wish to be tested for STDs  Past Medical History:  Past Medical History:   Diagnosis Date    Abnormal Pap smear of cervix     Ashkenazi Cheondoism ancestry requiring population-specific genetic screening     Asthma     Breathing difficulty     after gastric bypass 2014-"felt elephant on chest"    Chest pain, non-cardiac     Seen in ER and is from esophageal spasms      Constipation     occasional    Family history of breast cancer in female     GERD (gastroesophageal reflux disease)     History of MRSA infection     Leg    History of UTI     treated 8/1/2018    Human papilloma virus (HPV) infection     Hx MRSA infection     on leg-2009 treated    Pollen allergies     PONV (postoperative nausea and vomiting)     nausea    Postural lightheadedness     Raynaud's disease     Seasonal allergies     Sinus problem     Sinus problem        Past Surgical History:  Past Surgical History:   Procedure Laterality Date    BILATERAL OOPHORECTOMY      BLADDER SURGERY  2013    bladder lift    BREAST BIOPSY Right 06/14/2018    [facility]    CERVIX LESION DESTRUCTION      COLONOSCOPY      COLPOSCOPY W/ BIOPSY / CURETTAGE      ENDOMETRIAL ABLATION      GASTRIC BYPASS  2014 2014 wt loss 90 lbs(regained 20 lbs)    HYSTEROSCOPIC STERILIZATION W/ IMPLANTS      HYSTEROSCOPY W/ ENDOMETRIAL ABLATION  2013    KNEE ARTHROSCOPY Right 1990    LYMPH NODE BIOPSY Right 8/10/2018 Procedure: BIOPSY LYMPH NODE SENTINEL;  Surgeon: Jerald Campbell MD;  [facility];  Service: Surgical Oncology    MAMMO STEREOTACTIC BREAST BIOPSY RIGHT (ALL INC) Right 6/14/2018    MASTECTOMY Bilateral     NASAL SEPTUM SURGERY      2002, 2016    LA IMPLNT BIO IMPLNT FOR SOFT TISSUE REINFORCEMENT Bilateral 8/10/2018    Procedure: RECONSTRUCTION BREAST W/ IMPLANT;  Surgeon: Quentin Ojeda MD;  [facility];  Service: Plastics    LA INSJ/RPLCMT BREAST IMPLANT SEP DAY MASTECTOMY Bilateral 11/30/2018    Procedure: BREAST IMPLANT EXCHANGE;  Surgeon: Quentin Ojeda MD;  [facility];  Service: Plastics    LA LAP,CHOLECYSTECTOMY/GRAPH N/A 3/19/2021    Procedure: CHOLECYSTECTOMY LAPAROSCOPIC W/ INTRAOP CHOLANGIOGRAM;  Surgeon: Nehal Villarreal MD;  Location:  [facility] MAIN OR;  Service: General    LA LAP,RMV  ADNEXAL STRUCTURE N/A 8/10/2018    Procedure: SALPINGO-OOPHORECTOMY, LAPAROSCOPIC; PELVIC WASHINGS ;  Surgeon: Jeremy Lofton MD;  [facility];  Service: Gynecology Oncology    LA MASTECTOMY, SIMPLE, COMPLETE Bilateral 8/10/2018    Procedure: MASTECTOMY SIMPLE;  Surgeon: Jerald Campbell MD;  [facility];  Service: Surgical Oncology    LA LENNIE-IMPLANT CAPSULECTOMY BREAST COMPLETE Bilateral 11/30/2018    Procedure: CAPSULECTOMY;  Surgeon: Quentin Ojeda MD;  [facility];  Service: Plastics    LA REVISION OF RECONSTRUCTED BREAST Bilateral 2/22/2019    Procedure: BREAST MOUND REVISION; FAT GRAFTING;  Surgeon: Quentin Ojeda MD;  [facility];  Service: Plastics    LA TISSUE EXPANDER PLACEMENT BREAST RECONSTRUCTION Bilateral 8/10/2018    Procedure: INSERTION/PLACEMENT TISSUE EXPANDER (EXCHANGE); Surgeon: Quentin Ojeda MD;  [facility];  Service: Plastics    SINUS SURGERY         Past OB/Gyn History:    Patient is amenorrheic secondary to prophylactic oophorectomy  Denies any history of sexually transmitted infection  No history of abnormal pap smears   Her last pap smear was last year but she did have positive HPV findings      ALLERGIES:   Allergies   Allergen Reactions    Adhesive [Medical Tape] Rash     And small blister at sight       MEDS:   Current Outpatient Medications:     acetaminophen (TYLENOL) 325 mg tablet    albuterol (PROVENTIL HFA,VENTOLIN HFA) 90 mcg/act inhaler    anastrozole (ARIMIDEX) 1 mg tablet    BIOTIN PO    Calcium Carb-Cholecalciferol (CALCIUM 1000 + D) 1000-800 MG-UNIT TABS    calcium-vitamin D 250-100 MG-UNIT per tablet    cyanocobalamin 50 MCG tablet    Diclofenac Epolamine (Flector) 1 3 % PTCH    Ergocalciferol (VITAMIN D2) 2000 units TABS    ferrous sulfate 325 (65 Fe) mg tablet    Influenza Vac Split Quad (AFLURIA QUADRIVALENT IM)    Inulin-Calcium-Vitamin D 2-250-100 GM-MG-UNIT CHEW    Lifitegrast (XIIDRA OP)    loratadine (CLARITIN) 10 mg tablet    methocarbamol (ROBAXIN) 500 mg tablet    mometasone (NASONEX) 50 mcg/act nasal spray    montelukast (SINGULAIR) 10 mg tablet    multivitamin (THERAGRAN) TABS    Naproxen Sodium (ALEVE) 220 MG CAPS    pantoprazole (PROTONIX) 40 mg tablet    pyridoxine (VITAMIN B6) 100 mg tablet    zolpidem (AMBIEN) 5 mg tablet    Family History:  Family History   Problem Relation Age of Onset    Other Mother         BRCA negative    Breast cancer Mother 72    Ovarian cancer Mother 61    Diabetes Father     Heart disease Father     Hypertension Father     Migraines Sister     Diabetes Brother     Colon cancer Maternal Grandfather 80    Diabetes type II Maternal Grandfather     Heart disease Paternal Grandfather     Colon cancer Maternal Aunt 62    Skin cancer Maternal Aunt        Social History:  Social History     Socioeconomic History    Marital status: Single     Spouse name: Not on file    Number of children: 2    Years of education: Not on file    Highest education level: Not on file   Occupational History    Not on file   Social Needs    Financial resource strain: Not on file  Food insecurity     Worry: Not on file     Inability: Not on file    Transportation needs     Medical: Not on file     Non-medical: Not on file   Tobacco Use    Smoking status: Never Smoker    Smokeless tobacco: Never Used   Substance and Sexual Activity    Alcohol use: Yes     Alcohol/week: 4 0 standard drinks     Types: 4 Glasses of wine per week     Drinks per session: 3 or 4     Comment: 2 weekly  -4  glasses total    Drug use: Yes     Types: Marijuana     Comment: edible- last time 11/23/2018   Sexual activity: Not Currently   Lifestyle    Physical activity     Days per week: Not on file     Minutes per session: Not on file    Stress: Not on file   Relationships    Social connections     Talks on phone: Not on file     Gets together: Not on file     Attends [employer] service: Not on file     Active member of club or organization: Not on file     Attends meetings of clubs or organizations: Not on file     Relationship status: Not on file    Intimate partner violence     Fear of current or ex partner: Not on file     Emotionally abused: Not on file     Physically abused: Not on file     Forced sexual activity: Not on file   Other Topics Concern    Not on file   Social History Narrative    Uses safety equipment, seatbelts         Review of Systems:  Gen:   Denies fatigue, chills, nausea, vomiting, fever  Skin: No rashes or discolorations of any concern  RESP: Denies SOB, no cough  CV: Denies chest pain or palpitations  Breasts: Denies masses, pain, skin changes and nipple discharge  GI: Denies abdominal pain, heartburn, nausea, vomiting, changes in bowel habits  : Denies dysuria, frequency, CVA tenderness, incontinence and hematuria  Genitalia: Denies abnormal vaginal discharge, external lesions, rashes, pelvic pain, pressure, abnormal bleeding    Rectal:  Denies pain, bleeding, hemorrhoids,    Physical Exam:  /90 (BP Location: Left arm, Patient Position: Sitting, Cuff Size: Standard)   Ht 5' 8" (1 727 m)   Wt 98 1 kg (216 lb 3 2 oz)   LMP 07/31/2018   BMI 32 87 kg/m²    Gen: The patient was alert and oriented x3, pleasant well-appearing female in no acute distress  Neck:  Unremarkable, no lymphadenopathy, no thyromegaly, or tenderness  CV:  RRR, no murmurs  Resp:  Clear to auscultation bilaterally, no wheezing  Breasts: Symmetric with bilateral reconstruction and implants     No dominant, discrete, fixed  or suspicious masses are noted  No skin or nipple changes  No palpable axillary nodes  No supraclavicular adenopathy  Abd:  Soft, nontender, nondistended, no masses or organomegaly  Back:  No CVA tenderness, no tenderness to palpation along spine  Pelvic:   Umbilicated red raised lesions suspicious for herpes and a culture is taken  Vagina is free of discharge, but there are several areas of dotted erythema, again suspicious for herpes  , no evidence of prolapse anteriorly or posteriorly  Normal appearing cervix, mobile and nontender  A thin prep pap smear was obtained today  Uterus is  atrophic insize, mobile and, nontender  No palpable adnexal masses or tenderness  No anoperineal lesions  Rectal:  No masses, tenderness, hemorrhoids, or obvious blood  Skin:  No concerning lesions  Extremeties: No edema      Assessment & Plan:   1  Routine annual exam   Cervical Pap smear obtained  RTO one year orPRN  2    Possible herpes infection, await culture and patient will have blood work for this as well as other STD testing  3    Patient with history of breast cancer, continues to follow with Medical and Surgical Oncology

## 2021-06-03 NOTE — TELEPHONE ENCOUNTER
Patient called  Had appointment with Dr Claudell Clapper this morning  She is waiting for her Rx to be sent to [facility] in [place]

## 2021-06-04 LAB
HIV 1+2 AB+HIV1 P24 AG SERPL QL IA: NORMAL
HSV1 IGG SER IA-ACNC: <0.91 INDEX (ref 0–0.9)
HSV2 IGG SER IA-ACNC: <0.91 INDEX (ref 0–0.9)
RPR SER QL: NORMAL

## 2021-06-08 LAB
HSV SPEC CULT: NORMAL
LAB AP GYN PRIMARY INTERPRETATION: ABNORMAL
Lab: ABNORMAL
PATH INTERP SPEC-IMP: ABNORMAL

## 2021-06-10 ENCOUNTER — TELEPHONE (OUTPATIENT)
Dept: OBGYN CLINIC | Facility: CLINIC | Age: 51
End: 2021-06-10

## 2021-06-11 ENCOUNTER — TELEPHONE (OUTPATIENT)
Dept: LAB | Facility: HOSPITAL | Age: 51
End: 2021-06-11

## 2021-06-14 ENCOUNTER — TELEPHONE (OUTPATIENT)
Dept: OBGYN CLINIC | Facility: CLINIC | Age: 51
End: 2021-06-14

## 2021-06-14 DIAGNOSIS — Z11.3 SCREENING EXAMINATION FOR STD (SEXUALLY TRANSMITTED DISEASE): Primary | ICD-10-CM

## 2021-06-17 ENCOUNTER — PROCEDURE VISIT (OUTPATIENT)
Dept: OBGYN CLINIC | Facility: CLINIC | Age: 51
End: 2021-06-17
Payer: COMMERCIAL

## 2021-06-17 VITALS
DIASTOLIC BLOOD PRESSURE: 76 MMHG | BODY MASS INDEX: 32.92 KG/M2 | SYSTOLIC BLOOD PRESSURE: 122 MMHG | WEIGHT: 217.2 LBS | HEIGHT: 68 IN

## 2021-06-17 DIAGNOSIS — R87.810 ASCUS WITH POSITIVE HIGH RISK HPV CERVICAL: Primary | ICD-10-CM

## 2021-06-17 DIAGNOSIS — R87.610 ASCUS WITH POSITIVE HIGH RISK HPV CERVICAL: Primary | ICD-10-CM

## 2021-06-17 DIAGNOSIS — Z11.3 SCREENING EXAMINATION FOR STD (SEXUALLY TRANSMITTED DISEASE): ICD-10-CM

## 2021-06-17 PROCEDURE — 87491 CHLMYD TRACH DNA AMP PROBE: CPT | Performed by: OBSTETRICS & GYNECOLOGY

## 2021-06-17 PROCEDURE — 88305 TISSUE EXAM BY PATHOLOGIST: CPT | Performed by: PATHOLOGY

## 2021-06-17 PROCEDURE — 3008F BODY MASS INDEX DOCD: CPT | Performed by: OBSTETRICS & GYNECOLOGY

## 2021-06-17 PROCEDURE — 57454 BX/CURETT OF CERVIX W/SCOPE: CPT | Performed by: OBSTETRICS & GYNECOLOGY

## 2021-06-17 PROCEDURE — 88342 IMHCHEM/IMCYTCHM 1ST ANTB: CPT | Performed by: PATHOLOGY

## 2021-06-17 PROCEDURE — 87591 N.GONORRHOEAE DNA AMP PROB: CPT | Performed by: OBSTETRICS & GYNECOLOGY

## 2021-06-17 NOTE — PROGRESS NOTES
Colposcopy    Date/Time: 6/17/2021 9:55 AM  Performed by: Arlette Ritchie MD  Authorized by:  Arlette Ritchie MD     Consent:     Consent obtained:  Written    Consent given by:  Patient    Procedural risks discussed:  Bleeding, possible continued pain and infection    Patient questions answered: yes      Patient agrees, verbalizes understanding, and wants to proceed: yes      Educational handouts given: no      Instructions and paperwork completed: yes    Pre-procedure:     Pre-procedure timeout performed: yes      Prepped with: acetic acid    Indication:     Indication:  ASC-US  Procedure:     Procedure: Colposcopy w/ cervical biopsy and ECC      Under satisfactory analgesia the patient was prepped and draped in the dorsal lithotomy position: yes      Joliet speculum was placed in the vagina: yes      Under colposcopic examination the transition zone was seen in entirety: yes      Intracervical block was performed: no      Endocervix was curetted using a Kevorkian curette: yes      Cervical biopsy performed with a cervical biopsy punch: yes      Tampon inserted: no      Monsel's solution was applied: yes      Biopsy(s): yes      Location:  3 o clock    Specimen to pathology: yes    Post-procedure:     Findings: Punctation      Impression comment:  Atypical cells

## 2021-06-19 LAB
C TRACH DNA SPEC QL NAA+PROBE: NEGATIVE
N GONORRHOEA DNA SPEC QL NAA+PROBE: NEGATIVE

## 2021-06-22 DIAGNOSIS — N76.0 BACTERIAL VAGINOSIS: Primary | ICD-10-CM

## 2021-06-22 DIAGNOSIS — B96.89 BACTERIAL VAGINOSIS: Primary | ICD-10-CM

## 2021-07-20 ENCOUNTER — TELEPHONE (OUTPATIENT)
Dept: PAIN MEDICINE | Facility: CLINIC | Age: 51
End: 2021-07-20

## 2021-07-20 NOTE — TELEPHONE ENCOUNTER
S/W pt regarding below  Is scheduled with [facility] on 8/11 but is expecting to have TPI     Advised provider does not do TPI's  States she had seen KW at the start of the pandemic LOV 3/17  Would like to be seen ASAP for re-eval    Please review and advise if pt can be put in today at 3:15, 3:30 for re-eval?

## 2021-07-20 NOTE — TELEPHONE ENCOUNTER
Just spoke to patient on the phone at 11:53 a m     Medrol Dosepak sent to pharmacy   Do not schedule for re-evaluation today   Thank you

## 2021-07-20 NOTE — TELEPHONE ENCOUNTER
Patient was told she can get TPI on the day of her office visit 8/11 3 pm,  she is in a lot of pain calling to find out if she can be seen sooner? Saeed Sine patient willing to go to any office       Please advise    Thank you

## 2021-07-21 ENCOUNTER — OFFICE VISIT (OUTPATIENT)
Dept: SURGERY | Facility: CLINIC | Age: 51
End: 2021-07-21
Payer: COMMERCIAL

## 2021-07-21 VITALS
TEMPERATURE: 98.5 F | SYSTOLIC BLOOD PRESSURE: 128 MMHG | BODY MASS INDEX: 32.43 KG/M2 | HEIGHT: 68 IN | WEIGHT: 214 LBS | HEART RATE: 92 BPM | DIASTOLIC BLOOD PRESSURE: 84 MMHG

## 2021-07-21 DIAGNOSIS — Z98.84 BARIATRIC SURGERY STATUS: Primary | ICD-10-CM

## 2021-07-21 PROCEDURE — 99213 OFFICE O/P EST LOW 20 MIN: CPT | Performed by: SPECIALIST

## 2021-07-21 PROCEDURE — 1036F TOBACCO NON-USER: CPT | Performed by: SPECIALIST

## 2021-07-21 PROCEDURE — 3008F BODY MASS INDEX DOCD: CPT | Performed by: SPECIALIST

## 2021-07-21 RX ORDER — TOPIRAMATE 25 MG/1
TABLET ORAL
COMMUNITY
Start: 2021-06-11 | End: 2022-06-29

## 2021-07-21 RX ORDER — PHENTERMINE HYDROCHLORIDE 15 MG/1
CAPSULE ORAL
COMMUNITY
Start: 2021-05-17

## 2021-07-21 RX ORDER — LIDOCAINE HYDROCHLORIDE 10 MG/ML
8 INJECTION, SOLUTION INFILTRATION; PERINEURAL
COMMUNITY
End: 2021-10-19

## 2021-07-22 ENCOUNTER — TELEPHONE (OUTPATIENT)
Dept: RADIOLOGY | Facility: CLINIC | Age: 51
End: 2021-07-22

## 2021-07-22 NOTE — TELEPHONE ENCOUNTER
FYI only:  See below update from pt and RN advice:    Kimberly Leone  to Yolanda Kc DO        7/22/21 8:52 AM  I have the appt  with heather olivares on Aug 11 but should we really just be working on preauthorization for nerve block / ultra sound   or trigger point injections  with an authorized staff  member that can do such procedures  (which I had left a voice message with back after the begining of covid and our initial meeting for that and I understand it getting lost in the craziness the reduction of staffing and non-urgent procedures)      Me  to Kimberly Leone          7/22/21 9:19 AM  Jesús De La O,  Since we have not seen you for almost a year and a half, a re-evaluation is the appropriate next step as things may have changed since then  The plan back in March 2020 was for the repeat MRI and possibly an epidural or other injections after review by Dr Shavonne Xiao  So it is best that you keep the appt on 8/11/21 and then go from there  Is the Medrol Dose Pack helping yet?   Thanks, Mariela Gutierrez, RN        From: Oriana Smith  Sent: 7/22/2021   9:26 AM EDT  To: [facility]  Subject: RE: Visit Follow-Up Question                     YES thank you - actually slept the best in weeks but also took advil PM - and doing stretches

## 2021-08-11 ENCOUNTER — OFFICE VISIT (OUTPATIENT)
Dept: PAIN MEDICINE | Facility: CLINIC | Age: 51
End: 2021-08-11
Payer: COMMERCIAL

## 2021-08-11 VITALS
HEART RATE: 104 BPM | SYSTOLIC BLOOD PRESSURE: 102 MMHG | BODY MASS INDEX: 32.43 KG/M2 | RESPIRATION RATE: 16 BRPM | HEIGHT: 68 IN | WEIGHT: 214 LBS | DIASTOLIC BLOOD PRESSURE: 73 MMHG

## 2021-08-11 DIAGNOSIS — G89.4 CHRONIC PAIN SYNDROME: Primary | ICD-10-CM

## 2021-08-11 DIAGNOSIS — M50.120 CERVICAL DISC DISORDER WITH RADICULOPATHY OF MID-CERVICAL REGION: ICD-10-CM

## 2021-08-11 DIAGNOSIS — M79.18 MYOFASCIAL PAIN SYNDROME: ICD-10-CM

## 2021-08-11 PROCEDURE — 1036F TOBACCO NON-USER: CPT | Performed by: NURSE PRACTITIONER

## 2021-08-11 PROCEDURE — 99214 OFFICE O/P EST MOD 30 MIN: CPT | Performed by: NURSE PRACTITIONER

## 2021-08-11 PROCEDURE — 3008F BODY MASS INDEX DOCD: CPT | Performed by: NURSE PRACTITIONER

## 2021-08-11 RX ORDER — CYCLOBENZAPRINE HCL 5 MG
5 TABLET ORAL 3 TIMES DAILY PRN
Qty: 60 TABLET | Refills: 0 | Status: SHIPPED | OUTPATIENT
Start: 2021-08-11 | End: 2022-03-14

## 2021-08-11 NOTE — PROGRESS NOTES
Assessment:  1  Chronic pain syndrome    2  Myofascial pain syndrome    3  Cervical disc disorder with radiculopathy of mid-cervical region        Plan:  Marco Antonio Gibson is a 54 y o  female who was last seen 3/17/2020 presents for a follow up office visit in regards to chronic pain syndrome secondary to neck and mid back pain  Upon examination, I discussed with the patient she may benefit from a cervical epidural steroid injection to help with her radicular pain symptoms as well as trigger point injections to help with her myofascial pain  The most common risk of both procedures were reviewed with the patient  An order was placed for cervical epidural steroid injection at C7-T1 and bilateral periscapular trigger point injections  Complete risks and benefits including bleeding, infection, tissue reaction, nerve injury and allergic reaction were discussed  The approach was demonstrated using models and literature was provided  Verbal and written consent was obtained  The patient will also start Flexeril 5 mg by mouth up to 3 times daily as needed  The patient was reminded to not drive or operate heavy machinery while taking this medication at the medication may cause excessive drowsiness  The patient will follow up after cervical epidural steroid injection and trigger point injections or sooner if symptoms worsen in the interim  The patient was agreeable and verbalized understanding  My impressions and treatment recommendations were discussed in detail with the patient who verbalized understanding and had no further questions  Discharge instructions were provided  I personally saw and examined the patient and I agree with the above discussed plan of care      Orders Placed This Encounter   Procedures    Injection trigger point 3 or more muscles     Standing Status:   Future     Standing Expiration Date:   8/11/2022     Scheduling Instructions:      Bilateral periscapular trigger point injections  FL spine and pain procedure     Standing Status:   Future     Standing Expiration Date:   8/11/2025     Order Specific Question:   Reason for Exam:     Answer:   VICENTE C7-T1     Order Specific Question:   Is the patient pregnant? Answer:   No     Order Specific Question:   Anticoagulant hold needed? Answer:   no     New Medications Ordered This Visit   Medications    cyclobenzaprine (FLEXERIL) 5 mg tablet     Sig: Take 1 tablet (5 mg total) by mouth 3 (three) times a day as needed for muscle spasms     Dispense:  60 tablet     Refill:  0       History of Present Illness:  Margaret Mina is a 54 y o  female who was last seen 3/17/2020 presents for a follow up office visit in regards to chronic pain syndrome secondary to neck and mid back pain  The patients current symptoms include Neck Pain and Back Pain  The patient currently reports ongoing neck and mid back pain that are worse since the last office visit  The patient describes her pain as constant, dull aching, cramping pain with numbness and pins and needles that is worse in the morning and at night  The patient denies muscle weakness and reports no bowel or bladder dysfunction, and is able to complete ADLs with minimal difficulty  The patient currently rates her pain as 7/10 on numeric rating scale  I have personally reviewed and/or updated the patient's past medical history, past surgical history, family history, social history, current medications, allergies, and vital signs today  Review of Systems   Respiratory: Negative for shortness of breath  Gastrointestinal: Negative for constipation, diarrhea, nausea and vomiting  Musculoskeletal: Positive for back pain, gait problem, joint swelling and neck pain  Negative for arthralgias and myalgias  Skin: Negative for rash  Neurological: Negative for dizziness, seizures and weakness  All other systems reviewed and are negative        Patient Active Problem List   Diagnosis    Family history of breast cancer in female   Sima Axel Ashkenazi Religious ancestry requiring population-specific genetic screening    Malignant neoplasm of lower-outer quadrant of right breast of female, estrogen receptor positive ([address] )    UTI (urinary tract infection)    Status post bilateral salpingo-oophorectomy (BSO)    Use of aromatase inhibitors    Deformity of reconstructed breast    Nasal polyposis    History of gastric bypass    BRCA negative    DDD (degenerative disc disease), cervical    Breast variant    H/O bariatric surgery       Past Medical History:   Diagnosis Date    Abnormal Pap smear of cervix     Ashkenazi Religious ancestry requiring population-specific genetic screening     Asthma     Breathing difficulty     after gastric bypass 2014-"felt elephant on chest"    Chest pain, non-cardiac     Seen in ER and is from esophageal spasms      Constipation     occasional    Family history of breast cancer in female     GERD (gastroesophageal reflux disease)     History of MRSA infection     Leg    History of UTI     treated 8/1/2018    Human papilloma virus (HPV) infection     Hx MRSA infection     on leg-2009 treated    Pollen allergies     PONV (postoperative nausea and vomiting)     nausea    Postural lightheadedness     Raynaud's disease     Seasonal allergies     Sinus problem     Sinus problem        Past Surgical History:   Procedure Laterality Date    BILATERAL OOPHORECTOMY      BLADDER SURGERY  2013    bladder lift    BREAST BIOPSY Right 06/14/2018    [facility]    CERVIX LESION DESTRUCTION      COLONOSCOPY      COLPOSCOPY W/ BIOPSY / CURETTAGE      ENDOMETRIAL ABLATION      GASTRIC BYPASS  2014 2014 wt loss 90 lbs(regained 20 lbs)    HYSTEROSCOPIC STERILIZATION W/ IMPLANTS      HYSTEROSCOPY W/ ENDOMETRIAL ABLATION  2013    KNEE ARTHROSCOPY Right 1990    LYMPH NODE BIOPSY Right 8/10/2018    Procedure: BIOPSY LYMPH NODE SENTINEL;  Surgeon: Melida Shen MD;  [facility];  Service: Surgical Oncology    MAMMO STEREOTACTIC BREAST BIOPSY RIGHT (ALL INC) Right 6/14/2018    MASTECTOMY Bilateral     NASAL SEPTUM SURGERY      2002, 2016    IN IMPLNT BIO IMPLNT FOR SOFT TISSUE REINFORCEMENT Bilateral 8/10/2018    Procedure: RECONSTRUCTION BREAST W/ IMPLANT;  Surgeon: Angelika Zaragoza MD;  [facility];  Service: Plastics    IN INSJ/RPLCMT BREAST IMPLANT SEP DAY MASTECTOMY Bilateral 11/30/2018    Procedure: BREAST IMPLANT EXCHANGE;  Surgeon: Angelika Zaragoza MD;  [facility];  Service: Plastics    IN LAP,CHOLECYSTECTOMY/GRAPH N/A 3/19/2021    Procedure: CHOLECYSTECTOMY LAPAROSCOPIC W/ INTRAOP CHOLANGIOGRAM;  Surgeon: Kiarra Neves MD;  Location: [address] [place] [address] MAIN OR;  Service: General    IN LAP,RMV  ADNEXAL STRUCTURE N/A 8/10/2018    Procedure: SALPINGO-OOPHORECTOMY, LAPAROSCOPIC; PELVIC WASHINGS ;  Surgeon: Jitendra Washington MD;  [facility];  Service: Gynecology Oncology    IN MASTECTOMY, SIMPLE, COMPLETE Bilateral 8/10/2018    Procedure: MASTECTOMY SIMPLE;  Surgeon: Jenn Loyd MD;  [facility];  Service: Surgical Oncology    IN LENNIE-IMPLANT CAPSULECTOMY BREAST COMPLETE Bilateral 11/30/2018    Procedure: CAPSULECTOMY;  Surgeon: Angelika Zaragoza MD;  [facility];  Service: Plastics    IN REVISION OF RECONSTRUCTED BREAST Bilateral 2/22/2019    Procedure: BREAST MOUND REVISION; FAT GRAFTING;  Surgeon: Angelika Zaragoza MD;  [facility];  Service: Plastics    IN TISSUE EXPANDER PLACEMENT BREAST RECONSTRUCTION Bilateral 8/10/2018    Procedure: INSERTION/PLACEMENT TISSUE EXPANDER (EXCHANGE);   Surgeon: Angelika Zaragoza MD;  [facility];  Service: Plastics    SINUS SURGERY         Family History   Problem Relation Age of Onset    Other Mother         BRCA negative    Breast cancer Mother 72    Ovarian cancer Mother 61    Diabetes Father     Heart disease Father     Hypertension Father    [facility] Migraines Sister     Diabetes Brother     Colon cancer Maternal Grandfather 80    Diabetes type II Maternal Grandfather     Heart disease Paternal Grandfather     Colon cancer Maternal Aunt 62    Skin cancer Maternal Aunt        Social History     Occupational History    Not on file   Tobacco Use    Smoking status: Never Smoker    Smokeless tobacco: Never Used   Vaping Use    Vaping Use: Never used   Substance and Sexual Activity    Alcohol use: Yes     Alcohol/week: 4 0 standard drinks     Types: 4 Glasses of wine per week     Comment: 2 weekly  -4  glasses total    Drug use: Yes     Types: Marijuana     Comment: edible- last time 11/23/2018      Sexual activity: Not Currently       Current Outpatient Medications on File Prior to Visit   Medication Sig    acetaminophen (TYLENOL) 325 mg tablet Take 650 mg by mouth every 6 (six) hours as needed for mild pain    albuterol (PROVENTIL HFA,VENTOLIN HFA) 90 mcg/act inhaler Proventil HFA 90 mcg/actuation aerosol inhaler    anastrozole (ARIMIDEX) 1 mg tablet Take 1 tablet (1 mg total) by mouth daily    BIOTIN PO Take 1 tablet by mouth every morning    Calcium Carb-Cholecalciferol (CALCIUM 1000 + D) 1000-800 MG-UNIT TABS calcium    calcium-vitamin D 250-100 MG-UNIT per tablet Take 1 tablet by mouth    cyanocobalamin 50 MCG tablet Take 50 mcg by mouth    Diclofenac Epolamine (Flector) 1 3 % PTCH Flector 1 3 % transdermal 12 hour patch    Ergocalciferol (VITAMIN D2) 2000 units TABS Vitamin D2 50,000 unit capsule    ferrous sulfate 325 (65 Fe) mg tablet Daily    Influenza Vac Split Quad (AFLURIA QUADRIVALENT IM) Afluria Quad 4739-5564 (PF) 60 mcg (15 mcg x 4)/0 5 mL IM syringe   TO BE ADMINISTERED BY PHARMACIST FOR IMMUNIZATION    Inulin-Calcium-Vitamin D 2-250-100 GM-MG-UNIT CHEW Fiber Plus Mulitvitamin    lidocaine (XYLOCAINE) 1 % 8 mL    Lifitegrast (XIIDRA OP) Apply 1 vial to eye 2 (two) times a day    loratadine (CLARITIN) 10 mg tablet Take 10 mg by mouth as needed      methocarbamol (ROBAXIN) 500 mg tablet Take 500 mg by mouth 3 (three) times a day      methylPREDNISolone 4 MG tablet therapy pack Use as directed on package    mometasone (NASONEX) 50 mcg/act nasal spray 2 sprays in each nostril as needed    montelukast (SINGULAIR) 10 mg tablet Take 10 mg by mouth as needed      multivitamin (THERAGRAN) TABS Take 1 tablet by mouth every morning      Naproxen Sodium (ALEVE) 220 MG CAPS as needed    pantoprazole (PROTONIX) 40 mg tablet Take 40 mg by mouth every morning      phentermine 15 MG capsule     pyridoxine (VITAMIN B6) 100 mg tablet Take 100 mg by mouth    topiramate (TOPAMAX) 25 mg tablet     zolpidem (AMBIEN) 5 mg tablet Take 1 tablet (5 mg total) by mouth daily at bedtime as needed for sleep    clindamycin (CLEOCIN) 100 MG vaginal suppository Insert 1 suppository (100 mg total) into the vagina daily at bedtime    valACYclovir (VALTREX) 500 mg tablet Take 1 tablet (500 mg total) by mouth 2 (two) times a day for 5 days     No current facility-administered medications on file prior to visit  Allergies   Allergen Reactions    Adhesive [Medical Tape] Rash     And small blister at sight       Physical Exam:    /73   Pulse 104   Resp 16   Ht 5' 8" (1 727 m)   Wt 97 1 kg (214 lb)   LMP 07/31/2018   BMI 32 54 kg/m²     Constitutional:normal, well developed, well nourished, alert, in no distress and non-toxic and no overt pain behavior   and overweight  Eyes:anicteric  HEENT:grossly intact  Neck:supple, symmetric, trachea midline and no masses   Pulmonary:even and unlabored  Cardiovascular:No edema or pitting edema present  Skin:Normal without rashes or lesions and well hydrated  Psychiatric:Mood and affect appropriate  Neurologic:Cranial Nerves II-XII grossly intact  Musculoskeletal:normal     Cervical Spine Exam    Appearance:  Normal lordosis  Palpation/Tenderness:  left cervical paraspinal tenderness  right cervical paraspinal tenderness  palpable periscapular trigger points  Range of Motion:  Flexion:  Minimally limited  with pain  Extension:  Minimally limited  with pain  Lateral Flexion - Left:  Minimally limited  with pain  Lateral Flexion - Right:  Minimally limited  with pain  Rotation - Left:  Minimally limited  with pain  Rotation - Right:  Minimally limited  with pain  Motor Strength:  Left Arm Flexion  5/5  Left Arm Extension  5/5  Right Arm Flexion  5/5  Right Arm Extension  5/5  Left Wrist Flexion  5/5  Left Wrist Extension  5/5  Left    5/5  Right   5/5    Imaging    Study Result    Narrative & Impression   MRI CERVICAL SPINE WITHOUT CONTRAST     INDICATION: M50 30: Other cervical disc degeneration, unspecified cervical region  Pain in neck extending into the right arm      COMPARISON:  Outside prior MRI dated February 10, 2020     TECHNIQUE:  Sagittal T1, sagittal T2, sagittal inversion recovery, axial T2, axial gradient      IMAGE QUALITY:  Somewhat degraded by patient motion artifact      FINDINGS:     ALIGNMENT:  There is straightening of the normal cervical lordosis  Slight retrolisthesis C5-6 and C6-7      MARROW SIGNAL:  Normal marrow signal is identified within the visualized bony structures  No discrete marrow lesion      CERVICAL AND VISUALIZED THORACIC CORD:  Normal signal within the visualized cord      PREVERTEBRAL AND PARASPINAL SOFT TISSUES:  Normal      VISUALIZED POSTERIOR FOSSA:  The visualized posterior fossa demonstrates no abnormal signal      CERVICAL DISC SPACES:     C2-C3:  No significant central or foraminal narrowing  Mild facet hypertrophy      C3-C4: Moderate left facet hypertrophy  No significant central or foraminal narrowing      C4-C5:  Mild facet hypertrophy    Small marginal osteophytes identified without significant central or foraminal narrowing      C5-C6:  Degenerative disc osteophyte complex with marginal osteophytes results in mild central and mild left foraminal narrowing      C6-C7:  Degenerative disc osteophyte complex with marginal osteophytes  There is minimal central stenosis    Foramina patent      C7-T1:  Normal      UPPER THORACIC DISC SPACES:  Normal      IMPRESSION:     Mild spondylotic degenerative changes are noted as detailed above similar to the prior outside study of February 10, 2020

## 2021-08-11 NOTE — PATIENT INSTRUCTIONS
Epidural Steroid Injection   AMBULATORY CARE:   What you need to know about an epidural steroid injection (GERARD):  An GERARD is a procedure to inject steroid medicine into the epidural space  The epidural space is between your spinal cord and vertebrae  Steroids reduce inflammation and fluid buildup in your spine that may be causing pain  You may be given pain medicine along with the steroids  How to prepare for an GERARD:  Your healthcare provider will talk to you about how to prepare for your procedure  He or she will tell you what medicines to take or not take on the day of your procedure  You may need to stop taking blood thinners or other medicines several days before your procedure  You may need to adjust any diabetes medicine you take on the day of your procedure  Steroid medicine can increase your blood sugar level  Arrange for someone to drive you home when you are discharged  What will happen during an GERARD:   · You will be given medicine to numb the procedure area  You will be awake for the procedure, but you will not feel pain  You may also be given medicine to help you relax  Contrast liquid will be used to help your healthcare provider see the area better  Tell the healthcare provider if you have ever had an allergic reaction to contrast liquid  · Your healthcare provider may place the needle into your neck area, middle of your back, or tailbone area  He may inject the medicine next to the nerves that are causing your pain  He may instead inject the medicine into a larger area of the epidural space  This helps the medicine spread to more nerves  Your healthcare provider will use a fluoroscope to help guide the needle to the right place  A fluoroscope is a type of x-ray  After the procedure, a bandage will be placed over the injection site to prevent infection  What will happen after an GERARD:  You will have a bandage over the injection site to prevent infection   Your healthcare provider will tell you when you can bathe and any activity guidelines  You will be able to go home  Risks of an GERARD:  You may have temporary or permanent nerve damage or paralysis  You may have bleeding or develop a serious infection, such as meningitis (swelling of the brain coverings)  An abscess may also develop  An abscess is a pus-filled area under the skin  You may need surgery to fix the abscess  You may have a seizure, anxiety, or trouble sleeping  If you are a man, you may have temporary erectile dysfunction (not able to have an erection)  Call your local emergency number (911 in the [address] [place] [address],[address]) if:   · You have a seizure  · You have trouble moving your legs  Seek care immediately if:   · Blood soaks through your bandage  · You have a fever or chills, severe back pain, and the procedure area is sensitive to the touch  · You cannot control when you urinate or have a bowel movement  Call your doctor if:   · You have weakness or numbness in your legs  · Your wound is red, swollen, or draining pus  · You have nausea or are vomiting  · Your face or neck is red and you feel warm  · You have more pain than you had before the procedure  · You have swelling in your hands or feet  · You have questions or concerns about your condition or care  Care for your wound as directed: You may remove the bandage before you go to bed the day of your procedure  You may take a shower, but do not take a bath for at least 24 hours  Self-care:   · Do not drive,  use machines, or do strenuous activity for 24 hours after your procedure or as directed  · Continue other treatments  as directed  Steroid injections alone will not control your pain  The injections are meant to be used with other treatments, such as physical therapy  Follow up with your doctor as directed:  Write down your questions so you remember to ask them during your visits     © Copyright [employer] 2021 Information is for End User's use only and may not be sold, redistributed or otherwise used for commercial purposes  All illustrations and images included in CareNotes® are the copyrighted property of A D A M , Inc  or Tosin Townsend  The above information is an  only  It is not intended as medical advice for individual conditions or treatments  Talk to your doctor, nurse or pharmacist before following any medical regimen to see if it is safe and effective for you

## 2021-08-12 ENCOUNTER — TELEPHONE (OUTPATIENT)
Dept: PAIN MEDICINE | Facility: CLINIC | Age: 51
End: 2021-08-12

## 2021-08-12 NOTE — TELEPHONE ENCOUNTER
Pt is being scheduled for a Cervical Epidural which requires a 6 day aspirin hold  Please advise if you approve this hold   Thanks

## 2021-08-17 PROBLEM — J32.8 OTHER CHRONIC SINUSITIS: Status: ACTIVE | Noted: 2021-08-17

## 2021-08-17 PROBLEM — R29.818 SUSPECTED SLEEP APNEA: Status: ACTIVE | Noted: 2021-08-17

## 2021-08-30 ENCOUNTER — TELEPHONE (OUTPATIENT)
Dept: SLEEP CENTER | Facility: CLINIC | Age: 51
End: 2021-08-30

## 2021-08-30 NOTE — TELEPHONE ENCOUNTER
----- Message from Sherley Maldonado DO sent at 8/26/2021 10:47 PM EDT -----  approved  ----- Message -----  From: Maribel Bell MA  Sent: 8/25/2021   7:54 AM EDT  To: Sleep Medicine [place] Provider    This sleep study needs approval      If approved please sign and return to clerical pool  If denied please include reasons why  Also provide alternative testing if warranted  Please sign and return to clerical pool

## 2021-09-09 NOTE — TELEPHONE ENCOUNTER
Pt is scheduled on 10/19 with Dr Alvarez for [facility]   Per task 8/12/2021 pt was to schedule a cervical epidural injection however patient stated she does not want to move forward with that one       Pt stated that she is experiencing some discomfort and last time prednisone was prescribed, she is asking if she can be prescribed prednisone to help with the pain in her muscles as she stated muscle relaxer do not help     Pt can be reached at [phone]

## 2021-09-09 NOTE — TELEPHONE ENCOUNTER
Pt aware of same and is aware no nsaids while on the dose brionna  Nsaids listed for pt  Pt appreciative of same and will follow the directions on the brionna

## 2021-09-13 ENCOUNTER — PATIENT MESSAGE (OUTPATIENT)
Dept: HEMATOLOGY ONCOLOGY | Facility: CLINIC | Age: 51
End: 2021-09-13

## 2021-09-15 ENCOUNTER — TELEPHONE (OUTPATIENT)
Dept: HEMATOLOGY ONCOLOGY | Facility: CLINIC | Age: 51
End: 2021-09-15

## 2021-09-15 NOTE — TELEPHONE ENCOUNTER
Spoke with patient made her aware of new appointment 11/23 at 8:30 am with [facility], also advised her I also sent her my chart message but it has not been read  Patient agreed to appointment

## 2021-09-27 ENCOUNTER — HOSPITAL ENCOUNTER (OUTPATIENT)
Dept: BONE DENSITY | Facility: MEDICAL CENTER | Age: 51
Discharge: HOME | End: 2021-09-27
Payer: COMMERCIAL

## 2021-09-27 DIAGNOSIS — M85.88 OSTEOPENIA OF LUMBAR SPINE: ICD-10-CM

## 2021-09-27 PROCEDURE — 77080 DXA BONE DENSITY AXIAL: CPT

## 2021-10-07 ENCOUNTER — OFFICE VISIT (OUTPATIENT)
Dept: OBGYN CLINIC | Facility: CLINIC | Age: 51
End: 2021-10-07
Payer: COMMERCIAL

## 2021-10-07 VITALS
DIASTOLIC BLOOD PRESSURE: 80 MMHG | BODY MASS INDEX: 32.46 KG/M2 | WEIGHT: 214.2 LBS | SYSTOLIC BLOOD PRESSURE: 116 MMHG | HEIGHT: 68 IN

## 2021-10-07 DIAGNOSIS — R87.610 ASCUS WITH POSITIVE HIGH RISK HPV CERVICAL: Primary | ICD-10-CM

## 2021-10-07 DIAGNOSIS — R87.810 ASCUS WITH POSITIVE HIGH RISK HPV CERVICAL: Primary | ICD-10-CM

## 2021-10-07 PROCEDURE — G0124 SCREEN C/V THIN LAYER BY MD: HCPCS | Performed by: PATHOLOGY

## 2021-10-07 PROCEDURE — G0145 SCR C/V CYTO,THINLAYER,RESCR: HCPCS | Performed by: PATHOLOGY

## 2021-10-07 PROCEDURE — G0476 HPV COMBO ASSAY CA SCREEN: HCPCS | Performed by: OBSTETRICS & GYNECOLOGY

## 2021-10-07 PROCEDURE — 99213 OFFICE O/P EST LOW 20 MIN: CPT | Performed by: OBSTETRICS & GYNECOLOGY

## 2021-10-14 LAB
LAB AP GYN PRIMARY INTERPRETATION: ABNORMAL
Lab: ABNORMAL
PATH INTERP SPEC-IMP: ABNORMAL

## 2021-10-19 ENCOUNTER — PROCEDURE VISIT (OUTPATIENT)
Dept: PAIN MEDICINE | Facility: CLINIC | Age: 51
End: 2021-10-19
Payer: COMMERCIAL

## 2021-10-19 VITALS
SYSTOLIC BLOOD PRESSURE: 102 MMHG | BODY MASS INDEX: 32.54 KG/M2 | WEIGHT: 214 LBS | DIASTOLIC BLOOD PRESSURE: 64 MMHG | HEART RATE: 86 BPM

## 2021-10-19 DIAGNOSIS — M62.838 MUSCLE SPASMS OF NECK: Primary | ICD-10-CM

## 2021-10-19 PROCEDURE — 20552 NJX 1/MLT TRIGGER POINT 1/2: CPT | Performed by: PHYSICAL MEDICINE & REHABILITATION

## 2021-10-19 PROCEDURE — 76942 ECHO GUIDE FOR BIOPSY: CPT | Performed by: PHYSICAL MEDICINE & REHABILITATION

## 2021-10-19 RX ORDER — BUPIVACAINE HYDROCHLORIDE 2.5 MG/ML
10 INJECTION, SOLUTION EPIDURAL; INFILTRATION; INTRACAUDAL ONCE
Status: COMPLETED | OUTPATIENT
Start: 2021-10-19 | End: 2021-10-19

## 2021-10-19 RX ORDER — LIDOCAINE HYDROCHLORIDE 10 MG/ML
5 INJECTION, SOLUTION INFILTRATION; PERINEURAL ONCE
Status: DISCONTINUED | OUTPATIENT
Start: 2021-10-19 | End: 2021-10-19

## 2021-10-19 RX ORDER — METHYLPREDNISOLONE ACETATE 40 MG/ML
40 INJECTION, SUSPENSION INTRA-ARTICULAR; INTRALESIONAL; INTRAMUSCULAR; SOFT TISSUE ONCE
Status: COMPLETED | OUTPATIENT
Start: 2021-10-19 | End: 2021-10-19

## 2021-10-19 RX ADMIN — METHYLPREDNISOLONE ACETATE 40 MG: 40 INJECTION, SUSPENSION INTRA-ARTICULAR; INTRALESIONAL; INTRAMUSCULAR; SOFT TISSUE at 15:14

## 2021-10-19 RX ADMIN — BUPIVACAINE HYDROCHLORIDE 10 ML: 2.5 INJECTION, SOLUTION EPIDURAL; INFILTRATION; INTRACAUDAL at 15:35

## 2021-10-20 ENCOUNTER — OFFICE VISIT (OUTPATIENT)
Dept: SURGICAL ONCOLOGY | Facility: CLINIC | Age: 51
End: 2021-10-20
Payer: COMMERCIAL

## 2021-10-20 VITALS
WEIGHT: 213.4 LBS | HEIGHT: 68 IN | OXYGEN SATURATION: 99 % | BODY MASS INDEX: 32.34 KG/M2 | TEMPERATURE: 98.2 F | HEART RATE: 82 BPM | DIASTOLIC BLOOD PRESSURE: 64 MMHG | SYSTOLIC BLOOD PRESSURE: 122 MMHG | RESPIRATION RATE: 16 BRPM

## 2021-10-20 DIAGNOSIS — C50.511 MALIGNANT NEOPLASM OF LOWER-OUTER QUADRANT OF RIGHT BREAST OF FEMALE, ESTROGEN RECEPTOR POSITIVE (HCC): Primary | ICD-10-CM

## 2021-10-20 DIAGNOSIS — Z17.0 MALIGNANT NEOPLASM OF LOWER-OUTER QUADRANT OF RIGHT BREAST OF FEMALE, ESTROGEN RECEPTOR POSITIVE (HCC): Primary | ICD-10-CM

## 2021-10-20 DIAGNOSIS — Z13.71 BRCA NEGATIVE: ICD-10-CM

## 2021-10-20 DIAGNOSIS — N64.9 BREAST VARIANT: ICD-10-CM

## 2021-10-20 DIAGNOSIS — Z79.811 USE OF AROMATASE INHIBITORS: ICD-10-CM

## 2021-10-20 PROCEDURE — 99214 OFFICE O/P EST MOD 30 MIN: CPT | Performed by: SURGERY

## 2021-11-01 ENCOUNTER — HOSPITAL ENCOUNTER (OUTPATIENT)
Dept: SLEEP CENTER | Facility: CLINIC | Age: 51
Discharge: HOME | End: 2021-11-01
Payer: COMMERCIAL

## 2021-11-01 DIAGNOSIS — R06.83 SNORING: ICD-10-CM

## 2021-11-01 DIAGNOSIS — R29.818 SUSPECTED SLEEP APNEA: ICD-10-CM

## 2021-11-01 PROCEDURE — G0399 HOME SLEEP TEST/TYPE 3 PORTA: HCPCS

## 2021-11-04 ENCOUNTER — TELEPHONE (OUTPATIENT)
Dept: SLEEP CENTER | Facility: CLINIC | Age: 51
End: 2021-11-04

## 2021-11-04 PROCEDURE — 95806 SLEEP STUDY UNATT&RESP EFFT: CPT | Performed by: INTERNAL MEDICINE

## 2021-11-08 ENCOUNTER — PROCEDURE VISIT (OUTPATIENT)
Dept: NEUROLOGY | Facility: CLINIC | Age: 51
End: 2021-11-08
Payer: COMMERCIAL

## 2021-11-08 VITALS
HEIGHT: 68 IN | WEIGHT: 211 LBS | SYSTOLIC BLOOD PRESSURE: 110 MMHG | BODY MASS INDEX: 31.98 KG/M2 | DIASTOLIC BLOOD PRESSURE: 70 MMHG | TEMPERATURE: 97 F | HEART RATE: 86 BPM

## 2021-11-08 DIAGNOSIS — G90.09 PERIPHERAL AUTONOMIC NEUROPATHY OF UNKNOWN CAUSE: ICD-10-CM

## 2021-11-08 PROCEDURE — 95886 MUSC TEST DONE W/N TEST COMP: CPT | Performed by: PHYSICAL MEDICINE & REHABILITATION

## 2021-11-08 PROCEDURE — 95911 NRV CNDJ TEST 9-10 STUDIES: CPT | Performed by: PHYSICAL MEDICINE & REHABILITATION

## 2021-11-10 ENCOUNTER — TELEPHONE (OUTPATIENT)
Dept: HEMATOLOGY ONCOLOGY | Facility: CLINIC | Age: 51
End: 2021-11-10

## 2021-12-20 ENCOUNTER — TELEPHONE (OUTPATIENT)
Dept: HEMATOLOGY ONCOLOGY | Facility: CLINIC | Age: 51
End: 2021-12-20

## 2021-12-20 DIAGNOSIS — C50.511 MALIGNANT NEOPLASM OF LOWER-OUTER QUADRANT OF RIGHT BREAST OF FEMALE, ESTROGEN RECEPTOR POSITIVE (HCC): Primary | ICD-10-CM

## 2021-12-20 DIAGNOSIS — Z17.0 MALIGNANT NEOPLASM OF LOWER-OUTER QUADRANT OF RIGHT BREAST OF FEMALE, ESTROGEN RECEPTOR POSITIVE (HCC): ICD-10-CM

## 2021-12-20 DIAGNOSIS — C50.511 MALIGNANT NEOPLASM OF LOWER-OUTER QUADRANT OF RIGHT BREAST OF FEMALE, ESTROGEN RECEPTOR POSITIVE (HCC): ICD-10-CM

## 2021-12-20 DIAGNOSIS — Z17.0 MALIGNANT NEOPLASM OF LOWER-OUTER QUADRANT OF RIGHT BREAST OF FEMALE, ESTROGEN RECEPTOR POSITIVE (HCC): Primary | ICD-10-CM

## 2021-12-20 RX ORDER — ANASTROZOLE 1 MG/1
TABLET ORAL
Qty: 90 TABLET | Refills: 3 | Status: SHIPPED | OUTPATIENT
Start: 2021-12-20

## 2021-12-24 ENCOUNTER — APPOINTMENT (OUTPATIENT)
Dept: LAB | Facility: MEDICAL CENTER | Age: 51
End: 2021-12-24
Payer: COMMERCIAL

## 2021-12-24 DIAGNOSIS — Z17.0 MALIGNANT NEOPLASM OF LOWER-OUTER QUADRANT OF RIGHT BREAST OF FEMALE, ESTROGEN RECEPTOR POSITIVE (HCC): ICD-10-CM

## 2021-12-24 DIAGNOSIS — C50.511 MALIGNANT NEOPLASM OF LOWER-OUTER QUADRANT OF RIGHT BREAST OF FEMALE, ESTROGEN RECEPTOR POSITIVE (HCC): ICD-10-CM

## 2021-12-24 LAB
ALBUMIN SERPL BCP-MCNC: 3.8 G/DL (ref 3.5–5)
ALP SERPL-CCNC: 119 U/L (ref 46–116)
ALT SERPL W P-5'-P-CCNC: 25 U/L (ref 12–78)
ANION GAP SERPL CALCULATED.3IONS-SCNC: 6 MMOL/L (ref 4–13)
AST SERPL W P-5'-P-CCNC: 17 U/L (ref 5–45)
BASOPHILS # BLD AUTO: 0.03 THOUSANDS/ΜL (ref 0–0.1)
BASOPHILS NFR BLD AUTO: 1 % (ref 0–1)
BILIRUB SERPL-MCNC: 0.39 MG/DL (ref 0.2–1)
BUN SERPL-MCNC: 15 MG/DL (ref 5–25)
CALCIUM SERPL-MCNC: 9.3 MG/DL (ref 8.3–10.1)
CHLORIDE SERPL-SCNC: 108 MMOL/L (ref 100–108)
CO2 SERPL-SCNC: 27 MMOL/L (ref 21–32)
CREAT SERPL-MCNC: 0.72 MG/DL (ref 0.6–1.3)
EOSINOPHIL # BLD AUTO: 0.22 THOUSAND/ΜL (ref 0–0.61)
EOSINOPHIL NFR BLD AUTO: 4 % (ref 0–6)
ERYTHROCYTE [DISTWIDTH] IN BLOOD BY AUTOMATED COUNT: 11.9 % (ref 11.6–15.1)
GFR SERPL CREATININE-BSD FRML MDRD: 98 ML/MIN/1.73SQ M
GLUCOSE P FAST SERPL-MCNC: 84 MG/DL (ref 65–99)
HCT VFR BLD AUTO: 44.1 % (ref 34.8–46.1)
HGB BLD-MCNC: 13.9 G/DL (ref 11.5–15.4)
IMM GRANULOCYTES # BLD AUTO: 0.01 THOUSAND/UL (ref 0–0.2)
IMM GRANULOCYTES NFR BLD AUTO: 0 % (ref 0–2)
LYMPHOCYTES # BLD AUTO: 1.47 THOUSANDS/ΜL (ref 0.6–4.47)
LYMPHOCYTES NFR BLD AUTO: 27 % (ref 14–44)
MCH RBC QN AUTO: 28.5 PG (ref 26.8–34.3)
MCHC RBC AUTO-ENTMCNC: 31.5 G/DL (ref 31.4–37.4)
MCV RBC AUTO: 90 FL (ref 82–98)
MONOCYTES # BLD AUTO: 0.36 THOUSAND/ΜL (ref 0.17–1.22)
MONOCYTES NFR BLD AUTO: 7 % (ref 4–12)
NEUTROPHILS # BLD AUTO: 3.35 THOUSANDS/ΜL (ref 1.85–7.62)
NEUTS SEG NFR BLD AUTO: 61 % (ref 43–75)
NRBC BLD AUTO-RTO: 0 /100 WBCS
PLATELET # BLD AUTO: 407 THOUSANDS/UL (ref 149–390)
PMV BLD AUTO: 10.6 FL (ref 8.9–12.7)
POTASSIUM SERPL-SCNC: 3.7 MMOL/L (ref 3.5–5.3)
PROT SERPL-MCNC: 7.2 G/DL (ref 6.4–8.2)
RBC # BLD AUTO: 4.88 MILLION/UL (ref 3.81–5.12)
SODIUM SERPL-SCNC: 141 MMOL/L (ref 136–145)
WBC # BLD AUTO: 5.44 THOUSAND/UL (ref 4.31–10.16)

## 2021-12-24 PROCEDURE — 85025 COMPLETE CBC W/AUTO DIFF WBC: CPT

## 2021-12-24 PROCEDURE — 80053 COMPREHEN METABOLIC PANEL: CPT

## 2021-12-24 PROCEDURE — 36415 COLL VENOUS BLD VENIPUNCTURE: CPT

## 2021-12-28 ENCOUNTER — TELEMEDICINE (OUTPATIENT)
Dept: HEMATOLOGY ONCOLOGY | Facility: CLINIC | Age: 51
End: 2021-12-28
Payer: COMMERCIAL

## 2021-12-28 DIAGNOSIS — D75.839 THROMBOCYTOSIS: ICD-10-CM

## 2021-12-28 DIAGNOSIS — Z98.84 HISTORY OF GASTRIC BYPASS: ICD-10-CM

## 2021-12-28 DIAGNOSIS — M85.859 OSTEOPENIA OF HIP, UNSPECIFIED LATERALITY: ICD-10-CM

## 2021-12-28 DIAGNOSIS — M62.838 MUSCLE SPASMS OF NECK: ICD-10-CM

## 2021-12-28 DIAGNOSIS — Z98.84 H/O BARIATRIC SURGERY: ICD-10-CM

## 2021-12-28 DIAGNOSIS — Z90.722 STATUS POST BILATERAL SALPINGO-OOPHORECTOMY (BSO): ICD-10-CM

## 2021-12-28 DIAGNOSIS — G47.00 INSOMNIA, UNSPECIFIED TYPE: ICD-10-CM

## 2021-12-28 DIAGNOSIS — Z17.0 MALIGNANT NEOPLASM OF LOWER-OUTER QUADRANT OF RIGHT BREAST OF FEMALE, ESTROGEN RECEPTOR POSITIVE (HCC): Primary | ICD-10-CM

## 2021-12-28 DIAGNOSIS — F51.01 PRIMARY INSOMNIA: ICD-10-CM

## 2021-12-28 DIAGNOSIS — R76.8 POSITIVE ANA (ANTINUCLEAR ANTIBODY): ICD-10-CM

## 2021-12-28 DIAGNOSIS — C50.511 MALIGNANT NEOPLASM OF LOWER-OUTER QUADRANT OF RIGHT BREAST OF FEMALE, ESTROGEN RECEPTOR POSITIVE (HCC): Primary | ICD-10-CM

## 2021-12-28 DIAGNOSIS — Z79.811 USE OF AROMATASE INHIBITORS: ICD-10-CM

## 2021-12-28 PROCEDURE — 99215 OFFICE O/P EST HI 40 MIN: CPT | Performed by: PHYSICIAN ASSISTANT

## 2021-12-28 RX ORDER — ZOLPIDEM TARTRATE 5 MG/1
5 TABLET ORAL
Qty: 30 TABLET | Refills: 3 | Status: SHIPPED | OUTPATIENT
Start: 2021-12-28

## 2022-02-15 ENCOUNTER — TELEPHONE (OUTPATIENT)
Dept: PAIN MEDICINE | Facility: CLINIC | Age: 52
End: 2022-02-15

## 2022-02-15 NOTE — TELEPHONE ENCOUNTER
S/p b/l thoracic paraspinal TPI on 2/8/22    S/w pt she reports 100% relief  Pt set up another TPI in 3 months, as that is how long the last one lasted

## 2022-03-14 ENCOUNTER — TELEPHONE (OUTPATIENT)
Dept: PAIN MEDICINE | Facility: MEDICAL CENTER | Age: 52
End: 2022-03-14

## 2022-03-14 NOTE — TELEPHONE ENCOUNTER
Regarding: FW: Refill     ----- Message -----  From: Kevin Garcia  Sent: 3/14/2022   9:27 AM EDT  To: [facility]  Subject: Refill                                           ----- Message from Kevin Garcia sent at 3/14/2022  9:27 AM EDT -----       ----- Message from Ba Houston to Tia Hart DO sent at 3/12/2022  5:51 AM -----   At my last appointment (trigger point injections)  I requested a muscle relaxer refill - I dont think it went through     [facility] for 3 month supply (my preference)it will last a long time   [facility] [place] if only a month supply     Thanks

## 2022-03-14 NOTE — TELEPHONE ENCOUNTER
Sidney Jenkins routed conversation to [facility] 1 hour ago (9:20 AM)     Sidney Jenkins routed conversation to [facility] 1 hour ago (9:19 AM)     Sidney Jenkins 1 hour ago (9:19 AM)     OW       Pt returning the nurse call  Please be advised thank you    Pt can be reached @ [address] 1 hour ago (9:18 AM)

## 2022-03-31 ENCOUNTER — AMB VIDEO VISIT (OUTPATIENT)
Dept: OTHER | Facility: HOSPITAL | Age: 52
End: 2022-03-31

## 2022-03-31 DIAGNOSIS — J01.40 ACUTE PANSINUSITIS, RECURRENCE NOT SPECIFIED: Primary | ICD-10-CM

## 2022-03-31 RX ORDER — AMOXICILLIN AND CLAVULANATE POTASSIUM 875; 125 MG/1; MG/1
1 TABLET, FILM COATED ORAL EVERY 12 HOURS SCHEDULED
Qty: 20 TABLET | Refills: 0 | Status: SHIPPED | OUTPATIENT
Start: 2022-03-31 | End: 2022-04-10

## 2022-03-31 NOTE — CARE ANYWHERE EVISITS
Visit Summary for [facility] - Gender: Female - Date of Birth: [id number]  Date: [id number] - Duration: 7 minutes  Patient: Juli Pichardo   [facility]  Provider: Didi TRAORE    Patient Contact Information  Address  Carmelina King; [address] [place] [address]  [phone]    Visit Topics    Triage Questions   What is your current physical address in the event of a medical emergency? Answer []  Are you allergic to any medications? Answer []  Are you now or could you be pregnant? Answer []  Do you have any immune system compromise or chronic lung   disease? Answer []  Do you have any vulnerable family members in the home (infant, pregnant, cancer, elderly)? Answer []     Conversation Transcripts  [0A][0A] [Notification] You are connected with Mindy Meyer, Urgent Care Specialist [0A][Notification] Kayy Francisco is located in [place]  [0A][Notification] Kayy Francisco has shared health history  Shun Seo  [0A]    Diagnosis  Acute pansinusitis    Procedures  Value: [id number] Code: CPT-4 UNLISTED E&M SERVICE    Medications Prescribed    No prescriptions ordered    Electronically signed by: Mindy Altman(NPI [id number])

## 2022-03-31 NOTE — PATIENT INSTRUCTIONS
Start antibiotic  Take probiotic  Nasal saline flushes can be helpful  Continue with allergy medications  Follow-up with PCP or ENT if no improvement symptoms  Go to the ER with any worsening symptoms, chest pain, shortness breast or difficulty breathing  Sinusitis   WHAT YOU NEED TO KNOW:   Sinusitis is inflammation or infection of your sinuses  Sinusitis is most often caused by a virus  Acute sinusitis may last up to 12 weeks  Chronic sinusitis lasts longer than 12 weeks  Recurrent sinusitis means you have 4 or more infections in 1 year  DISCHARGE INSTRUCTIONS:   Return to the emergency department if:   · You have trouble breathing or wheezing that is getting worse  · You have a stiff neck, a fever, or a bad headache  · You cannot open your eye  · Your eyeball bulges out or you cannot move your eye  · You are more sleepy than normal, or you notice changes in your ability to think, move, or talk  · You have swelling of your forehead or scalp  Call your doctor if:   · You have vision changes, such as double vision  · Your eye and eyelid are red, swollen, and painful  · Your symptoms do not improve or go away after 10 days  · You have nausea and are vomiting  · Your nose is bleeding  · You have questions or concerns about your condition or care  Medicines: Your symptoms may go away on their own  Your healthcare provider may recommend watchful waiting for up to 10 days before starting antibiotics  You may need any of the following:  · Acetaminophen  decreases pain and fever  It is available without a doctor's order  Ask how much to take and how often to take it  Follow directions  Read the labels of all other medicines you are using to see if they also contain acetaminophen, or ask your doctor or pharmacist  Acetaminophen can cause liver damage if not taken correctly  Do not use more than 4 grams (4,000 milligrams) total of acetaminophen in one day       · NSAIDs , such as ibuprofen, help decrease swelling, pain, and fever  This medicine is available with or without a doctor's order  NSAIDs can cause stomach bleeding or kidney problems in certain people  If you take blood thinner medicine, always ask your healthcare provider if NSAIDs are safe for you  Always read the medicine label and follow directions  · Nasal steroid sprays  may help decrease inflammation in your nose and sinuses  · Decongestants  help reduce swelling and drain mucus in the nose and sinuses  They may help you breathe easier  · Antihistamines  help dry mucus in the nose and relieve sneezing  · Antibiotics  help treat or prevent a bacterial infection  · Take your medicine as directed  Contact your healthcare provider if you think your medicine is not helping or if you have side effects  Tell him or her if you are allergic to any medicine  Keep a list of the medicines, vitamins, and herbs you take  Include the amounts, and when and why you take them  Bring the list or the pill bottles to follow-up visits  Carry your medicine list with you in case of an emergency  Self-care:   · Rinse your sinuses as directed  Use a sinus rinse device to rinse your nasal passages with a saline (salt water) solution or distilled water  Do not use tap water  This will help thin the mucus in your nose and rinse away pollen and dirt  It will also help reduce swelling so you can breathe normally  · Use a humidifier  to increase air moisture in your home  This may make it easier for you to breathe and help decrease your cough  · Sleep with your head elevated  Place an extra pillow under your head before you go to sleep to help your sinuses drain  · Drink liquids as directed  Ask your healthcare provider how much liquid to drink each day and which liquids are best for you  Liquids will thin the mucus in your nose and help it drain  Avoid drinks that contain alcohol or caffeine       · Do not smoke, and avoid secondhand smoke  Nicotine and other chemicals in cigarettes and cigars can make your symptoms worse  Ask your healthcare provider for information if you currently smoke and need help to quit  E-cigarettes or smokeless tobacco still contain nicotine  Talk to your healthcare provider before you use these products  Prevent the spread of germs:   · Wash your hands often with soap and water  Wash your hands after you use the bathroom, change a child's diaper, or sneeze  Wash your hands before you prepare or eat food  · Stay away from people who are sick  Some germs spread easily and quickly through contact  Follow up with your doctor as directed: You may be referred to an ear, nose, and throat specialist  Write down your questions so you remember to ask them during your visits  © Copyright [employer] 2022 Information is for End User's use only and may not be sold, redistributed or otherwise used for commercial purposes  All illustrations and images included in CareNotes® are the copyrighted property of [employer] M , Inc  or [facility] Mikey Rangel   The above information is an  only  It is not intended as medical advice for individual conditions or treatments  Talk to your doctor, nurse or pharmacist before following any medical regimen to see if it is safe and effective for you

## 2022-03-31 NOTE — PROGRESS NOTES
Video Visit - Pauly Smalls 54 y o  female MRN: [id number]    REQUIRED DOCUMENTATION:         1  [facility]  2  Provider located at [address] [place], [place] [id number]  3  [facility] provider: LEÓN Rodriguez  4  Identify all parties in room with patient during [facility] visit:  Patient   5  After connecting through [employer], patient was identified by name and date of birth  Patient was then informed that this was a Telemedicine visit and that the exam was being conducted confidentially over secure lines  My office door was closed  No one else was in the room  Patient acknowledged consent and understanding of privacy and security of the Telemedicine visit  I informed the patient that I have reviewed their record in Epic and presented the opportunity for them to ask any questions regarding the visit today  The patient agreed to participate  This is a 21-year-old female here today for video visit  She states she started with sinus pressure and congestion several days ago  She states she is not having greenish nasal discharge  She states she has a significant history of sinus infections in sinus polyp surgery in the past   She is vaccinated for COVID x3  She denies any known exposure to COVID  No shortness of breath or chest pain  She also had a history of seasonal allergies  She denies any fever, body aches or chills  Review of Systems   Constitutional: Negative for activity change, chills and fatigue  HENT: Positive for congestion, rhinorrhea, sinus pressure and sinus pain  Respiratory: Negative for cough, shortness of breath and wheezing  Cardiovascular: Negative  Psychiatric/Behavioral: Negative  Physical Exam  Constitutional:       Appearance: Normal appearance  She is normal weight  She is not ill-appearing     HENT:      Head:      Comments: Tenderness to palpate over the maxillary and frontal sinuses on self palpation  Nose: Congestion present  Pulmonary:      Effort: Pulmonary effort is normal  No respiratory distress  Comments: No cough or audible wheezing during video visit  Skin:     Comments: No rash on head or neck   Neurological:      Mental Status: She is alert and oriented to person, place, and time  Psychiatric:         Mood and Affect: Mood normal          Behavior: Behavior normal          Thought Content: Thought content normal          Judgment: Judgment normal        Diagnoses and all orders for this visit:    Acute pansinusitis, recurrence not specified  -     amoxicillin-clavulanate (AUGMENTIN) 875-125 mg per tablet; Take 1 tablet by mouth every 12 (twelve) hours for 10 days      Patient Instructions     Start antibiotic  Take probiotic  Nasal saline flushes can be helpful  Continue with allergy medications  Follow-up with PCP or ENT if no improvement symptoms  Go to the ER with any worsening symptoms, chest pain, shortness breast or difficulty breathing  Sinusitis   WHAT YOU NEED TO KNOW:   Sinusitis is inflammation or infection of your sinuses  Sinusitis is most often caused by a virus  Acute sinusitis may last up to 12 weeks  Chronic sinusitis lasts longer than 12 weeks  Recurrent sinusitis means you have 4 or more infections in 1 year  DISCHARGE INSTRUCTIONS:   Return to the emergency department if:   · You have trouble breathing or wheezing that is getting worse  · You have a stiff neck, a fever, or a bad headache  · You cannot open your eye  · Your eyeball bulges out or you cannot move your eye  · You are more sleepy than normal, or you notice changes in your ability to think, move, or talk  · You have swelling of your forehead or scalp  Call your doctor if:   · You have vision changes, such as double vision  · Your eye and eyelid are red, swollen, and painful  · Your symptoms do not improve or go away after 10 days      · You have nausea and are vomiting  · Your nose is bleeding  · You have questions or concerns about your condition or care  Medicines: Your symptoms may go away on their own  Your healthcare provider may recommend watchful waiting for up to 10 days before starting antibiotics  You may need any of the following:  · Acetaminophen  decreases pain and fever  It is available without a doctor's order  Ask how much to take and how often to take it  Follow directions  Read the labels of all other medicines you are using to see if they also contain acetaminophen, or ask your doctor or pharmacist  Acetaminophen can cause liver damage if not taken correctly  Do not use more than 4 grams (4,000 milligrams) total of acetaminophen in one day  · NSAIDs , such as ibuprofen, help decrease swelling, pain, and fever  This medicine is available with or without a doctor's order  NSAIDs can cause stomach bleeding or kidney problems in certain people  If you take blood thinner medicine, always ask your healthcare provider if NSAIDs are safe for you  Always read the medicine label and follow directions  · Nasal steroid sprays  may help decrease inflammation in your nose and sinuses  · Decongestants  help reduce swelling and drain mucus in the nose and sinuses  They may help you breathe easier  · Antihistamines  help dry mucus in the nose and relieve sneezing  · Antibiotics  help treat or prevent a bacterial infection  · Take your medicine as directed  Contact your healthcare provider if you think your medicine is not helping or if you have side effects  Tell him or her if you are allergic to any medicine  Keep a list of the medicines, vitamins, and herbs you take  Include the amounts, and when and why you take them  Bring the list or the pill bottles to follow-up visits  Carry your medicine list with you in case of an emergency  Self-care:   · Rinse your sinuses as directed    Use a sinus rinse device to rinse your nasal passages with a saline (salt water) solution or distilled water  Do not use tap water  This will help thin the mucus in your nose and rinse away pollen and dirt  It will also help reduce swelling so you can breathe normally  · Use a humidifier  to increase air moisture in your home  This may make it easier for you to breathe and help decrease your cough  · Sleep with your head elevated  Place an extra pillow under your head before you go to sleep to help your sinuses drain  · Drink liquids as directed  Ask your healthcare provider how much liquid to drink each day and which liquids are best for you  Liquids will thin the mucus in your nose and help it drain  Avoid drinks that contain alcohol or caffeine  · Do not smoke, and avoid secondhand smoke  Nicotine and other chemicals in cigarettes and cigars can make your symptoms worse  Ask your healthcare provider for information if you currently smoke and need help to quit  E-cigarettes or smokeless tobacco still contain nicotine  Talk to your healthcare provider before you use these products  Prevent the spread of germs:   · Wash your hands often with soap and water  Wash your hands after you use the bathroom, change a child's diaper, or sneeze  Wash your hands before you prepare or eat food  · Stay away from people who are sick  Some germs spread easily and quickly through contact  Follow up with your doctor as directed: You may be referred to an ear, nose, and throat specialist  Write down your questions so you remember to ask them during your visits  © Copyright [employer] 2022 Information is for End User's use only and may not be sold, redistributed or otherwise used for commercial purposes  All illustrations and images included in CareNotes® are the copyrighted property of [employer]  or Tosin Townsend  The above information is an  only   It is not intended as medical advice for individual conditions or treatments  Talk to your doctor, nurse or pharmacist before following any medical regimen to see if it is safe and effective for you  Follow up with PCP if not improved, if symptoms are worse, go to the ER

## 2022-05-10 ENCOUNTER — PROCEDURE VISIT (OUTPATIENT)
Dept: PAIN MEDICINE | Facility: CLINIC | Age: 52
End: 2022-05-10
Payer: COMMERCIAL

## 2022-05-10 VITALS
SYSTOLIC BLOOD PRESSURE: 110 MMHG | HEART RATE: 83 BPM | WEIGHT: 211 LBS | DIASTOLIC BLOOD PRESSURE: 72 MMHG | BODY MASS INDEX: 32.08 KG/M2

## 2022-05-10 DIAGNOSIS — M62.838 MUSCLE SPASM: Primary | ICD-10-CM

## 2022-05-10 PROCEDURE — 20552 NJX 1/MLT TRIGGER POINT 1/2: CPT | Performed by: PHYSICAL MEDICINE & REHABILITATION

## 2022-05-10 PROCEDURE — 76942 ECHO GUIDE FOR BIOPSY: CPT | Performed by: PHYSICAL MEDICINE & REHABILITATION

## 2022-05-10 RX ORDER — LIDOCAINE HYDROCHLORIDE 10 MG/ML
10 INJECTION, SOLUTION INFILTRATION; PERINEURAL ONCE
Status: COMPLETED | OUTPATIENT
Start: 2022-05-10 | End: 2022-05-10

## 2022-05-10 RX ORDER — METHYLPREDNISOLONE ACETATE 40 MG/ML
40 INJECTION, SUSPENSION INTRA-ARTICULAR; INTRALESIONAL; INTRAMUSCULAR; SOFT TISSUE ONCE
Status: COMPLETED | OUTPATIENT
Start: 2022-05-10 | End: 2022-05-10

## 2022-05-10 RX ADMIN — LIDOCAINE HYDROCHLORIDE 5 ML: 10 INJECTION, SOLUTION INFILTRATION; PERINEURAL at 11:43

## 2022-05-10 RX ADMIN — METHYLPREDNISOLONE ACETATE 40 MG: 40 INJECTION, SUSPENSION INTRA-ARTICULAR; INTRALESIONAL; INTRAMUSCULAR; SOFT TISSUE at 11:44

## 2022-05-10 NOTE — PROGRESS NOTES
Indication:  Muscular pain  Preprocedure diagnosis:  1  Myofascial pain syndrome  Postprocedure diagnosis:  1  Myofascial pain syndrome    Procedure:  Ultrasound-guided bilateral thoracic paraspinal muscle trigger point injection(s)  After discussing the risks, benefits, and alternatives to the procedure, the patient expressed understanding and wished to proceed  The patient was brought to the procedure suite and placed in the prone position  A procedural pause was conducted to verify:  correct patient identity, procedure to be performed and as applicable, correct side and site, correct patient position, and availability of implants, special equipment or special requirements  A simple surgical tray was used  Prior to the procedure, the bilateral thoracic paraspinal musculature was examined with a 12 MHz linear transducer to visualize the targeted trigger points and determine the optimal needle path  Following this, the area was prepared with a ChloraPrep scrub, then re-examined using the same transducer, a sterile ultrasound transducer cover, and sterile ultrasound transducer gel  Thereafter, using ultrasound guidance, a 2 5-inch 25-gauge needle was advanced into the targeted trigger points  After visualization of the tip and negative aspiration for blood, a mixture of 1% lidocaine with 40 milligrams/milliliter Depo-Medrol was injected into the targeted trigger points  Following the injection, the needle was withdrawn  The patient tolerated the procedure well and there were no apparent complications  After an appropriate amount of observation, the patient was dismissed from the clinic in good condition under their own power

## 2022-05-16 ENCOUNTER — TELEPHONE (OUTPATIENT)
Dept: SURGICAL ONCOLOGY | Facility: CLINIC | Age: 52
End: 2022-05-16

## 2022-05-16 NOTE — TELEPHONE ENCOUNTER
Called and left message to reschedule appointment with Dr Guadalupe Hook on 5/19/22  Request she return call directly to me

## 2022-05-17 ENCOUNTER — TELEPHONE (OUTPATIENT)
Dept: PAIN MEDICINE | Facility: CLINIC | Age: 52
End: 2022-05-17

## 2022-05-17 ENCOUNTER — TELEPHONE (OUTPATIENT)
Dept: SURGICAL ONCOLOGY | Facility: CLINIC | Age: 52
End: 2022-05-17

## 2022-05-17 NOTE — TELEPHONE ENCOUNTER
Called and left another message regarding the need to reschedule her appointment with Dr Guadalupe Hook for tomorrow  Requested she return call to me directly so I may assist her in doing so

## 2022-05-31 ENCOUNTER — OFFICE VISIT (OUTPATIENT)
Dept: SURGICAL ONCOLOGY | Facility: CLINIC | Age: 52
End: 2022-05-31
Payer: COMMERCIAL

## 2022-05-31 VITALS
HEIGHT: 68 IN | WEIGHT: 219 LBS | SYSTOLIC BLOOD PRESSURE: 122 MMHG | DIASTOLIC BLOOD PRESSURE: 80 MMHG | TEMPERATURE: 98.2 F | RESPIRATION RATE: 16 BRPM | BODY MASS INDEX: 33.19 KG/M2 | OXYGEN SATURATION: 99 % | HEART RATE: 81 BPM

## 2022-05-31 DIAGNOSIS — Z17.0 MALIGNANT NEOPLASM OF LOWER-OUTER QUADRANT OF RIGHT BREAST OF FEMALE, ESTROGEN RECEPTOR POSITIVE (HCC): Primary | ICD-10-CM

## 2022-05-31 DIAGNOSIS — Z79.811 USE OF AROMATASE INHIBITORS: ICD-10-CM

## 2022-05-31 DIAGNOSIS — C50.511 MALIGNANT NEOPLASM OF LOWER-OUTER QUADRANT OF RIGHT BREAST OF FEMALE, ESTROGEN RECEPTOR POSITIVE (HCC): Primary | ICD-10-CM

## 2022-05-31 PROCEDURE — 99214 OFFICE O/P EST MOD 30 MIN: CPT | Performed by: NURSE PRACTITIONER

## 2022-05-31 NOTE — PROGRESS NOTES
Surgical Oncology Follow Up       [facility] SURGICAL ONCOLOGY [facility] [id number]    Joe Perry  1970  [id number]  [facility] SURGICAL ONCOLOGY [facility]  [phone]    Chief Complaint   Patient presents with    Follow-up       Assessment/Plan:  1  Malignant neoplasm of lower-outer quadrant of right breast of female, estrogen receptor positive ([address] )  - 6 mo f/u visit with Dr Scottie Rojas    2  Use of aromatase inhibitors  - Continue use per medical oncology    Discussion/Summary:  Patient is a 74-year-old female who presents today for follow-up visit for right breast cancer diagnosed in June of 2018  Her pathology revealed invasive ductal carcinoma, ER/%, her 2-  She underwent genetic testing which was negative  She underwent a bilateral mastectomy with Dr Scottie Rojas and had reconstruction with Dr Niru Najera   She underwent a bilateral oophorectomy and continues on anastrozole  Her only complaint today is left sided chest wall tenderness  She reports tenderness with palpation of the left chest wall musculature  Recommended heat and stretching  She is not interested in a referral back to PT at this time  She continues to follow with her back specialist for injections  No worrisome breast findings  We will plan to see her back in 6 months or sooner should the need arise  She was instructed to call with any new concerns her symptoms prior to that time  All of her questions were answered today  History of Present Illness:     Oncology History    No history exists         -Interval History:  Patient presents today for follow-up visit for right breast cancer  She notices no changes on her breast exam but does report tenderness on her left lateral chest wall extending into her back    She reports chronic back pain and follows with a specialist   Denies persistent headaches, cough shortness of breath, abdominal pain  She underwent a cholecystectomy since her last visit with us  Review of Systems:  Review of Systems   Constitutional: Negative for activity change, appetite change, chills, fatigue, fever and unexpected weight change  Respiratory: Negative for cough and shortness of breath  Cardiovascular: Negative for chest pain  Gastrointestinal: Negative for abdominal pain, constipation, diarrhea, nausea and vomiting  Musculoskeletal: Positive for back pain  Negative for arthralgias, gait problem and myalgias  Skin: Negative for color change and rash  Neurological: Negative for dizziness and headaches  Hematological: Negative for adenopathy  Psychiatric/Behavioral: Negative for agitation and confusion  All other systems reviewed and are negative  Patient Active Problem List   Diagnosis    Family history of breast cancer in female   Lizbeth Mon Ashkenazi Sikh ancestry requiring population-specific genetic screening    Malignant neoplasm of lower-outer quadrant of right breast of female, estrogen receptor positive ([facility] [address] )    UTI (urinary tract infection)    Status post bilateral salpingo-oophorectomy (BSO)    Use of aromatase inhibitors    Deformity of reconstructed breast    Nasal polyposis    History of gastric bypass    BRCA negative    DDD (degenerative disc disease), cervical    Breast variant    H/O bariatric surgery    Other chronic sinusitis    Suspected sleep apnea    Muscle spasm    KRISHNA (obstructive sleep apnea)    Snoring    Thrombocytosis    Insomnia    Osteopenia of hip    Positive CLAUDIA (antinuclear antibody)     Past Medical History:   Diagnosis Date    Abnormal Pap smear of cervix     Ashkenazi Sikh ancestry requiring population-specific genetic screening     Asthma     Breathing difficulty     after gastric bypass 2014-"felt elephant on chest"    Chest pain, non-cardiac     Seen in ER and is from esophageal spasms      Constipation     occasional  Family history of breast cancer in female     GERD (gastroesophageal reflux disease)     History of MRSA infection     Leg    History of UTI     treated 8/1/2018    Human papilloma virus (HPV) infection     Hx MRSA infection     on leg-2009 treated    Pollen allergies     PONV (postoperative nausea and vomiting)     nausea    Postural lightheadedness     Raynaud's disease     Seasonal allergies     Sinus problem     Sinus problem      Past Surgical History:   Procedure Laterality Date    BILATERAL OOPHORECTOMY      BLADDER SURGERY  2013    bladder lift    BREAST BIOPSY Right 06/14/2018    [facility]    CERVIX LESION DESTRUCTION      COLONOSCOPY      COLPOSCOPY W/ BIOPSY / CURETTAGE      ENDOMETRIAL ABLATION      GASTRIC BYPASS  2014 2014 wt loss 90 lbs(regained 20 lbs)    HYSTEROSCOPIC STERILIZATION W/ IMPLANTS      HYSTEROSCOPY W/ ENDOMETRIAL ABLATION  2013    KNEE ARTHROSCOPY Right 1990    LYMPH NODE BIOPSY Right 8/10/2018    Procedure: BIOPSY LYMPH NODE SENTINEL;  Surgeon: Bubba Russo MD;  [facility];  Service: Surgical Oncology    MAMMO STEREOTACTIC BREAST BIOPSY RIGHT (ALL INC) Right 6/14/2018    MASTECTOMY Bilateral     NASAL SEPTUM SURGERY      2002, 2016    DC IMPLNT BIO IMPLNT FOR SOFT TISSUE REINFORCEMENT Bilateral 8/10/2018    Procedure: RECONSTRUCTION BREAST W/ IMPLANT;  Surgeon: Jovani Escobar MD;  [facility];  Service: Plastics    DC INSJ/RPLCMT BREAST IMPLANT SEP DAY MASTECTOMY Bilateral 11/30/2018    Procedure: BREAST IMPLANT EXCHANGE;  Surgeon: Jovani Escobar MD;  [facility];  Service: Plastics    DC LAP,CHOLECYSTECTOMY/GRAPH N/A 3/19/2021    Procedure: CHOLECYSTECTOMY LAPAROSCOPIC W/ INTRAOP CHOLANGIOGRAM;  Surgeon: Ladarius Armstrong MD;  Location: [address] [place] [address] MAIN OR;  Service: General    DC LAP,RMV  ADNEXAL STRUCTURE N/A 8/10/2018    Procedure: SALPINGO-OOPHORECTOMY, LAPAROSCOPIC; PELVIC WASHINGS ;  Surgeon: Cecille Perez MD;  [facility]; Service: Gynecology Oncology    AR MASTECTOMY, SIMPLE, COMPLETE Bilateral 8/10/2018    Procedure: MASTECTOMY SIMPLE;  Surgeon: Kemar Jimenez MD;  [facility];  Service: Surgical Oncology    AR LENNIE-IMPLANT CAPSULECTOMY BREAST COMPLETE Bilateral 11/30/2018    Procedure: CAPSULECTOMY;  Surgeon: Sharla Ling MD;  [facility];  Service: Plastics    AR REVISION OF RECONSTRUCTED BREAST Bilateral 2/22/2019    Procedure: BREAST MOUND REVISION; FAT GRAFTING;  Surgeon: Sharla Ling MD;  [facility];  Service: Plastics    AR TISSUE EXPANDER PLACEMENT BREAST RECONSTRUCTION Bilateral 8/10/2018    Procedure: INSERTION/PLACEMENT TISSUE EXPANDER (EXCHANGE); Surgeon: Sharla Ling MD;  [facility];  Service: Plastics    SINUS SURGERY       Family History   Problem Relation Age of Onset    Other Mother         BRCA negative    Breast cancer Mother 72    Ovarian cancer Mother 61    Diabetes Father     Heart disease Father     Hypertension Father     Migraines Sister     Diabetes Brother     Colon cancer Maternal Grandfather 80    Diabetes type II Maternal Grandfather     Heart disease Paternal Grandfather     Colon cancer Maternal Aunt 62    Skin cancer Maternal Aunt      Social History     Socioeconomic History    Marital status: Single     Spouse name: Not on file    Number of children: 2    Years of education: Not on file    Highest education level: Not on file   Occupational History    Not on file   Tobacco Use    Smoking status: Never Smoker    Smokeless tobacco: Never Used   Vaping Use    Vaping Use: Never used   Substance and Sexual Activity    Alcohol use: Yes     Alcohol/week: 4 0 standard drinks     Types: 4 Glasses of wine per week     Comment: 2 weekly  -4  glasses total    Drug use: Yes     Types: Marijuana     Comment: edible- last time 11/23/2018      Sexual activity: Not Currently   Other Topics Concern    Not on file   Social History Narrative    Uses safety equipment, seatbelts     Social Determinants of Health     Financial Resource Strain: Not on file   Food Insecurity: Not on file   Transportation Needs: Not on file   Physical Activity: Not on file   Stress: Not on file   Social Connections: Not on file   Intimate Partner Violence: Not on file   Housing Stability: Not on file       Current Outpatient Medications:     acetaminophen (TYLENOL) 325 mg tablet, Take 650 mg by mouth every 6 (six) hours as needed for mild pain, Disp: , Rfl:     anastrozole (ARIMIDEX) 1 mg tablet, TAKE 1 TABLET DAILY, Disp: 90 tablet, Rfl: 3    BIOTIN PO, Take 1 tablet by mouth every morning, Disp: , Rfl:     Calcium Carb-Cholecalciferol 1000-800 MG-UNIT TABS, calcium, Disp: , Rfl:     calcium-vitamin D 250-100 MG-UNIT per tablet, Take 1 tablet by mouth, Disp: , Rfl:     cyanocobalamin 50 MCG tablet, Take 50 mcg by mouth, Disp: , Rfl:     Ergocalciferol (VITAMIN D2) 2000 units TABS, Vitamin D2 50,000 unit capsule, Disp: , Rfl:     ferrous sulfate 325 (65 Fe) mg tablet, Daily, Disp: , Rfl:     Influenza Vac Split Quad (AFLURIA QUADRIVALENT IM), Afluria Quad 5865-1803 (PF) 60 mcg (15 mcg x 4)/0 5 mL IM syringe  TO BE ADMINISTERED BY PHARMACIST FOR IMMUNIZATION, Disp: , Rfl:     Inulin-Calcium-Vitamin D 2-250-100 GM-MG-UNIT CHEW, Fiber Plus Mulitvitamin, Disp: , Rfl:     Lifitegrast (XIIDRA OP), Apply 1 vial to eye 2 (two) times a day, Disp: , Rfl:     loratadine (CLARITIN) 10 mg tablet, Take 10 mg by mouth as needed  , Disp: , Rfl:     methocarbamol (ROBAXIN) 500 mg tablet, Take 500 mg by mouth 3 (three) times a day  , Disp: , Rfl:     montelukast (SINGULAIR) 10 mg tablet, Take 10 mg by mouth as needed  , Disp: , Rfl:     multivitamin (THERAGRAN) TABS, Take 1 tablet by mouth every morning  , Disp: , Rfl:     Naproxen Sodium 220 MG CAPS, as needed, Disp: , Rfl:     pantoprazole (PROTONIX) 40 mg tablet, Take 40 mg by mouth every morning  , Disp: , Rfl:     phentermine 15 MG capsule,  , Disp: , Rfl:     pyridoxine (VITAMIN B6) 100 mg tablet, Take 100 mg by mouth, Disp: , Rfl:     tiZANidine (Zanaflex) 4 mg tablet, Take 0 5 tablets (2 mg total) by mouth every 8 (eight) hours as needed for muscle spasms, Disp: 60 tablet, Rfl: 1    topiramate (TOPAMAX) 25 mg tablet, , Disp: , Rfl:     Xhance 93 MCG/ACT EXHU, INSTILL 1 SPRAY PER NOSTRIL TWICE A DAY, Disp: 16 mL, Rfl: 4    zolpidem (AMBIEN) 5 mg tablet, Take 1 tablet (5 mg total) by mouth daily at bedtime as needed for sleep, Disp: 30 tablet, Rfl: 3  Allergies   Allergen Reactions    Adhesive [Medical Tape] Rash     And small blister at sight     Vitals:    05/31/22 0817   BP: 122/80   Pulse: 81   Resp: 16   Temp: 98 2 °F (36 8 °C)   SpO2: 99%       Physical Exam  Vitals reviewed  Constitutional:       General: She is not in acute distress  Appearance: Normal appearance  She is well-developed  She is not diaphoretic  HENT:      Head: Normocephalic and atraumatic  Cardiovascular:      Rate and Rhythm: Normal rate and regular rhythm  Heart sounds: Normal heart sounds  Pulmonary:      Effort: Pulmonary effort is normal       Breath sounds: Normal breath sounds  Chest:      Chest wall: Tenderness (Left lateral chest wall) present  Breasts:      Right: No axillary adenopathy or supraclavicular adenopathy  Left: No axillary adenopathy or supraclavicular adenopathy  Comments: Bilateral reconstructed breast with implant present  No masses, skin nodules or changes  Abdominal:      Palpations: Abdomen is soft  There is no mass  Tenderness: There is no abdominal tenderness  Musculoskeletal:         General: Normal range of motion  Cervical back: Normal range of motion  Lymphadenopathy:      Upper Body:      Right upper body: No supraclavicular or axillary adenopathy  Left upper body: No supraclavicular or axillary adenopathy  Skin:     General: Skin is warm and dry  Findings: No rash  Neurological:      Mental Status: She is alert and oriented to person, place, and time  Psychiatric:         Speech: Speech normal            Advance Care Planning/Advance Directives:  Discussed disease status, cancer treatment plans and/or cancer treatment goals with the patient

## 2022-06-09 ENCOUNTER — ANNUAL EXAM (OUTPATIENT)
Dept: OBGYN CLINIC | Facility: CLINIC | Age: 52
End: 2022-06-09
Payer: COMMERCIAL

## 2022-06-09 VITALS
BODY MASS INDEX: 31.95 KG/M2 | DIASTOLIC BLOOD PRESSURE: 78 MMHG | HEIGHT: 68 IN | WEIGHT: 210.8 LBS | SYSTOLIC BLOOD PRESSURE: 122 MMHG

## 2022-06-09 DIAGNOSIS — Z01.419 ENCOUNTER FOR ANNUAL ROUTINE GYNECOLOGICAL EXAMINATION: Primary | ICD-10-CM

## 2022-06-09 DIAGNOSIS — C50.511 MALIGNANT NEOPLASM OF LOWER-OUTER QUADRANT OF RIGHT BREAST OF FEMALE, ESTROGEN RECEPTOR POSITIVE (HCC): ICD-10-CM

## 2022-06-09 DIAGNOSIS — Z12.4 CERVICAL CANCER SCREENING: ICD-10-CM

## 2022-06-09 DIAGNOSIS — Z17.0 MALIGNANT NEOPLASM OF LOWER-OUTER QUADRANT OF RIGHT BREAST OF FEMALE, ESTROGEN RECEPTOR POSITIVE (HCC): ICD-10-CM

## 2022-06-09 PROCEDURE — G0476 HPV COMBO ASSAY CA SCREEN: HCPCS | Performed by: OBSTETRICS & GYNECOLOGY

## 2022-06-09 PROCEDURE — G0124 SCREEN C/V THIN LAYER BY MD: HCPCS | Performed by: PATHOLOGY

## 2022-06-09 PROCEDURE — G0145 SCR C/V CYTO,THINLAYER,RESCR: HCPCS | Performed by: PATHOLOGY

## 2022-06-09 PROCEDURE — S0612 ANNUAL GYNECOLOGICAL EXAMINA: HCPCS | Performed by: OBSTETRICS & GYNECOLOGY

## 2022-06-09 NOTE — PROGRESS NOTES
CC:  Annual exam    HPI: Arturo Alfredo presents for annual gyn exam   Olegario Ramirez is doing well and offers no complaints or concerns at this time  She continues to follow-up with Dr Mari Barnes because of the history of breast cancer  Past Medical History:  Past Medical History:   Diagnosis Date    Abnormal Pap smear of cervix     Ashkenazi Hindu ancestry requiring population-specific genetic screening     Asthma     Breathing difficulty     after gastric bypass 2014-"felt elephant on chest"    Chest pain, non-cardiac     Seen in ER and is from esophageal spasms      Constipation     occasional    Family history of breast cancer in female     GERD (gastroesophageal reflux disease)     History of MRSA infection     Leg    History of UTI     treated 8/1/2018    Human papilloma virus (HPV) infection     Hx MRSA infection     on leg-2009 treated    Pollen allergies     PONV (postoperative nausea and vomiting)     nausea    Postural lightheadedness     Raynaud's disease     Seasonal allergies     Sinus problem     Sinus problem        Past Surgical History:  Past Surgical History:   Procedure Laterality Date    BILATERAL OOPHORECTOMY      BLADDER SURGERY  2013    bladder lift    BREAST BIOPSY Right 06/14/2018    [facility]    CERVIX LESION DESTRUCTION      COLONOSCOPY      COLPOSCOPY W/ BIOPSY / CURETTAGE      ENDOMETRIAL ABLATION      GASTRIC BYPASS  2014 2014 wt loss 90 lbs(regained 20 lbs)    HYSTEROSCOPIC STERILIZATION W/ IMPLANTS      HYSTEROSCOPY W/ ENDOMETRIAL ABLATION  2013    KNEE ARTHROSCOPY Right 1990    LYMPH NODE BIOPSY Right 8/10/2018    Procedure: BIOPSY LYMPH NODE SENTINEL;  Surgeon: Antonia Addison MD;  [facility];  Service: Surgical Oncology    MAMMO STEREOTACTIC BREAST BIOPSY RIGHT (ALL INC) Right 6/14/2018    MASTECTOMY Bilateral     NASAL SEPTUM SURGERY      2002, 2016    AR IMPLNT BIO IMPLNT FOR SOFT TISSUE REINFORCEMENT Bilateral 8/10/2018    Procedure: RECONSTRUCTION BREAST W/ IMPLANT;  Surgeon: Evie Rosales MD;  [facility];  Service: Plastics    DE INSJ/RPLCMT BREAST IMPLANT SEP DAY MASTECTOMY Bilateral 11/30/2018    Procedure: BREAST IMPLANT EXCHANGE;  Surgeon: Evie Rosales MD;  [facility];  Service: Plastics    DE LAP,CHOLECYSTECTOMY/GRAPH N/A 3/19/2021    Procedure: CHOLECYSTECTOMY LAPAROSCOPIC W/ INTRAOP CHOLANGIOGRAM;  Surgeon: Andrez Stern MD;  Location:  [facility];  Service: General    DE LAP,RMV  ADNEXAL STRUCTURE N/A 8/10/2018    Procedure: SALPINGO-OOPHORECTOMY, LAPAROSCOPIC; PELVIC WASHINGS ;  Surgeon: Leatha Staples MD;  [facility];  Service: Gynecology Oncology    DE MASTECTOMY, SIMPLE, COMPLETE Bilateral 8/10/2018    Procedure: MASTECTOMY SIMPLE;  Surgeon: Arlen Novoa MD;  [facility];  Service: Surgical Oncology    DE LENNIE-IMPLANT CAPSULECTOMY BREAST COMPLETE Bilateral 11/30/2018    Procedure: CAPSULECTOMY;  Surgeon: Evie Rosales MD;  [facility];  Service: Plastics    DE REVISION OF RECONSTRUCTED BREAST Bilateral 2/22/2019    Procedure: BREAST MOUND REVISION; FAT GRAFTING;  Surgeon: Evie Rosales MD;  [facility];  Service: Plastics    DE TISSUE EXPANDER PLACEMENT BREAST RECONSTRUCTION Bilateral 8/10/2018    Procedure: INSERTION/PLACEMENT TISSUE EXPANDER (EXCHANGE); Surgeon: Evie Rosales MD;  [facility];  Service: Plastics    SINUS SURGERY         Past OB/Gyn History:  Patient is surgically menopausal, status post BSO in 2018  She denies any history of sexually transmitted infection  No history of abnormal pap smears  Her last pap smear was 6 months ago and demonstrated continuation of atypical squamous cells, and non 16/18 positive HPV      ALLERGIES:   Allergies   Allergen Reactions    Adhesive [Medical Tape] Rash     And small blister at sight       MEDS:   Current Outpatient Medications:     acetaminophen (TYLENOL) 325 mg tablet    anastrozole (ARIMIDEX) 1 mg tablet   BIOTIN PO    Calcium Carb-Cholecalciferol 1000-800 MG-UNIT TABS    calcium-vitamin D 250-100 MG-UNIT per tablet    cyanocobalamin 50 MCG tablet    Ergocalciferol (VITAMIN D2) 2000 units TABS    ferrous sulfate 325 (65 Fe) mg tablet    Influenza Vac Split Quad (AFLURIA QUADRIVALENT IM)    Inulin-Calcium-Vitamin D 2-250-100 GM-MG-UNIT CHEW    Lifitegrast (XIIDRA OP)    loratadine (CLARITIN) 10 mg tablet    methocarbamol (ROBAXIN) 500 mg tablet    montelukast (SINGULAIR) 10 mg tablet    multivitamin (THERAGRAN) TABS    Naproxen Sodium 220 MG CAPS    pantoprazole (PROTONIX) 40 mg tablet    phentermine 15 MG capsule    pyridoxine (VITAMIN B6) 100 mg tablet    tiZANidine (Zanaflex) 4 mg tablet    topiramate (TOPAMAX) 25 mg tablet    Xhance 93 MCG/ACT EXHU    zolpidem (AMBIEN) 5 mg tablet    Family History:  Family History   Problem Relation Age of Onset    Other Mother         BRCA negative    Breast cancer Mother 72    Ovarian cancer Mother 61    Diabetes Father     Heart disease Father     Hypertension Father     Migraines Sister     Diabetes Brother     Colon cancer Maternal Grandfather 80    Diabetes type II Maternal Grandfather     Heart disease Paternal Grandfather     Colon cancer Maternal Aunt 62    Skin cancer Maternal Aunt        Social History:  Social History     Socioeconomic History    Marital status: Single     Spouse name: Not on file    Number of children: 2    Years of education: Not on file    Highest education level: Not on file   Occupational History    Not on file   Tobacco Use    Smoking status: Never Smoker    Smokeless tobacco: Never Used   Vaping Use    Vaping Use: Never used   Substance and Sexual Activity    Alcohol use: Yes     Alcohol/week: 4 0 standard drinks     Types: 4 Glasses of wine per week     Comment: 2 weekly  -4  glasses total    Drug use: Yes     Types: Marijuana     Comment: edible- last time 11/23/2018      Sexual activity: Not Currently   Other Topics Concern    Not on file   Social History Narrative    Uses safety equipment, seatbelts     Social Determinants of Health     Financial Resource Strain: Not on file   Food Insecurity: Not on file   Transportation Needs: Not on file   Physical Activity: Not on file   Stress: Not on file   Social Connections: Not on file   Intimate Partner Violence: Not on file   Housing Stability: Not on file         Review of Systems:  Gen:   Denies fatigue, chills, nausea, vomiting, fever  Skin: No rashes or discolorations of any concern  RESP: Denies SOB, no cough  CV: Denies chest pain or palpitations  Breasts: Denies masses, pain, skin changes and nipple discharge  GI: Denies abdominal pain, heartburn, nausea, vomiting, changes in bowel habits  : Denies dysuria, frequency, CVA tenderness, incontinence and hematuria  Genitalia: Denies abnormal vaginal discharge, external lesions, rashes, pelvic pain, pressure, abnormal bleeding  Rectal:  Denies pain, bleeding, hemorrhoids,    Physical Exam:  /78   Ht 5' 8" (1 727 m)   Wt 95 6 kg (210 lb 12 8 oz)   LMP 07/31/2018   BMI 32 05 kg/m²    Gen: The patient was alert and oriented x3, pleasant well-appearing female in no acute distress  Neck:  Unremarkable, no lymphadenopathy, no thyromegaly, or tenderness  CV:  RRR, no murmurs  Resp:  Clear to auscultation bilaterally, no wheezing  Breasts: Symmetric  Bilateral mastectomy with reconstruction bilaterally noted  There are no dominant, discrete, fixed  or suspicious masses are noted  No skin or nipple changes  No palpable axillary nodes  No supraclavicular adenopathy  Abd:  Soft, nontender, nondistended, no masses or organomegaly  Back:  No CVA tenderness, no tenderness to palpation along spine  Pelvic:  Normal appearing external female genitalia, no visible lesions, no rashes   Vagina is free of discharge, normal vaginal epithelium, no abnormal  lesions, no evidence of prolapse anteriorly or posteriorly  Normal appearing cervix, mobile and nontender  A thin prep pap smear was obtained today  Uterus is normal size, mobile and, nontender  No palpable adnexal masses or tenderness  No anoperineal lesions  Rectal:  No masses, tenderness, hemorrhoids, or obvious blood  Skin:  No concerning lesions  Extremeties: No edema      Assessment & Plan:   1  Routine annual exam      RTO one year orPRN  2  History of breast cancer, continued follow-up with surgical and medical oncology  3  Chronic atypical cells with chronic non 16/18 HPV

## 2022-06-16 LAB
LAB AP GYN PRIMARY INTERPRETATION: ABNORMAL
Lab: ABNORMAL
PATH INTERP SPEC-IMP: ABNORMAL

## 2022-06-17 ENCOUNTER — TELEPHONE (OUTPATIENT)
Dept: OBGYN CLINIC | Facility: CLINIC | Age: 52
End: 2022-06-17

## 2022-06-17 NOTE — TELEPHONE ENCOUNTER
Please contact the patient  Her Pap did have atypical cells with positive HPV, needs colposcopy  Please arrange for this  Additionally, she notes some type of pelvic cramping  I would suggest at the post colposcopy follow-up visit normally done 2-3 weeks after the colposcopy that we have ultrasound with Melanie vital to evaluate  Thanks, Eva Brunson MD    ----- Message from Salvador Hunt sent at 6/15/2022  2:30 PM EDT -----  Regarding: FW:  Another Positive HPV test    ----- Message -----  From: Gael Ritter  Sent: 6/15/2022  11:13 AM EDT  To: [facility]  Subject:  Another Positive HPV test                       I already had the hole punch of my cervix 2x (what ever you call that procedure for biopsy) - I once had the freezing of my cervix for treatment  I would like an abdominal scan of some sort - to rule out anything - I do have random pain - that comes and goes - i just had my yearly gyn with Dr Judy Robertson he would be retired by the time of my results -  (recently had colonoscopy so all good there- a few polys- but family hx)  Breast cancer -hx - double mastectomy - dr Sidney Balbuena  vetoed me and refused to do full hysterectomy and only took my ovaries and tubes     Ricardo Fuentes now still have to deal with cervix BS  I also had my gallbladder removed about a year ago - all in my chart    Family hx - colon/ovarian/breast cancer - all brca negative

## 2022-06-22 NOTE — TELEPHONE ENCOUNTER
Second message-patient needs colposcopy in follow-up to Pap with ASCUS with positive HPV  Additionally, has pelvic cramping  Would arrange for ultrasound at the follow-up visit about 3 weeks after colposcopy

## 2022-06-28 DIAGNOSIS — Z98.84 BARIATRIC SURGERY STATUS: Primary | ICD-10-CM

## 2022-06-29 RX ORDER — TOPIRAMATE 25 MG/1
TABLET ORAL
Qty: 180 TABLET | Refills: 3 | Status: SHIPPED | OUTPATIENT
Start: 2022-06-29

## 2022-08-05 ENCOUNTER — OFFICE VISIT (OUTPATIENT)
Dept: PAIN MEDICINE | Facility: CLINIC | Age: 52
End: 2022-08-05
Payer: COMMERCIAL

## 2022-08-05 VITALS
DIASTOLIC BLOOD PRESSURE: 76 MMHG | SYSTOLIC BLOOD PRESSURE: 120 MMHG | HEIGHT: 68 IN | WEIGHT: 210 LBS | BODY MASS INDEX: 31.83 KG/M2

## 2022-08-05 DIAGNOSIS — M79.18 MYOFASCIAL PAIN SYNDROME: Primary | ICD-10-CM

## 2022-08-05 DIAGNOSIS — M62.838 MUSCLE SPASM: ICD-10-CM

## 2022-08-05 PROCEDURE — 99214 OFFICE O/P EST MOD 30 MIN: CPT | Performed by: PHYSICAL MEDICINE & REHABILITATION

## 2022-08-05 RX ORDER — MELOXICAM 15 MG/1
15 TABLET ORAL DAILY
Qty: 30 TABLET | Refills: 1 | Status: SHIPPED | OUTPATIENT
Start: 2022-08-05

## 2022-08-05 NOTE — PROGRESS NOTES
Pain Medicine Follow-Up Note    Assessment:  1  Myofascial pain syndrome    2  Muscle spasm        Plan:  Ms Radha Lopez is a pleasant 51-year-old female who presents for follow-up and re-evaluation regarding mid to low back pain bilaterally  She had significant greater than 50% relief in her pain after last trigger point injection in May of 2022, however, she went on a recent kayaking adventure and reports a fall while leaning on a tree that broke with subsequent rolling down a hill and suffering worsening right-sided low back pain  During today's evaluation she is demonstrating clinical evidence of myofascial pain syndrome of the bilateral thoracic paraspinals and right side of her ribs  At this time we will   1  Order meloxicam 15 mg daily  2  Will plan for bilateral thoracic paraspinal trigger point injections under ultrasound guidance   3  Complete risks and benefits including bleeding, infection, tissue reaction, nerve injury and allergic reaction were discussed  The approach was demonstrated using models and literature was provided  Verbal and written consent was obtained  History of Present Illness:    Travis Riddle is a 50 y o  female who presents to [facility] for interval re-evaluation of the above stated pain complaints  The patient has a past medical and chronic pain history as outlined in the assessment section  Today patient reports 6 to 8/10 pain that is described as a constant throbbing, aching sensation that is worse in the morning in the evening  Presents today for follow-up    Other than as stated above, the patient denies any interval changes in medications, medical condition, mental condition, symptoms, or allergies since the last office visit  Review of Systems:    Review of Systems   Constitutional: Negative  HENT: Negative  Eyes: Negative  Respiratory: Negative  Cardiovascular: Negative      Gastrointestinal: Positive for constipation and diarrhea  Endocrine: Negative  Genitourinary: Negative  Musculoskeletal: Positive for back pain  Skin: Negative  Allergic/Immunologic: Positive for environmental allergies  Neurological: Positive for numbness  Psychiatric/Behavioral: Negative  Patient Active Problem List   Diagnosis    Family history of breast cancer in female   Mavis Cousin Ashkenazi Hoahaoism ancestry requiring population-specific genetic screening    Malignant neoplasm of lower-outer quadrant of right breast of female, estrogen receptor positive ([address] )    UTI (urinary tract infection)    Status post bilateral salpingo-oophorectomy (BSO)    Use of aromatase inhibitors    Deformity of reconstructed breast    Nasal polyposis    History of gastric bypass    BRCA negative    DDD (degenerative disc disease), cervical    Breast variant    H/O bariatric surgery    Other chronic sinusitis    Suspected sleep apnea    Muscle spasm    KRISHNA (obstructive sleep apnea)    Snoring    Thrombocytosis    Insomnia    Osteopenia of hip    Positive CLAUDIA (antinuclear antibody)       Past Medical History:   Diagnosis Date    Abnormal Pap smear of cervix     Ashkenazi Hoahaoism ancestry requiring population-specific genetic screening     Asthma     Breathing difficulty     after gastric bypass 2014-"felt elephant on chest"    Chest pain, non-cardiac     Seen in ER and is from esophageal spasms      Constipation     occasional    Family history of breast cancer in female     GERD (gastroesophageal reflux disease)     History of MRSA infection     Leg    History of UTI     treated 8/1/2018    Human papilloma virus (HPV) infection     Hx MRSA infection     on leg-2009 treated    Pollen allergies     PONV (postoperative nausea and vomiting)     nausea    Postural lightheadedness     Raynaud's disease     Seasonal allergies     Sinus problem     Sinus problem        Past Surgical History:   Procedure Laterality Date    BILATERAL OOPHORECTOMY      BLADDER SURGERY  2013    bladder lift    BREAST BIOPSY Right 06/14/2018    [facility]    CERVIX LESION DESTRUCTION      COLONOSCOPY      COLPOSCOPY W/ BIOPSY / CURETTAGE      ENDOMETRIAL ABLATION      GASTRIC BYPASS  2014 2014 wt loss 90 lbs(regained 20 lbs)    HYSTEROSCOPIC STERILIZATION W/ IMPLANTS      HYSTEROSCOPY W/ ENDOMETRIAL ABLATION  2013    KNEE ARTHROSCOPY Right 1990    LYMPH NODE BIOPSY Right 8/10/2018    Procedure: BIOPSY LYMPH NODE SENTINEL;  Surgeon: Junior Álvarez MD;  [facility];  Service: Surgical Oncology    MAMMO STEREOTACTIC BREAST BIOPSY RIGHT (ALL INC) Right 6/14/2018    MASTECTOMY Bilateral     NASAL SEPTUM SURGERY      2002, 2016    VT IMPLNT BIO IMPLNT FOR SOFT TISSUE REINFORCEMENT Bilateral 8/10/2018    Procedure: RECONSTRUCTION BREAST W/ IMPLANT;  Surgeon: Brenda Hobbs MD;  [facility];  Service: Plastics    VT INSJ/RPLCMT BREAST IMPLANT SEP DAY MASTECTOMY Bilateral 11/30/2018    Procedure: BREAST IMPLANT EXCHANGE;  Surgeon: Brenda Hobbs MD;  [facility];  Service: Plastics    VT LAP,CHOLECYSTECTOMY/GRAPH N/A 3/19/2021    Procedure: CHOLECYSTECTOMY LAPAROSCOPIC W/ INTRAOP CHOLANGIOGRAM;  Surgeon: Shane Alfonso MD;  Location:  [facility];  Service: General    VT LAP,RMV  ADNEXAL STRUCTURE N/A 8/10/2018    Procedure: SALPINGO-OOPHORECTOMY, LAPAROSCOPIC; PELVIC WASHINGS ;  Surgeon: Cristy Serrano MD;  [facility];  Service: Gynecology Oncology    VT MASTECTOMY, SIMPLE, COMPLETE Bilateral 8/10/2018    Procedure: MASTECTOMY SIMPLE;  Surgeon: Junior Álvarez MD;  [facility];  Service: Surgical Oncology    VT LENNIE-IMPLANT CAPSULECTOMY BREAST COMPLETE Bilateral 11/30/2018    Procedure: CAPSULECTOMY;  Surgeon: Brenda Hobbs MD;  [facility];  Service: Plastics    VT REVISION OF RECONSTRUCTED BREAST Bilateral 2/22/2019    Procedure: BREAST MOUND REVISION; FAT GRAFTING;  Surgeon: Brenda Hobbs MD;  [facility]; Service: Plastics    SD TISSUE EXPANDER PLACEMENT BREAST RECONSTRUCTION Bilateral 8/10/2018    Procedure: INSERTION/PLACEMENT TISSUE EXPANDER (EXCHANGE); Surgeon: Neha Carcamo MD;  [facility];  Service: Plastics    SINUS SURGERY         Family History   Problem Relation Age of Onset    Other Mother         BRCA negative    Breast cancer Mother 72    Ovarian cancer Mother 61    Diabetes Father     Heart disease Father     Hypertension Father     Migraines Sister     Diabetes Brother     Colon cancer Maternal Grandfather 80    Diabetes type II Maternal Grandfather     Heart disease Paternal Grandfather     Colon cancer Maternal Aunt 62    Skin cancer Maternal Aunt        Social History     Occupational History    Not on file   Tobacco Use    Smoking status: Never Smoker    Smokeless tobacco: Never Used   Vaping Use    Vaping Use: Never used   Substance and Sexual Activity    Alcohol use: Yes     Alcohol/week: 4 0 standard drinks     Types: 4 Glasses of wine per week     Comment: 2 weekly  -4  glasses total    Drug use: Yes     Types: Marijuana     Comment: edible- last time 11/23/2018      Sexual activity: Not Currently         Current Outpatient Medications:     meloxicam (Mobic) 15 mg tablet, Take 1 tablet (15 mg total) by mouth daily, Disp: 30 tablet, Rfl: 1    acetaminophen (TYLENOL) 325 mg tablet, Take 650 mg by mouth every 6 (six) hours as needed for mild pain, Disp: , Rfl:     anastrozole (ARIMIDEX) 1 mg tablet, TAKE 1 TABLET DAILY, Disp: 90 tablet, Rfl: 3    BIOTIN PO, Take 1 tablet by mouth every morning, Disp: , Rfl:     Calcium Carb-Cholecalciferol 1000-800 MG-UNIT TABS, calcium, Disp: , Rfl:     calcium-vitamin D 250-100 MG-UNIT per tablet, Take 1 tablet by mouth, Disp: , Rfl:     cyanocobalamin 50 MCG tablet, Take 50 mcg by mouth, Disp: , Rfl:     Ergocalciferol (VITAMIN D2) 2000 units TABS, Vitamin D2 50,000 unit capsule, Disp: , Rfl:     ferrous sulfate 325 (65 Fe) mg tablet, Daily, Disp: , Rfl:     Influenza Vac Split Quad (AFLURIA QUADRIVALENT IM), Afluria Quad 0230-5273 (PF) 60 mcg (15 mcg x 4)/0 5 mL IM syringe  TO BE ADMINISTERED BY PHARMACIST FOR IMMUNIZATION, Disp: , Rfl:     Inulin-Calcium-Vitamin D 2-250-100 GM-MG-UNIT CHEW, Fiber Plus Mulitvitamin, Disp: , Rfl:     Lifitegrast (Seda Odor OP), Apply 1 vial to eye 2 (two) times a day, Disp: , Rfl:     loratadine (CLARITIN) 10 mg tablet, Take 10 mg by mouth as needed  , Disp: , Rfl:     methocarbamol (ROBAXIN) 500 mg tablet, Take 500 mg by mouth 3 (three) times a day  , Disp: , Rfl:     montelukast (SINGULAIR) 10 mg tablet, Take 10 mg by mouth as needed  , Disp: , Rfl:     multivitamin (THERAGRAN) TABS, Take 1 tablet by mouth every morning  , Disp: , Rfl:     Naproxen Sodium 220 MG CAPS, as needed, Disp: , Rfl:     pantoprazole (PROTONIX) 40 mg tablet, Take 40 mg by mouth every morning  , Disp: , Rfl:     phentermine 15 MG capsule,  , Disp: , Rfl:     pyridoxine (VITAMIN B6) 100 mg tablet, Take 100 mg by mouth, Disp: , Rfl:     tiZANidine (Zanaflex) 4 mg tablet, Take 0 5 tablets (2 mg total) by mouth every 8 (eight) hours as needed for muscle spasms, Disp: 60 tablet, Rfl: 1    topiramate (TOPAMAX) 25 mg tablet, TAKE 1 TABLET TWICE A DAY, Disp: 180 tablet, Rfl: 3    Xhance 93 MCG/ACT EXHU, INSTILL 1 SPRAY PER NOSTRIL TWICE A DAY, Disp: 16 mL, Rfl: 4    zolpidem (AMBIEN) 5 mg tablet, Take 1 tablet (5 mg total) by mouth daily at bedtime as needed for sleep, Disp: 30 tablet, Rfl: 3    Allergies   Allergen Reactions    Adhesive [Medical Tape] Rash     And small blister at sight       Physical Exam:    /76   Ht 5' 8" (1 727 m)   Wt 95 3 kg (210 lb)   LMP 07/31/2018   BMI 31 93 kg/m²     Constitutional:normal, well developed, well nourished, alert, in no distress and non-toxic and no overt pain behavior    Eyes:anicteric  HEENT:grossly intact  Neck:supple, symmetric, trachea midline and no masses Pulmonary:even and unlabored  Cardiovascular:No edema or pitting edema present  Skin:Normal without rashes or lesions and well hydrated  Psychiatric:Mood and affect appropriate  Neurologic:Cranial Nerves II-XII grossly intact  Musculoskeletal:normal      Imaging  US guidance    (Results Pending)         Orders Placed This Encounter   Procedures    US guidance

## 2022-08-08 ENCOUNTER — PROCEDURE VISIT (OUTPATIENT)
Dept: OBGYN CLINIC | Facility: CLINIC | Age: 52
End: 2022-08-08
Payer: COMMERCIAL

## 2022-08-08 VITALS — DIASTOLIC BLOOD PRESSURE: 84 MMHG | SYSTOLIC BLOOD PRESSURE: 120 MMHG | WEIGHT: 211.6 LBS | BODY MASS INDEX: 32.17 KG/M2

## 2022-08-08 DIAGNOSIS — C50.511 MALIGNANT NEOPLASM OF LOWER-OUTER QUADRANT OF RIGHT BREAST OF FEMALE, ESTROGEN RECEPTOR POSITIVE (HCC): ICD-10-CM

## 2022-08-08 DIAGNOSIS — R87.810 ASCUS WITH POSITIVE HIGH RISK HPV CERVICAL: Primary | ICD-10-CM

## 2022-08-08 DIAGNOSIS — Z87.410 HISTORY OF CERVICAL DYSPLASIA: ICD-10-CM

## 2022-08-08 DIAGNOSIS — Z13.71 BRCA NEGATIVE: ICD-10-CM

## 2022-08-08 DIAGNOSIS — Z90.722 STATUS POST BILATERAL SALPINGO-OOPHORECTOMY (BSO): ICD-10-CM

## 2022-08-08 DIAGNOSIS — R87.610 ASCUS WITH POSITIVE HIGH RISK HPV CERVICAL: Primary | ICD-10-CM

## 2022-08-08 DIAGNOSIS — Z17.0 MALIGNANT NEOPLASM OF LOWER-OUTER QUADRANT OF RIGHT BREAST OF FEMALE, ESTROGEN RECEPTOR POSITIVE (HCC): ICD-10-CM

## 2022-08-08 DIAGNOSIS — Z80.3 FAMILY HISTORY OF BREAST CANCER IN FEMALE: ICD-10-CM

## 2022-08-08 DIAGNOSIS — Z90.13 S/P BILATERAL MASTECTOMY: ICD-10-CM

## 2022-08-08 DIAGNOSIS — Z98.84 HISTORY OF GASTRIC BYPASS: ICD-10-CM

## 2022-08-08 PROCEDURE — 88342 IMHCHEM/IMCYTCHM 1ST ANTB: CPT | Performed by: PATHOLOGY

## 2022-08-08 PROCEDURE — 57454 BX/CURETT OF CERVIX W/SCOPE: CPT | Performed by: OBSTETRICS & GYNECOLOGY

## 2022-08-08 PROCEDURE — 88341 IMHCHEM/IMCYTCHM EA ADD ANTB: CPT | Performed by: PATHOLOGY

## 2022-08-08 PROCEDURE — 88305 TISSUE EXAM BY PATHOLOGIST: CPT | Performed by: PATHOLOGY

## 2022-08-08 PROCEDURE — 88344 IMHCHEM/IMCYTCHM EA MLT ANTB: CPT | Performed by: PATHOLOGY

## 2022-08-08 NOTE — PROGRESS NOTES
Colposcopy     Date/Time 8/8/2022 11:58 AM     Universal Protocol   Consent: Written consent obtained  Risks and benefits: risks, benefits and alternatives were discussed  Consent given by: patient  Patient understanding: patient states understanding of the procedure being performed  Patient consent: the patient's understanding of the procedure matches consent given  Procedure consent: procedure consent matches procedure scheduled  Patient identity confirmed: verbally with patient       Performed by  JOVANY Johns DO     Authorized by Timothy Kang DO        Pre-procedure details     Premeds:  Naproxen sodium    Prepped with: acetic acid       Indication    ASC-H     Procedure Details   Procedure: Colposcopy w/ cervical biopsy and ECC      Under satisfactory analgesia the patient was prepped and draped in the dorsal lithotomy position: yes      [place] Path speculum was placed in the vagina: yes      Under colposcopic examination the transition zone was seen in entirety: no      Intracervical block was performed: no      Endocervix was curetted using a Kevorkian curette: yes      Cervical biopsy performed with a cervical biopsy punch: yes      Tampon inserted: no      Monsel's solution was applied: yes      Biopsy(s): yes      Location:   06:00 o'clock, 10:00 o'clock, 02:00 o'clock     Post-procedure      Findings: Bleeding, Friable cervix and White epithelium      Impression: Low grade cervical dysplasia      Patient tolerance of procedure: Tolerated well, no immediate complications     Comments        History of cervical dysplasia in the past   States she had a cryocautery in the past she is uncertain of when  Recent ASCUS Pap with high-risk HPV  Indication for colposcopy  Cervix is scarred, transition zone not completely visualized  Tolerated well follow-up in 2-3 weeks

## 2022-08-23 ENCOUNTER — ULTRASOUND (OUTPATIENT)
Dept: OBGYN CLINIC | Facility: CLINIC | Age: 52
End: 2022-08-23
Payer: COMMERCIAL

## 2022-08-23 DIAGNOSIS — R10.2 PELVIC PAIN: Primary | ICD-10-CM

## 2022-08-23 PROBLEM — Z90.49 STATUS POST LAPAROSCOPIC CHOLECYSTECTOMY: Status: ACTIVE | Noted: 2022-08-23

## 2022-08-23 PROBLEM — Z98.890 STATUS POST BILATERAL BREAST RECONSTRUCTION: Status: ACTIVE | Noted: 2022-08-23

## 2022-08-23 PROBLEM — Z90.13 STATUS POST BILATERAL MASTECTOMY: Status: ACTIVE | Noted: 2022-08-23

## 2022-08-23 PROBLEM — R87.613 HIGH GRADE SQUAMOUS INTRAEPITHELIAL CERVICAL DYSPLASIA: Status: ACTIVE | Noted: 2022-08-23

## 2022-08-23 PROBLEM — Z85.3 HISTORY OF BREAST CANCER: Status: ACTIVE | Noted: 2022-08-23

## 2022-08-23 PROCEDURE — 76830 TRANSVAGINAL US NON-OB: CPT | Performed by: OBSTETRICS & GYNECOLOGY

## 2022-08-23 NOTE — PROGRESS NOTES
AMB US Pelvic Non OB    Date/Time: 8/23/2022 9:07 AM  Performed by: Chris Copeland  Authorized by: Emani Duarte DO   Universal Protocol:  Patient identity confirmed: verbally with patient      Procedure details:     Technique:  Transvaginal US, Non-OB    Position: lithotomy exam    Uterine findings:     Length (cm): 5 37    Height (cm):  2 27    Width (cm):  3 23    Endometrial stripe: identified      Endometrium thickness (mm):  1 1  Other findings:     Free pelvic fluid: not identified      Free peritoneal fluid: not identified    Post-Procedure Details:     Impression:  Anteverted uterus appears within normal limits  The bilateral ovaries have been surgically removed  Tolerance: Tolerated well, no immediate complications    Complications: no complications    Additional Procedure Comments:      Lok[employer].ru F8 E8C-RS transvaginal transducer Serial # [id number] was used to perform the examination today and subsequently followed with high level disinfection utilizing Trophon EPR procedure  Ultrasound performed at:     [address] [address]  Phone:  [phone]  Fax:  [phone]

## 2022-08-29 ENCOUNTER — TELEPHONE (OUTPATIENT)
Dept: SURGICAL ONCOLOGY | Facility: CLINIC | Age: 52
End: 2022-08-29

## 2022-08-29 NOTE — TELEPHONE ENCOUNTER
----- Message from Charanjit Sidhu MD sent at 8/29/2022 11:52 AM EDT -----  Regarding: FW: lump /swelling left collar bone  Either one  Looks like I have an opening on Wednesday  ----- Message -----  From: Kevin Wynne  Sent: 8/29/2022  11:02 AM EDT  To: Vanessa Downey RN, Charanjit Sidhu MD  Subject: Mónica Mccracken: lump Mario Stephanie left collar bone                ----- Message -----  From: Abbey Wallace  Sent: 8/29/2022  10:58 AM EDT  To: Surgical Oncology Clinical  Subject: lump /swelling left collar bone                  should I schedule with you / primary/ oncology to explore this weird swelling with movable lump inside

## 2022-08-29 NOTE — TELEPHONE ENCOUNTER
Called and spoke with Jaylyn  Scheduled appt with Dr Merino on 8/31 at 10:45am at [facility]  She was agreeable

## 2022-08-31 ENCOUNTER — OFFICE VISIT (OUTPATIENT)
Dept: SURGICAL ONCOLOGY | Facility: CLINIC | Age: 52
End: 2022-08-31
Payer: COMMERCIAL

## 2022-08-31 VITALS
DIASTOLIC BLOOD PRESSURE: 80 MMHG | SYSTOLIC BLOOD PRESSURE: 122 MMHG | BODY MASS INDEX: 32.43 KG/M2 | WEIGHT: 214 LBS | HEIGHT: 68 IN

## 2022-08-31 DIAGNOSIS — Z17.0 MALIGNANT NEOPLASM OF LOWER-OUTER QUADRANT OF RIGHT BREAST OF FEMALE, ESTROGEN RECEPTOR POSITIVE (HCC): Primary | ICD-10-CM

## 2022-08-31 DIAGNOSIS — C50.511 MALIGNANT NEOPLASM OF LOWER-OUTER QUADRANT OF RIGHT BREAST OF FEMALE, ESTROGEN RECEPTOR POSITIVE (HCC): Primary | ICD-10-CM

## 2022-08-31 DIAGNOSIS — R22.1 LOCALIZED SWELLING, MASS OR LUMP OF NECK: ICD-10-CM

## 2022-08-31 PROCEDURE — 99213 OFFICE O/P EST LOW 20 MIN: CPT | Performed by: SURGERY

## 2022-08-31 NOTE — PROGRESS NOTES
Surgical Oncology Follow Up       [facility]  [facility] SURGICAL ONCOLOGY [facility] [address] [address]    Leela Degree  1970  [id number]  [address]  [facility] SURGICAL ONCOLOGY [place]  [id number] [id number]    Chief Complaint   Patient presents with    Follow-up       Assessment/Plan   Problem List Items Addressed This Visit        Other    Localized swelling, mass or lump of neck     51 y/o F w/ hx of with history of right breast cancer, status post bilateral mastectomy with reconstruction (08/2018), now presenting with area of left supraclavicular fullness  --Bedside ultrasound performed today in clinic, which showed area of fatty tissue surrounding a hypoechoic mass  --Further imaging recommended, ordered CT of soft tissue in the neck with contrast         Relevant Orders    CT soft tissue neck w contrast    Malignant neoplasm of lower-outer quadrant of right breast of female, estrogen receptor positive ([facility] [address] ) - Primary    Relevant Orders    CT soft tissue neck w contrast        Oncology History:    Oncology History    No history exists  History of Present Illness:   Pt is a 51 y/o F w/ hx of right breast cancer diagnosed in June of 2018  Her pathology revealed invasive ductal carcinoma, ER/%, her 2-  She underwent genetic testing which was negative  She underwent a bilateral mastectomy with Dr Katiuska Galloway and had reconstruction with Dr Shala Fuller   She underwent a bilateral oophorectomy and continues on anastrozole  -Interval History: She returns today with concern for area of new Left supraclavicular fullness x 1 month  She reports that she first  noticed this area of fullness approximately 1 month ago  She denies any recent trauma to the area, or any vaccinations in the left upper extremity prior to the area of fullness developing  Denies any fevers, chills, erythema/tenderness/pruritus at the site   She denies any increase in size of the area over the past 3 weeks  Patient is desiring to have a hysterectomy, and was desiring further workup of this left supraclavicular mass prior to proceeding with the procedure  Review of Systems:  Review of Systems   Constitutional: Negative for chills, diaphoresis, fatigue and fever  HENT: Negative for congestion, postnasal drip, rhinorrhea, sinus pressure, sinus pain, sore throat and trouble swallowing  Eyes: Negative for visual disturbance  Respiratory: Negative for cough, chest tightness, shortness of breath, wheezing and stridor  Cardiovascular: Negative for chest pain, palpitations and leg swelling  Gastrointestinal: Negative for abdominal distention, abdominal pain, constipation, diarrhea, nausea and vomiting  Endocrine: Negative for polyphagia  Genitourinary: Negative for flank pain and urgency  Musculoskeletal: Negative for arthralgias, back pain, gait problem, joint swelling, myalgias, neck pain and neck stiffness  Skin: Negative for color change, pallor, rash and wound  Left supraclavicular fullness   Allergic/Immunologic: Negative for environmental allergies, food allergies and immunocompromised state  Neurological: Negative for dizziness, facial asymmetry, light-headedness and headaches  Psychiatric/Behavioral: Negative for agitation, behavioral problems and confusion         Patient Active Problem List   Diagnosis    Family history of breast cancer in female   [address] [place] [address] Ashkenazi Zoroastrianism ancestry requiring population-specific genetic screening    Malignant neoplasm of lower-outer quadrant of right breast of female, estrogen receptor positive ([facility] [address] )    UTI (urinary tract infection)    Status post bilateral salpingo-oophorectomy (BSO)    Use of aromatase inhibitors    Deformity of reconstructed breast    Nasal polyposis    History of gastric bypass    BRCA negative    DDD (degenerative disc disease), cervical    Breast variant    H/O bariatric surgery    Other chronic sinusitis    Suspected sleep apnea    Muscle spasm    KRISHNA (obstructive sleep apnea)    Snoring    Thrombocytosis    Insomnia    Osteopenia of hip    Positive CLAUDIA (antinuclear antibody)    High grade squamous intraepithelial cervical dysplasia    History of breast cancer    Status post bilateral mastectomy    Status post bilateral breast reconstruction    Status post laparoscopic cholecystectomy    Localized swelling, mass or lump of neck     Past Medical History:   Diagnosis Date    Abnormal Pap smear of cervix     Ashkenazi Baptism ancestry requiring population-specific genetic screening     Asthma     Breathing difficulty     after gastric bypass 2014-"felt elephant on chest"    Chest pain, non-cardiac     Seen in ER and is from esophageal spasms      Constipation     occasional    Family history of breast cancer in female     GERD (gastroesophageal reflux disease)     History of MRSA infection     Leg    History of UTI     treated 8/1/2018    Human papilloma virus (HPV) infection     Hx MRSA infection     on leg-2009 treated    Pollen allergies     PONV (postoperative nausea and vomiting)     nausea    Postural lightheadedness     Raynaud's disease     Seasonal allergies     Sinus problem     Sinus problem      Past Surgical History:   Procedure Laterality Date    BILATERAL OOPHORECTOMY      BLADDER SURGERY  2013    bladder lift    BREAST BIOPSY Right 06/14/2018    [facility]    CERVIX LESION DESTRUCTION      COLONOSCOPY      COLPOSCOPY W/ BIOPSY / CURETTAGE      ENDOMETRIAL ABLATION      GASTRIC BYPASS  2014 2014 wt loss 90 lbs(regained 20 lbs)    HYSTEROSCOPIC STERILIZATION W/ IMPLANTS      HYSTEROSCOPY W/ ENDOMETRIAL ABLATION  2013    KNEE ARTHROSCOPY Right 1990    LYMPH NODE BIOPSY Right 8/10/2018    Procedure: BIOPSY LYMPH NODE SENTINEL;  Surgeon: Madyson Rogers MD;  [facility];  Service: Surgical Oncology    MAMMO STEREOTACTIC BREAST BIOPSY RIGHT (ALL INC) Right 6/14/2018    MASTECTOMY Bilateral     NASAL SEPTUM SURGERY      2002, 2016    OR IMPLNT BIO IMPLNT FOR SOFT TISSUE REINFORCEMENT Bilateral 8/10/2018    Procedure: RECONSTRUCTION BREAST W/ IMPLANT;  Surgeon: Lidia Adams MD;  [facility];  Service: Plastics    OR INSJ/RPLCMT BREAST IMPLANT SEP DAY MASTECTOMY Bilateral 11/30/2018    Procedure: BREAST IMPLANT EXCHANGE;  Surgeon: Lidia Adams MD;  [facility];  Service: Plastics    OR LAP,CHOLECYSTECTOMY/GRAPH N/A 3/19/2021    Procedure: CHOLECYSTECTOMY LAPAROSCOPIC W/ INTRAOP CHOLANGIOGRAM;  Surgeon: Lory Vang MD;  Location: [address] OR;  Service: General    OR LAP,RMV  ADNEXAL STRUCTURE N/A 8/10/2018    Procedure: SALPINGO-OOPHORECTOMY, LAPAROSCOPIC; PELVIC WASHINGS ;  Surgeon: Khang Torres MD;  [facility];  Service: Gynecology Oncology    OR MASTECTOMY, SIMPLE, COMPLETE Bilateral 8/10/2018    Procedure: MASTECTOMY SIMPLE;  Surgeon: Morgan Rosas MD;  [facility];  Service: Surgical Oncology    OR LENNIE-IMPLANT CAPSULECTOMY BREAST COMPLETE Bilateral 11/30/2018    Procedure: CAPSULECTOMY;  Surgeon: Lidia Adams MD;  [facility];  Service: Plastics    OR REVISION OF RECONSTRUCTED BREAST Bilateral 2/22/2019    Procedure: BREAST MOUND REVISION; FAT GRAFTING;  Surgeon: Lidia Adams MD;  [facility];  Service: Plastics    OR TISSUE EXPANDER PLACEMENT BREAST RECONSTRUCTION Bilateral 8/10/2018    Procedure: INSERTION/PLACEMENT TISSUE EXPANDER (EXCHANGE);   Surgeon: Lidia Adams MD;  [facility];  Service: Plastics    SINUS SURGERY       Family History   Problem Relation Age of Onset    Other Mother         BRCA negative    Breast cancer Mother 72    Ovarian cancer Mother 61    Diabetes Father     Heart disease Father     Hypertension Father    Adam Migraines Sister     Diabetes Brother     Colon cancer Maternal Grandfather 80    Diabetes type II Maternal Grandfather     Heart disease Paternal Grandfather     Colon cancer Maternal Aunt 62    Skin cancer Maternal Aunt      Social History     Socioeconomic History    Marital status: Single     Spouse name: Not on file    Number of children: 2    Years of education: Not on file    Highest education level: Not on file   Occupational History    Not on file   Tobacco Use    Smoking status: Never Smoker    Smokeless tobacco: Never Used   Vaping Use    Vaping Use: Never used   Substance and Sexual Activity    Alcohol use: Yes     Alcohol/week: 4 0 standard drinks     Types: 4 Glasses of wine per week     Comment: 2 weekly  -4  glasses total    Drug use: Yes     Types: Marijuana     Comment: edible- last time 11/23/2018      Sexual activity: Not Currently   Other Topics Concern    Not on file   Social History Narrative    Uses safety equipment, seatbelts     Social Determinants of Health     Financial Resource Strain: Not on file   Food Insecurity: Not on file   Transportation Needs: Not on file   Physical Activity: Not on file   Stress: Not on file   Social Connections: Not on file   Intimate Partner Violence: Not on file   Housing Stability: Not on file       Current Outpatient Medications:     acetaminophen (TYLENOL) 325 mg tablet, Take 650 mg by mouth every 6 (six) hours as needed for mild pain, Disp: , Rfl:     anastrozole (ARIMIDEX) 1 mg tablet, TAKE 1 TABLET DAILY, Disp: 90 tablet, Rfl: 3    BIOTIN PO, Take 1 tablet by mouth every morning, Disp: , Rfl:     Calcium Carb-Cholecalciferol 1000-800 MG-UNIT TABS, calcium, Disp: , Rfl:     calcium-vitamin D 250-100 MG-UNIT per tablet, Take 1 tablet by mouth, Disp: , Rfl:     cyanocobalamin 50 MCG tablet, Take 50 mcg by mouth, Disp: , Rfl:     Ergocalciferol (VITAMIN D2) 2000 units TABS, Vitamin D2 50,000 unit capsule, Disp: , Rfl:     ferrous sulfate 325 (65 Fe) mg tablet, Daily, Disp: , Rfl:     Influenza Vac Split Quad (AFLURIA QUADRIVALENT IM), Afluria Quad 7914-2755 (PF) 60 mcg (15 mcg x 4)/0 5 mL IM syringe  TO BE ADMINISTERED BY PHARMACIST FOR IMMUNIZATION, Disp: , Rfl:     Inulin-Calcium-Vitamin D 2-250-100 GM-MG-UNIT CHEW, Fiber Plus Mulitvitamin, Disp: , Rfl:     Lifitegrast (Sl[employer] OP), Apply 1 vial to eye 2 (two) times a day, Disp: , Rfl:     loratadine (CLARITIN) 10 mg tablet, Take 10 mg by mouth as needed  , Disp: , Rfl:     meloxicam (Mobic) 15 mg tablet, Take 1 tablet (15 mg total) by mouth daily, Disp: 30 tablet, Rfl: 1    methocarbamol (ROBAXIN) 500 mg tablet, Take 500 mg by mouth 3 (three) times a day  , Disp: , Rfl:     montelukast (SINGULAIR) 10 mg tablet, Take 10 mg by mouth as needed  , Disp: , Rfl:     multivitamin (THERAGRAN) TABS, Take 1 tablet by mouth every morning  , Disp: , Rfl:     Naproxen Sodium 220 MG CAPS, as needed, Disp: , Rfl:     pantoprazole (PROTONIX) 40 mg tablet, Take 40 mg by mouth every morning  , Disp: , Rfl:     phentermine 15 MG capsule,  , Disp: , Rfl:     pyridoxine (VITAMIN B6) 100 mg tablet, Take 100 mg by mouth, Disp: , Rfl:     tiZANidine (Zanaflex) 4 mg tablet, Take 0 5 tablets (2 mg total) by mouth every 8 (eight) hours as needed for muscle spasms, Disp: 60 tablet, Rfl: 1    topiramate (TOPAMAX) 25 mg tablet, TAKE 1 TABLET TWICE A DAY, Disp: 180 tablet, Rfl: 3    Xhance 93 MCG/ACT EXHU, INSTILL 1 SPRAY PER NOSTRIL TWICE A DAY, Disp: 16 mL, Rfl: 11    zolpidem (AMBIEN) 5 mg tablet, Take 1 tablet (5 mg total) by mouth daily at bedtime as needed for sleep, Disp: 30 tablet, Rfl: 3  Allergies   Allergen Reactions    Adhesive [Medical Tape] Rash     And small blister at sight       The following portions of the patient's history were reviewed and updated as appropriate:   She  has a past medical history of Abnormal Pap smear of cervix, Ashkenazi Advent ancestry requiring population-specific genetic screening, Asthma, Breathing difficulty, Chest pain, non-cardiac, Constipation, Family history of breast cancer in female, GERD (gastroesophageal reflux disease), History of MRSA infection, History of UTI, Human papilloma virus (HPV) infection, MRSA infection, Pollen allergies, PONV (postoperative nausea and vomiting), Postural lightheadedness, Raynaud's disease, Seasonal allergies, Sinus problem, and Sinus problem  She   Patient Active Problem List    Diagnosis Date Noted    Localized swelling, mass or lump of neck 08/31/2022    High grade squamous intraepithelial cervical dysplasia 08/23/2022    History of breast cancer 08/23/2022    Status post bilateral mastectomy 08/23/2022    Status post bilateral breast reconstruction 08/23/2022    Status post laparoscopic cholecystectomy 08/23/2022    Thrombocytosis 12/28/2021    Insomnia 12/28/2021    Osteopenia of hip 12/28/2021    Positive CLAUDIA (antinuclear antibody) 12/28/2021    Snoring     KRISHNA (obstructive sleep apnea)     Muscle spasm     Other chronic sinusitis 08/17/2021    Suspected sleep apnea 08/17/2021    H/O bariatric surgery 03/18/2021    Breast variant 06/15/2020    DDD (degenerative disc disease), cervical 03/11/2020    BRCA negative 06/11/2019    History of gastric bypass 04/02/2019    Nasal polyposis 03/14/2019    Deformity of reconstructed breast     Use of aromatase inhibitors 11/27/2018    Status post bilateral salpingo-oophorectomy (BSO) 08/10/2018    UTI (urinary tract infection) 08/03/2018    Ashkenazi Tenriism ancestry requiring population-specific genetic screening 07/02/2018    Malignant neoplasm of lower-outer quadrant of right breast of female, estrogen receptor positive ([facility] [address] ) 07/02/2018    Family history of breast cancer in female      She  has a past surgical history that includes Gastric bypass (2014); Bladder surgery (2013); Cervix lesion destruction; Colposcopy w/ biopsy / curettage; Hysteroscopy w/ endometrial ablation (2013); Nasal septum surgery; Knee arthroscopy (Right, 1990);  Endometrial ablation; Colonoscopy; Sinus surgery; Breast biopsy (Right, 06/14/2018); Hysteroscopic sterilization w/ implants; pr lap,rmv  adnexal structure (N/A, 8/10/2018); pr mastectomy, simple, complete (Bilateral, 8/10/2018); pr tissue expander placement breast reconstruction (Bilateral, 8/10/2018); pr implnt bio implnt for soft tissue reinforcement (Bilateral, 8/10/2018); Lymph node biopsy (Right, 8/10/2018); Mammo stereotactic breast biopsy right (all inc) (Right, 6/14/2018); Mastectomy (Bilateral); pr insj/rplcmt breast implant sep day mastectomy (Bilateral, 11/30/2018); pr corky-implant capsulectomy breast complete (Bilateral, 11/30/2018); pr revision of reconstructed breast (Bilateral, 2/22/2019); Bilateral oophorectomy; and pr lap,cholecystectomy/graph (N/A, 3/19/2021)  Her family history includes Breast cancer (age of onset: 72) in her mother; Colon cancer (age of onset: 62) in her maternal aunt; Colon cancer (age of onset: 80) in her maternal grandfather; Diabetes in her brother and father; Diabetes type II in her maternal grandfather; Heart disease in her father and paternal grandfather; Hypertension in her father; Migraines in her sister; Other in her mother; Ovarian cancer (age of onset: 61) in her mother; Skin cancer in her maternal aunt  She  reports that she has never smoked  She has never used smokeless tobacco  She reports current alcohol use of about 4 0 standard drinks of alcohol per week  She reports current drug use  Drug: Marijuana    Current Outpatient Medications   Medication Sig Dispense Refill    acetaminophen (TYLENOL) 325 mg tablet Take 650 mg by mouth every 6 (six) hours as needed for mild pain      anastrozole (ARIMIDEX) 1 mg tablet TAKE 1 TABLET DAILY 90 tablet 3    BIOTIN PO Take 1 tablet by mouth every morning      Calcium Carb-Cholecalciferol 1000-800 MG-UNIT TABS calcium      calcium-vitamin D 250-100 MG-UNIT per tablet Take 1 tablet by mouth      cyanocobalamin 50 MCG tablet Take 50 mcg by mouth      Ergocalciferol (VITAMIN D2) 2000 units TABS Vitamin D2 50,000 unit capsule      ferrous sulfate 325 (65 Fe) mg tablet Daily      Influenza Vac Split Quad (AFLURIA QUADRIVALENT IM) Afluria Quad 0391-7096 (PF) 60 mcg (15 mcg x 4)/0 5 mL IM syringe   TO BE ADMINISTERED BY PHARMACIST FOR IMMUNIZATION      Inulin-Calcium-Vitamin D 2-250-100 GM-MG-UNIT CHEW Fiber Plus Mulitvitamin      Lifitegrast (XIIDRA OP) Apply 1 vial to eye 2 (two) times a day      loratadine (CLARITIN) 10 mg tablet Take 10 mg by mouth as needed        meloxicam (Mobic) 15 mg tablet Take 1 tablet (15 mg total) by mouth daily 30 tablet 1    methocarbamol (ROBAXIN) 500 mg tablet Take 500 mg by mouth 3 (three) times a day        montelukast (SINGULAIR) 10 mg tablet Take 10 mg by mouth as needed        multivitamin (THERAGRAN) TABS Take 1 tablet by mouth every morning        Naproxen Sodium 220 MG CAPS as needed      pantoprazole (PROTONIX) 40 mg tablet Take 40 mg by mouth every morning        phentermine 15 MG capsule        pyridoxine (VITAMIN B6) 100 mg tablet Take 100 mg by mouth      tiZANidine (Zanaflex) 4 mg tablet Take 0 5 tablets (2 mg total) by mouth every 8 (eight) hours as needed for muscle spasms 60 tablet 1    topiramate (TOPAMAX) 25 mg tablet TAKE 1 TABLET TWICE A  tablet 3    Xhance 93 MCG/ACT EXHU INSTILL 1 SPRAY PER NOSTRIL TWICE A DAY 16 mL 11    zolpidem (AMBIEN) 5 mg tablet Take 1 tablet (5 mg total) by mouth daily at bedtime as needed for sleep 30 tablet 3     No current facility-administered medications for this visit       Current Outpatient Medications on File Prior to Visit   Medication Sig    acetaminophen (TYLENOL) 325 mg tablet Take 650 mg by mouth every 6 (six) hours as needed for mild pain    anastrozole (ARIMIDEX) 1 mg tablet TAKE 1 TABLET DAILY    BIOTIN PO Take 1 tablet by mouth every morning    Calcium Carb-Cholecalciferol 1000-800 MG-UNIT TABS calcium    calcium-vitamin D 250-100 MG-UNIT per tablet Take 1 tablet by mouth    cyanocobalamin 50 MCG tablet Take 50 mcg by mouth    Ergocalciferol (VITAMIN D2) 2000 units TABS Vitamin D2 50,000 unit capsule    ferrous sulfate 325 (65 Fe) mg tablet Daily    Influenza Vac Split Quad (AFLURIA QUADRIVALENT IM) Afluria Quad 6784-5103 (PF) 60 mcg (15 mcg x 4)/0 5 mL IM syringe   TO BE ADMINISTERED BY PHARMACIST FOR IMMUNIZATION    Inulin-Calcium-Vitamin D 2-250-100 GM-MG-UNIT CHEW Fiber Plus Mulitvitamin    Lifitegrast (XIIDRA OP) Apply 1 vial to eye 2 (two) times a day    loratadine (CLARITIN) 10 mg tablet Take 10 mg by mouth as needed      meloxicam (Mobic) 15 mg tablet Take 1 tablet (15 mg total) by mouth daily    methocarbamol (ROBAXIN) 500 mg tablet Take 500 mg by mouth 3 (three) times a day      montelukast (SINGULAIR) 10 mg tablet Take 10 mg by mouth as needed      multivitamin (THERAGRAN) TABS Take 1 tablet by mouth every morning      Naproxen Sodium 220 MG CAPS as needed    pantoprazole (PROTONIX) 40 mg tablet Take 40 mg by mouth every morning      phentermine 15 MG capsule      pyridoxine (VITAMIN B6) 100 mg tablet Take 100 mg by mouth    tiZANidine (Zanaflex) 4 mg tablet Take 0 5 tablets (2 mg total) by mouth every 8 (eight) hours as needed for muscle spasms    topiramate (TOPAMAX) 25 mg tablet TAKE 1 TABLET TWICE A DAY    Xhance 93 MCG/ACT EXHU INSTILL 1 SPRAY PER NOSTRIL TWICE A DAY    zolpidem (AMBIEN) 5 mg tablet Take 1 tablet (5 mg total) by mouth daily at bedtime as needed for sleep     No current facility-administered medications on file prior to visit  She is allergic to adhesive [medical tape]           Vitals:    08/31/22 1045   BP: 122/80       Physical Exam  Constitutional:       General: She is not in acute distress  Appearance: She is well-developed  She is not diaphoretic  HENT:      Head: Normocephalic and atraumatic  Eyes:      Pupils: Pupils are equal, round, and reactive to light  Cardiovascular:      Rate and Rhythm: Normal rate and regular rhythm  Heart sounds: Normal heart sounds  No murmur heard  No friction rub  No gallop  Pulmonary:      Effort: Pulmonary effort is normal  No respiratory distress  Breath sounds: Normal breath sounds  No wheezing or rales  Chest:      Chest wall: No tenderness  Comments: Approximately 5 cm area of left supraclavicular fullness-soft and nontender to palpation  Abdominal:      General: Bowel sounds are normal  There is no distension  Palpations: Abdomen is soft  There is no mass  Tenderness: There is no abdominal tenderness  There is no guarding or rebound  Musculoskeletal:         General: Normal range of motion  Cervical back: Normal range of motion and neck supple  Skin:     General: Skin is warm and dry  Findings: No erythema or rash  Neurological:      Mental Status: She is alert and oriented to person, place, and time  Psychiatric:         Behavior: Behavior normal          Thought Content: Thought content normal          Judgment: Judgment normal            Results:  Labs:  CBC, Coags, BMP, Mg, Phos   Liver Function, Amylase, & Lipase   Cardiac Enzymes   CSF & Dilantin   ABGs      Imaging  All recent imaging studies reviewed personally by myself  I reviewed the above laboratory and imaging data      Discussion/Summary:  72-year-old female with history of right breast cancer, status post bilateral mastectomy with reconstruction, now presenting with area of left supraclavicular fullness  --Bedside ultrasound performed today in clinic, which showed area of fatty tissue surrounding a hypoechoic mass  --Further imaging recommended, ordered CT of soft tissue in the neck with contrast

## 2022-08-31 NOTE — ASSESSMENT & PLAN NOTE
49 y/o F w/ hx of with history of right breast cancer, status post bilateral mastectomy with reconstruction (08/2018), now presenting with area of left supraclavicular fullness  --Bedside ultrasound performed today in clinic, which showed area of fatty tissue surrounding a hypoechoic mass  --Further imaging recommended, ordered CT of soft tissue in the neck with contrast

## 2022-09-08 ENCOUNTER — HOSPITAL ENCOUNTER (OUTPATIENT)
Dept: CT IMAGING | Facility: HOSPITAL | Age: 52
Discharge: HOME | End: 2022-09-08
Attending: SURGERY
Payer: COMMERCIAL

## 2022-09-08 DIAGNOSIS — Z17.0 MALIGNANT NEOPLASM OF LOWER-OUTER QUADRANT OF RIGHT BREAST OF FEMALE, ESTROGEN RECEPTOR POSITIVE (HCC): ICD-10-CM

## 2022-09-08 DIAGNOSIS — R22.1 LOCALIZED SWELLING, MASS OR LUMP OF NECK: ICD-10-CM

## 2022-09-08 DIAGNOSIS — C50.511 MALIGNANT NEOPLASM OF LOWER-OUTER QUADRANT OF RIGHT BREAST OF FEMALE, ESTROGEN RECEPTOR POSITIVE (HCC): ICD-10-CM

## 2022-09-08 PROCEDURE — 70491 CT SOFT TISSUE NECK W/DYE: CPT

## 2022-09-08 PROCEDURE — G1004 CDSM NDSC: HCPCS

## 2022-09-08 RX ADMIN — IOHEXOL 85 ML: 350 INJECTION, SOLUTION INTRAVENOUS at 17:52

## 2022-09-13 ENCOUNTER — PROCEDURE VISIT (OUTPATIENT)
Dept: PAIN MEDICINE | Facility: CLINIC | Age: 52
End: 2022-09-13
Payer: COMMERCIAL

## 2022-09-13 ENCOUNTER — TELEPHONE (OUTPATIENT)
Dept: SURGICAL ONCOLOGY | Facility: CLINIC | Age: 52
End: 2022-09-13

## 2022-09-13 VITALS
SYSTOLIC BLOOD PRESSURE: 141 MMHG | BODY MASS INDEX: 32.54 KG/M2 | HEART RATE: 87 BPM | DIASTOLIC BLOOD PRESSURE: 86 MMHG | WEIGHT: 214 LBS

## 2022-09-13 DIAGNOSIS — M79.18 MYOFASCIAL PAIN SYNDROME: ICD-10-CM

## 2022-09-13 DIAGNOSIS — N63.20 MASS OF LEFT BREAST, UNSPECIFIED QUADRANT: Primary | ICD-10-CM

## 2022-09-13 DIAGNOSIS — M62.838 MUSCLE SPASM: ICD-10-CM

## 2022-09-13 PROCEDURE — 20552 NJX 1/MLT TRIGGER POINT 1/2: CPT | Performed by: PHYSICAL MEDICINE & REHABILITATION

## 2022-09-13 PROCEDURE — 76942 ECHO GUIDE FOR BIOPSY: CPT | Performed by: PHYSICAL MEDICINE & REHABILITATION

## 2022-09-13 RX ORDER — BUPIVACAINE HYDROCHLORIDE 2.5 MG/ML
10 INJECTION, SOLUTION EPIDURAL; INFILTRATION; INTRACAUDAL ONCE
Status: COMPLETED | OUTPATIENT
Start: 2022-09-13 | End: 2022-09-13

## 2022-09-13 RX ORDER — LIDOCAINE HYDROCHLORIDE 10 MG/ML
5 INJECTION, SOLUTION INFILTRATION; PERINEURAL ONCE
Status: COMPLETED | OUTPATIENT
Start: 2022-09-13 | End: 2022-09-13

## 2022-09-13 RX ORDER — METHYLPREDNISOLONE ACETATE 40 MG/ML
40 INJECTION, SUSPENSION INTRA-ARTICULAR; INTRALESIONAL; INTRAMUSCULAR; SOFT TISSUE ONCE
Status: COMPLETED | OUTPATIENT
Start: 2022-09-13 | End: 2022-09-13

## 2022-09-13 RX ADMIN — METHYLPREDNISOLONE ACETATE 40 MG: 40 INJECTION, SUSPENSION INTRA-ARTICULAR; INTRALESIONAL; INTRAMUSCULAR; SOFT TISSUE at 11:55

## 2022-09-13 RX ADMIN — BUPIVACAINE HYDROCHLORIDE 10 ML: 2.5 INJECTION, SOLUTION EPIDURAL; INFILTRATION; INTRACAUDAL at 11:55

## 2022-09-13 RX ADMIN — LIDOCAINE HYDROCHLORIDE 5 ML: 10 INJECTION, SOLUTION INFILTRATION; PERINEURAL at 11:55

## 2022-09-13 NOTE — PROGRESS NOTES
Indication:  Muscular pain  Preprocedure diagnosis:  1  Myofascial pain syndrome  Postprocedure diagnosis:  1  Myofascial pain syndrome    Procedure:  Ultrasound-guided bilateral thoracic paraspinal muscle trigger point injection(s)  After discussing the risks, benefits, and alternatives to the procedure, the patient expressed understanding and wished to proceed  The patient was brought to the procedure suite and placed in the prone position  A procedural pause was conducted to verify:  correct patient identity, procedure to be performed and as applicable, correct side and site, correct patient position, and availability of implants, special equipment or special requirements  A simple surgical tray was used  Prior to the procedure, the bilateral thoracic paraspinal musculature was examined with a 12 MHz linear transducer to visualize the targeted trigger points and determine the optimal needle path  Following this, the area was prepared with a ChloraPrep scrub, then re-examined using the same transducer, a sterile ultrasound transducer cover, and sterile ultrasound transducer gel  Thereafter, using ultrasound guidance, a 2 5-inch 25-gauge needle was advanced into the targeted trigger points  After visualization of the tip and negative aspiration for blood, a mixture of 1% lidocaine with 40 milligrams/milliliters Depo-Medrol was injected into the targeted trigger points  Following the injection, the needle was withdrawn  The patient tolerated the procedure well and there were no apparent complications  After an appropriate amount of observation, the patient was dismissed from the clinic in good condition under their own power

## 2022-09-13 NOTE — TELEPHONE ENCOUNTER
As per Dr Mindy Morales, spoke with Shahab Umaña to inform her of Dr Isabela Phillip recommendation for a left breast US of reconstructed breast  She was aware of the CT of the neck results as she saw them on her MyChart and understands  She is in agreement and would like to schedule the US on her own  Provided contact information for the [facility]

## 2022-09-13 NOTE — TELEPHONE ENCOUNTER
----- Message from Evelyn Portillo MD sent at 9/13/2022 11:00 AM EDT -----  She recently saw us for left neck swelling  The CT of the neck didn't show anything but they are commenting on a partially visualized mass in the left (reconstructed) breast  I ordered an ultrasound for her  If you could please let her know and then either she could set this up or have Tootie or Arneta Lipoma do so please

## 2022-09-14 ENCOUNTER — TELEPHONE (OUTPATIENT)
Dept: SURGICAL ONCOLOGY | Facility: CLINIC | Age: 52
End: 2022-09-14

## 2022-09-14 NOTE — TELEPHONE ENCOUNTER
----- Message from Rikki Mustafa MD sent at 9/13/2022  6:30 PM EDT -----  Not worried about that, it's only 1cm    ----- Message -----  From: Callie Welch RN  Sent: 9/13/2022   1:34 PM EDT  To: Rikki Mustafa MD    Called and spoke with pt , she understands  She questioned the findings on the thyroid  Any concerns?  ----- Message -----  From: Rikki Mustafa MD  Sent: 9/13/2022  11:01 AM EDT  To: Callie Welch RN    She recently saw us for left neck swelling  The CT of the neck didn't show anything but they are commenting on a partially visualized mass in the left (reconstructed) breast  I ordered an ultrasound for her  If you could please let her know and then either she could set this up or have Tootie Grajeda do so please

## 2022-09-14 NOTE — TELEPHONE ENCOUNTER
Returned call to [facility] and informed her Dr Gwendolyn Robertson did not have any additional recommendations for the thyroid nodule at this time due to its size  She understands

## 2022-09-20 ENCOUNTER — HOSPITAL ENCOUNTER (OUTPATIENT)
Dept: ULTRASOUND IMAGING | Facility: CLINIC | Age: 52
Discharge: HOME | End: 2022-09-20
Payer: COMMERCIAL

## 2022-09-20 DIAGNOSIS — N63.20 MASS OF LEFT BREAST, UNSPECIFIED QUADRANT: ICD-10-CM

## 2022-09-20 PROCEDURE — 76642 ULTRASOUND BREAST LIMITED: CPT

## 2022-09-25 ENCOUNTER — PATIENT MESSAGE (OUTPATIENT)
Dept: GYNECOLOGIC ONCOLOGY | Facility: CLINIC | Age: 52
End: 2022-09-25

## 2022-09-26 DIAGNOSIS — R87.613 HIGH GRADE SQUAMOUS INTRAEPITHELIAL CERVICAL DYSPLASIA: Primary | ICD-10-CM

## 2022-10-11 ENCOUNTER — HOSPITAL ENCOUNTER (OUTPATIENT)
Dept: RADIOLOGY | Age: 52
Discharge: HOME | End: 2022-10-11
Payer: COMMERCIAL

## 2022-10-11 DIAGNOSIS — I77.810 DILATATION OF THORACIC AORTA (HCC): ICD-10-CM

## 2022-10-11 PROCEDURE — G1004 CDSM NDSC: HCPCS

## 2022-10-11 PROCEDURE — 71250 CT THORAX DX C-: CPT

## 2022-10-13 ENCOUNTER — TELEPHONE (OUTPATIENT)
Dept: PAIN MEDICINE | Facility: MEDICAL CENTER | Age: 52
End: 2022-10-13

## 2022-10-13 NOTE — TELEPHONE ENCOUNTER
Caller: Hafsa from Radiology     Doctor: Dr Irving     Reason for call: Clarify order    Call back#:[phone]

## 2022-10-18 ENCOUNTER — OFFICE VISIT (OUTPATIENT)
Dept: GYNECOLOGIC ONCOLOGY | Facility: CLINIC | Age: 52
End: 2022-10-18
Payer: COMMERCIAL

## 2022-10-18 VITALS
TEMPERATURE: 97.7 F | WEIGHT: 213 LBS | DIASTOLIC BLOOD PRESSURE: 74 MMHG | SYSTOLIC BLOOD PRESSURE: 118 MMHG | BODY MASS INDEX: 32.28 KG/M2 | HEIGHT: 68 IN

## 2022-10-18 DIAGNOSIS — R87.613 HIGH GRADE SQUAMOUS INTRAEPITHELIAL CERVICAL DYSPLASIA: ICD-10-CM

## 2022-10-18 DIAGNOSIS — N87.1 CIN II (CERVICAL INTRAEPITHELIAL NEOPLASIA II): Primary | ICD-10-CM

## 2022-10-18 PROBLEM — Z98.890 STATUS POST BILATERAL BREAST RECONSTRUCTION: Status: RESOLVED | Noted: 2022-08-23 | Resolved: 2022-10-18

## 2022-10-18 PROBLEM — N39.0 UTI (URINARY TRACT INFECTION): Status: RESOLVED | Noted: 2018-08-03 | Resolved: 2022-10-18

## 2022-10-18 PROBLEM — Z90.49 STATUS POST LAPAROSCOPIC CHOLECYSTECTOMY: Status: RESOLVED | Noted: 2022-08-23 | Resolved: 2022-10-18

## 2022-10-18 PROBLEM — Z90.13 STATUS POST BILATERAL MASTECTOMY: Status: RESOLVED | Noted: 2022-08-23 | Resolved: 2022-10-18

## 2022-10-18 PROBLEM — N63.20 LEFT BREAST MASS: Status: RESOLVED | Noted: 2022-09-13 | Resolved: 2022-10-18

## 2022-10-18 PROCEDURE — 99244 OFF/OP CNSLTJ NEW/EST MOD 40: CPT | Performed by: OBSTETRICS & GYNECOLOGY

## 2022-10-18 RX ORDER — CELECOXIB 200 MG/1
200 CAPSULE ORAL ONCE
OUTPATIENT
Start: 2022-10-18 | End: 2022-10-18

## 2022-10-18 NOTE — LETTER
October 18, 2022     Iliana Malik DO  [address] [facility] [address]    Patient: Krysten Fontenot   YOB: 1970   Date of Visit: 10/18/2022       Dear Dr Princess Knight: Thank you for referring Krysten Fontenot to me for evaluation  Below are my notes for this consultation  If you have questions, please do not hesitate to call me  I look forward to following your patient along with you  Sincerely,        Clint Hamilton MD        CC: MD Diana Cordon MD Fletcher Beam, MD  10/18/2022  1:52 PM  Sign when Signing Visit  Assessment/Plan:    Problem List Items Addressed This Visit        Genitourinary    JOSEPH II (cervical intraepithelial neoplasia II) - Primary     I explained to patient why hysterectomy is not indicated under the circumstances  I even explained the risks of committing to an immediate hysterectomy without further excisional procedure which could miss an invasive cancer and provide the wrong operation  After extensive counseling, patient agrees to proceed with indicated intervention which is exam under anesthesia, loop electrosurgical excision procedure  Patient is healthy and has good exercise tolerance  No additional cardiovascular workup is indicated  No preoperative testing is indicated for this procedure  I discussed with patient indication, risks, benefits and alternatives of surgical exploration  We discussed possibility of bleeding requiring blood transfusion, injuries to bladder, vagina, rectum or neurovascular structures  Additionally, we discussed general risks associated to stress of surgery such as venous thromboembolism, acute myocardial events and stroke  All questions answered to patient's satisfaction  She agrees and wants to proceed  Informed consent form signed              Relevant Orders    Case request operating room: EXAM UNDER ANESTHESIA,  BIOPSY LEEP CERVIX (Completed)       Other    High grade squamous intraepithelial cervical dysplasia    Relevant Orders    Case request operating room: EXAM UNDER ANESTHESIA,  BIOPSY LEEP CERVIX (Completed)              CHIEF COMPLAINT:   HERE FOR CONSULTATION MANAGEMENT FOR NEWLY DIAGNOSED HIGH-GRADE CERVICAL DYSPLASIA/JOSEPH 2    Subjective:     Problem:  Cancer Staging  Malignant neoplasm of lower-outer quadrant of right breast of female, estrogen receptor positive ([facility] [address] )  Staging form: Breast, AJCC 8th Edition  - Clinical: cT1, cN0, cM0, ER: Positive, IN: Positive, HER2: Negative - Signed by Marci Carroll MD on 7/2/2018  - Pathologic: pT1a, pN0(sn), cM0, ER: Positive, IN: Positive, HER2: Negative - Signed by Marci Carroll MD on 8/21/2018      Previous therapy:  Oncology History    No history exists  Patient ID: Lane Rivera is a 50 y o  female  HPI  53-year-old well known to me with prior history of hormone receptor positive breast cancer status post bilateral mastectomy with sentinel lymph node biopsy with joint laparoscopic bilateral salpingo-oophorectomy in 2018  She has now completed her upfront breast cancer treatment and has no evidence of disease  She is on aromatase inhibitors  Recent Pap demonstrated atypical squamous cells of undetermined significance with high-risk HPV  Colposcopy/biopsy demonstrated JOSEPH 2  Patient has history of remote cervical dysplasia treated by cryotherapy 5-10 years ago  She denies abnormal uterine bleeding, drainage or discharge  She is asymptomatic  Review of Systems  As per HPI  Twelve point review of systems otherwise unremarkable    Current Outpatient Medications   Medication Sig Dispense Refill   • acetaminophen (TYLENOL) 325 mg tablet Take 650 mg by mouth every 6 (six) hours as needed for mild pain     • anastrozole (ARIMIDEX) 1 mg tablet TAKE 1 TABLET DAILY 90 tablet 3   • BIOTIN PO Take 1 tablet by mouth every morning     • Calcium Carb-Cholecalciferol 1000-800 MG-UNIT TABS calcium     • calcium-vitamin D 250-100 MG-UNIT per tablet Take 1 tablet by mouth     • cyanocobalamin 50 MCG tablet Take 50 mcg by mouth     • Ergocalciferol (VITAMIN D2) 2000 units TABS Vitamin D2 50,000 unit capsule     • ferrous sulfate 325 (65 Fe) mg tablet Daily     • Influenza Vac Split Quad (AFLURIA QUADRIVALENT IM) Afluria Quad 3257-2192 (PF) 60 mcg (15 mcg x 4)/0 5 mL IM syringe   TO BE ADMINISTERED BY PHARMACIST FOR IMMUNIZATION     • Inulin-Calcium-Vitamin D 2-250-100 GM-MG-UNIT CHEW Fiber Plus Mulitvitamin     • Lifitegrast (XIIDRA OP) Apply 1 vial to eye 2 (two) times a day     • loratadine (CLARITIN) 10 mg tablet Take 10 mg by mouth as needed       • meloxicam (Mobic) 15 mg tablet Take 1 tablet (15 mg total) by mouth daily 30 tablet 1   • methocarbamol (ROBAXIN) 500 mg tablet Take 500 mg by mouth 3 (three) times a day       • montelukast (SINGULAIR) 10 mg tablet Take 10 mg by mouth as needed       • multivitamin (THERAGRAN) TABS Take 1 tablet by mouth every morning       • Naproxen Sodium 220 MG CAPS as needed     • pantoprazole (PROTONIX) 40 mg tablet Take 40 mg by mouth every morning       • phentermine 15 MG capsule       • pyridoxine (VITAMIN B6) 100 mg tablet Take 100 mg by mouth     • tiZANidine (Zanaflex) 4 mg tablet Take 0 5 tablets (2 mg total) by mouth every 8 (eight) hours as needed for muscle spasms 60 tablet 1   • topiramate (TOPAMAX) 25 mg tablet TAKE 1 TABLET TWICE A  tablet 3   • Xhance 93 MCG/ACT EXHU INSTILL 1 SPRAY PER NOSTRIL TWICE A DAY 16 mL 11   • zolpidem (AMBIEN) 5 mg tablet Take 1 tablet (5 mg total) by mouth daily at bedtime as needed for sleep 30 tablet 3     No current facility-administered medications for this visit         Allergies   Allergen Reactions   • Adhesive [Medical Tape] Rash     And small blister at sight       Past Medical History:   Diagnosis Date   • Abnormal Pap smear of cervix    • Ashkenazi [place] ancestry requiring population-specific genetic screening    • Asthma    • Breast cancer ([facility] ) 6/13/17   • Breathing difficulty     after gastric bypass 2014-"felt elephant on chest"   • Cervical cancer ([facility] ) 8/2022   • Chest pain, non-cardiac     Seen in ER and is from esophageal spasms     • Constipation     occasional   • Family history of breast cancer in female    • GERD (gastroesophageal reflux disease)    • History of MRSA infection     Leg   • History of UTI     treated 8/1/2018   • Human papilloma virus (HPV) infection    • Hx MRSA infection     on leg-2009 treated   • Pollen allergies    • PONV (postoperative nausea and vomiting)     nausea   • Postural lightheadedness    • Raynaud's disease    • Seasonal allergies    • Sinus problem    • Sinus problem        Past Surgical History:   Procedure Laterality Date   • BILATERAL OOPHORECTOMY     • BLADDER SURGERY  2013    bladder lift   • BREAST BIOPSY Right 06/14/2018    IDC   • CERVIX LESION DESTRUCTION     • COLONOSCOPY     • COLPOSCOPY W/ BIOPSY / CURETTAGE     • ENDOMETRIAL ABLATION     • GASTRIC BYPASS  2014 2014 wt loss 90 lbs(regained 20 lbs)   • HYSTEROSCOPIC STERILIZATION W/ IMPLANTS     • HYSTEROSCOPY W/ ENDOMETRIAL ABLATION  2013   • KNEE ARTHROSCOPY Right 1990   • LYMPH NODE BIOPSY Right 08/10/2018    Procedure: BIOPSY LYMPH NODE SENTINEL;  Surgeon: Morgan Rosas MD;  [facility];  Service: Surgical Oncology   • MAMMO STEREOTACTIC BREAST BIOPSY RIGHT (ALL INC) Right 06/14/2018   • MASTECTOMY Bilateral    • NASAL SEPTUM SURGERY      2002, 2016   • OOPHORECTOMY Bilateral    • ID IMPLNT BIO IMPLNT FOR SOFT TISSUE REINFORCEMENT Bilateral 08/10/2018    Procedure: RECONSTRUCTION BREAST W/ IMPLANT;  Surgeon: Lidia Adams MD;  [facility];  Service: Plastics   • ID INSJ/RPLCMT BREAST IMPLANT SEP DAY MASTECTOMY Bilateral 11/30/2018    Procedure: BREAST IMPLANT EXCHANGE;  Surgeon: Lidia Adams MD;  [facility];  Service: Plastics   • ID LAP,CHOLECYSTECTOMY/GRAPH N/A 03/19/2021    Procedure: CHOLECYSTECTOMY LAPAROSCOPIC W/ INTRAOP CHOLANGIOGRAM;  Surgeon: Devyn Pino MD;  [facility];  Service: General   • GA LAP,RMV  ADNEXAL STRUCTURE N/A 08/10/2018    Procedure: SALPINGO-OOPHORECTOMY, LAPAROSCOPIC; PELVIC WASHINGS ;  Surgeon: Yoly Mehta MD;  [facility];  Service: Gynecology Oncology   • GA MASTECTOMY, SIMPLE, COMPLETE Bilateral 08/10/2018    Procedure: MASTECTOMY SIMPLE;  Surgeon: Luciana Holt MD;  [facility];  Service: Surgical Oncology   • GA LENNIE-IMPLANT CAPSULECTOMY BREAST COMPLETE Bilateral 2018    Procedure: CAPSULECTOMY;  Surgeon: Bridgette Landry MD;  [facility];  Service: Plastics   • GA REVISION OF RECONSTRUCTED BREAST Bilateral 2019    Procedure: BREAST MOUND REVISION; FAT GRAFTING;  Surgeon: Bridgette Landry MD;  [facility];  Service: Plastics   • GA TISSUE EXPANDER PLACEMENT BREAST RECONSTRUCTION Bilateral 08/10/2018    Procedure: INSERTION/PLACEMENT TISSUE EXPANDER (EXCHANGE);   Surgeon: Bridgette Landry MD;  [facility];  Service: Plastics   • SINUS SURGERY         OB History        2    Para   2    Term   2            AB        Living   2       SAB        IAB        Ectopic        Multiple        Live Births               Obstetric Comments   First pregnancy age 31  Menarche age 17  Hx birth control pills                Family History   Problem Relation Age of Onset   • Other Mother         BRCA negative   • Breast cancer Mother 72   • Ovarian cancer Mother 61   • Diabetes Father    • Heart disease Father    • Hypertension Father    • Migraines Sister    • Diabetes Brother    • Colon cancer Maternal Grandfather 80   • Diabetes type II Maternal Grandfather    • Heart disease Paternal Grandfather    • Colon cancer Maternal Aunt 57   • Skin cancer Maternal Aunt    • Colon cancer Maternal Aunt        The following portions of the patient's history were reviewed and updated as appropriate: allergies, current medications, past family history, past medical history, past social history, past surgical history and problem list       Objective:    Blood pressure 118/74, temperature 97 7 °F (36 5 °C), temperature source Tympanic, height 5' 8" (1 727 m), weight 96 6 kg (213 lb)  Body mass index is 32 39 kg/m²  Physical Exam  Vitals reviewed  Exam conducted with a chaperone present  Constitutional:       Appearance: Normal appearance  She is not toxic-appearing  Eyes:      General: No scleral icterus  Right eye: No discharge  Left eye: No discharge  Conjunctiva/sclera: Conjunctivae normal    Cardiovascular:      Rate and Rhythm: Normal rate and regular rhythm  Heart sounds: Normal heart sounds  No murmur heard  Pulmonary:      Effort: No respiratory distress  Breath sounds: Normal breath sounds  No stridor  No wheezing  Abdominal:      General: There is no distension  Palpations: Abdomen is soft  There is no mass  Tenderness: There is no abdominal tenderness  There is no guarding  Genitourinary:     Comments: Normal external female genitalia  Normal Bartholin's and Soquel's glands  Normal urethral meatus and no evidence of urethral discharge or masses  Bladder without fullness mass or tenderness  Vagina without lesion or discharge  No significant pelvic organ prolapse noted  Cervix grossly normal appearing without visible lesions  Uterus nontender with normal contour, size and mobility  Adnexae without mass or tenderness  Anus without fissure of lesion  Musculoskeletal:      Right lower leg: No edema  Left lower leg: No edema  Neurological:      Mental Status: She is alert         Makayla Canseco MD, [address]  10/18/2022  1:52 PM

## 2022-10-18 NOTE — ASSESSMENT & PLAN NOTE
I explained to patient why hysterectomy is not indicated under the circumstances  I even explained the risks of committing to an immediate hysterectomy without further excisional procedure which could miss an invasive cancer and provide the wrong operation  After extensive counseling, patient agrees to proceed with indicated intervention which is exam under anesthesia, loop electrosurgical excision procedure  Patient is healthy and has good exercise tolerance  No additional cardiovascular workup is indicated  No preoperative testing is indicated for this procedure  I discussed with patient indication, risks, benefits and alternatives of surgical exploration  We discussed possibility of bleeding requiring blood transfusion, injuries to bladder, vagina, rectum or neurovascular structures  Additionally, we discussed general risks associated to stress of surgery such as venous thromboembolism, acute myocardial events and stroke  All questions answered to patient's satisfaction  She agrees and wants to proceed  Informed consent form signed

## 2022-10-18 NOTE — PROGRESS NOTES
Assessment/Plan:    Problem List Items Addressed This Visit        Genitourinary    JOSEPH II (cervical intraepithelial neoplasia II) - Primary     I explained to patient why hysterectomy is not indicated under the circumstances  I even explained the risks of committing to an immediate hysterectomy without further excisional procedure which could miss an invasive cancer and provide the wrong operation  After extensive counseling, patient agrees to proceed with indicated intervention which is exam under anesthesia, loop electrosurgical excision procedure  Patient is healthy and has good exercise tolerance  No additional cardiovascular workup is indicated  No preoperative testing is indicated for this procedure  I discussed with patient indication, risks, benefits and alternatives of surgical exploration  We discussed possibility of bleeding requiring blood transfusion, injuries to bladder, vagina, rectum or neurovascular structures  Additionally, we discussed general risks associated to stress of surgery such as venous thromboembolism, acute myocardial events and stroke  All questions answered to patient's satisfaction  She agrees and wants to proceed  Informed consent form signed              Relevant Orders    Case request operating room: EXAM UNDER ANESTHESIA,  BIOPSY LEEP CERVIX (Completed)       Other    High grade squamous intraepithelial cervical dysplasia    Relevant Orders    Case request operating room: EXAM UNDER ANESTHESIA,  BIOPSY LEEP CERVIX (Completed)              CHIEF COMPLAINT:   HERE FOR CONSULTATION MANAGEMENT FOR NEWLY DIAGNOSED HIGH-GRADE CERVICAL DYSPLASIA/JOSEPH 2    Subjective:     Problem:  Cancer Staging  Malignant neoplasm of lower-outer quadrant of right breast of female, estrogen receptor positive ([facility] [address] )  Staging form: Breast, AJCC 8th Edition  - Clinical: cT1, cN0, cM0, ER: Positive, GA: Positive, HER2: Negative - Signed by Jaz Kohli MD on 7/2/2018  - Pathologic: pT1a, pN0(sn), cM0, ER: Positive, KY: Positive, HER2: Negative - Signed by Madyson Rogers MD on 8/21/2018      Previous therapy:  Oncology History    No history exists  Patient ID: Leela Neal is a 50 y o  female  HPI  61-year-old well known to me with prior history of hormone receptor positive breast cancer status post bilateral mastectomy with sentinel lymph node biopsy with joint laparoscopic bilateral salpingo-oophorectomy in 2018  She has now completed her upfront breast cancer treatment and has no evidence of disease  She is on aromatase inhibitors  Recent Pap demonstrated atypical squamous cells of undetermined significance with high-risk HPV  Colposcopy/biopsy demonstrated JOSEPH 2  Patient has history of remote cervical dysplasia treated by cryotherapy 5-10 years ago  She denies abnormal uterine bleeding, drainage or discharge  She is asymptomatic  Review of Systems  As per HPI  Twelve point review of systems otherwise unremarkable    Current Outpatient Medications   Medication Sig Dispense Refill   • acetaminophen (TYLENOL) 325 mg tablet Take 650 mg by mouth every 6 (six) hours as needed for mild pain     • anastrozole (ARIMIDEX) 1 mg tablet TAKE 1 TABLET DAILY 90 tablet 3   • BIOTIN PO Take 1 tablet by mouth every morning     • Calcium Carb-Cholecalciferol 1000-800 MG-UNIT TABS calcium     • calcium-vitamin D 250-100 MG-UNIT per tablet Take 1 tablet by mouth     • cyanocobalamin 50 MCG tablet Take 50 mcg by mouth     • Ergocalciferol (VITAMIN D2) 2000 units TABS Vitamin D2 50,000 unit capsule     • ferrous sulfate 325 (65 Fe) mg tablet Daily     • Influenza Vac Split Quad (AFLURIA QUADRIVALENT IM) Afluria Quad 3152-6128 (PF) 60 mcg (15 mcg x 4)/0 5 mL IM syringe   TO BE ADMINISTERED BY PHARMACIST FOR IMMUNIZATION     • Inulin-Calcium-Vitamin D 2-250-100 GM-MG-UNIT CHEW Fiber Plus Mulitvitamin     • Lifitegrast (XIIDRA OP) Apply 1 vial to eye 2 (two) times a day     • loratadine (CLARITIN) 10 mg tablet Take 10 mg by mouth as needed       • meloxicam (Mobic) 15 mg tablet Take 1 tablet (15 mg total) by mouth daily 30 tablet 1   • methocarbamol (ROBAXIN) 500 mg tablet Take 500 mg by mouth 3 (three) times a day       • montelukast (SINGULAIR) 10 mg tablet Take 10 mg by mouth as needed       • multivitamin (THERAGRAN) TABS Take 1 tablet by mouth every morning       • Naproxen Sodium 220 MG CAPS as needed     • pantoprazole (PROTONIX) 40 mg tablet Take 40 mg by mouth every morning       • phentermine 15 MG capsule       • pyridoxine (VITAMIN B6) 100 mg tablet Take 100 mg by mouth     • tiZANidine (Zanaflex) 4 mg tablet Take 0 5 tablets (2 mg total) by mouth every 8 (eight) hours as needed for muscle spasms 60 tablet 1   • topiramate (TOPAMAX) 25 mg tablet TAKE 1 TABLET TWICE A  tablet 3   • Xhance 93 MCG/ACT EXHU INSTILL 1 SPRAY PER NOSTRIL TWICE A DAY 16 mL 11   • zolpidem (AMBIEN) 5 mg tablet Take 1 tablet (5 mg total) by mouth daily at bedtime as needed for sleep 30 tablet 3     No current facility-administered medications for this visit  Allergies   Allergen Reactions   • Adhesive [Medical Tape] Rash     And small blister at sight       Past Medical History:   Diagnosis Date   • Abnormal Pap smear of cervix    • Ashkenazi Christian ancestry requiring population-specific genetic screening    • Asthma    • Breast cancer ([facility] ) 6/13/17   • Breathing difficulty     after gastric bypass 2014-"felt elephant on chest"   • Cervical cancer ([facility] ) 8/2022   • Chest pain, non-cardiac     Seen in ER and is from esophageal spasms     • Constipation     occasional   • Family history of breast cancer in female    • GERD (gastroesophageal reflux disease)    • History of MRSA infection     Leg   • History of UTI     treated 8/1/2018   • Human papilloma virus (HPV) infection    • Hx MRSA infection     on leg-2009 treated   • Pollen allergies    • PONV (postoperative nausea and vomiting)     nausea   • Postural lightheadedness    • Raynaud's disease    • Seasonal allergies    • Sinus problem    • Sinus problem        Past Surgical History:   Procedure Laterality Date   • BILATERAL OOPHORECTOMY     • BLADDER SURGERY  2013    bladder lift   • BREAST BIOPSY Right 06/14/2018    IDC   • CERVIX LESION DESTRUCTION     • COLONOSCOPY     • COLPOSCOPY W/ BIOPSY / CURETTAGE     • ENDOMETRIAL ABLATION     • GASTRIC BYPASS  2014 2014 wt loss 90 lbs(regained 20 lbs)   • HYSTEROSCOPIC STERILIZATION W/ IMPLANTS     • HYSTEROSCOPY W/ ENDOMETRIAL ABLATION  2013   • KNEE ARTHROSCOPY Right 1990   • LYMPH NODE BIOPSY Right 08/10/2018    Procedure: BIOPSY LYMPH NODE SENTINEL;  Surgeon: Malini Young MD;  [facility];  Service: Surgical Oncology   • MAMMO STEREOTACTIC BREAST BIOPSY RIGHT (ALL INC) Right 06/14/2018   • MASTECTOMY Bilateral    • NASAL SEPTUM SURGERY      2002, 2016   • OOPHORECTOMY Bilateral    • TN IMPLNT BIO IMPLNT FOR SOFT TISSUE REINFORCEMENT Bilateral 08/10/2018    Procedure: RECONSTRUCTION BREAST W/ IMPLANT;  Surgeon: Ant Lockhart MD;  [facility];  Service: Plastics   • TN INSJ/RPLCMT BREAST IMPLANT SEP DAY MASTECTOMY Bilateral 11/30/2018    Procedure: BREAST IMPLANT EXCHANGE;  Surgeon: Ant Lockhart MD;  [facility];  Service: Plastics   • TN LAP,CHOLECYSTECTOMY/GRAPH N/A 03/19/2021    Procedure: CHOLECYSTECTOMY LAPAROSCOPIC W/ INTRAOP CHOLANGIOGRAM;  Surgeon: Arline Guillen MD;  Location: [address] [place] [address] MAIN OR;  Service: General   • TN LAP,RMV  ADNEXAL STRUCTURE N/A 08/10/2018    Procedure: SALPINGO-OOPHORECTOMY, LAPAROSCOPIC; PELVIC WASHINGS ;  Surgeon: Emerson Sierra MD;  [facility];  Service: Gynecology Oncology   • TN MASTECTOMY, SIMPLE, COMPLETE Bilateral 08/10/2018    Procedure: MASTECTOMY SIMPLE;  Surgeon: Malini Young MD;  [facility];  Service: Surgical Oncology   • TN LENNIE-IMPLANT CAPSULECTOMY BREAST COMPLETE Bilateral 11/30/2018    Procedure: CAPSULECTOMY;  Surgeon: Estefanía Jeronimo Jam Romero MD;  [facility];  Service: Plastics   • IL REVISION OF RECONSTRUCTED BREAST Bilateral 2019    Procedure: BREAST MOUND REVISION; FAT GRAFTING;  Surgeon: Mushtaq Gonzalez MD;  [facility];  Service: Plastics   • IL TISSUE EXPANDER PLACEMENT BREAST RECONSTRUCTION Bilateral 08/10/2018    Procedure: INSERTION/PLACEMENT TISSUE EXPANDER (EXCHANGE); Surgeon: Mushtaq Gonzalez MD;  [facility];  Service: Plastics   • SINUS SURGERY         OB History        2    Para   2    Term   2            AB        Living   2       SAB        IAB        Ectopic        Multiple        Live Births               Obstetric Comments   First pregnancy age 31  Menarche age 17  Hx birth control pills                Family History   Problem Relation Age of Onset   • Other Mother         BRCA negative   • Breast cancer Mother 72   • Ovarian cancer Mother 61   • Diabetes Father    • Heart disease Father    • Hypertension Father    • Migraines Sister    • Diabetes Brother    • Colon cancer Maternal Grandfather 80   • Diabetes type II Maternal Grandfather    • Heart disease Paternal Grandfather    • Colon cancer Maternal Aunt 57   • Skin cancer Maternal Aunt    • Colon cancer Maternal Aunt        The following portions of the patient's history were reviewed and updated as appropriate: allergies, current medications, past family history, past medical history, past social history, past surgical history and problem list       Objective:    Blood pressure 118/74, temperature 97 7 °F (36 5 °C), temperature source Tympanic, height 5' 8" (1 727 m), weight 96 6 kg (213 lb)  Body mass index is 32 39 kg/m²  Physical Exam  Vitals reviewed  Exam conducted with a chaperone present  Constitutional:       Appearance: Normal appearance  She is not toxic-appearing  Eyes:      General: No scleral icterus  Right eye: No discharge  Left eye: No discharge        Conjunctiva/sclera: Conjunctivae normal  Cardiovascular:      Rate and Rhythm: Normal rate and regular rhythm  Heart sounds: Normal heart sounds  No murmur heard  Pulmonary:      Effort: No respiratory distress  Breath sounds: Normal breath sounds  No stridor  No wheezing  Abdominal:      General: There is no distension  Palpations: Abdomen is soft  There is no mass  Tenderness: There is no abdominal tenderness  There is no guarding  Genitourinary:     Comments: Normal external female genitalia  Normal Bartholin's and Villalba's glands  Normal urethral meatus and no evidence of urethral discharge or masses  Bladder without fullness mass or tenderness  Vagina without lesion or discharge  No significant pelvic organ prolapse noted  Cervix grossly normal appearing without visible lesions  Uterus nontender with normal contour, size and mobility  Adnexae without mass or tenderness  Anus without fissure of lesion  Musculoskeletal:      Right lower leg: No edema  Left lower leg: No edema  Neurological:      Mental Status: She is alert         Abby Palafox MD, [facility]  10/18/2022  1:52 PM

## 2022-11-01 ENCOUNTER — TELEPHONE (OUTPATIENT)
Dept: CARDIAC SURGERY | Facility: CLINIC | Age: 52
End: 2022-11-01

## 2022-11-01 NOTE — TELEPHONE ENCOUNTER
Called patient regarding recent imaging  Patient had requested to be seen in our office:  1) breast u/s reporting ascending aortic diameter 4 1 cm  2) CT neck- no report of ascending aortic aneurysm  3) CT chest- 3 8 cm ascending aorta- normal caliber; no aneurysm    Reviewed imaging with patient;  Breast u/s and CT neck imaging are NOT studies that provide adequate assessment of the thoracic aorta  Ct chest is the appropriate imaging and her ascending aorta is measured at 3 8 cm;  not a significant finding, not aneurysmal and considered normal caliber, as indicated in the CT chest impression  Explained this to patient and these findings do not require any consultation in our aortic clinic  Advised , if in the future, any CT imaging of the CHEST demonstrate a change in size of the ascending aorta to 4 5 cm or greater, then referral to our office is appropriate

## 2022-11-04 NOTE — PRE-PROCEDURE INSTRUCTIONS
Pre-Surgery Instructions:   Medication Instructions   • acetaminophen (TYLENOL) 325 mg tablet Uses PRN- OK to take day of surgery   • anastrozole (ARIMIDEX) 1 mg tablet Take night before surgery   • BIOTIN PO Stop taking 5 days prior to surgery  • calcium-vitamin D 250-100 MG-UNIT per tablet Stop taking 5 days prior to surgery  • cyanocobalamin 50 MCG tablet Stop taking 5 days prior to surgery  • Ergocalciferol (VITAMIN D2) 2000 units TABS Stop taking 5 days prior to surgery  • ferrous sulfate 325 (65 Fe) mg tablet Stop taking 5 days prior to surgery  • Lifitegrast (XIIDRA OP) Take day of surgery  • loratadine (CLARITIN) 10 mg tablet Uses PRN- OK to take day of surgery   • meloxicam (Mobic) 15 mg tablet stopped 10/27   • methocarbamol (ROBAXIN) 500 mg tablet Uses PRN- OK to take day of surgery   • montelukast (SINGULAIR) 10 mg tablet Uses PRN- OK to take day of surgery   • multivitamin (THERAGRAN) TABS Stop taking 5 days prior to surgery  • Naproxen Sodium 220 MG CAPS Stop taking 5 days prior to surgery  • Omega-3 Fatty Acids (FISH OIL PO) stopped 10/27   • pantoprazole (PROTONIX) 40 mg tablet Hold day of surgery  • pyridoxine (VITAMIN B6) 100 mg tablet Stop taking 5 days prior to surgery  • tiZANidine (Zanaflex) 4 mg tablet Uses PRN- OK to take day of surgery   • topiramate (TOPAMAX) 25 mg tablet Uses PRN- OK to take day of surgery   • Xhance 93 MCG/ACT EXHU Take day of surgery  • zolpidem (AMBIEN) 5 mg tablet Take night before surgery   Covid screening negative as per patient  Reviewed pre op medicine and showering instructions with patient via phone call, verbalizes understanding  Advised patient to stop taking non prescribed vitamins, herbal meds and NSAID's as of 11/4  Advised may take Tylenol products if needed  Advised to take DOS medicine with a small sip water       Advised NPO after MN prior to surgery and surgical services will call (11/9) with scheduled time of hospital arrival     Pt verbalized understanding of all instructions

## 2022-11-07 ENCOUNTER — TELEPHONE (OUTPATIENT)
Dept: GYNECOLOGIC ONCOLOGY | Facility: CLINIC | Age: 52
End: 2022-11-07

## 2022-11-07 NOTE — TELEPHONE ENCOUNTER
Patient called into the office requesting a change in providers from Dr Chuy Gifford to Dr Megan Alford if possible  Patient is having surgery with Dr Chuy Gifford on 11/10/22   Patient can be contacted back @ [phone] regarding this discussion

## 2022-11-07 NOTE — TELEPHONE ENCOUNTER
Scheduled with Dr [facility] on 11/8/22  Will wait until after that appt to decide if she will be cancelling surgery on 11/10/22

## 2022-11-08 ENCOUNTER — OFFICE VISIT (OUTPATIENT)
Dept: GYNECOLOGIC ONCOLOGY | Facility: CLINIC | Age: 52
End: 2022-11-08

## 2022-11-08 VITALS
WEIGHT: 213 LBS | HEIGHT: 68 IN | RESPIRATION RATE: 16 BRPM | SYSTOLIC BLOOD PRESSURE: 128 MMHG | OXYGEN SATURATION: 100 % | BODY MASS INDEX: 32.28 KG/M2 | TEMPERATURE: 98.2 F | DIASTOLIC BLOOD PRESSURE: 88 MMHG | HEART RATE: 80 BPM

## 2022-11-08 DIAGNOSIS — N87.1 CIN II (CERVICAL INTRAEPITHELIAL NEOPLASIA II): Primary | ICD-10-CM

## 2022-11-08 NOTE — H&P (VIEW-ONLY)
Assessment/Plan:    Problem List Items Addressed This Visit        Genitourinary    JOSEPH II (cervical intraepithelial neoplasia II) - Primary     82-year-old with recurrent cervical dysplasia, biopsy-proven JOSEPH 2  Her performance status is 0     1  I discussed the rationale for LEEP procedure prior to performing simple hysterectomy for recurrent dysplasia  She understands the rationale and agrees to proceed  She has previously consented for the procedure  2  Post LEEP options include Pap in 6 months, hysterectomy based on results  She understands that after hysterectomy, she will require ongoing screening of the vagina  CHIEF COMPLAINT:  Here for a 2nd opinion to discuss LEEP procedure for cervical dysplasia          Problem:  Cancer Staging  Malignant neoplasm of lower-outer quadrant of right breast of female, estrogen receptor positive ([facility] [address] )  Staging form: Breast, AJCC 8th Edition  - Clinical: cT1, cN0, cM0, ER: Positive, RI: Positive, HER2: Negative - Signed by Luciana Holt MD on 7/2/2018  - Pathologic: pT1a, pN0(sn), cM0, ER: Positive, RI: Positive, HER2: Negative - Signed by Luciana Holt MD on 8/21/2018      Previous therapy:  Oncology History    No history exists  Patient ID: Bakari Constantino is a 50 y o  female  82-year-old with a history of cervical dysplasia, recent biopsy-proven JOSEPH 2 who was scheduled for a LEEP procedure presents to discuss hysterectomy  No interval change in medications or medical history since her last visit to the office  Review of Systems   Constitutional: Negative for activity change and unexpected weight change  HENT: Negative  Eyes: Negative  Respiratory: Negative  Cardiovascular: Negative  Gastrointestinal: Negative for abdominal distention and abdominal pain  Endocrine: Negative  Genitourinary: Negative for pelvic pain and vaginal bleeding  Musculoskeletal: Negative  Skin: Negative  Allergic/Immunologic: Negative  Neurological: Negative  Hematological: Negative  Psychiatric/Behavioral: Negative          Current Outpatient Medications   Medication Sig Dispense Refill   • acetaminophen (TYLENOL) 325 mg tablet Take 650 mg by mouth every 6 (six) hours as needed for mild pain     • anastrozole (ARIMIDEX) 1 mg tablet TAKE 1 TABLET DAILY 90 tablet 3   • BIOTIN PO Take 1 tablet by mouth every morning     • Calcium Carb-Cholecalciferol 1000-800 MG-UNIT TABS calcium     • calcium-vitamin D 250-100 MG-UNIT per tablet Take 1 tablet by mouth     • cyanocobalamin 50 MCG tablet Take 50 mcg by mouth every other day     • Ergocalciferol (VITAMIN D2) 2000 units TABS Vitamin D2 50,000 unit capsule     • ferrous sulfate 325 (65 Fe) mg tablet Daily     • Influenza Vac Split Quad (AFLURIA QUADRIVALENT IM) Afluria Quad 1998-7877 (PF) 60 mcg (15 mcg x 4)/0 5 mL IM syringe   TO BE ADMINISTERED BY PHARMACIST FOR IMMUNIZATION     • Inulin-Calcium-Vitamin D 2-250-100 GM-MG-UNIT CHEW Fiber Plus Mulitvitamin     • Lifitegrast (XIIDRA OP) Apply 1 vial to eye 2 (two) times a day     • loratadine (CLARITIN) 10 mg tablet Take 10 mg by mouth as needed       • meloxicam (Mobic) 15 mg tablet Take 1 tablet (15 mg total) by mouth daily 30 tablet 1   • methocarbamol (ROBAXIN) 500 mg tablet Take 500 mg by mouth 3 (three) times a day       • montelukast (SINGULAIR) 10 mg tablet Take 10 mg by mouth as needed       • multivitamin (THERAGRAN) TABS Take 1 tablet by mouth every morning       • Naproxen Sodium 220 MG CAPS as needed     • Omega-3 Fatty Acids (FISH OIL PO) Take by mouth in the morning     • pantoprazole (PROTONIX) 40 mg tablet Take 40 mg by mouth every morning       • phentermine 15 MG capsule       • pyridoxine (VITAMIN B6) 100 mg tablet Take 100 mg by mouth daily     • tiZANidine (Zanaflex) 4 mg tablet Take 0 5 tablets (2 mg total) by mouth every 8 (eight) hours as needed for muscle spasms 60 tablet 1   • topiramate (TOPAMAX) 25 mg tablet TAKE 1 TABLET TWICE A  tablet 3   • Xhance 93 MCG/ACT EXHU INSTILL 1 SPRAY PER NOSTRIL TWICE A DAY 16 mL 11   • zolpidem (AMBIEN) 5 mg tablet Take 1 tablet (5 mg total) by mouth daily at bedtime as needed for sleep 30 tablet 3     No current facility-administered medications for this visit  Allergies   Allergen Reactions   • Adhesive [Medical Tape] Rash     And small blister at sight       Past Medical History:   Diagnosis Date   • Abnormal Pap smear of cervix    • Ashkenazi Sabianist ancestry requiring population-specific genetic screening    • Asthma    • Breast cancer ([facility] ) 6/13/17   • Breathing difficulty     after gastric bypass 2014-"felt elephant on chest"   • Cervical cancer ([facility] ) 8/2022   • Cervical dysplasia    • Chest pain, non-cardiac     Seen in ER and is from esophageal spasms     • Colon polyp    • Constipation     occasional   • Family history of breast cancer in female    • GERD (gastroesophageal reflux disease)    • History of MRSA infection     Leg   • History of UTI     treated 8/1/2018   • Human papilloma virus (HPV) infection    • Hx MRSA infection     on leg-2009 treated   • Pollen allergies    • PONV (postoperative nausea and vomiting)     nausea   • Postural lightheadedness    • Raynaud's disease    • Seasonal allergies    • Sinus problem        Past Surgical History:   Procedure Laterality Date   • BILATERAL OOPHORECTOMY     • BLADDER SURGERY  2013    bladder lift   • BREAST BIOPSY Right 06/14/2018    IDC   • CERVIX LESION DESTRUCTION     • COLONOSCOPY     • COLPOSCOPY W/ BIOPSY / CURETTAGE     • ENDOMETRIAL ABLATION     • GASTRIC BYPASS  2014 2014 wt loss 90 lbs(regained 20 lbs)   • HYSTEROSCOPIC STERILIZATION W/ IMPLANTS     • HYSTEROSCOPY W/ ENDOMETRIAL ABLATION  2013   • KNEE ARTHROSCOPY Right 1990   • LYMPH NODE BIOPSY Right 08/10/2018    Procedure: BIOPSY LYMPH NODE SENTINEL;  Surgeon: Malini Young MD;  [facility];  Service: Surgical Oncology   • MAMMO STEREOTACTIC BREAST BIOPSY RIGHT (ALL INC) Right 2018   • MASTECTOMY Bilateral    • NASAL SEPTUM SURGERY      ,    • OOPHORECTOMY Bilateral    • MA IMPLNT BIO IMPLNT FOR SOFT TISSUE REINFORCEMENT Bilateral 08/10/2018    Procedure: RECONSTRUCTION BREAST W/ IMPLANT;  Surgeon: Bandar Abarca MD;  [facility];  Service: Plastics   • MA INSJ/RPLCMT BREAST IMPLANT SEP DAY MASTECTOMY Bilateral 2018    Procedure: BREAST IMPLANT EXCHANGE;  Surgeon: Bandar Abarca MD;  [facility];  Service: Plastics   • MA LAP,CHOLECYSTECTOMY/GRAPH N/A 2021    Procedure: CHOLECYSTECTOMY LAPAROSCOPIC W/ INTRAOP CHOLANGIOGRAM;  Surgeon: Mason Flores MD;  Location: [facility] MAIN OR;  Service: General   • MA LAP,RMV  ADNEXAL STRUCTURE N/A 08/10/2018    Procedure: SALPINGO-OOPHORECTOMY, LAPAROSCOPIC; PELVIC WASHINGS ;  Surgeon: Shey Dumont MD;  [facility];  Service: Gynecology Oncology   • MA MASTECTOMY, SIMPLE, COMPLETE Bilateral 08/10/2018    Procedure: MASTECTOMY SIMPLE;  Surgeon: Shelton De La Cruz MD;  [facility];  Service: Surgical Oncology   • MA LENNIE-IMPLANT CAPSULECTOMY BREAST COMPLETE Bilateral 2018    Procedure: CAPSULECTOMY;  Surgeon: Bandar Abarca MD;  [facility];  Service: Plastics   • MA REVISION OF RECONSTRUCTED BREAST Bilateral 2019    Procedure: BREAST MOUND REVISION; FAT GRAFTING;  Surgeon: Bandar Abarca MD;  [facility];  Service: Plastics   • MA TISSUE EXPANDER PLACEMENT BREAST RECONSTRUCTION Bilateral 08/10/2018    Procedure: INSERTION/PLACEMENT TISSUE EXPANDER (EXCHANGE);   Surgeon: Bandar Abarca MD;  [facility];  Service: Plastics   • SINUS SURGERY         OB History        2    Para   2    Term   2            AB        Living   2       SAB        IAB        Ectopic        Multiple        Live Births               Obstetric Comments   First pregnancy age 31  Menarche age 17  Hx birth control pills                Family History   Problem Relation Age of Onset   • Other Mother         BRCA negative   • Breast cancer Mother 72   • Ovarian cancer Mother 61   • Diabetes Father    • Heart disease Father    • Hypertension Father    • Migraines Sister    • Diabetes Brother    • Colon cancer Maternal Grandfather 80   • Diabetes type II Maternal Grandfather    • Heart disease Paternal Grandfather    • Colon cancer Maternal Aunt 57   • Skin cancer Maternal Aunt    • Colon cancer Maternal Aunt        The following portions of the patient's history were reviewed and updated as appropriate: allergies, current medications, past family history, past medical history, past social history, past surgical history and problem list       Objective:    Blood pressure 128/88, pulse 80, temperature 98 2 °F (36 8 °C), temperature source Temporal, resp  rate 16, height 5' 8" (1 727 m), weight 96 6 kg (213 lb), SpO2 100 %  Body mass index is 32 39 kg/m²  Physical Exam  Vitals reviewed  Constitutional:       General: She is not in acute distress  Appearance: Normal appearance  She is not ill-appearing  HENT:      Head: Normocephalic and atraumatic  Mouth/Throat:      Mouth: Mucous membranes are moist    Eyes:      General: No scleral icterus  Right eye: No discharge  Left eye: No discharge  Conjunctiva/sclera: Conjunctivae normal    Pulmonary:      Effort: Pulmonary effort is normal    Skin:     General: Skin is warm and dry  Coloration: Skin is not jaundiced  Findings: No rash  Neurological:      General: No focal deficit present  Mental Status: She is alert and oriented to person, place, and time  Cranial Nerves: No cranial nerve deficit  Motor: No weakness  Gait: Gait normal    Psychiatric:         Mood and Affect: Mood normal          Behavior: Behavior normal          Thought Content:  Thought content normal          Judgment: Judgment normal            No results found for:   Lab Results   Component Value Date    WBC 5 44 12/24/2021    HGB 13 9 12/24/2021    HCT 44 1 12/24/2021    MCV 90 12/24/2021     (H) 12/24/2021     Lab Results   Component Value Date    K 3 7 12/24/2021     12/24/2021    CO2 27 12/24/2021    BUN 15 12/24/2021    CREATININE 0 72 12/24/2021    GLUCOSE 129 07/14/2016    GLUF 84 12/24/2021    CALCIUM 9 3 12/24/2021    AST 17 12/24/2021    ALT 25 12/24/2021    ALKPHOS 119 (H) 12/24/2021    EGFR 98 12/24/2021        Trend:  No results found for:

## 2022-11-08 NOTE — PROGRESS NOTES
Assessment/Plan:    Problem List Items Addressed This Visit        Genitourinary    OJSEPH II (cervical intraepithelial neoplasia II) - Primary     61-year-old with recurrent cervical dysplasia, biopsy-proven JOSEPH 2  Her performance status is 0     1  I discussed the rationale for LEEP procedure prior to performing simple hysterectomy for recurrent dysplasia  She understands the rationale and agrees to proceed  She has previously consented for the procedure  2  Post LEEP options include Pap in 6 months, hysterectomy based on results  She understands that after hysterectomy, she will require ongoing screening of the vagina  CHIEF COMPLAINT:  Here for a 2nd opinion to discuss LEEP procedure for cervical dysplasia          Problem:  Cancer Staging  Malignant neoplasm of lower-outer quadrant of right breast of female, estrogen receptor positive ([facility] [address] )  Staging form: Breast, AJCC 8th Edition  - Clinical: cT1, cN0, cM0, ER: Positive, TX: Positive, HER2: Negative - Signed by Cheryl Mays MD on 7/2/2018  - Pathologic: pT1a, pN0(sn), cM0, ER: Positive, TX: Positive, HER2: Negative - Signed by Cheryl Mays MD on 8/21/2018      Previous therapy:  Oncology History    No history exists  Patient ID: Ayo Correa is a 50 y o  female  61-year-old with a history of cervical dysplasia, recent biopsy-proven JOSEPH 2 who was scheduled for a LEEP procedure presents to discuss hysterectomy  No interval change in medications or medical history since her last visit to the office  Review of Systems   Constitutional: Negative for activity change and unexpected weight change  HENT: Negative  Eyes: Negative  Respiratory: Negative  Cardiovascular: Negative  Gastrointestinal: Negative for abdominal distention and abdominal pain  Endocrine: Negative  Genitourinary: Negative for pelvic pain and vaginal bleeding  Musculoskeletal: Negative  Skin: Negative  Allergic/Immunologic: Negative  Neurological: Negative  Hematological: Negative  Psychiatric/Behavioral: Negative          Current Outpatient Medications   Medication Sig Dispense Refill   • acetaminophen (TYLENOL) 325 mg tablet Take 650 mg by mouth every 6 (six) hours as needed for mild pain     • anastrozole (ARIMIDEX) 1 mg tablet TAKE 1 TABLET DAILY 90 tablet 3   • BIOTIN PO Take 1 tablet by mouth every morning     • Calcium Carb-Cholecalciferol 1000-800 MG-UNIT TABS calcium     • calcium-vitamin D 250-100 MG-UNIT per tablet Take 1 tablet by mouth     • cyanocobalamin 50 MCG tablet Take 50 mcg by mouth every other day     • Ergocalciferol (VITAMIN D2) 2000 units TABS Vitamin D2 50,000 unit capsule     • ferrous sulfate 325 (65 Fe) mg tablet Daily     • Influenza Vac Split Quad (AFLURIA QUADRIVALENT IM) Afluria Quad 8248-0865 (PF) 60 mcg (15 mcg x 4)/0 5 mL IM syringe   TO BE ADMINISTERED BY PHARMACIST FOR IMMUNIZATION     • Inulin-Calcium-Vitamin D 2-250-100 GM-MG-UNIT CHEW Fiber Plus Mulitvitamin     • Lifitegrast (XIIDRA OP) Apply 1 vial to eye 2 (two) times a day     • loratadine (CLARITIN) 10 mg tablet Take 10 mg by mouth as needed       • meloxicam (Mobic) 15 mg tablet Take 1 tablet (15 mg total) by mouth daily 30 tablet 1   • methocarbamol (ROBAXIN) 500 mg tablet Take 500 mg by mouth 3 (three) times a day       • montelukast (SINGULAIR) 10 mg tablet Take 10 mg by mouth as needed       • multivitamin (THERAGRAN) TABS Take 1 tablet by mouth every morning       • Naproxen Sodium 220 MG CAPS as needed     • Omega-3 Fatty Acids (FISH OIL PO) Take by mouth in the morning     • pantoprazole (PROTONIX) 40 mg tablet Take 40 mg by mouth every morning       • phentermine 15 MG capsule       • pyridoxine (VITAMIN B6) 100 mg tablet Take 100 mg by mouth daily     • tiZANidine (Zanaflex) 4 mg tablet Take 0 5 tablets (2 mg total) by mouth every 8 (eight) hours as needed for muscle spasms 60 tablet 1   • topiramate (TOPAMAX) 25 mg tablet TAKE 1 TABLET TWICE A  tablet 3   • Xhance 93 MCG/ACT EXHU INSTILL 1 SPRAY PER NOSTRIL TWICE A DAY 16 mL 11   • zolpidem (AMBIEN) 5 mg tablet Take 1 tablet (5 mg total) by mouth daily at bedtime as needed for sleep 30 tablet 3     No current facility-administered medications for this visit  Allergies   Allergen Reactions   • Adhesive [Medical Tape] Rash     And small blister at sight       Past Medical History:   Diagnosis Date   • Abnormal Pap smear of cervix    • Ashkenazi Zoroastrian ancestry requiring population-specific genetic screening    • Asthma    • Breast cancer ([facility] ) 6/13/17   • Breathing difficulty     after gastric bypass 2014-"felt elephant on chest"   • Cervical cancer ([facility] ) 8/2022   • Cervical dysplasia    • Chest pain, non-cardiac     Seen in ER and is from esophageal spasms     • Colon polyp    • Constipation     occasional   • Family history of breast cancer in female    • GERD (gastroesophageal reflux disease)    • History of MRSA infection     Leg   • History of UTI     treated 8/1/2018   • Human papilloma virus (HPV) infection    • Hx MRSA infection     on leg-2009 treated   • Pollen allergies    • PONV (postoperative nausea and vomiting)     nausea   • Postural lightheadedness    • Raynaud's disease    • Seasonal allergies    • Sinus problem        Past Surgical History:   Procedure Laterality Date   • BILATERAL OOPHORECTOMY     • BLADDER SURGERY  2013    bladder lift   • BREAST BIOPSY Right 06/14/2018    IDC   • CERVIX LESION DESTRUCTION     • COLONOSCOPY     • COLPOSCOPY W/ BIOPSY / CURETTAGE     • ENDOMETRIAL ABLATION     • GASTRIC BYPASS  2014 2014 wt loss 90 lbs(regained 20 lbs)   • HYSTEROSCOPIC STERILIZATION W/ IMPLANTS     • HYSTEROSCOPY W/ ENDOMETRIAL ABLATION  2013   • KNEE ARTHROSCOPY Right 1990   • LYMPH NODE BIOPSY Right 08/10/2018    Procedure: BIOPSY LYMPH NODE SENTINEL;  Surgeon: Deb Walker MD;  [facility];  Service: Surgical Oncology   • MAMMO STEREOTACTIC BREAST BIOPSY RIGHT (ALL INC) Right 2018   • MASTECTOMY Bilateral    • NASAL SEPTUM SURGERY      ,    • OOPHORECTOMY Bilateral    • MO IMPLNT BIO IMPLNT FOR SOFT TISSUE REINFORCEMENT Bilateral 08/10/2018    Procedure: RECONSTRUCTION BREAST W/ IMPLANT;  Surgeon: Iban Gutierrez MD;  [facility];  Service: Plastics   • MO INSJ/RPLCMT BREAST IMPLANT SEP DAY MASTECTOMY Bilateral 2018    Procedure: BREAST IMPLANT EXCHANGE;  Surgeon: Iban Gutierrez MD;  [facility];  Service: Plastics   • MO LAP,CHOLECYSTECTOMY/GRAPH N/A 2021    Procedure: CHOLECYSTECTOMY LAPAROSCOPIC W/ INTRAOP CHOLANGIOGRAM;  Surgeon: Lauren Dover MD;  Location: [address] [place] [address] MAIN OR;  Service: General   • MO LAP,RMV  ADNEXAL STRUCTURE N/A 08/10/2018    Procedure: SALPINGO-OOPHORECTOMY, LAPAROSCOPIC; PELVIC WASHINGS ;  Surgeon: Nneka Kearney MD;  [facility];  Service: Gynecology Oncology   • MO MASTECTOMY, SIMPLE, COMPLETE Bilateral 08/10/2018    Procedure: MASTECTOMY SIMPLE;  Surgeon: Leona Negro MD;  [facility];  Service: Surgical Oncology   • MO LENNIE-IMPLANT CAPSULECTOMY BREAST COMPLETE Bilateral 2018    Procedure: CAPSULECTOMY;  Surgeon: Iban Gutierrez MD;  [facility];  Service: Plastics   • MO REVISION OF RECONSTRUCTED BREAST Bilateral 2019    Procedure: BREAST MOUND REVISION; FAT GRAFTING;  Surgeon: Iban Gutierrez MD;  [facility];  Service: Plastics   • MO TISSUE EXPANDER PLACEMENT BREAST RECONSTRUCTION Bilateral 08/10/2018    Procedure: INSERTION/PLACEMENT TISSUE EXPANDER (EXCHANGE);   Surgeon: Iban Gutierrez MD;  [facility];  Service: Plastics   • SINUS SURGERY         OB History        2    Para   2    Term   2            AB        Living   2       SAB        IAB        Ectopic        Multiple        Live Births               Obstetric Comments   First pregnancy age 31  Menarche age 17  Hx birth control pills                Family History   Problem Relation Age of Onset   • Other Mother         BRCA negative   • Breast cancer Mother 72   • Ovarian cancer Mother 61   • Diabetes Father    • Heart disease Father    • Hypertension Father    • Migraines Sister    • Diabetes Brother    • Colon cancer Maternal Grandfather 80   • Diabetes type II Maternal Grandfather    • Heart disease Paternal Grandfather    • Colon cancer Maternal Aunt 57   • Skin cancer Maternal Aunt    • Colon cancer Maternal Aunt        The following portions of the patient's history were reviewed and updated as appropriate: allergies, current medications, past family history, past medical history, past social history, past surgical history and problem list       Objective:    Blood pressure 128/88, pulse 80, temperature 98 2 °F (36 8 °C), temperature source Temporal, resp  rate 16, height 5' 8" (1 727 m), weight 96 6 kg (213 lb), SpO2 100 %  Body mass index is 32 39 kg/m²  Physical Exam  Vitals reviewed  Constitutional:       General: She is not in acute distress  Appearance: Normal appearance  She is not ill-appearing  HENT:      Head: Normocephalic and atraumatic  Mouth/Throat:      Mouth: Mucous membranes are moist    Eyes:      General: No scleral icterus  Right eye: No discharge  Left eye: No discharge  Conjunctiva/sclera: Conjunctivae normal    Pulmonary:      Effort: Pulmonary effort is normal    Skin:     General: Skin is warm and dry  Coloration: Skin is not jaundiced  Findings: No rash  Neurological:      General: No focal deficit present  Mental Status: She is alert and oriented to person, place, and time  Cranial Nerves: No cranial nerve deficit  Motor: No weakness  Gait: Gait normal    Psychiatric:         Mood and Affect: Mood normal          Behavior: Behavior normal          Thought Content:  Thought content normal          Judgment: Judgment normal            No results found for:   Lab Results   Component Value Date    WBC 5 44 12/24/2021    HGB 13 9 12/24/2021    HCT 44 1 12/24/2021    MCV 90 12/24/2021     (H) 12/24/2021     Lab Results   Component Value Date    K 3 7 12/24/2021     12/24/2021    CO2 27 12/24/2021    BUN 15 12/24/2021    CREATININE 0 72 12/24/2021    GLUCOSE 129 07/14/2016    GLUF 84 12/24/2021    CALCIUM 9 3 12/24/2021    AST 17 12/24/2021    ALT 25 12/24/2021    ALKPHOS 119 (H) 12/24/2021    EGFR 98 12/24/2021        Trend:  No results found for:

## 2022-11-08 NOTE — ASSESSMENT & PLAN NOTE
17-year-old with recurrent cervical dysplasia, biopsy-proven JOSEPH 2  Her performance status is 0     1  I discussed the rationale for LEEP procedure prior to performing simple hysterectomy for recurrent dysplasia  She understands the rationale and agrees to proceed  She has previously consented for the procedure  2  Post LEEP options include Pap in 6 months, hysterectomy based on results  She understands that after hysterectomy, she will require ongoing screening of the vagina

## 2022-11-14 ENCOUNTER — ANESTHESIA EVENT (OUTPATIENT)
Dept: PERIOP | Facility: HOSPITAL | Age: 52
End: 2022-11-14

## 2022-11-14 NOTE — ANESTHESIA PREPROCEDURE EVALUATION
Procedure:  EXAM UNDER ANESTHESIA (EUA) (N/A Vagina )  BIOPSY LEEP CERVIX (N/A Cervix)    Relevant Problems   ANESTHESIA (within normal limits)      CARDIO (within normal limits)      ENDO (within normal limits)      GI/HEPATIC   (+) H/O bariatric surgery      /RENAL (within normal limits)      GYN   (+) Malignant neoplasm of lower-outer quadrant of right breast of female, estrogen receptor positive (HCC)      HEMATOLOGY (within normal limits)      MUSCULOSKELETAL   (+) DDD (degenerative disc disease), cervical      NEURO/PSYCH   (+) History of breast cancer      PULMONARY   (+) KRISHNA (obstructive sleep apnea)      Genitourinary   (+) JOSEPH II (cervical intraepithelial neoplasia II)      Other   (+) High grade squamous intraepithelial cervical dysplasia   (+) History of gastric bypass      CT Chest 10/11/2022:  Ascending thoracic aorta within normal limits in diameter  No evidence of thoracic aortic aneurysm  EKG 2018:  Normal sinus rhythm  Low voltage QRS  Borderline ECG  When compared with ECG of 14-JUL-2016 20:27,  No significant change was found    Lab Results   Component Value Date    WBC 5 44 12/24/2021    HGB 13 9 12/24/2021    HCT 44 1 12/24/2021    MCV 90 12/24/2021     (H) 12/24/2021     Lab Results   Component Value Date    SODIUM 141 12/24/2021    K 3 7 12/24/2021     12/24/2021    CO2 27 12/24/2021    BUN 15 12/24/2021    CREATININE 0 72 12/24/2021    GLUC 83 09/02/2020    CALCIUM 9 3 12/24/2021     Lab Results   Component Value Date    INR 0 98 08/03/2018    PROTIME 13 1 08/03/2018     Lab Results   Component Value Date    HGBA1C 5 2 08/17/2021          Physical Exam    Airway    Mallampati score: I  TM Distance: >3 FB  Neck ROM: full     Dental   No notable dental hx     Cardiovascular  Cardiovascular exam normal    Pulmonary  Pulmonary exam normal     Other Findings        Anesthesia Plan  ASA Score- 2     Anesthesia Type- general with ASA Monitors           Additional Monitors: Airway Plan: LMA  Plan Factors-Exercise tolerance (METS): >4 METS  Chart reviewed  EKG reviewed  Existing labs reviewed  Patient summary reviewed  Induction- intravenous  Postoperative Plan-     Informed Consent- Anesthetic plan and risks discussed with patient  I personally reviewed this patient with the CRNA  Discussed and agreed on the Anesthesia Plan with the CRNA  Randy Antoine

## 2022-11-15 ENCOUNTER — ANESTHESIA (OUTPATIENT)
Dept: PERIOP | Facility: HOSPITAL | Age: 52
End: 2022-11-15

## 2022-11-15 ENCOUNTER — HOSPITAL ENCOUNTER (OUTPATIENT)
Facility: HOSPITAL | Age: 52
Setting detail: OUTPATIENT SURGERY
Discharge: HOME | End: 2022-11-15
Attending: OBSTETRICS & GYNECOLOGY | Admitting: OBSTETRICS & GYNECOLOGY

## 2022-11-15 VITALS
TEMPERATURE: 97 F | SYSTOLIC BLOOD PRESSURE: 110 MMHG | RESPIRATION RATE: 16 BRPM | OXYGEN SATURATION: 97 % | HEIGHT: 68 IN | DIASTOLIC BLOOD PRESSURE: 73 MMHG | BODY MASS INDEX: 32.28 KG/M2 | WEIGHT: 213 LBS | HEART RATE: 66 BPM

## 2022-11-15 DIAGNOSIS — R87.613 HIGH GRADE SQUAMOUS INTRAEPITHELIAL CERVICAL DYSPLASIA: ICD-10-CM

## 2022-11-15 DIAGNOSIS — N87.1 CIN II (CERVICAL INTRAEPITHELIAL NEOPLASIA II): ICD-10-CM

## 2022-11-15 RX ORDER — FENTANYL CITRATE 50 UG/ML
INJECTION, SOLUTION INTRAMUSCULAR; INTRAVENOUS AS NEEDED
Status: DISCONTINUED | OUTPATIENT
Start: 2022-11-15 | End: 2022-11-15

## 2022-11-15 RX ORDER — SODIUM CHLORIDE, SODIUM LACTATE, POTASSIUM CHLORIDE, CALCIUM CHLORIDE 600; 310; 30; 20 MG/100ML; MG/100ML; MG/100ML; MG/100ML
20 INJECTION, SOLUTION INTRAVENOUS CONTINUOUS
Status: DISCONTINUED | OUTPATIENT
Start: 2022-11-15 | End: 2022-11-15 | Stop reason: HOSPADM

## 2022-11-15 RX ORDER — LIDOCAINE HYDROCHLORIDE 10 MG/ML
INJECTION, SOLUTION EPIDURAL; INFILTRATION; INTRACAUDAL; PERINEURAL AS NEEDED
Status: DISCONTINUED | OUTPATIENT
Start: 2022-11-15 | End: 2022-11-15

## 2022-11-15 RX ORDER — SODIUM CHLORIDE, SODIUM LACTATE, POTASSIUM CHLORIDE, CALCIUM CHLORIDE 600; 310; 30; 20 MG/100ML; MG/100ML; MG/100ML; MG/100ML
INJECTION, SOLUTION INTRAVENOUS CONTINUOUS PRN
Status: DISCONTINUED | OUTPATIENT
Start: 2022-11-15 | End: 2022-11-15

## 2022-11-15 RX ORDER — METOCLOPRAMIDE HYDROCHLORIDE 5 MG/ML
INJECTION INTRAMUSCULAR; INTRAVENOUS AS NEEDED
Status: DISCONTINUED | OUTPATIENT
Start: 2022-11-15 | End: 2022-11-15

## 2022-11-15 RX ORDER — MAGNESIUM HYDROXIDE 1200 MG/15ML
LIQUID ORAL AS NEEDED
Status: DISCONTINUED | OUTPATIENT
Start: 2022-11-15 | End: 2022-11-15 | Stop reason: HOSPADM

## 2022-11-15 RX ORDER — PROPOFOL 10 MG/ML
INJECTION, EMULSION INTRAVENOUS AS NEEDED
Status: DISCONTINUED | OUTPATIENT
Start: 2022-11-15 | End: 2022-11-15

## 2022-11-15 RX ORDER — DEXAMETHASONE SODIUM PHOSPHATE 10 MG/ML
INJECTION, SOLUTION INTRAMUSCULAR; INTRAVENOUS AS NEEDED
Status: DISCONTINUED | OUTPATIENT
Start: 2022-11-15 | End: 2022-11-15

## 2022-11-15 RX ORDER — MIDAZOLAM HYDROCHLORIDE 2 MG/2ML
INJECTION, SOLUTION INTRAMUSCULAR; INTRAVENOUS AS NEEDED
Status: DISCONTINUED | OUTPATIENT
Start: 2022-11-15 | End: 2022-11-15

## 2022-11-15 RX ORDER — PROPOFOL 10 MG/ML
INJECTION, EMULSION INTRAVENOUS CONTINUOUS PRN
Status: DISCONTINUED | OUTPATIENT
Start: 2022-11-15 | End: 2022-11-15

## 2022-11-15 RX ORDER — FENTANYL CITRATE/PF 50 MCG/ML
50 SYRINGE (ML) INJECTION
Status: DISCONTINUED | OUTPATIENT
Start: 2022-11-15 | End: 2022-11-15 | Stop reason: HOSPADM

## 2022-11-15 RX ORDER — ONDANSETRON 2 MG/ML
INJECTION INTRAMUSCULAR; INTRAVENOUS AS NEEDED
Status: DISCONTINUED | OUTPATIENT
Start: 2022-11-15 | End: 2022-11-15

## 2022-11-15 RX ORDER — CELECOXIB 200 MG/1
200 CAPSULE ORAL ONCE
Status: COMPLETED | OUTPATIENT
Start: 2022-11-15 | End: 2022-11-15

## 2022-11-15 RX ORDER — ACETIC ACID 5 %
LIQUID (ML) MISCELLANEOUS AS NEEDED
Status: DISCONTINUED | OUTPATIENT
Start: 2022-11-15 | End: 2022-11-15 | Stop reason: HOSPADM

## 2022-11-15 RX ADMIN — PHENYLEPHRINE HYDROCHLORIDE 20 MCG/MIN: 10 INJECTION INTRAVENOUS at 12:07

## 2022-11-15 RX ADMIN — CELECOXIB 200 MG: 200 CAPSULE ORAL at 10:07

## 2022-11-15 RX ADMIN — PROPOFOL 200 MG: 10 INJECTION, EMULSION INTRAVENOUS at 12:02

## 2022-11-15 RX ADMIN — DEXAMETHASONE SODIUM PHOSPHATE 10 MG: 10 INJECTION, SOLUTION INTRAMUSCULAR; INTRAVENOUS at 12:02

## 2022-11-15 RX ADMIN — FENTANYL CITRATE 50 MCG: 50 INJECTION INTRAMUSCULAR; INTRAVENOUS at 12:15

## 2022-11-15 RX ADMIN — PROPOFOL 40 MCG/KG/MIN: 10 INJECTION, EMULSION INTRAVENOUS at 12:02

## 2022-11-15 RX ADMIN — METOCLOPRAMIDE HYDROCHLORIDE 10 MG: 5 INJECTION INTRAMUSCULAR; INTRAVENOUS at 12:02

## 2022-11-15 RX ADMIN — FENTANYL CITRATE 50 MCG: 50 INJECTION INTRAMUSCULAR; INTRAVENOUS at 12:02

## 2022-11-15 RX ADMIN — SODIUM CHLORIDE, SODIUM LACTATE, POTASSIUM CHLORIDE, AND CALCIUM CHLORIDE 20 ML/HR: .6; .31; .03; .02 INJECTION, SOLUTION INTRAVENOUS at 10:20

## 2022-11-15 RX ADMIN — LIDOCAINE HYDROCHLORIDE 2 ML: 10 INJECTION, SOLUTION EPIDURAL; INFILTRATION; INTRACAUDAL; PERINEURAL at 12:02

## 2022-11-15 RX ADMIN — MIDAZOLAM 2 MG: 1 INJECTION INTRAMUSCULAR; INTRAVENOUS at 11:55

## 2022-11-15 RX ADMIN — SODIUM CHLORIDE, SODIUM LACTATE, POTASSIUM CHLORIDE, AND CALCIUM CHLORIDE: .6; .31; .03; .02 INJECTION, SOLUTION INTRAVENOUS at 11:59

## 2022-11-15 RX ADMIN — ONDANSETRON 4 MG: 2 INJECTION INTRAMUSCULAR; INTRAVENOUS at 12:02

## 2022-11-15 NOTE — ANESTHESIA POSTPROCEDURE EVALUATION
Post-Op Assessment Note    CV Status:  Stable  Pain Score: 0    Pain management: adequate     Mental Status:  Awake and alert   Hydration Status:  Euvolemic   PONV Controlled:  Controlled   Airway Patency:  Patent      Post Op Vitals Reviewed: Yes      Staff: Anesthesiologist, CRNA         No complications documented      BP   108/68   Temp   98 4   Pulse  77   Resp   11   SpO2   96 room air

## 2022-11-15 NOTE — OP NOTE
OPERATIVE REPORT  PATIENT NAME: Travis Riddle    :  1970  MRN: [id number]  Pt Location: [facility] OR ROOM 05    SURGERY DATE: 11/15/2022    Surgeon(s) and Role:     * Marla Ames MD - Primary    Preop Diagnosis:  JOSEPH II (cervical intraepithelial neoplasia II) [N87 1]  High grade squamous intraepithelial cervical dysplasia [R87 613]    Post-Op Diagnosis Codes:     * JOSEPH II (cervical intraepithelial neoplasia II) [N87 1]     * High grade squamous intraepithelial cervical dysplasia [R87 613]    Procedure(s) (LRB):  EXAM UNDER ANESTHESIA (EUA) (N/A)  BIOPSY LEEP CERVIX (N/A)    Specimen(s):  ID Type Source Tests Collected by Time Destination   1 : Endocervical Currettings Tissue Endocervical TISSUE EXAM Marla Ames MD 11/15/2022 1218    2 : LEEP Tissue Cervix TISSUE EXAM Marla Ames MD 11/15/2022 1222        Estimated Blood Loss:   Minimal    Drains:  Closed/Suction Drain Right;Lateral Breast Bulb 15 Fr  (Active)   Number of days: 6527       Closed/Suction Drain Left;Lateral Breast Bulb 15 Fr  (Active)   Number of days: 7276       Anesthesia Type:   General    Operative Indications:  JOSEPH II (cervical intraepithelial neoplasia II) [N87 1]  High grade squamous intraepithelial cervical dysplasia [T85267]  19-year-old biopsy-proven JOSEPH 2 presents for LEEP procedure  Operative Findings:  1  Exam under anesthesia revealed a grossly normal cervix  The uterus was mobile  There is no evidence of parametrial disease  2  Upon application of ascetic acid, there was no visible aceto-white epithelium  Complications:   None    Procedure and Technique:  After informed consent was obtained, the patient was taken to the operating room where general anesthesia was administered via LMA  She was then prepped and draped in normal sterile fashion in the dorsal lithotomy position  Examination under anesthesia was then performed with findings noted as above    A speculum was placed in patient's vagina  5% ascetic acid was applied  Findings noted as above  Using a 15 x 15 mm loop, a single pass was made of the ectocervix  An ECC was then performed  Hemostasis was achieved using ball cautery as well as Monsel's solution  She was then awakened and transferred to the recovery room in stable condition  All instrument counts correct x2 for procedure  No complications    Estimated blood loss is minimal    I was present for the entire procedure and A qualified resident physician was not available    Patient Disposition:  PACU         SIGNATURE: Mariangel Lundy MD  DATE: November 15, 2022  TIME: 12:34 PM

## 2022-11-15 NOTE — INTERVAL H&P NOTE
H&P reviewed  After examining the patient I find no changes in the patients condition since the H&P had been written      Vitals:    11/15/22 0937   BP: 135/70   Pulse: 73   Resp: 20   Temp: 97 5 °F (36 4 °C)   SpO2: 98%

## 2022-12-06 ENCOUNTER — TELEPHONE (OUTPATIENT)
Dept: HEMATOLOGY ONCOLOGY | Facility: CLINIC | Age: 52
End: 2022-12-06

## 2022-12-13 ENCOUNTER — TELEPHONE (OUTPATIENT)
Dept: GYNECOLOGIC ONCOLOGY | Facility: CLINIC | Age: 52
End: 2022-12-13

## 2022-12-13 ENCOUNTER — OFFICE VISIT (OUTPATIENT)
Dept: GYNECOLOGIC ONCOLOGY | Facility: CLINIC | Age: 52
End: 2022-12-13

## 2022-12-13 VITALS
BODY MASS INDEX: 32.58 KG/M2 | TEMPERATURE: 97.3 F | WEIGHT: 215 LBS | HEIGHT: 68 IN | DIASTOLIC BLOOD PRESSURE: 80 MMHG | SYSTOLIC BLOOD PRESSURE: 114 MMHG | OXYGEN SATURATION: 100 % | HEART RATE: 93 BPM

## 2022-12-13 DIAGNOSIS — D06.9 HIGH GRADE SQUAMOUS INTRAEPITHELIAL LESION (HGSIL), GRADE 3 CIN, ON BIOPSY OF CERVIX: Primary | ICD-10-CM

## 2022-12-13 RX ORDER — CEFAZOLIN SODIUM 2 G/50ML
2000 SOLUTION INTRAVENOUS ONCE
Status: CANCELLED | OUTPATIENT
Start: 2022-12-23 | End: 2022-12-13

## 2022-12-13 RX ORDER — HEPARIN SODIUM 5000 [USP'U]/ML
5000 INJECTION, SOLUTION INTRAVENOUS; SUBCUTANEOUS
Status: CANCELLED | OUTPATIENT
Start: 2022-12-23 | End: 2022-12-24

## 2022-12-13 RX ORDER — CELECOXIB 200 MG/1
200 CAPSULE ORAL ONCE
Status: CANCELLED | OUTPATIENT
Start: 2022-12-23 | End: 2022-12-13

## 2022-12-13 RX ORDER — ALBUTEROL SULFATE 90 UG/1
AEROSOL, METERED RESPIRATORY (INHALATION)
COMMUNITY
Start: 2022-12-01

## 2022-12-13 RX ORDER — SODIUM CHLORIDE, SODIUM LACTATE, POTASSIUM CHLORIDE, CALCIUM CHLORIDE 600; 310; 30; 20 MG/100ML; MG/100ML; MG/100ML; MG/100ML
125 INJECTION, SOLUTION INTRAVENOUS CONTINUOUS
Status: CANCELLED | OUTPATIENT
Start: 2022-12-23

## 2022-12-13 RX ORDER — ACETAMINOPHEN 325 MG/1
975 TABLET ORAL ONCE
Status: CANCELLED | OUTPATIENT
Start: 2022-12-23 | End: 2022-12-13

## 2022-12-13 RX ORDER — GABAPENTIN 100 MG/1
200 CAPSULE ORAL ONCE
Status: CANCELLED | OUTPATIENT
Start: 2022-12-23 | End: 2022-12-13

## 2022-12-13 NOTE — H&P (VIEW-ONLY)
Assessment/Plan:    Problem List Items Addressed This Visit        Other    High grade squamous intraepithelial lesion (HGSIL), grade 3 JOSEPH, on biopsy of cervix - Primary     29-year-old with JOSEPH-3, positive margins after LEEP November 15, 2022, surgical history notable for laparoscopic BSO, gastric bypass, laparoscopic cholecystectomy  She is recovering well from the procedure  Her performance status is 0   1   I discussed the risks and benefits of robotic assisted total laparoscopic hysterectomy, possible exploratory laparotomy and all other indicated procedures  She understands the risks and benefits of the operation and agrees to proceed as outlined  Consent for surgery was obtained by me in the office  2   First follow-up Pap will be 6 months after hysterectomy  I spent 20 minutes with the patient  More than half the time was spent in counseling and coordination of care regarding surgical management of her cervical dysplasia  Only brief time was spent on the postoperative history and physical examination  Relevant Orders    Case request operating room: HYSTERECTOMY LAPAROSCOPIC TOTAL ([address] [place] [address]) W/ ROBOTICS (Completed)    Type and screen    HEMOGLOBIN A1C W/ EAG ESTIMATION    CBC and Platelet    Basic metabolic panel    EKG 12 lead           CHIEF COMPLAINT: Here to discuss hysterectomy as management for JOSEPH-3, postoperative evaluation          Problem:  Cancer Staging   Malignant neoplasm of lower-outer quadrant of right breast of female, estrogen receptor positive ([facility] [address] )  Staging form: Breast, AJCC 8th Edition  - Clinical: cT1, cN0, cM0, ER: Positive, CA: Positive, HER2: Negative - Signed by Clau De León MD on 7/2/2018  - Pathologic: pT1a, pN0(sn), cM0, ER: Positive, CA: Positive, HER2: Negative - Signed by Clau De León MD on 8/21/2018      Previous therapy:  Oncology History    No history exists           Patient ID: Zhou Rene is a 50 y o  female  Who returns for treatment discussion after her LEEP procedure  She is recovering well from the LEEP  No interval change in medications or medical history since her surgery  No bleeding  Review of Systems   Constitutional: Negative for activity change and unexpected weight change  HENT: Negative  Eyes: Negative  Respiratory: Negative  Cardiovascular: Negative  Gastrointestinal: Negative for abdominal distention and abdominal pain  Endocrine: Negative  Genitourinary: Negative for pelvic pain and vaginal bleeding  Musculoskeletal: Negative  Skin: Negative  Allergic/Immunologic: Negative  Neurological: Negative  Hematological: Negative  Psychiatric/Behavioral: Negative          Current Outpatient Medications   Medication Sig Dispense Refill   • acetaminophen (TYLENOL) 325 mg tablet Take 650 mg by mouth every 6 (six) hours as needed for mild pain     • albuterol (PROVENTIL HFA,VENTOLIN HFA) 90 mcg/act inhaler      • anastrozole (ARIMIDEX) 1 mg tablet TAKE 1 TABLET DAILY 90 tablet 3   • BIOTIN PO Take 1 tablet by mouth every morning     • Calcium Carb-Cholecalciferol 1000-800 MG-UNIT TABS calcium     • calcium-vitamin D 250-100 MG-UNIT per tablet Take 1 tablet by mouth     • cyanocobalamin 50 MCG tablet Take 50 mcg by mouth every other day     • Ergocalciferol (VITAMIN D2) 2000 units TABS Vitamin D2 50,000 unit capsule     • ferrous sulfate 325 (65 Fe) mg tablet Daily     • Inulin-Calcium-Vitamin D 2-250-100 GM-MG-UNIT CHEW Fiber Plus Mulitvitamin     • Lifitegrast (XIIDRA OP) Apply 1 vial to eye 2 (two) times a day     • loratadine (CLARITIN) 10 mg tablet Take 10 mg by mouth as needed       • meloxicam (Mobic) 15 mg tablet Take 1 tablet (15 mg total) by mouth daily 30 tablet 1   • methocarbamol (ROBAXIN) 500 mg tablet Take 500 mg by mouth 3 (three) times a day       • montelukast (SINGULAIR) 10 mg tablet Take 10 mg by mouth as needed       • multivitamin (THERAGRAN) TABS Take 1 tablet by mouth every morning       • Naproxen Sodium 220 MG CAPS as needed     • Omega-3 Fatty Acids (FISH OIL PO) Take by mouth in the morning     • pantoprazole (PROTONIX) 40 mg tablet Take 40 mg by mouth every morning       • phentermine 15 MG capsule       • pyridoxine (VITAMIN B6) 100 mg tablet Take 100 mg by mouth daily     • tiZANidine (Zanaflex) 4 mg tablet Take 0 5 tablets (2 mg total) by mouth every 8 (eight) hours as needed for muscle spasms 60 tablet 1   • topiramate (TOPAMAX) 25 mg tablet TAKE 1 TABLET TWICE A  tablet 3   • Xhance 93 MCG/ACT EXHU INSTILL 1 SPRAY PER NOSTRIL TWICE A DAY 16 mL 11   • zolpidem (AMBIEN) 5 mg tablet Take 1 tablet (5 mg total) by mouth daily at bedtime as needed for sleep 30 tablet 3     No current facility-administered medications for this visit  Allergies   Allergen Reactions   • Adhesive [Medical Tape] Rash     And small blister at sight       Past Medical History:   Diagnosis Date   • Abnormal Pap smear of cervix    • Ashkenazi Cheondoism ancestry requiring population-specific genetic screening    • Asthma    • Breast cancer ([facility] ) 6/13/17   • Breathing difficulty     after gastric bypass 2014-"felt elephant on chest"   • Cervical cancer ([facility] ) 8/2022   • Cervical dysplasia    • Chest pain, non-cardiac     Seen in ER and is from esophageal spasms     • Colon polyp    • Constipation     occasional   • Family history of breast cancer in female    • GERD (gastroesophageal reflux disease)    • History of MRSA infection     Leg   • History of UTI     treated 8/1/2018   • Human papilloma virus (HPV) infection    • Hx MRSA infection     on leg-2009 treated   • Pollen allergies    • PONV (postoperative nausea and vomiting)     nausea   • Postural lightheadedness    • Raynaud's disease    • Seasonal allergies    • Sinus problem        Past Surgical History:   Procedure Laterality Date   • BILATERAL OOPHORECTOMY     • BLADDER SURGERY  2013    bladder lift   • BREAST BIOPSY Right 06/14/2018    IDC   • CERVICAL BIOPSY N/A 11/15/2022    Procedure: BIOPSY LEEP CERVIX;  Surgeon: Liborio Freire MD;  Location: [facility];  Service: Gynecology Oncology   • CERVIX LESION DESTRUCTION     • COLONOSCOPY     • COLPOSCOPY W/ BIOPSY / CURETTAGE     • ENDOMETRIAL ABLATION     • EXAMINATION UNDER ANESTHESIA N/A 11/15/2022    Procedure: EXAM UNDER ANESTHESIA (EUA);   Surgeon: Liborio Freire MD;  Location: [facility];  Service: Gynecology Oncology   • GASTRIC BYPASS  2014 2014 wt loss 90 lbs(regained 20 lbs)   • HYSTEROSCOPIC STERILIZATION W/ IMPLANTS     • HYSTEROSCOPY W/ ENDOMETRIAL ABLATION  2013   • KNEE ARTHROSCOPY Right 1990   • LYMPH NODE BIOPSY Right 08/10/2018    Procedure: BIOPSY LYMPH NODE SENTINEL;  Surgeon: Camilla Bradshaw MD;  [facility];  Service: Surgical Oncology   • MAMMO STEREOTACTIC BREAST BIOPSY RIGHT (ALL INC) Right 06/14/2018   • MASTECTOMY Bilateral    • NASAL SEPTUM SURGERY      2002, 2016   • OOPHORECTOMY Bilateral    • NE IMPLNT BIO IMPLNT FOR SOFT TISSUE REINFORCEMENT Bilateral 08/10/2018    Procedure: RECONSTRUCTION BREAST W/ IMPLANT;  Surgeon: Saul Covarrubias MD;  [facility];  Service: Plastics   • NE INSJ/RPLCMT BREAST IMPLANT SEP DAY MASTECTOMY Bilateral 11/30/2018    Procedure: BREAST IMPLANT EXCHANGE;  Surgeon: Saul Covarrubias MD;  [facility];  Service: Plastics   • NE LAP,CHOLECYSTECTOMY/GRAPH N/A 03/19/2021    Procedure: CHOLECYSTECTOMY LAPAROSCOPIC W/ INTRAOP CHOLANGIOGRAM;  Surgeon: Yudith Lynne MD;  Location: [address] [place] [address] MAIN OR;  Service: General   • NE LAP,RMV  ADNEXAL STRUCTURE N/A 08/10/2018    Procedure: SALPINGO-OOPHORECTOMY, LAPAROSCOPIC; PELVIC WASHINGS ;  Surgeon: Brooks Tamez MD;  [facility];  Service: Gynecology Oncology   • NE MASTECTOMY, SIMPLE, COMPLETE Bilateral 08/10/2018    Procedure: MASTECTOMY SIMPLE;  Surgeon: Camilla Bradshaw MD;  [facility];  Service: Surgical Oncology   • NE LENNIE-IMPLANT CAPSULECTOMY BREAST COMPLETE Bilateral 2018    Procedure: CAPSULECTOMY;  Surgeon: Wendi Taylor MD;  [facility];  Service: Plastics   • LA REVISION OF RECONSTRUCTED BREAST Bilateral 2019    Procedure: BREAST MOUND REVISION; FAT GRAFTING;  Surgeon: Wendi Taylor MD;  [facility];  Service: Plastics   • LA TISSUE EXPANDER PLACEMENT BREAST RECONSTRUCTION Bilateral 08/10/2018    Procedure: INSERTION/PLACEMENT TISSUE EXPANDER (EXCHANGE); Surgeon: Wendi Taylor MD;  [facility];  Service: Plastics   • SINUS SURGERY         OB History        2    Para   2    Term   2            AB        Living   2       SAB        IAB        Ectopic        Multiple        Live Births               Obstetric Comments   First pregnancy age 31  Menarche age 17  Hx birth control pills                Family History   Problem Relation Age of Onset   • Other Mother         BRCA negative   • Breast cancer Mother 72   • Ovarian cancer Mother 61   • Diabetes Father    • Heart disease Father    • Hypertension Father    • Migraines Sister    • Diabetes Brother    • Colon cancer Maternal Grandfather 80   • Diabetes type II Maternal Grandfather    • Heart disease Paternal Grandfather    • Colon cancer Maternal Aunt 57   • Skin cancer Maternal Aunt    • Colon cancer Maternal Aunt        The following portions of the patient's history were reviewed and updated as appropriate: allergies, current medications, past family history, past medical history, past social history, past surgical history and problem list       Objective:    Blood pressure 114/80, pulse 93, temperature (!) 97 3 °F (36 3 °C), temperature source Temporal, height 5' 8" (1 727 m), weight 97 5 kg (215 lb), SpO2 100 %  Body mass index is 32 69 kg/m²  Physical Exam  Vitals reviewed  Exam conducted with a chaperone present  Constitutional:       General: She is not in acute distress  Appearance: Normal appearance  She is well-developed  She is obese   She is not ill-appearing, toxic-appearing or diaphoretic  HENT:      Head: Normocephalic and atraumatic  Eyes:      General: No scleral icterus  Extraocular Movements: Extraocular movements intact  Conjunctiva/sclera: Conjunctivae normal    Neck:      Thyroid: No thyromegaly  Pulmonary:      Effort: Pulmonary effort is normal  No respiratory distress  Breath sounds: No stridor  No wheezing or rhonchi  Abdominal:      Palpations: Abdomen is soft  Genitourinary:     Comments: The external female genitalia without mass lesion or bleeding  Speculum exam reveals a gross normal vagina  The cervix is flush with the posterior vaginal apex  No masses or lesions present  No bleeding  My examination reveals a mobile uterus  No parametrial disease  Uterus is approximately 9 weeks in size  Musculoskeletal:      Cervical back: Normal range of motion and neck supple  Right lower leg: No edema  Left lower leg: No edema  Lymphadenopathy:      Cervical: No cervical adenopathy  Skin:     General: Skin is warm and dry  Coloration: Skin is not jaundiced or pale  Findings: No lesion or rash  Neurological:      General: No focal deficit present  Mental Status: She is alert and oriented to person, place, and time  Mental status is at baseline  Cranial Nerves: No cranial nerve deficit  Motor: No weakness  Gait: Gait normal    Psychiatric:         Mood and Affect: Mood normal          Behavior: Behavior normal          Thought Content:  Thought content normal          Judgment: Judgment normal            No results found for:   Lab Results   Component Value Date    WBC 5 44 12/24/2021    HGB 13 9 12/24/2021    HCT 44 1 12/24/2021    MCV 90 12/24/2021     (H) 12/24/2021     Lab Results   Component Value Date    K 3 7 12/24/2021     12/24/2021    CO2 27 12/24/2021    BUN 15 12/24/2021    CREATININE 0 72 12/24/2021    GLUCOSE 129 07/14/2016    GLUF 84 12/24/2021    CALCIUM 9 3 12/24/2021    AST 17 12/24/2021    ALT 25 12/24/2021    ALKPHOS 119 (H) 12/24/2021    EGFR 98 12/24/2021        Trend:  No results found for:

## 2022-12-13 NOTE — PROGRESS NOTES
Assessment/Plan:    Problem List Items Addressed This Visit        Other    High grade squamous intraepithelial lesion (HGSIL), grade 3 JOSEPH, on biopsy of cervix - Primary     46-year-old with JOSEPH-3, positive margins after LEEP November 15, 2022, surgical history notable for laparoscopic BSO, gastric bypass, laparoscopic cholecystectomy  She is recovering well from the procedure  Her performance status is 0   1   I discussed the risks and benefits of robotic assisted total laparoscopic hysterectomy, possible exploratory laparotomy and all other indicated procedures  She understands the risks and benefits of the operation and agrees to proceed as outlined  Consent for surgery was obtained by me in the office  2   First follow-up Pap will be 6 months after hysterectomy  I spent 20 minutes with the patient  More than half the time was spent in counseling and coordination of care regarding surgical management of her cervical dysplasia  Only brief time was spent on the postoperative history and physical examination  Relevant Orders    Case request operating room: HYSTERECTOMY LAPAROSCOPIC TOTAL ([address] [place] [address]) W/ ROBOTICS (Completed)    Type and screen    HEMOGLOBIN A1C W/ EAG ESTIMATION    CBC and Platelet    Basic metabolic panel    EKG 12 lead           CHIEF COMPLAINT: Here to discuss hysterectomy as management for JOSEPH-3, postoperative evaluation          Problem:  Cancer Staging   Malignant neoplasm of lower-outer quadrant of right breast of female, estrogen receptor positive ([facility] [address] )  Staging form: Breast, AJCC 8th Edition  - Clinical: cT1, cN0, cM0, ER: Positive, NE: Positive, HER2: Negative - Signed by Abhishek Cooney MD on 7/2/2018  - Pathologic: pT1a, pN0(sn), cM0, ER: Positive, NE: Positive, HER2: Negative - Signed by Abhishek Cooney MD on 8/21/2018      Previous therapy:  Oncology History    No history exists           Patient ID: Archana Gomez is a 50 y o  female  Who returns for treatment discussion after her LEEP procedure  She is recovering well from the LEEP  No interval change in medications or medical history since her surgery  No bleeding  Review of Systems   Constitutional: Negative for activity change and unexpected weight change  HENT: Negative  Eyes: Negative  Respiratory: Negative  Cardiovascular: Negative  Gastrointestinal: Negative for abdominal distention and abdominal pain  Endocrine: Negative  Genitourinary: Negative for pelvic pain and vaginal bleeding  Musculoskeletal: Negative  Skin: Negative  Allergic/Immunologic: Negative  Neurological: Negative  Hematological: Negative  Psychiatric/Behavioral: Negative          Current Outpatient Medications   Medication Sig Dispense Refill   • acetaminophen (TYLENOL) 325 mg tablet Take 650 mg by mouth every 6 (six) hours as needed for mild pain     • albuterol (PROVENTIL HFA,VENTOLIN HFA) 90 mcg/act inhaler      • anastrozole (ARIMIDEX) 1 mg tablet TAKE 1 TABLET DAILY 90 tablet 3   • BIOTIN PO Take 1 tablet by mouth every morning     • Calcium Carb-Cholecalciferol 1000-800 MG-UNIT TABS calcium     • calcium-vitamin D 250-100 MG-UNIT per tablet Take 1 tablet by mouth     • cyanocobalamin 50 MCG tablet Take 50 mcg by mouth every other day     • Ergocalciferol (VITAMIN D2) 2000 units TABS Vitamin D2 50,000 unit capsule     • ferrous sulfate 325 (65 Fe) mg tablet Daily     • Inulin-Calcium-Vitamin D 2-250-100 GM-MG-UNIT CHEW Fiber Plus Mulitvitamin     • Lifitegrast (XIIDRA OP) Apply 1 vial to eye 2 (two) times a day     • loratadine (CLARITIN) 10 mg tablet Take 10 mg by mouth as needed       • meloxicam (Mobic) 15 mg tablet Take 1 tablet (15 mg total) by mouth daily 30 tablet 1   • methocarbamol (ROBAXIN) 500 mg tablet Take 500 mg by mouth 3 (three) times a day       • montelukast (SINGULAIR) 10 mg tablet Take 10 mg by mouth as needed       • multivitamin (THERAGRAN) TABS Take 1 tablet by mouth every morning       • Naproxen Sodium 220 MG CAPS as needed     • Omega-3 Fatty Acids (FISH OIL PO) Take by mouth in the morning     • pantoprazole (PROTONIX) 40 mg tablet Take 40 mg by mouth every morning       • phentermine 15 MG capsule       • pyridoxine (VITAMIN B6) 100 mg tablet Take 100 mg by mouth daily     • tiZANidine (Zanaflex) 4 mg tablet Take 0 5 tablets (2 mg total) by mouth every 8 (eight) hours as needed for muscle spasms 60 tablet 1   • topiramate (TOPAMAX) 25 mg tablet TAKE 1 TABLET TWICE A  tablet 3   • Xhance 93 MCG/ACT EXHU INSTILL 1 SPRAY PER NOSTRIL TWICE A DAY 16 mL 11   • zolpidem (AMBIEN) 5 mg tablet Take 1 tablet (5 mg total) by mouth daily at bedtime as needed for sleep 30 tablet 3     No current facility-administered medications for this visit  Allergies   Allergen Reactions   • Adhesive [Medical Tape] Rash     And small blister at sight       Past Medical History:   Diagnosis Date   • Abnormal Pap smear of cervix    • [employer] ancestry requiring population-specific genetic screening    • Asthma    • Breast cancer ([facility] ) 6/13/17   • Breathing difficulty     after gastric bypass 2014-"felt elephant on chest"   • Cervical cancer ([facility] ) 8/2022   • Cervical dysplasia    • Chest pain, non-cardiac     Seen in ER and is from esophageal spasms     • Colon polyp    • Constipation     occasional   • Family history of breast cancer in female    • GERD (gastroesophageal reflux disease)    • History of MRSA infection     Leg   • History of UTI     treated 8/1/2018   • Human papilloma virus (HPV) infection    • Hx MRSA infection     on leg-2009 treated   • Pollen allergies    • PONV (postoperative nausea and vomiting)     nausea   • Postural lightheadedness    • Raynaud's disease    • Seasonal allergies    • Sinus problem        Past Surgical History:   Procedure Laterality Date   • BILATERAL OOPHORECTOMY     • BLADDER SURGERY  2013    bladder lift   • BREAST BIOPSY Right 06/14/2018    IDC   • CERVICAL BIOPSY N/A 11/15/2022    Procedure: BIOPSY LEEP CERVIX;  Surgeon: Archana Hernandez MD;  [facility];  Service: Gynecology Oncology   • CERVIX LESION DESTRUCTION     • COLONOSCOPY     • COLPOSCOPY W/ BIOPSY / CURETTAGE     • ENDOMETRIAL ABLATION     • EXAMINATION UNDER ANESTHESIA N/A 11/15/2022    Procedure: EXAM UNDER ANESTHESIA (EUA);   Surgeon: Arhcana Hernandez MD;  [facility];  Service: Gynecology Oncology   • GASTRIC BYPASS  2014 2014 wt loss 90 lbs(regained 20 lbs)   • HYSTEROSCOPIC STERILIZATION W/ IMPLANTS     • HYSTEROSCOPY W/ ENDOMETRIAL ABLATION  2013   • KNEE ARTHROSCOPY Right 1990   • LYMPH NODE BIOPSY Right 08/10/2018    Procedure: BIOPSY LYMPH NODE SENTINEL;  Surgeon: Charles Jeronimo MD;  [facility];  Service: Surgical Oncology   • MAMMO STEREOTACTIC BREAST BIOPSY RIGHT (ALL INC) Right 06/14/2018   • MASTECTOMY Bilateral    • NASAL SEPTUM SURGERY      2002, 2016   • OOPHORECTOMY Bilateral    • SD IMPLNT BIO IMPLNT FOR SOFT TISSUE REINFORCEMENT Bilateral 08/10/2018    Procedure: RECONSTRUCTION BREAST W/ IMPLANT;  Surgeon: Zulma Faust MD;  [facility];  Service: Plastics   • SD INSJ/RPLCMT BREAST IMPLANT SEP DAY MASTECTOMY Bilateral 11/30/2018    Procedure: BREAST IMPLANT EXCHANGE;  Surgeon: Zulma Faust MD;  [facility];  Service: Plastics   • SD LAP,CHOLECYSTECTOMY/GRAPH N/A 03/19/2021    Procedure: CHOLECYSTECTOMY LAPAROSCOPIC W/ INTRAOP CHOLANGIOGRAM;  Surgeon: Dmitri Rutherford MD;  Location: [address] [place] [address] MAIN OR;  Service: General   • SD LAP,RMV  ADNEXAL STRUCTURE N/A 08/10/2018    Procedure: SALPINGO-OOPHORECTOMY, LAPAROSCOPIC; PELVIC WASHINGS ;  Surgeon: Jorge Alberto Powell MD;  [facility];  Service: Gynecology Oncology   • SD MASTECTOMY, SIMPLE, COMPLETE Bilateral 08/10/2018    Procedure: MASTECTOMY SIMPLE;  Surgeon: Charles Jeronimo MD;  [facility];  Service: Surgical Oncology   • SD LENNIE-IMPLANT CAPSULECTOMY BREAST COMPLETE Bilateral 2018    Procedure: CAPSULECTOMY;  Surgeon: Li Fraser MD;  [facility];  Service: Plastics   • OH REVISION OF RECONSTRUCTED BREAST Bilateral 2019    Procedure: BREAST MOUND REVISION; FAT GRAFTING;  Surgeon: Li Fraser MD;  [facility];  Service: Plastics   • OH TISSUE EXPANDER PLACEMENT BREAST RECONSTRUCTION Bilateral 08/10/2018    Procedure: INSERTION/PLACEMENT TISSUE EXPANDER (EXCHANGE); Surgeon: Li Fraser MD;  [facility];  Service: Plastics   • SINUS SURGERY         OB History        2    Para   2    Term   2            AB        Living   2       SAB        IAB        Ectopic        Multiple        Live Births               Obstetric Comments   First pregnancy age 31  Menarche age 17  Hx birth control pills                Family History   Problem Relation Age of Onset   • Other Mother         BRCA negative   • Breast cancer Mother 72   • Ovarian cancer Mother 61   • Diabetes Father    • Heart disease Father    • Hypertension Father    • Migraines Sister    • Diabetes Brother    • Colon cancer Maternal Grandfather 80   • Diabetes type II Maternal Grandfather    • Heart disease Paternal Grandfather    • Colon cancer Maternal Aunt 57   • Skin cancer Maternal Aunt    • Colon cancer Maternal Aunt        The following portions of the patient's history were reviewed and updated as appropriate: allergies, current medications, past family history, past medical history, past social history, past surgical history and problem list       Objective:    Blood pressure 114/80, pulse 93, temperature (!) 97 3 °F (36 3 °C), temperature source Temporal, height 5' 8" (1 727 m), weight 97 5 kg (215 lb), SpO2 100 %  Body mass index is 32 69 kg/m²  Physical Exam  Vitals reviewed  Exam conducted with a chaperone present  Constitutional:       General: She is not in acute distress  Appearance: Normal appearance  She is well-developed  She is obese   She is not ill-appearing, toxic-appearing or diaphoretic  HENT:      Head: Normocephalic and atraumatic  Eyes:      General: No scleral icterus  Extraocular Movements: Extraocular movements intact  Conjunctiva/sclera: Conjunctivae normal    Neck:      Thyroid: No thyromegaly  Pulmonary:      Effort: Pulmonary effort is normal  No respiratory distress  Breath sounds: No stridor  No wheezing or rhonchi  Abdominal:      Palpations: Abdomen is soft  Genitourinary:     Comments: The external female genitalia without mass lesion or bleeding  Speculum exam reveals a gross normal vagina  The cervix is flush with the posterior vaginal apex  No masses or lesions present  No bleeding  My examination reveals a mobile uterus  No parametrial disease  Uterus is approximately 9 weeks in size  Musculoskeletal:      Cervical back: Normal range of motion and neck supple  Right lower leg: No edema  Left lower leg: No edema  Lymphadenopathy:      Cervical: No cervical adenopathy  Skin:     General: Skin is warm and dry  Coloration: Skin is not jaundiced or pale  Findings: No lesion or rash  Neurological:      General: No focal deficit present  Mental Status: She is alert and oriented to person, place, and time  Mental status is at baseline  Cranial Nerves: No cranial nerve deficit  Motor: No weakness  Gait: Gait normal    Psychiatric:         Mood and Affect: Mood normal          Behavior: Behavior normal          Thought Content:  Thought content normal          Judgment: Judgment normal            No results found for:   Lab Results   Component Value Date    WBC 5 44 12/24/2021    HGB 13 9 12/24/2021    HCT 44 1 12/24/2021    MCV 90 12/24/2021     (H) 12/24/2021     Lab Results   Component Value Date    K 3 7 12/24/2021     12/24/2021    CO2 27 12/24/2021    BUN 15 12/24/2021    CREATININE 0 72 12/24/2021    GLUCOSE 129 07/14/2016    GLUF 84 12/24/2021    CALCIUM 9 3 12/24/2021    AST 17 12/24/2021    ALT 25 12/24/2021    ALKPHOS 119 (H) 12/24/2021    EGFR 98 12/24/2021        Trend:  No results found for:

## 2022-12-13 NOTE — ASSESSMENT & PLAN NOTE
46-year-old with JOSEPH-3, positive margins after LEEP November 15, 2022, surgical history notable for laparoscopic BSO, gastric bypass, laparoscopic cholecystectomy  She is recovering well from the procedure  Her performance status is 0   1   I discussed the risks and benefits of robotic assisted total laparoscopic hysterectomy, possible exploratory laparotomy and all other indicated procedures  She understands the risks and benefits of the operation and agrees to proceed as outlined  Consent for surgery was obtained by me in the office  2   First follow-up Pap will be 6 months after hysterectomy  I spent 20 minutes with the patient  More than half the time was spent in counseling and coordination of care regarding surgical management of her cervical dysplasia  Only brief time was spent on the postoperative history and physical examination

## 2022-12-13 NOTE — TELEPHONE ENCOUNTER
CALLED AND OFFERED PATIENT SURGERY WITH DR Emani Sanchez AS SHE HAD AVAILABILITY BEFORE THE END OF THE MONTH  PATIENT IS LOOKING TO HAVE SURGERY BEFORE THE END OF THE YEAR  OK WITH 12/23/22 WITH [facility]  BOTH PROVIDERS ARE AWARE OF PATIENTS REQUEST

## 2022-12-15 DIAGNOSIS — Z17.0 MALIGNANT NEOPLASM OF LOWER-OUTER QUADRANT OF RIGHT BREAST OF FEMALE, ESTROGEN RECEPTOR POSITIVE (HCC): ICD-10-CM

## 2022-12-15 DIAGNOSIS — C50.511 MALIGNANT NEOPLASM OF LOWER-OUTER QUADRANT OF RIGHT BREAST OF FEMALE, ESTROGEN RECEPTOR POSITIVE (HCC): ICD-10-CM

## 2022-12-15 RX ORDER — ANASTROZOLE 1 MG/1
TABLET ORAL
Qty: 90 TABLET | Refills: 3 | Status: SHIPPED | OUTPATIENT
Start: 2022-12-15

## 2022-12-16 ENCOUNTER — AMB VIDEO VISIT (OUTPATIENT)
Dept: OTHER | Facility: HOSPITAL | Age: 52
End: 2022-12-16

## 2022-12-16 DIAGNOSIS — J01.00 ACUTE NON-RECURRENT MAXILLARY SINUSITIS: Primary | ICD-10-CM

## 2022-12-16 RX ORDER — AMOXICILLIN AND CLAVULANATE POTASSIUM 875; 125 MG/1; MG/1
1 TABLET, FILM COATED ORAL EVERY 12 HOURS SCHEDULED
Qty: 20 TABLET | Refills: 0 | Status: SHIPPED | OUTPATIENT
Start: 2022-12-16 | End: 2022-12-26

## 2022-12-16 NOTE — CARE ANYWHERE EVISITS
Visit Summary for [facility] - Gender: Female - Date of Birth: [id number]  Date: [id number] - Duration: 2 minutes  Patient: Jesica Curiel   [facility]  Provider: Venessa Carter PA-C    Patient Contact Information  Address  [facility]  [address] [facility]; [address] [place] [address]  [phone]    Visit Topics  Sinus infection  [Added By: Self - 2022-12-16]    Triage Questions   What is your current physical address in the event of a medical emergency? Answer []  Are you allergic to any medications? Answer []  Are you now or could you be pregnant? Answer []  Do you have any immune system compromise or chronic lung   disease? Answer []  Do you have any vulnerable family members in the home (infant, pregnant, cancer, elderly)? Answer []     Conversation Transcripts  [0A][0A] [Notification] You are connected with Venessa Carter PA-C, Urgent Care Specialist [0A][Notification] Ryan Toro is located in [place]  [0A][Notification] Ryan Toro has shared health history  [facility]  [0A]    Diagnosis  Acute maxillary sinusitis, unspecified    Procedures  Value: [id number] Code: CPT-4 UNLISTED E&M SERVICE    Medications Prescribed    No prescriptions ordered    Electronically signed by: Nupur Rodriguez(NPI [id number])

## 2022-12-16 NOTE — PROGRESS NOTES
Video Visit - Abbey Wallace 50 y o  female MRN: [id number]    REQUIRED DOCUMENTATION:         1  This service was provided via [employer]  2  Provider located at [address] [place] [id number] [phone]  3  [employer] provider: Leopoldo Valentine PA-C   4  Identify all parties in room with patient during [employer] visit:  No one else  5  After connecting through [employer], patient was identified by name and date of birth  Patient was then informed that this was a Telemedicine visit and that the exam was being conducted confidentially over secure lines  My office door was closed  No one else was in the room  Patient acknowledged consent and understanding of privacy and security of the Telemedicine visit  I informed the patient that I have reviewed their record in Epic and presented the opportunity for them to ask any questions regarding the visit today  The patient agreed to participate  HPI  Patient states she was positive for the flu a few weeks ago and was on tamiflu  She is still having sinus pressure, PNS, green mucus  She denies any fevers  Physical Exam  Constitutional:       General: She is not in acute distress  Appearance: Normal appearance  She is not ill-appearing, toxic-appearing or diaphoretic  HENT:      Head: Normocephalic and atraumatic  Nose: Congestion present  Comments: TTP over sinuses  Pulmonary:      Effort: Pulmonary effort is normal  No respiratory distress  Skin:     General: Skin is dry  Neurological:      General: No focal deficit present  Mental Status: She is alert and oriented to person, place, and time  Psychiatric:         Mood and Affect: Mood normal          Behavior: Behavior normal          Diagnoses and all orders for this visit:    Acute non-recurrent maxillary sinusitis  -     amoxicillin-clavulanate (AUGMENTIN) 875-125 mg per tablet;  Take 1 tablet by mouth every 12 (twelve) hours for 10 days      Patient Instructions     Sinusitis   WHAT YOU NEED TO KNOW:   Sinusitis is inflammation or infection of your sinuses  Sinusitis is most often caused by a virus  Acute sinusitis may last up to 12 weeks  Chronic sinusitis lasts longer than 12 weeks  Recurrent sinusitis means you have 4 or more infections in 1 year  DISCHARGE INSTRUCTIONS:   Return to the emergency department if:   · You have trouble breathing or wheezing that is getting worse  · You have a stiff neck, a fever, or a bad headache  · You cannot open your eye  · Your eyeball bulges out or you cannot move your eye  · You are more sleepy than normal, or you notice changes in your ability to think, move, or talk  · You have swelling of your forehead or scalp  Call your doctor if:   · You have vision changes, such as double vision  · Your eye and eyelid are red, swollen, and painful  · Your symptoms do not improve or go away after 10 days  · You have nausea and are vomiting  · Your nose is bleeding  · You have questions or concerns about your condition or care  Medicines: Your symptoms may go away on their own  Your healthcare provider may recommend watchful waiting for up to 10 days before starting antibiotics  You may need any of the following:  · Acetaminophen  decreases pain and fever  It is available without a doctor's order  Ask how much to take and how often to take it  Follow directions  Read the labels of all other medicines you are using to see if they also contain acetaminophen, or ask your doctor or pharmacist  Acetaminophen can cause liver damage if not taken correctly  Do not use more than 4 grams (4,000 milligrams) total of acetaminophen in one day  · NSAIDs , such as ibuprofen, help decrease swelling, pain, and fever  This medicine is available with or without a doctor's order  NSAIDs can cause stomach bleeding or kidney problems in certain people   If you take blood thinner medicine, always ask your healthcare provider if NSAIDs are safe for you  Always read the medicine label and follow directions  · Nasal steroid sprays  may help decrease inflammation in your nose and sinuses  · Decongestants  help reduce swelling and drain mucus in the nose and sinuses  They may help you breathe easier  · Antihistamines  help dry mucus in the nose and relieve sneezing  · Antibiotics  help treat or prevent a bacterial infection  · Take your medicine as directed  Contact your healthcare provider if you think your medicine is not helping or if you have side effects  Tell him or her if you are allergic to any medicine  Keep a list of the medicines, vitamins, and herbs you take  Include the amounts, and when and why you take them  Bring the list or the pill bottles to follow-up visits  Carry your medicine list with you in case of an emergency  Self-care:   · Rinse your sinuses as directed  Use a sinus rinse device to rinse your nasal passages with a saline (salt water) solution or distilled water  Do not use tap water  This will help thin the mucus in your nose and rinse away pollen and dirt  It will also help reduce swelling so you can breathe normally  · Use a humidifier  to increase air moisture in your home  This may make it easier for you to breathe and help decrease your cough  · Sleep with your head elevated  Place an extra pillow under your head before you go to sleep to help your sinuses drain  · Drink liquids as directed  Ask your healthcare provider how much liquid to drink each day and which liquids are best for you  Liquids will thin the mucus in your nose and help it drain  Avoid drinks that contain alcohol or caffeine  · Do not smoke, and avoid secondhand smoke  Nicotine and other chemicals in cigarettes and cigars can make your symptoms worse  Ask your healthcare provider for information if you currently smoke and need help to quit   E-cigarettes or smokeless tobacco still contain nicotine  Talk to your healthcare provider before you use these products  Prevent the spread of germs:   · Wash your hands often with soap and water  Wash your hands after you use the bathroom, change a child's diaper, or sneeze  Wash your hands before you prepare or eat food  · Stay away from people who are sick  Some germs spread easily and quickly through contact  Follow up with your doctor as directed: You may be referred to an ear, nose, and throat specialist  Write down your questions so you remember to ask them during your visits  © Copyright [employer] 2022 Information is for End User's use only and may not be sold, redistributed or otherwise used for commercial purposes  All illustrations and images included in CareNotes® are the copyrighted property of [employer]  or [employer]osin Townsend  The above information is an  only  It is not intended as medical advice for individual conditions or treatments  Talk to your doctor, nurse or pharmacist before following any medical regimen to see if it is safe and effective for you

## 2022-12-16 NOTE — PATIENT INSTRUCTIONS
Sinusitis   WHAT YOU NEED TO KNOW:   Sinusitis is inflammation or infection of your sinuses  Sinusitis is most often caused by a virus  Acute sinusitis may last up to 12 weeks  Chronic sinusitis lasts longer than 12 weeks  Recurrent sinusitis means you have 4 or more infections in 1 year  DISCHARGE INSTRUCTIONS:   Return to the emergency department if:   You have trouble breathing or wheezing that is getting worse  You have a stiff neck, a fever, or a bad headache  You cannot open your eye  Your eyeball bulges out or you cannot move your eye  You are more sleepy than normal, or you notice changes in your ability to think, move, or talk  You have swelling of your forehead or scalp  Call your doctor if:   You have vision changes, such as double vision  Your eye and eyelid are red, swollen, and painful  Your symptoms do not improve or go away after 10 days  You have nausea and are vomiting  Your nose is bleeding  You have questions or concerns about your condition or care  Medicines: Your symptoms may go away on their own  Your healthcare provider may recommend watchful waiting for up to 10 days before starting antibiotics  You may need any of the following:  Acetaminophen  decreases pain and fever  It is available without a doctor's order  Ask how much to take and how often to take it  Follow directions  Read the labels of all other medicines you are using to see if they also contain acetaminophen, or ask your doctor or pharmacist  Acetaminophen can cause liver damage if not taken correctly  Do not use more than 4 grams (4,000 milligrams) total of acetaminophen in one day  NSAIDs , such as ibuprofen, help decrease swelling, pain, and fever  This medicine is available with or without a doctor's order  NSAIDs can cause stomach bleeding or kidney problems in certain people   If you take blood thinner medicine, always ask your healthcare provider if NSAIDs are safe for you  Always read the medicine label and follow directions  Nasal steroid sprays  may help decrease inflammation in your nose and sinuses  Decongestants  help reduce swelling and drain mucus in the nose and sinuses  They may help you breathe easier  Antihistamines  help dry mucus in the nose and relieve sneezing  Antibiotics  help treat or prevent a bacterial infection  Take your medicine as directed  Contact your healthcare provider if you think your medicine is not helping or if you have side effects  Tell him or her if you are allergic to any medicine  Keep a list of the medicines, vitamins, and herbs you take  Include the amounts, and when and why you take them  Bring the list or the pill bottles to follow-up visits  Carry your medicine list with you in case of an emergency  Self-care:   Rinse your sinuses as directed  Use a sinus rinse device to rinse your nasal passages with a saline (salt water) solution or distilled water  Do not use tap water  This will help thin the mucus in your nose and rinse away pollen and dirt  It will also help reduce swelling so you can breathe normally  Use a humidifier  to increase air moisture in your home  This may make it easier for you to breathe and help decrease your cough  Sleep with your head elevated  Place an extra pillow under your head before you go to sleep to help your sinuses drain  Drink liquids as directed  Ask your healthcare provider how much liquid to drink each day and which liquids are best for you  Liquids will thin the mucus in your nose and help it drain  Avoid drinks that contain alcohol or caffeine  Do not smoke, and avoid secondhand smoke  Nicotine and other chemicals in cigarettes and cigars can make your symptoms worse  Ask your healthcare provider for information if you currently smoke and need help to quit  E-cigarettes or smokeless tobacco still contain nicotine   Talk to your healthcare provider before you use these products  Prevent the spread of germs:   Wash your hands often with soap and water  Wash your hands after you use the bathroom, change a child's diaper, or sneeze  Wash your hands before you prepare or eat food  Stay away from people who are sick  Some germs spread easily and quickly through contact  Follow up with your doctor as directed: You may be referred to an ear, nose, and throat specialist  Write down your questions so you remember to ask them during your visits  © Copyright [employer] 2022 Information is for End User's use only and may not be sold, redistributed or otherwise used for commercial purposes  All illustrations and images included in CareNotes® are the copyrighted property of A D A M , Inc  or [facility] Mikey Rangle   The above information is an  only  It is not intended as medical advice for individual conditions or treatments  Talk to your doctor, nurse or pharmacist before following any medical regimen to see if it is safe and effective for you

## 2022-12-20 ENCOUNTER — ANESTHESIA EVENT (OUTPATIENT)
Dept: PERIOP | Facility: HOSPITAL | Age: 52
End: 2022-12-20

## 2022-12-20 ENCOUNTER — OFFICE VISIT (OUTPATIENT)
Dept: LAB | Facility: CLINIC | Age: 52
End: 2022-12-20

## 2022-12-20 ENCOUNTER — APPOINTMENT (OUTPATIENT)
Dept: LAB | Facility: CLINIC | Age: 52
End: 2022-12-20

## 2022-12-20 DIAGNOSIS — C50.511 MALIGNANT NEOPLASM OF LOWER-OUTER QUADRANT OF RIGHT BREAST OF FEMALE, ESTROGEN RECEPTOR POSITIVE (HCC): ICD-10-CM

## 2022-12-20 DIAGNOSIS — Z17.0 MALIGNANT NEOPLASM OF LOWER-OUTER QUADRANT OF RIGHT BREAST OF FEMALE, ESTROGEN RECEPTOR POSITIVE (HCC): ICD-10-CM

## 2022-12-20 DIAGNOSIS — D06.9 HIGH GRADE SQUAMOUS INTRAEPITHELIAL LESION (HGSIL), GRADE 3 CIN, ON BIOPSY OF CERVIX: ICD-10-CM

## 2022-12-20 LAB
ALBUMIN SERPL BCP-MCNC: 4.1 G/DL (ref 3.5–5)
ALP SERPL-CCNC: 107 U/L (ref 34–104)
ALT SERPL W P-5'-P-CCNC: 11 U/L (ref 7–52)
ANION GAP SERPL CALCULATED.3IONS-SCNC: 7 MMOL/L (ref 4–13)
AST SERPL W P-5'-P-CCNC: 16 U/L (ref 13–39)
BASOPHILS # BLD AUTO: 0.04 THOUSANDS/ÂΜL (ref 0–0.1)
BASOPHILS NFR BLD AUTO: 1 % (ref 0–1)
BILIRUB SERPL-MCNC: 0.51 MG/DL (ref 0.2–1)
BUN SERPL-MCNC: 18 MG/DL (ref 5–25)
CALCIUM SERPL-MCNC: 9.6 MG/DL (ref 8.4–10.2)
CHLORIDE SERPL-SCNC: 105 MMOL/L (ref 96–108)
CO2 SERPL-SCNC: 27 MMOL/L (ref 21–32)
CREAT SERPL-MCNC: 0.77 MG/DL (ref 0.6–1.3)
EOSINOPHIL # BLD AUTO: 0.18 THOUSAND/ÂΜL (ref 0–0.61)
EOSINOPHIL NFR BLD AUTO: 4 % (ref 0–6)
ERYTHROCYTE [DISTWIDTH] IN BLOOD BY AUTOMATED COUNT: 15.3 % (ref 11.6–15.1)
EST. AVERAGE GLUCOSE BLD GHB EST-MCNC: 103 MG/DL
FERRITIN SERPL-MCNC: 14 NG/ML (ref 8–388)
GFR SERPL CREATININE-BSD FRML MDRD: 90 ML/MIN/1.73SQ M
GLUCOSE P FAST SERPL-MCNC: 92 MG/DL (ref 65–99)
HBA1C MFR BLD: 5.2 %
HCT VFR BLD AUTO: 41.9 % (ref 34.8–46.1)
HGB BLD-MCNC: 13.2 G/DL (ref 11.5–15.4)
IMM GRANULOCYTES # BLD AUTO: 0.01 THOUSAND/UL (ref 0–0.2)
IMM GRANULOCYTES NFR BLD AUTO: 0 % (ref 0–2)
IRON SATN MFR SERPL: 13 % (ref 15–50)
IRON SERPL-MCNC: 62 UG/DL (ref 50–170)
LYMPHOCYTES # BLD AUTO: 1.49 THOUSANDS/ÂΜL (ref 0.6–4.47)
LYMPHOCYTES NFR BLD AUTO: 29 % (ref 14–44)
MCH RBC QN AUTO: 26.2 PG (ref 26.8–34.3)
MCHC RBC AUTO-ENTMCNC: 31.5 G/DL (ref 31.4–37.4)
MCV RBC AUTO: 83 FL (ref 82–98)
MONOCYTES # BLD AUTO: 0.34 THOUSAND/ÂΜL (ref 0.17–1.22)
MONOCYTES NFR BLD AUTO: 7 % (ref 4–12)
NEUTROPHILS # BLD AUTO: 3.08 THOUSANDS/ÂΜL (ref 1.85–7.62)
NEUTS SEG NFR BLD AUTO: 59 % (ref 43–75)
NRBC BLD AUTO-RTO: 0 /100 WBCS
PLATELET # BLD AUTO: 399 THOUSANDS/UL (ref 149–390)
PMV BLD AUTO: 10.8 FL (ref 8.9–12.7)
POTASSIUM SERPL-SCNC: 4.4 MMOL/L (ref 3.5–5.3)
PROT SERPL-MCNC: 7.6 G/DL (ref 6.4–8.4)
RBC # BLD AUTO: 5.04 MILLION/UL (ref 3.81–5.12)
SODIUM SERPL-SCNC: 139 MMOL/L (ref 135–147)
TIBC SERPL-MCNC: 466 UG/DL (ref 250–450)
WBC # BLD AUTO: 5.14 THOUSAND/UL (ref 4.31–10.16)

## 2022-12-20 NOTE — PRE-PROCEDURE INSTRUCTIONS
Pre-Surgery Instructions:   Medication Instructions   • acetaminophen (TYLENOL) 325 mg tablet Uses PRN- OK to take day of surgery   • albuterol (PROVENTIL HFA,VENTOLIN HFA) 90 mcg/act inhaler Uses PRN- OK to take day of surgery   • amoxicillin-clavulanate (AUGMENTIN) 875-125 mg per tablet Take day of surgery  • anastrozole (ARIMIDEX) 1 mg tablet Take day of surgery  • BIOTIN PO instructed to hold   • Calcium Carb-Cholecalciferol 1000-800 MG-UNIT TABS instructed to hold   • cyanocobalamin 50 MCG tablet instructed to hold   • Ergocalciferol (VITAMIN D2) 2000 units TABS instructed to hold   • ferrous sulfate 325 (65 Fe) mg tablet instructed to hold   • Lifitegrast (XIIDRA OP) Take day of surgery  • loratadine (CLARITIN) 10 mg tablet Uses PRN- OK to take day of surgery   • meloxicam (Mobic) 15 mg tablet Stop taking 3 days prior to surgery  • methocarbamol (ROBAXIN) 500 mg tablet Uses PRN- OK to take day of surgery   • montelukast (SINGULAIR) 10 mg tablet Uses PRN- OK to take day of surgery   • multivitamin (THERAGRAN) TABS instructed to hold   • Naproxen Sodium 220 MG CAPS instructed to hold   • Omega-3 Fatty Acids (FISH OIL PO) instructed to hold   • pantoprazole (PROTONIX) 40 mg tablet Take day of surgery  • pyridoxine (VITAMIN B6) 100 mg tablet instructed to hold   • tiZANidine (Zanaflex) 4 mg tablet Uses PRN- OK to take day of surgery   • topiramate (TOPAMAX) 25 mg tablet Take day of surgery  • Xhance 93 MCG/ACT EXHU Uses PRN- OK to take day of surgery   • zolpidem (AMBIEN) 5 mg tablet Take night before surgery    [facility] preop instructions reviewed with pt  Pt has surgical soap  ERAS reviewed

## 2022-12-21 ENCOUNTER — LAB REQUISITION (OUTPATIENT)
Dept: LAB | Facility: HOSPITAL | Age: 52
End: 2022-12-21

## 2022-12-21 DIAGNOSIS — C50.511 MALIGNANT NEOPLASM OF LOWER-OUTER QUADRANT OF RIGHT FEMALE BREAST (HCC): ICD-10-CM

## 2022-12-21 DIAGNOSIS — D06.9 CARCINOMA IN SITU OF CERVIX, UNSPECIFIED: ICD-10-CM

## 2022-12-21 DIAGNOSIS — Z17.0 ESTROGEN RECEPTOR POSITIVE STATUS (ER+): ICD-10-CM

## 2022-12-21 LAB
ABO GROUP BLD: NORMAL
ATRIAL RATE: 67 BPM
BLD GP AB SCN SERPL QL: NEGATIVE
P AXIS: -6 DEGREES
PR INTERVAL: 126 MS
QRS AXIS: 0 DEGREES
QRSD INTERVAL: 78 MS
QT INTERVAL: 436 MS
QTC INTERVAL: 460 MS
RH BLD: POSITIVE
SPECIMEN EXPIRATION DATE: NORMAL
T WAVE AXIS: 15 DEGREES
VENTRICULAR RATE: 67 BPM

## 2022-12-23 ENCOUNTER — HOSPITAL ENCOUNTER (OUTPATIENT)
Facility: HOSPITAL | Age: 52
Setting detail: OUTPATIENT SURGERY
Discharge: HOME | End: 2022-12-23
Attending: OBSTETRICS & GYNECOLOGY | Admitting: OBSTETRICS & GYNECOLOGY

## 2022-12-23 ENCOUNTER — ANESTHESIA (OUTPATIENT)
Dept: PERIOP | Facility: HOSPITAL | Age: 52
End: 2022-12-23

## 2022-12-23 VITALS
OXYGEN SATURATION: 95 % | RESPIRATION RATE: 14 BRPM | DIASTOLIC BLOOD PRESSURE: 56 MMHG | SYSTOLIC BLOOD PRESSURE: 121 MMHG | WEIGHT: 211.86 LBS | HEIGHT: 68 IN | TEMPERATURE: 97 F | HEART RATE: 81 BPM | BODY MASS INDEX: 32.11 KG/M2

## 2022-12-23 DIAGNOSIS — D06.9 HIGH GRADE SQUAMOUS INTRAEPITHELIAL LESION (HGSIL), GRADE 3 CIN, ON BIOPSY OF CERVIX: ICD-10-CM

## 2022-12-23 DIAGNOSIS — G89.18 POST-OP PAIN: Primary | ICD-10-CM

## 2022-12-23 LAB
ABO GROUP BLD: NORMAL
BLD GP AB SCN SERPL QL: NEGATIVE
RH BLD: POSITIVE
SPECIMEN EXPIRATION DATE: NORMAL

## 2022-12-23 RX ORDER — MIDAZOLAM HYDROCHLORIDE 2 MG/2ML
INJECTION, SOLUTION INTRAMUSCULAR; INTRAVENOUS AS NEEDED
Status: DISCONTINUED | OUTPATIENT
Start: 2022-12-23 | End: 2022-12-23

## 2022-12-23 RX ORDER — OXYCODONE HYDROCHLORIDE 5 MG/1
10 TABLET ORAL EVERY 4 HOURS PRN
Status: DISCONTINUED | OUTPATIENT
Start: 2022-12-23 | End: 2022-12-23 | Stop reason: HOSPADM

## 2022-12-23 RX ORDER — KETOROLAC TROMETHAMINE 30 MG/ML
INJECTION, SOLUTION INTRAMUSCULAR; INTRAVENOUS AS NEEDED
Status: DISCONTINUED | OUTPATIENT
Start: 2022-12-23 | End: 2022-12-23

## 2022-12-23 RX ORDER — HEPARIN SODIUM 5000 [USP'U]/ML
5000 INJECTION, SOLUTION INTRAVENOUS; SUBCUTANEOUS
Status: COMPLETED | OUTPATIENT
Start: 2022-12-23 | End: 2022-12-23

## 2022-12-23 RX ORDER — SODIUM CHLORIDE, SODIUM LACTATE, POTASSIUM CHLORIDE, CALCIUM CHLORIDE 600; 310; 30; 20 MG/100ML; MG/100ML; MG/100ML; MG/100ML
125 INJECTION, SOLUTION INTRAVENOUS CONTINUOUS
Status: DISCONTINUED | OUTPATIENT
Start: 2022-12-23 | End: 2022-12-23 | Stop reason: HOSPADM

## 2022-12-23 RX ORDER — NEOSTIGMINE METHYLSULFATE 1 MG/ML
INJECTION INTRAVENOUS AS NEEDED
Status: DISCONTINUED | OUTPATIENT
Start: 2022-12-23 | End: 2022-12-23

## 2022-12-23 RX ORDER — GLYCOPYRROLATE 0.2 MG/ML
INJECTION INTRAMUSCULAR; INTRAVENOUS AS NEEDED
Status: DISCONTINUED | OUTPATIENT
Start: 2022-12-23 | End: 2022-12-23

## 2022-12-23 RX ORDER — OXYCODONE HYDROCHLORIDE 5 MG/1
5 TABLET ORAL EVERY 4 HOURS PRN
Status: DISCONTINUED | OUTPATIENT
Start: 2022-12-23 | End: 2022-12-23 | Stop reason: HOSPADM

## 2022-12-23 RX ORDER — ACETAMINOPHEN 325 MG/1
975 TABLET ORAL ONCE
Status: COMPLETED | OUTPATIENT
Start: 2022-12-23 | End: 2022-12-23

## 2022-12-23 RX ORDER — CELECOXIB 200 MG/1
200 CAPSULE ORAL ONCE
Status: COMPLETED | OUTPATIENT
Start: 2022-12-23 | End: 2022-12-23

## 2022-12-23 RX ORDER — ROCURONIUM BROMIDE 10 MG/ML
INJECTION, SOLUTION INTRAVENOUS AS NEEDED
Status: DISCONTINUED | OUTPATIENT
Start: 2022-12-23 | End: 2022-12-23

## 2022-12-23 RX ORDER — FENTANYL CITRATE/PF 50 MCG/ML
25 SYRINGE (ML) INJECTION
Status: DISCONTINUED | OUTPATIENT
Start: 2022-12-23 | End: 2022-12-23 | Stop reason: HOSPADM

## 2022-12-23 RX ORDER — SODIUM CHLORIDE 9 MG/ML
INJECTION, SOLUTION INTRAVENOUS CONTINUOUS PRN
Status: DISCONTINUED | OUTPATIENT
Start: 2022-12-23 | End: 2022-12-23

## 2022-12-23 RX ORDER — CEFAZOLIN SODIUM 2 G/50ML
2000 SOLUTION INTRAVENOUS ONCE
Status: COMPLETED | OUTPATIENT
Start: 2022-12-23 | End: 2022-12-23

## 2022-12-23 RX ORDER — OXYCODONE HYDROCHLORIDE 5 MG/1
5 TABLET ORAL EVERY 4 HOURS PRN
Qty: 15 TABLET | Refills: 0 | Status: SHIPPED | OUTPATIENT
Start: 2022-12-23 | End: 2023-01-02

## 2022-12-23 RX ORDER — LIDOCAINE HYDROCHLORIDE 10 MG/ML
INJECTION, SOLUTION EPIDURAL; INFILTRATION; INTRACAUDAL; PERINEURAL AS NEEDED
Status: DISCONTINUED | OUTPATIENT
Start: 2022-12-23 | End: 2022-12-23

## 2022-12-23 RX ORDER — EPHEDRINE SULFATE 50 MG/ML
INJECTION INTRAVENOUS AS NEEDED
Status: DISCONTINUED | OUTPATIENT
Start: 2022-12-23 | End: 2022-12-23

## 2022-12-23 RX ORDER — ACETAMINOPHEN 325 MG/1
975 TABLET ORAL EVERY 8 HOURS SCHEDULED
Status: DISCONTINUED | OUTPATIENT
Start: 2022-12-23 | End: 2022-12-23 | Stop reason: HOSPADM

## 2022-12-23 RX ORDER — PROPOFOL 10 MG/ML
INJECTION, EMULSION INTRAVENOUS AS NEEDED
Status: DISCONTINUED | OUTPATIENT
Start: 2022-12-23 | End: 2022-12-23

## 2022-12-23 RX ORDER — METOCLOPRAMIDE HYDROCHLORIDE 5 MG/ML
INJECTION INTRAMUSCULAR; INTRAVENOUS AS NEEDED
Status: DISCONTINUED | OUTPATIENT
Start: 2022-12-23 | End: 2022-12-23

## 2022-12-23 RX ORDER — DEXAMETHASONE SODIUM PHOSPHATE 10 MG/ML
INJECTION, SOLUTION INTRAMUSCULAR; INTRAVENOUS AS NEEDED
Status: DISCONTINUED | OUTPATIENT
Start: 2022-12-23 | End: 2022-12-23

## 2022-12-23 RX ORDER — ONDANSETRON 2 MG/ML
INJECTION INTRAMUSCULAR; INTRAVENOUS AS NEEDED
Status: DISCONTINUED | OUTPATIENT
Start: 2022-12-23 | End: 2022-12-23

## 2022-12-23 RX ORDER — FENTANYL CITRATE 50 UG/ML
INJECTION, SOLUTION INTRAMUSCULAR; INTRAVENOUS AS NEEDED
Status: DISCONTINUED | OUTPATIENT
Start: 2022-12-23 | End: 2022-12-23

## 2022-12-23 RX ORDER — ONDANSETRON 2 MG/ML
4 INJECTION INTRAMUSCULAR; INTRAVENOUS EVERY 6 HOURS PRN
Status: DISCONTINUED | OUTPATIENT
Start: 2022-12-23 | End: 2022-12-23 | Stop reason: HOSPADM

## 2022-12-23 RX ORDER — SODIUM CHLORIDE 9 MG/ML
125 INJECTION, SOLUTION INTRAVENOUS CONTINUOUS
Status: DISCONTINUED | OUTPATIENT
Start: 2022-12-23 | End: 2022-12-23 | Stop reason: HOSPADM

## 2022-12-23 RX ORDER — BUPIVACAINE HYDROCHLORIDE 5 MG/ML
INJECTION, SOLUTION EPIDURAL; INTRACAUDAL AS NEEDED
Status: DISCONTINUED | OUTPATIENT
Start: 2022-12-23 | End: 2022-12-23 | Stop reason: HOSPADM

## 2022-12-23 RX ORDER — ONDANSETRON 2 MG/ML
4 INJECTION INTRAMUSCULAR; INTRAVENOUS ONCE AS NEEDED
Status: COMPLETED | OUTPATIENT
Start: 2022-12-23 | End: 2022-12-23

## 2022-12-23 RX ORDER — GABAPENTIN 100 MG/1
200 CAPSULE ORAL ONCE
Status: COMPLETED | OUTPATIENT
Start: 2022-12-23 | End: 2022-12-23

## 2022-12-23 RX ADMIN — NEOSTIGMINE METHYLSULFATE 2 MG: 1 INJECTION INTRAVENOUS at 11:32

## 2022-12-23 RX ADMIN — GABAPENTIN 200 MG: 100 CAPSULE ORAL at 09:17

## 2022-12-23 RX ADMIN — FENTANYL CITRATE 100 MCG: 50 INJECTION INTRAMUSCULAR; INTRAVENOUS at 10:24

## 2022-12-23 RX ADMIN — MIDAZOLAM 2 MG: 1 INJECTION INTRAMUSCULAR; INTRAVENOUS at 10:04

## 2022-12-23 RX ADMIN — KETOROLAC TROMETHAMINE 30 MG: 30 INJECTION, SOLUTION INTRAMUSCULAR at 11:24

## 2022-12-23 RX ADMIN — FENTANYL CITRATE 50 MCG: 50 INJECTION INTRAMUSCULAR; INTRAVENOUS at 10:04

## 2022-12-23 RX ADMIN — PROPOFOL 200 MG: 10 INJECTION, EMULSION INTRAVENOUS at 10:13

## 2022-12-23 RX ADMIN — SODIUM CHLORIDE 125 ML/HR: 0.9 INJECTION, SOLUTION INTRAVENOUS at 09:14

## 2022-12-23 RX ADMIN — FENTANYL CITRATE 50 MCG: 50 INJECTION INTRAMUSCULAR; INTRAVENOUS at 10:13

## 2022-12-23 RX ADMIN — NEOSTIGMINE METHYLSULFATE 2 MG: 1 INJECTION INTRAVENOUS at 11:25

## 2022-12-23 RX ADMIN — LIDOCAINE HYDROCHLORIDE 100 MG: 10 INJECTION, SOLUTION EPIDURAL; INFILTRATION; INTRACAUDAL; PERINEURAL at 10:13

## 2022-12-23 RX ADMIN — HEPARIN SODIUM 5000 UNITS: 5000 INJECTION INTRAVENOUS; SUBCUTANEOUS at 09:17

## 2022-12-23 RX ADMIN — EPHEDRINE SULFATE 10 MG: 50 INJECTION, SOLUTION INTRAVENOUS at 10:34

## 2022-12-23 RX ADMIN — ONDANSETRON HYDROCHLORIDE 4 MG: 2 SOLUTION INTRAMUSCULAR; INTRAVENOUS at 11:53

## 2022-12-23 RX ADMIN — EPHEDRINE SULFATE 15 MG: 50 INJECTION, SOLUTION INTRAVENOUS at 10:47

## 2022-12-23 RX ADMIN — CELECOXIB 200 MG: 200 CAPSULE ORAL at 09:17

## 2022-12-23 RX ADMIN — GLYCOPYRROLATE 0.4 MG: 0.2 INJECTION, SOLUTION INTRAMUSCULAR; INTRAVENOUS at 11:32

## 2022-12-23 RX ADMIN — FENTANYL CITRATE 25 MCG: 50 INJECTION, SOLUTION INTRAMUSCULAR; INTRAVENOUS at 12:02

## 2022-12-23 RX ADMIN — DEXAMETHASONE SODIUM PHOSPHATE 10 MG: 10 INJECTION INTRAMUSCULAR; INTRAVENOUS at 10:23

## 2022-12-23 RX ADMIN — CEFAZOLIN SODIUM 2000 MG: 2 SOLUTION INTRAVENOUS at 10:09

## 2022-12-23 RX ADMIN — ACETAMINOPHEN 975 MG: 325 TABLET, FILM COATED ORAL at 09:17

## 2022-12-23 RX ADMIN — METOCLOPRAMIDE 10 MG: 5 INJECTION, SOLUTION INTRAMUSCULAR; INTRAVENOUS at 11:18

## 2022-12-23 RX ADMIN — GLYCOPYRROLATE 0.4 MG: 0.2 INJECTION, SOLUTION INTRAMUSCULAR; INTRAVENOUS at 11:25

## 2022-12-23 RX ADMIN — ONDANSETRON 4 MG: 2 INJECTION INTRAMUSCULAR; INTRAVENOUS at 10:23

## 2022-12-23 RX ADMIN — SODIUM CHLORIDE: 0.9 INJECTION, SOLUTION INTRAVENOUS at 10:15

## 2022-12-23 RX ADMIN — SODIUM CHLORIDE: 0.9 INJECTION, SOLUTION INTRAVENOUS at 10:36

## 2022-12-23 RX ADMIN — ROCURONIUM BROMIDE 50 MG: 10 INJECTION, SOLUTION INTRAVENOUS at 10:13

## 2022-12-23 NOTE — DISCHARGE INSTRUCTIONS
[facility]  Magy Zhu and Peng  [phone]    Hysterectomy Discharge Instructions    WHAT YOU NEED TO KNOW:   A hysterectomy is surgery to remove your uterus  Your ovaries, fallopian tubes, cervix, or part of your vagina may also need to be removed  The organs and tissue that will be removed depends on your medical condition  After a hysterectomy, you will not be able to become pregnant  If your ovaries are removed, you will go through menopause if you have not already  DISCHARGE INSTRUCTIONS:   Contact your doctor at the number above if:   You have a fever over 101o  You have nausea or are vomiting that does not improve after a light meal    Your pain is getting worse, even after you take medicine  You feel pain or burning when you urinate, or you have trouble urinating  You have pus or a foul-smelling odor coming from your vagina  Your wound is red, swollen, or draining pus  You see new or an increased amount of bright red blood coming from your vagina or your incisions  You have questions or concerns about your condition or care  Seek care immediately:   Your arm or leg feels warm, tender, and painful  It may look swollen and red  You have increasing abdominal or pelvic pain  You have heavy vaginal bleeding that fills 1 or more sanitary pads in 1 hour  Call 911 for any of the following: You feel lightheaded, short of breath, and have chest pain  You cough up blood  Medicines: You may need any of the following:  Prescription medicine may be given  You may receive a prescription for pain medication or be advised to use over the counter (OTC) pain medication such as acetaminophen (Tylenol) or ibuprofen (Advil, Motrin)  Ask your healthcare provider how to take this medicine safely  NSAIDs , such as ibuprofen, help decrease swelling, pain, and fever   NSAIDs can cause stomach bleeding or kidney problems in certain people  If you take blood thinner medicine, always ask your healthcare provider if NSAIDs are safe for you  Always read the medicine label and follow directions  Stool softeners help treat or prevent constipation  Take your medicine as directed  Contact your healthcare provider if you think your medicine is not helping or if you have side effects  Tell him or her if you are allergic to any medicine  Keep a list of the medicines, vitamins, and herbs you take  Include the amounts, and when and why you take them  Bring the list or the pill bottles to follow-up visits  Carry your medicine list with you in case of an emergency  Activity:   Rest as needed  Get up and move around as directed to help prevent blood clots  Start with short walks and slowly increase the distance every day  Limit the number of times you climb stairs to 2 times each day for the first week  Plan most of your daily activities on one level of your home  Do not lift objects heavier than 10 pounds for 6 weeks  Avoid strenuous activity for 2 weeks  Do not strain during bowel movements  High-fiber foods and extra liquids can help you prevent constipation  Examples of high-fiber foods are fruit and bran  Prune juice and water are good liquids to drink  Do not have sex, use tampons, or douche for up to 8 weeks  You may shower as soon as the day after surgery  Tub baths can be taken starting 2 weeks after surgery  Do not go into pools or hot tubs until cleared by your doctor  Ask when it is safe for you to drive  It is generally safe to drive after 2 weeks and when no longer taking prescription pain medication  Ask when you may return to work and to other regular activities  Wound care: Care for your abdominal incisions as directed  Carefully wash around the wound with soap and water   If you have Hibiclens or medicated soap that you were instructed to use before surgery, you may use that to wash with for up to 2 days after surgery  If not, any mild non-scented, non-abrasive soap is safe  It is okay to let the soap and water run over your incision  Do not scrub your incision  Dry the area and put on new, clean bandages as directed  Change your bandages when they get wet or dirty  If you have strips of medical tape, let them fall off on their own  It may take 7 to 14 days for them to fall off  Check your incision every day for redness, swelling, or pus  Deep breathing: Take deep breaths and cough 10 times each hour  This will decrease your risk for a lung infection  Take a deep breath and hold it for as long as you can  Let the air out and then cough strongly  Deep breaths help open your airway  You may be given an incentive spirometer to help you take deep breaths  Put the plastic piece in your mouth and take a slow, deep breath, then let the air out and cough  Repeat these steps 10 times every hour  Get support: This surgery may be life-changing for you and your family  You will no longer be able to get pregnant  Sudden changes in the levels of your hormones may occur and cause mood swings and depression  You may feel angry, sad, or frightened, or cry frequently and unexpectedly  These feelings are normal  Talk to your healthcare provider about where you can get support  You can also ask if hormone replacement medicine is right for you  Follow up with your healthcare provider or gynecologist as directed: You may need to return to have stitches removed, and for other tests  Write down your questions so you remember to ask them during your visits  © 2017 [address] Information is for End User's use only and may not be sold, redistributed or otherwise used for commercial purposes  All illustrations and images included in CareNotes® are the copyrighted property of [employer] M , Inc  or [employer]  The above information is an  only   It is not intended as medical advice for individual conditions or treatments  Talk to your doctor, nurse or pharmacist before following any medical regimen to see if it is safe and effective for you

## 2022-12-23 NOTE — OP NOTE
OPERATIVE REPORT  PATIENT NAME: Raymundo Cabrera    :  1970  MRN: [id number]  Pt Location: [facility] OR ROOM 07    SURGERY DATE: 2022    Surgeon(s) and Role:     * Cesilia Aguillon MD - Primary     * Batsheva Braga PA-C - Makenzie Barrientos MD - Resident, Alysia Esquivel MD - Fellow, first assisting    Preop Diagnosis:  High grade squamous intraepithelial lesion (HGSIL), grade 3 JOSEPH, on biopsy of cervix [D06 9]    Post-Op Diagnosis Codes:     * High grade squamous intraepithelial lesion (HGSIL), grade 3 JOSEPH, on biopsy of cervix [D06 9]    Procedure(s) (LRB):  ROBOT ASSISTED TOTAL LAPAROSCOPIC HYSTERECTOMY      Specimen(s):  ID Type Source Tests Collected by Time Destination   1 : uterus, cervix Tissue Uterus TISSUE EXAM Cesilia Aguillon MD 2022 1052      Estimated Blood Loss:   25 mL    Drains:  Closed/Suction Drain Right;Lateral Breast Bulb 15 Fr  (Active)   Number of days: 6428       Closed/Suction Drain Left;Lateral Breast Bulb 15 Fr  (Active)   Number of days: 1596       Urethral Catheter Non-latex 16 Fr  (Active)   Number of days: 0     Anesthesia Type:   General    Operative Indications:  High grade squamous intraepithelial lesion (HGSIL), grade 3 JOSEPH, on biopsy of cervix [D06 9]    Patient is a 51 yo with hx of cervical dysplasia s/p LEEP 2022 where CIN2-3 was noted with positive surgical margins, neg post procedure ECC  Of note, patient also has history of ER/IA hormone positive stage 1A right ductal breast carcinoma for which she has undergone a prior BSO in 2018 as well as b/l mastectomy and is now on Arimidex maintenance  The results of her diagnostic testing, her differential diagnosis and treatment options, and the benefits and risks of these were reviewed  She has a good understanding and has agreed to proceed with definitive surgical       Operative Findings:  Normal bowel, omentum   Normal appearing upper abdomen  Normal appearing uterus and bilateral cul de sacs  Surgically absent bilateral fallopian tubes and ovaries  No evidence of extra-uterine disease  Hemostatic vaginal cuff  B/l ureteral course easily seen       Complications:   None    Procedure and Technique:  The patient was brought to the Operating Room where general anesthesia was induced  The abdomen, vagina and perineum were prepped and draped  Attention was turned to patient's vagina  A tomas catheter was placed  Atraumatic vaginal retractors were placed to identify the cervix which was grasped with a single tooth tenaculum  The cervix was stenotic and serially dilated with a sound and dilators  She was sounded to 6 cm  A suture was placed at the anterior margin of her cervix  A medium Koh ring was placed around the cervix   Then SHUKRI cannula was inserted in the uterine cavity  Given stenotic nature and difficulty with placement, we did wait until entry was gained laparoscopically       With the abdomen under anterior traction with two corky-umbilical towel clamps, a Veress needle was inserted into the abdominal cavity with the carbon dioxide on low flow  Low opening pressures were noted and diffuse abdominal distention indicated intraperitoneal placement  The peritoneal cavity was then insufflated with carbon dioxide on high flow to maintain a pressure of 15 mm Hg throughout the case   Local was infiltrated and an incision was made 25 cm above the pubic symphysis, through which the robotic trocar was introduced into the abdominal cavity  The robotic camera was then inserted and the peritoneal cavity explored with the above findings  There was no evidence of visceral injury  Three additional robotic ports and a 8mm airseal assistant port were placed under direct visualization after local was infiltrated at all ports  We proceeded to watch laparoscopically after she was placed in Trendelenburg position   Two small areas of perforation were noted anterior and posterior broad ligament abutting the cervical area  The SHUKRI was then advanced under laparoscopic visualization  The cervical suture was secured to the SHUKRI handle and the vaginal balloon was then inflated with air  With the patient in steep Trendelenburg, the robot was docked to the robotic ports and the instruments were placed under direct visualization with scissors, synchroseal and prograsp in arms 4, 2 and 1      Careful survey of abdomen and pelvis was performed to reveal above notable findings  The area of prior perforation was notable for hemostasis with a small contained hematoma  The ureters were easily identified transperitoneally  With the uterus on axial traction, a window was created at the vesico-uterine peritoneum, which was incised in the midline after grasping the peritoneum and lifting anteriorly  The bladder was carefully dissected from the cervix and upper vagina in a meticulous fashion to the level of the koh ring using short bursts of monopolar energy and traction with push maneuver  The right round ligament was isolated, cauterized and cut  The posterior peritoneum was dropped to the level of the koh ring  The broad ligament tissue lateral to the uterine vessels was skeletonized on the right and the uterine vessels were then coagulated at the level of the koh ring using bipolar current, then divided  The same procedure of steps was repeated on the left side  With the bladder mobilized anteriorly and the rectum well away posteriorally, using the monopolar device, an incision was made in the anterior upper vagina at the level of the cervical-vaginal junction  The incision was continued circumferentially around the upper vagina, controlling upper vaginal blood supply laterally using the bipolar  This allowed completely amputation of the specimen  The vaginal balloon was removed   The uterus and cervix were then removed en bloc transvaginally      The anterior edge of the cuff was elongated using blunt dissection of the bladder further caudad to allow enough space for good approximation of the vaginal cuff  The upper vagina was then closed laparoscopically with 2-0 Stratafix, taking care to avoid bladder injury in a running fashion right to left than reinforced left to right  The upper vagina was intact and hemostatic with irrigation  A small area of a slow ooze at the left angle was briefly coagulated near the suture line  The vaginal balloon was removed       The robotic instruments were removed, and the robot undocked  The laparoscopic skin incisions were irrigated and made hemostatic using electrocoagulation  They were closed with subcuticular stitches of 4-0 monocryl as well as further local was infiltrated  The skin was covered with Exofin skin glue       All sponge, needle and instrument counts were correct x2   Anesthesia was reversed and the patient was taken to the PACU in a stable condition  Dr Loni Edgar was present for the entire procedure and a physician assistant was required during the procedure for retraction tissue handling,dissection and suturing   Dr Loni Edgar spoke to the patient's designated family member at the conclusion of the case       Patient Disposition:  PACU     This procedure was not performed to treat colon cancer through resection      SIGNATURE: Nicky De Jesus MD  DATE: December 23, 2022  TIME: 11:46 AM

## 2022-12-23 NOTE — INTERVAL H&P NOTE
H&P reviewed  After examining the patient I find no changes in the patients condition since the H&P had been written      Vitals:    12/23/22 0859   BP: 143/89   Pulse: 94   Resp: 16   Temp: 98 3 °F (36 8 °C)   SpO2: 97%

## 2022-12-23 NOTE — ANESTHESIA POSTPROCEDURE EVALUATION
Post-Op Assessment Note    CV Status:  Stable    Pain management: adequate     Mental Status:  Alert and awake   Hydration Status:  Euvolemic   PONV Controlled:  Controlled   Airway Patency:  Patent      Post Op Vitals Reviewed: Yes      Staff: Anesthesiologist         No notable events documented    /53   Pulse 58   Temp 98 9 °F (37 2 °C)   Resp 15   Ht 5' 8" (1 727 m)   Wt 96 1 kg (211 lb 13 8 oz)   SpO2 93%   BMI 32 21 kg/m²   BP      Temp      Pulse     Resp      SpO2

## 2022-12-23 NOTE — ANESTHESIA PREPROCEDURE EVALUATION
Procedure:  ([address] [place] [address]) W/ ROBOT (Abdomen)  LAPAROTOMY EXPLORATORY (Abdomen)    Relevant Problems   ANESTHESIA (within normal limits)      CARDIO (within normal limits)      ENDO (within normal limits)      GI/HEPATIC   (+) H/O bariatric surgery      /RENAL (within normal limits)      GYN   (+) Malignant neoplasm of lower-outer quadrant of right breast of female, estrogen receptor positive (HCC)      HEMATOLOGY (within normal limits)      MUSCULOSKELETAL   (+) DDD (degenerative disc disease), cervical      NEURO/PSYCH   (+) History of breast cancer      PULMONARY   (+) KRISHNA (obstructive sleep apnea)      Other   (+) Ashkenazi Hoah[employer]ism ancestry requiring population-specific genetic screening      CT Chest 10/11/2022:  Ascending thoracic aorta within normal limits in diameter  No evidence of thoracic aortic aneurysm  EKG 2018:  Normal sinus rhythm  Low voltage QRS  Borderline ECG  When compared with ECG of 14-JUL-2016 20:27,  No significant change was found    Lab Results   Component Value Date    WBC 5 14 12/20/2022    HGB 13 2 12/20/2022    HCT 41 9 12/20/2022    MCV 83 12/20/2022     (H) 12/20/2022     Lab Results   Component Value Date    SODIUM 139 12/20/2022    K 4 4 12/20/2022     12/20/2022    CO2 27 12/20/2022    BUN 18 12/20/2022    CREATININE 0 77 12/20/2022    GLUC 83 09/02/2020    CALCIUM 9 6 12/20/2022     Lab Results   Component Value Date    INR 0 98 08/03/2018    PROTIME 13 1 08/03/2018     Lab Results   Component Value Date    HGBA1C 5 2 12/20/2022          Physical Exam    Airway    Mallampati score: I  TM Distance: >3 FB  Neck ROM: full     Dental   No notable dental hx     Cardiovascular  Rhythm: regular, Rate: normal, Cardiovascular exam normal    Pulmonary  Pulmonary exam normal Breath sounds clear to auscultation,     Other Findings        Anesthesia Plan  ASA Score- 2     Anesthesia Type- general with ASA Monitors  Additional Monitors:   Airway Plan: ETT            Plan Factors-Exercise tolerance (METS): >4 METS  Chart reviewed  EKG reviewed  Existing labs reviewed  Patient summary reviewed  Patient is not a current smoker  Patient not instructed to abstain from smoking on day of procedure  Patient did not smoke on day of surgery  Induction- intravenous  Postoperative Plan-     Informed Consent- Anesthetic plan and risks discussed with patient

## 2023-01-10 ENCOUNTER — OFFICE VISIT (OUTPATIENT)
Dept: GYNECOLOGIC ONCOLOGY | Facility: CLINIC | Age: 53
End: 2023-01-10

## 2023-01-10 VITALS
WEIGHT: 215 LBS | HEART RATE: 106 BPM | SYSTOLIC BLOOD PRESSURE: 120 MMHG | TEMPERATURE: 97.3 F | DIASTOLIC BLOOD PRESSURE: 80 MMHG | BODY MASS INDEX: 32.58 KG/M2 | HEIGHT: 68 IN | OXYGEN SATURATION: 97 %

## 2023-01-10 DIAGNOSIS — D06.9 HIGH GRADE SQUAMOUS INTRAEPITHELIAL LESION (HGSIL), GRADE 3 CIN, ON BIOPSY OF CERVIX: Primary | ICD-10-CM

## 2023-01-10 NOTE — LETTER
January 10, 2023     [address]  [address] [facility]    Patient: Zara Mueller   YOB: 1970   Date of Visit: 1/10/2023       Dear Dr Braeden Reyes: Thank you for referring Zara Mueller to me for evaluation  Below are my notes for this consultation  If you have questions, please do not hesitate to call me  I look forward to following your patient along with you  Sincerely,        Ysabel Cabezas MD        CC: No Recipients  Ysabel Cabezas MD  1/10/2023  1:16 PM  Sign when Signing Visit  Assessment/Plan:    Problem List Items Addressed This Visit        Other    High grade squamous intraepithelial lesion (HGSIL), grade 3 JOSEPH, on biopsy of cervix - Primary     65-year-old status post robotic assisted total laparoscopic hysterectomy 12/23/2022 for JOSEPH-3, positive margin  Final pathology consistent with JOSEPH-2, margins negative  She is recovering well from surgery  Her performance status is 0   1   I discussed ongoing activity limitations  2  Return in 3 to 4 weeks for postoperative pelvic examination  CHIEF COMPLAINT: Postoperative evaluation       Problem:  Cancer Staging   Malignant neoplasm of lower-outer quadrant of right breast of female, estrogen receptor positive ([facility] [address] )  Staging form: Breast, AJCC 8th Edition  - Clinical: cT1, cN0, cM0, ER: Positive, IN: Positive, HER2: Negative - Signed by Jie Aparicio MD on 7/2/2018  - Pathologic: pT1a, pN0(sn), cM0, ER: Positive, IN: Positive, HER2: Negative - Signed by Jie Aparicio MD on 8/21/2018        Previous therapy:  Oncology History    No history exists  Patient ID: Zara Mueller is a 50 y o  female  Who returns for postoperative evaluation  She has no new complaints  No vaginal bleeding, abdominal pain, pelvic pain  She has not required narcotics  She is ambulating, voiding, tolerating regular diet        The following portions of the patient's history were reviewed and updated as appropriate: allergies, current medications, past family history, past medical history, past social history, past surgical history and problem list     Review of Systems      Current Outpatient Medications:   •  acetaminophen (TYLENOL) 325 mg tablet, Take 650 mg by mouth every 6 (six) hours as needed for mild pain, Disp: , Rfl:   •  albuterol (PROVENTIL HFA,VENTOLIN HFA) 90 mcg/act inhaler, , Disp: , Rfl:   •  anastrozole (ARIMIDEX) 1 mg tablet, TAKE 1 TABLET DAILY, Disp: 90 tablet, Rfl: 3  •  BIOTIN PO, Take 1 tablet by mouth every morning, Disp: , Rfl:   •  Calcium Carb-Cholecalciferol 1000-800 MG-UNIT TABS, calcium, Disp: , Rfl:   •  calcium-vitamin D 250-100 MG-UNIT per tablet, Take 1 tablet by mouth, Disp: , Rfl:   •  cyanocobalamin 50 MCG tablet, Take 50 mcg by mouth every other day, Disp: , Rfl:   •  Ergocalciferol (VITAMIN D2) 2000 units TABS, Vitamin D2 50,000 unit capsule, Disp: , Rfl:   •  ferrous sulfate 325 (65 Fe) mg tablet, Daily, Disp: , Rfl:   •  Inulin-Calcium-Vitamin D 2-250-100 GM-MG-UNIT CHEW, Fiber Plus Mulitvitamin, Disp: , Rfl:   •  Lifitegrast (XIIDRA OP), Apply 1 vial to eye 2 (two) times a day, Disp: , Rfl:   •  loratadine (CLARITIN) 10 mg tablet, Take 10 mg by mouth as needed  , Disp: , Rfl:   •  meloxicam (Mobic) 15 mg tablet, Take 1 tablet (15 mg total) by mouth daily (Patient taking differently: Take 15 mg by mouth if needed), Disp: 30 tablet, Rfl: 1  •  methocarbamol (ROBAXIN) 500 mg tablet, Take 500 mg by mouth if needed, Disp: , Rfl:   •  montelukast (SINGULAIR) 10 mg tablet, Take 10 mg by mouth as needed  , Disp: , Rfl:   •  multivitamin (THERAGRAN) TABS, Take 1 tablet by mouth every morning  , Disp: , Rfl:   •  Naproxen Sodium 220 MG CAPS, as needed, Disp: , Rfl:   •  Omega-3 Fatty Acids (FISH OIL PO), Take by mouth in the morning, Disp: , Rfl:   •  pantoprazole (PROTONIX) 40 mg tablet, Take 40 mg by mouth every morning  , Disp: , Rfl:   •  phentermine 15 MG capsule,  , Disp: , Rfl:   •  pyridoxine (VITAMIN B6) 100 mg tablet, Take 100 mg by mouth daily, Disp: , Rfl:   •  tiZANidine (Zanaflex) 4 mg tablet, Take 0 5 tablets (2 mg total) by mouth every 8 (eight) hours as needed for muscle spasms, Disp: 60 tablet, Rfl: 1  •  topiramate (TOPAMAX) 25 mg tablet, TAKE 1 TABLET TWICE A DAY, Disp: 180 tablet, Rfl: 3  •  Xhance 93 MCG/ACT EXHU, INSTILL 1 SPRAY PER NOSTRIL TWICE A DAY, Disp: 16 mL, Rfl: 11  •  zolpidem (AMBIEN) 5 mg tablet, Take 1 tablet (5 mg total) by mouth daily at bedtime as needed for sleep, Disp: 30 tablet, Rfl: 3    Objective:    Blood pressure 120/80, pulse (!) 106, temperature (!) 97 3 °F (36 3 °C), temperature source Temporal, height 5' 8" (1 727 m), weight 97 5 kg (215 lb), last menstrual period 07/31/2018, SpO2 97 %  Body mass index is 32 69 kg/m²  Body surface area is 2 11 meters squared  Physical Exam  Vitals reviewed  Constitutional:       General: She is not in acute distress  Appearance: Normal appearance  HENT:      Head: Normocephalic and atraumatic  Mouth/Throat:      Mouth: Mucous membranes are moist    Pulmonary:      Effort: Pulmonary effort is normal    Abdominal:      Palpations: Abdomen is soft  There is no mass  Tenderness: There is no abdominal tenderness  Skin:     General: Skin is warm and dry  Comments: Surgical trocar sites are intact, clean and dry without induration, erythema or purulent drainage  Neurological:      Mental Status: She is alert and oriented to person, place, and time  Psychiatric:         Mood and Affect: Mood normal          Behavior: Behavior normal          Thought Content:  Thought content normal          Judgment: Judgment normal

## 2023-01-10 NOTE — PROGRESS NOTES
Assessment/Plan:    Problem List Items Addressed This Visit        Other    High grade squamous intraepithelial lesion (HGSIL), grade 3 JOSEPH, on biopsy of cervix - Primary     57-year-old status post robotic assisted total laparoscopic hysterectomy 12/23/2022 for JOSEPH-3, positive margin  Final pathology consistent with JOSEPH-2, margins negative  She is recovering well from surgery  Her performance status is 0   1   I discussed ongoing activity limitations  2  Return in 3 to 4 weeks for postoperative pelvic examination  CHIEF COMPLAINT: Postoperative evaluation       Problem:  Cancer Staging   Malignant neoplasm of lower-outer quadrant of right breast of female, estrogen receptor positive ([facility] [address] )  Staging form: Breast, AJCC 8th Edition  - Clinical: cT1, cN0, cM0, ER: Positive, AL: Positive, HER2: Negative - Signed by Charles Jeronimo MD on 7/2/2018  - Pathologic: pT1a, pN0(sn), cM0, ER: Positive, AL: Positive, HER2: Negative - Signed by Charles Jeronimo MD on 8/21/2018        Previous therapy:  Oncology History    No history exists  Patient ID: Pauly Smalls is a 50 y o  female  Who returns for postoperative evaluation  She has no new complaints  No vaginal bleeding, abdominal pain, pelvic pain  She has not required narcotics  She is ambulating, voiding, tolerating regular diet        The following portions of the patient's history were reviewed and updated as appropriate: allergies, current medications, past family history, past medical history, past social history, past surgical history and problem list     Review of Systems      Current Outpatient Medications:   •  acetaminophen (TYLENOL) 325 mg tablet, Take 650 mg by mouth every 6 (six) hours as needed for mild pain, Disp: , Rfl:   •  albuterol (PROVENTIL HFA,VENTOLIN HFA) 90 mcg/act inhaler, , Disp: , Rfl:   •  anastrozole (ARIMIDEX) 1 mg tablet, TAKE 1 TABLET DAILY, Disp: 90 tablet, Rfl: 3  •  BIOTIN PO, Take 1 tablet by mouth every morning, Disp: , Rfl:   •  Calcium Carb-Cholecalciferol 1000-800 MG-UNIT TABS, calcium, Disp: , Rfl:   •  calcium-vitamin D 250-100 MG-UNIT per tablet, Take 1 tablet by mouth, Disp: , Rfl:   •  cyanocobalamin 50 MCG tablet, Take 50 mcg by mouth every other day, Disp: , Rfl:   •  Ergocalciferol (VITAMIN D2) 2000 units TABS, Vitamin D2 50,000 unit capsule, Disp: , Rfl:   •  ferrous sulfate 325 (65 Fe) mg tablet, Daily, Disp: , Rfl:   •  Inulin-Calcium-Vitamin D 2-250-100 GM-MG-UNIT CHEW, Fiber Plus Mulitvitamin, Disp: , Rfl:   •  Lifitegrast (XIIDRA OP), Apply 1 vial to eye 2 (two) times a day, Disp: , Rfl:   •  loratadine (CLARITIN) 10 mg tablet, Take 10 mg by mouth as needed  , Disp: , Rfl:   •  meloxicam (Mobic) 15 mg tablet, Take 1 tablet (15 mg total) by mouth daily (Patient taking differently: Take 15 mg by mouth if needed), Disp: 30 tablet, Rfl: 1  •  methocarbamol (ROBAXIN) 500 mg tablet, Take 500 mg by mouth if needed, Disp: , Rfl:   •  montelukast (SINGULAIR) 10 mg tablet, Take 10 mg by mouth as needed  , Disp: , Rfl:   •  multivitamin (THERAGRAN) TABS, Take 1 tablet by mouth every morning  , Disp: , Rfl:   •  Naproxen Sodium 220 MG CAPS, as needed, Disp: , Rfl:   •  Omega-3 Fatty Acids (FISH OIL PO), Take by mouth in the morning, Disp: , Rfl:   •  pantoprazole (PROTONIX) 40 mg tablet, Take 40 mg by mouth every morning  , Disp: , Rfl:   •  phentermine 15 MG capsule,  , Disp: , Rfl:   •  pyridoxine (VITAMIN B6) 100 mg tablet, Take 100 mg by mouth daily, Disp: , Rfl:   •  tiZANidine (Zanaflex) 4 mg tablet, Take 0 5 tablets (2 mg total) by mouth every 8 (eight) hours as needed for muscle spasms, Disp: 60 tablet, Rfl: 1  •  topiramate (TOPAMAX) 25 mg tablet, TAKE 1 TABLET TWICE A DAY, Disp: 180 tablet, Rfl: 3  •  Xhance 93 MCG/ACT EXHU, INSTILL 1 SPRAY PER NOSTRIL TWICE A DAY, Disp: 16 mL, Rfl: 11  •  zolpidem (AMBIEN) 5 mg tablet, Take 1 tablet (5 mg total) by mouth daily at bedtime as needed for sleep, Disp: 30 tablet, Rfl: 3    Objective:    Blood pressure 120/80, pulse (!) 106, temperature (!) 97 3 °F (36 3 °C), temperature source Temporal, height 5' 8" (1 727 m), weight 97 5 kg (215 lb), last menstrual period 07/31/2018, SpO2 97 %  Body mass index is 32 69 kg/m²  Body surface area is 2 11 meters squared  Physical Exam  Vitals reviewed  Constitutional:       General: She is not in acute distress  Appearance: Normal appearance  HENT:      Head: Normocephalic and atraumatic  Mouth/Throat:      Mouth: Mucous membranes are moist    Pulmonary:      Effort: Pulmonary effort is normal    Abdominal:      Palpations: Abdomen is soft  There is no mass  Tenderness: There is no abdominal tenderness  Skin:     General: Skin is warm and dry  Comments: Surgical trocar sites are intact, clean and dry without induration, erythema or purulent drainage  Neurological:      Mental Status: She is alert and oriented to person, place, and time  Psychiatric:         Mood and Affect: Mood normal          Behavior: Behavior normal          Thought Content:  Thought content normal          Judgment: Judgment normal

## 2023-01-10 NOTE — ASSESSMENT & PLAN NOTE
61-year-old status post robotic assisted total laparoscopic hysterectomy 12/23/2022 for JOSEPH-3, positive margin  Final pathology consistent with JOSEPH-2, margins negative  She is recovering well from surgery  Her performance status is 0   1   I discussed ongoing activity limitations  2  Return in 3 to 4 weeks for postoperative pelvic examination

## 2023-01-11 ENCOUNTER — TELEPHONE (OUTPATIENT)
Dept: HEMATOLOGY ONCOLOGY | Facility: CLINIC | Age: 53
End: 2023-01-11

## 2023-01-11 ENCOUNTER — OFFICE VISIT (OUTPATIENT)
Dept: HEMATOLOGY ONCOLOGY | Facility: CLINIC | Age: 53
End: 2023-01-11

## 2023-01-11 VITALS
WEIGHT: 210 LBS | HEART RATE: 99 BPM | HEIGHT: 68 IN | OXYGEN SATURATION: 99 % | TEMPERATURE: 96.7 F | SYSTOLIC BLOOD PRESSURE: 117 MMHG | BODY MASS INDEX: 31.83 KG/M2 | DIASTOLIC BLOOD PRESSURE: 78 MMHG

## 2023-01-11 DIAGNOSIS — E61.1 HYPOFERREMIA: ICD-10-CM

## 2023-01-11 DIAGNOSIS — Z98.84 HISTORY OF GASTRIC BYPASS: ICD-10-CM

## 2023-01-11 DIAGNOSIS — Z98.84 H/O BARIATRIC SURGERY: ICD-10-CM

## 2023-01-11 DIAGNOSIS — Z90.722 STATUS POST BILATERAL SALPINGO-OOPHORECTOMY (BSO): Primary | ICD-10-CM

## 2023-01-11 DIAGNOSIS — D75.839 THROMBOCYTOSIS: ICD-10-CM

## 2023-01-11 DIAGNOSIS — Z79.811 USE OF AROMATASE INHIBITORS: ICD-10-CM

## 2023-01-11 DIAGNOSIS — Z13.71 BRCA NEGATIVE: ICD-10-CM

## 2023-01-11 DIAGNOSIS — M85.80 OSTEOPENIA, UNSPECIFIED LOCATION: ICD-10-CM

## 2023-01-11 DIAGNOSIS — Z17.0 MALIGNANT NEOPLASM OF LOWER-OUTER QUADRANT OF RIGHT BREAST OF FEMALE, ESTROGEN RECEPTOR POSITIVE (HCC): ICD-10-CM

## 2023-01-11 DIAGNOSIS — E53.8 B12 DEFICIENCY: ICD-10-CM

## 2023-01-11 DIAGNOSIS — C50.511 MALIGNANT NEOPLASM OF LOWER-OUTER QUADRANT OF RIGHT BREAST OF FEMALE, ESTROGEN RECEPTOR POSITIVE (HCC): ICD-10-CM

## 2023-01-11 RX ORDER — SODIUM CHLORIDE 9 MG/ML
20 INJECTION, SOLUTION INTRAVENOUS ONCE
Status: CANCELLED | OUTPATIENT
Start: 2023-01-16

## 2023-01-11 NOTE — PROGRESS NOTES
Hematology/Oncology Outpatient Follow- up Note  Caterina Rod 50 y o  female MRN: @ Encounter: [id number]        Date:  1/11/2023      Assessment / Plan:    1  Stage IA invasive ductal carcinoma the right breast diagnosed on 06/2018 status post bilateral mastectomies (pT1a, N0, grade 1) %, %, HER2 negative  Left mastectomy was done for prophylactic measures - negative for malignancy   She is status post immediate expanders and bilateral salpingo oophorectomy in August 2018  Negative for BRCA1/2 mutation, she was found to have FH mutation of unknown significance      Anastrozole 1 mg p o  Daily initiated on 09/2018  She questions 5 year vs 10 year dosing  Reviewed that she has favorable features of early stage, negative lymph nodes  States she would rather continue beyond 5 years        2  Osteopenia on the DEXA scan 9/2019, calcium plus vitamin-D 1-2 tablets every day  Repeat DEXA 9/27/21per Dr Daisy Canseco - low bone mass, notable at the spine and femoral neck areas and the patient is considered at low risk for fracture  Advised to continue CA+ vit D  F/U Dexa ordered         3  Insomnia  Uses ambien seldomly        4   Mild thrombocytosis  Platelet count 066 2/93;  407 - 12/24/21  Will continue to monitor  Suspect 2nd to low iron      5  S/P gastric bypass surgery  Iron saturation 16% 8/21  She has been taking oral iron daily  Hemoglobin 13 2 with MCV of 83 on December 20, 2022 labs, platelet count 485  Iron saturation 13%, ferritin 14       6  Chronic muscle pain  Works with pain management  Exacerbated post mastectomy  Discussed trial of different AI, tamoxifen or trial of AI hold  She doesn't think AI contributing significantly  She is s/p cholecystectomy with some improvement in posterior back pain      7   Elevated CLAUDIA, ESR  Has seen rheumatology previously    Treatment limited due to POTS exacerbation on prior therapy          8   S/p hysterecomy 12/22 for for high-grade squamous intraepithelial lesion    9  Colonoscopy at [facility] 11/2021 - polyp removed in descending colon and ascending colon- tubular adenomas identified          HPI: Jaylyn Kim was seen initially 9/7/2018 re: right sided, ER/%, her-2 negative stage IA breast cancer   She is status post bilateral mastectomy      She underwent screening mammogram 6/7/2018 which showed abnormal findings   Diagnostic imaging led to right breast biopsy 6/14/2018 which identified invasive breast carcinoma   Right breast core biopsy showed invasive breast carcinoma of no specific type, grade 1 with ductal carcinoma in situ present, comprising 45% of the lesion, % positive, % positive, her 2+ 1 by [facility] elected to proceed with bilateral mastectomy and Bilateral oophorectomy performed by doctors Lynette Romeo and Chucky respectively 8/10/2018   Expander placement/reconstruction per Dr Jeff Mckinney     Final pathology showed invasive ductal carcinoma of no specific type, tumor size 3 mm, grade 1 no evidence of lymphovascular invasion stage IA ( pT1a, pN0, grade 1) with 2- sentinel lymph nodes     She has family history of breast and ovarian cancer in her mother   Patient's genetic testing was negative for BRCA1 or 2 mutation, her mother was tested negative for BRCA1/2 mutation, the patient was found to have FH mutation of unknown significance     Initiated on anastrozole 1 mg p o  Daily on 09/07/2018     She had a history of GERD, gastric bypass surgery, Raynaud phenomena, POTS          Persistent mid thoracic back pain  Started in PACU post mastectomy        6/21:  Noncontrast brain MRI secondary to headache- No mass effect, recent infarct, intracranial hemorrhage, or hydrocephalus  No parenchymal signal abnormality appreciated  Status post internal ethmoidectomies   Moderate mucosal thickening right  maxillary sinus is present        8/21:  Hemoglobin 14 6, white blood cell count 6, platelet count 832  Glucose 110, alk-phos 129 otherwise normal CMP folate greater than 17 5, hemoglobin A1c 5 2, no monoclonal band on SPEP or UPEP, vitamin B12 greater than 1450, TSH 0 82, iron saturation 16%,     9/8/21:  Neurology consult with Abbie Jacob MD at [facility] regarding numbness and tingling side of her face and neck that began in May 2021  Radiates from the back of her neck, upper back, shoulder blades  This can occur on either side      She gave a history of back pain and midthoracic area with numbness and tingling  Spine surgery was discussed  She did have right upper extremity weakness which resolved        9/29/21 [facility]:  Noncontrast cervical spine MRI - Multilevel moderate to severe degenerative changes of the cervical spine    Most significant at:   C5-C6: LEFT paracentral disc protrusion measuring 5 mm in the anterior  posterior dimension with ventral mass effect on the cervical spinal cord leading to mild to moderate spinal canal stenosis  May be partially calcified and   represent acute or chronic degenerative change at this level  LEFT uncovertebral   joint hypertrophy  Moderate LEFT neuroforaminal stenosis   RIGHT uncovertebral   joint hypertrophy  Moderate RIGHT neuroforaminal stenosis           10/2021- office visit with Dr Bety Mcmillan- no mass or tenderness on breast exam   6 month f/u requested      Home Sleep study 11/21 per Dr Borden Pole     EMG 11/8/21 to evaluate mid back spasms associated with bilateral lower extremity pain at times for the last several months   Patient is being evaluated for a lumbosacral radiculopathy versus focal neuropathy  - normal EMG of the bilateral lower extremities      11/24/21:  Colonoscopy per Dr Petersen Console- tubular adenomas     Receiving TPI per Dr Olimpia Valentine with pain management     12/24/21:  Hemoglobin 13 9, white blood cell count 5 4, platelet count 195  CLAUDIA positive with titer of 160, negative double-stranded DNA, Smith auto antibody, Scl -70, SSA and SSB antibody    ESR 33 (ULN 20)   CRP 6 8 (ULN 7), negative Lyme antibody  Normal vitamin-D level of 51    Interval History:    Has received trigger point injections for myofascial pain  9/22 CT scan of the neck-normal in regards to no pathologic adenopathy or soft tissue mass in the neck noted  Incidentally noted, partially visualized left breast mass    9/22 left breast u/s - What was thought to be a mass in the breast on the CT scan is actually just the top of the breast implant    There are 3 small foci of fat necrosis seen on the ultrasound examination, 1 of which is also readily evident on the CT scan  These do not require any specific surveillance  The largest is about 8 mm  These are in the 10-11 o'clock region above the implant  10/22 CT chest to f/u thoracic aortic ectasia - Ascending thoracic aorta within normal limits in diameter  No evidence of thoracic aortic aneurysm  Robot-assisted total laparoscopic hysterectomy from December 23, 2022 by Dr Jamal Carvajal for high-grade squamous intraepithelial lesion      Test Results:        Labs:   Lab Results   Component Value Date    HGB 13 2 12/20/2022    HCT 41 9 12/20/2022    MCV 83 12/20/2022     (H) 12/20/2022    WBC 5 14 12/20/2022    NRBC 0 12/20/2022     Lab Results   Component Value Date    K 4 4 12/20/2022     12/20/2022    CO2 27 12/20/2022    BUN 18 12/20/2022    CREATININE 0 77 12/20/2022    GLUCOSE 129 07/14/2016    GLUF 92 12/20/2022    CALCIUM 9 6 12/20/2022    AST 16 12/20/2022    ALT 11 12/20/2022    ALKPHOS 107 (H) 12/20/2022    EGFR 90 12/20/2022       Imaging: No results found  ROS:  As mentioned in HPI & Interval History otherwise 14 point ROS negative  Allergies: Allergies   Allergen Reactions   • Adhesive [Medical Tape] Rash     And small blister at sight     Current Medications: Reviewed  PMH/FH/SH:  Reviewed      Physical Exam:    There is no height or weight on file to calculate BSA      Ht Readings from Last 3 Encounters: 01/10/23 5' 8" (1 727 m)   22 5' 8" (1 727 m)   22 5' 8" (1 727 m)        Wt Readings from Last 3 Encounters:   01/10/23 97 5 kg (215 lb)   22 96 1 kg (211 lb 13 8 oz)   22 97 5 kg (215 lb)        Temp Readings from Last 3 Encounters:   01/10/23 (!) 97 3 °F (36 3 °C) (Temporal)   22 (!) 97 °F (36 1 °C) (Temporal)   22 (!) 97 3 °F (36 3 °C) (Temporal)        BP Readings from Last 3 Encounters:   01/10/23 120/80   22 121/56   22 114/80           Physical Exam  Constitutional:       Appearance: She is well-developed  HENT:      Head: Normocephalic and atraumatic  Cardiovascular:      Rate and Rhythm: Normal rate  Pulmonary:      Effort: Pulmonary effort is normal  No respiratory distress  Breath sounds: Normal breath sounds  Abdominal:      Palpations: Abdomen is soft  Musculoskeletal:      Cervical back: Neck supple  Skin:     General: Skin is warm and dry  Neurological:      Mental Status: She is alert     Psychiatric:         Behavior: Behavior normal          ECO      Emergency Contacts:    Extended Emergency Contact Information  Primary Emergency Contact: Lindsay Job [place] of [address] [place] [address] Phone: [phone]  Mobile Phone: [phone]  Relation: Mother  Secondary Emergency Contact: [address]  Mobile Phone: [phone]  Relation: Father

## 2023-01-11 NOTE — TELEPHONE ENCOUNTER
IV iron x 3   Patient prefers [facility]   Wants to start 1/16/23 due to school holiday and then transition to Saturdays

## 2023-01-12 NOTE — TELEPHONE ENCOUNTER
Patient confirmed appointment, per patient will schedule rest of iron infusions at infusion due to work schedule

## 2023-01-16 ENCOUNTER — HOSPITAL ENCOUNTER (OUTPATIENT)
Dept: INFUSION CENTER | Facility: CLINIC | Age: 53
Discharge: HOME | End: 2023-01-16

## 2023-01-16 ENCOUNTER — OFFICE VISIT (OUTPATIENT)
Dept: SURGICAL ONCOLOGY | Facility: CLINIC | Age: 53
End: 2023-01-16

## 2023-01-16 VITALS
BODY MASS INDEX: 31.83 KG/M2 | DIASTOLIC BLOOD PRESSURE: 78 MMHG | WEIGHT: 210 LBS | HEART RATE: 85 BPM | TEMPERATURE: 98.2 F | HEIGHT: 68 IN | OXYGEN SATURATION: 99 % | SYSTOLIC BLOOD PRESSURE: 110 MMHG | RESPIRATION RATE: 16 BRPM

## 2023-01-16 DIAGNOSIS — E61.1 HYPOFERREMIA: Primary | ICD-10-CM

## 2023-01-16 DIAGNOSIS — Z80.3 FAMILY HISTORY OF BREAST CANCER IN FEMALE: ICD-10-CM

## 2023-01-16 DIAGNOSIS — C50.511 MALIGNANT NEOPLASM OF LOWER-OUTER QUADRANT OF RIGHT BREAST OF FEMALE, ESTROGEN RECEPTOR POSITIVE (HCC): Primary | ICD-10-CM

## 2023-01-16 DIAGNOSIS — L90.5 AXILLARY WEB SYNDROME: ICD-10-CM

## 2023-01-16 DIAGNOSIS — Z17.0 MALIGNANT NEOPLASM OF LOWER-OUTER QUADRANT OF RIGHT BREAST OF FEMALE, ESTROGEN RECEPTOR POSITIVE (HCC): Primary | ICD-10-CM

## 2023-01-16 DIAGNOSIS — L76.82 AXILLARY WEB SYNDROME: ICD-10-CM

## 2023-01-16 DIAGNOSIS — Z79.811 USE OF AROMATASE INHIBITORS: ICD-10-CM

## 2023-01-16 RX ORDER — SODIUM CHLORIDE 9 MG/ML
20 INJECTION, SOLUTION INTRAVENOUS ONCE
Status: COMPLETED | OUTPATIENT
Start: 2023-01-16 | End: 2023-01-16

## 2023-01-16 RX ORDER — SODIUM CHLORIDE 9 MG/ML
20 INJECTION, SOLUTION INTRAVENOUS ONCE
Status: CANCELLED | OUTPATIENT
Start: 2023-01-23

## 2023-01-16 RX ADMIN — IRON SUCROSE 300 MG: 20 INJECTION, SOLUTION INTRAVENOUS at 13:43

## 2023-01-16 RX ADMIN — SODIUM CHLORIDE 20 ML/HR: 0.9 INJECTION, SOLUTION INTRAVENOUS at 13:44

## 2023-01-16 NOTE — PROGRESS NOTES
Pt presents for venofer  No new meds or concerns  Intake assessment done  Pt tolerated treatment without adverse reaction  Future appointments discussed  AVS declined

## 2023-01-18 ENCOUNTER — TELEPHONE (OUTPATIENT)
Dept: GENETICS | Facility: CLINIC | Age: 53
End: 2023-01-18

## 2023-01-18 NOTE — TELEPHONE ENCOUNTER
I called patient to review her genetics referral and the recommendations from the genetic counselors based on her prior genetic testing, after reviewing with our genetic counselor Chris Hernandez, no additional genetic testing is recommended however if patient would like to discuss her results she can be seen by our office

## 2023-01-23 ENCOUNTER — HOSPITAL ENCOUNTER (OUTPATIENT)
Dept: INFUSION CENTER | Facility: CLINIC | Age: 53
Discharge: HOME | End: 2023-01-23

## 2023-01-23 VITALS
TEMPERATURE: 97.2 F | RESPIRATION RATE: 16 BRPM | DIASTOLIC BLOOD PRESSURE: 91 MMHG | HEART RATE: 87 BPM | SYSTOLIC BLOOD PRESSURE: 135 MMHG

## 2023-01-23 DIAGNOSIS — E61.1 HYPOFERREMIA: Primary | ICD-10-CM

## 2023-01-23 RX ORDER — SODIUM CHLORIDE 9 MG/ML
20 INJECTION, SOLUTION INTRAVENOUS ONCE
Status: CANCELLED | OUTPATIENT
Start: 2023-01-30

## 2023-01-23 RX ORDER — SODIUM CHLORIDE 9 MG/ML
20 INJECTION, SOLUTION INTRAVENOUS ONCE
Status: COMPLETED | OUTPATIENT
Start: 2023-01-23 | End: 2023-01-23

## 2023-01-23 RX ADMIN — IRON SUCROSE 300 MG: 20 INJECTION, SOLUTION INTRAVENOUS at 13:26

## 2023-01-23 RX ADMIN — SODIUM CHLORIDE 20 ML/HR: 0.9 INJECTION, SOLUTION INTRAVENOUS at 13:21

## 2023-01-30 ENCOUNTER — HOSPITAL ENCOUNTER (OUTPATIENT)
Dept: INFUSION CENTER | Facility: CLINIC | Age: 53
Discharge: HOME | End: 2023-01-30

## 2023-01-30 VITALS
SYSTOLIC BLOOD PRESSURE: 138 MMHG | RESPIRATION RATE: 18 BRPM | HEART RATE: 102 BPM | TEMPERATURE: 98.2 F | DIASTOLIC BLOOD PRESSURE: 85 MMHG

## 2023-01-30 DIAGNOSIS — E61.1 HYPOFERREMIA: Primary | ICD-10-CM

## 2023-01-30 RX ORDER — SODIUM CHLORIDE 9 MG/ML
20 INJECTION, SOLUTION INTRAVENOUS ONCE
Status: COMPLETED | OUTPATIENT
Start: 2023-01-30 | End: 2023-01-30

## 2023-01-30 RX ORDER — SODIUM CHLORIDE 9 MG/ML
20 INJECTION, SOLUTION INTRAVENOUS ONCE
Status: CANCELLED | OUTPATIENT
Start: 2023-01-30

## 2023-01-30 RX ADMIN — IRON SUCROSE 300 MG: 20 INJECTION, SOLUTION INTRAVENOUS at 14:53

## 2023-01-30 RX ADMIN — SODIUM CHLORIDE 20 ML/HR: 0.9 INJECTION, SOLUTION INTRAVENOUS at 14:50

## 2023-02-13 ENCOUNTER — OFFICE VISIT (OUTPATIENT)
Dept: GYNECOLOGIC ONCOLOGY | Facility: CLINIC | Age: 53
End: 2023-02-13

## 2023-02-13 VITALS
SYSTOLIC BLOOD PRESSURE: 120 MMHG | WEIGHT: 217 LBS | HEIGHT: 68 IN | HEART RATE: 133 BPM | DIASTOLIC BLOOD PRESSURE: 78 MMHG | BODY MASS INDEX: 32.89 KG/M2 | TEMPERATURE: 97.3 F | OXYGEN SATURATION: 97 %

## 2023-02-13 DIAGNOSIS — D06.9 HIGH GRADE SQUAMOUS INTRAEPITHELIAL LESION (HGSIL), GRADE 3 CIN, ON BIOPSY OF CERVIX: Primary | ICD-10-CM

## 2023-02-13 RX ORDER — KETOCONAZOLE 20 MG/G
CREAM TOPICAL
COMMUNITY
Start: 2023-02-06

## 2023-02-13 NOTE — ASSESSMENT & PLAN NOTE
61-year-old status post robotic assisted total laparoscopic hysterectomy for JOSEPH-3 on 12/23/2022  Final pathology consistent with JOSEPH-2, negative margins  She is recovering well from surgery  Her performance status is 0   1   Repeat Pap smear in approximately 6 months  2   We discussed ongoing activity limitations

## 2023-02-13 NOTE — LETTER
February 13, 2023     [address]  [address] [address]    Patient: Robbin Hill   YOB: 1970   Date of Visit: 2/13/2023       Dear Dr Adan Conley: Thank you for referring Robbin Hill to me for evaluation  Below are my notes for this consultation  If you have questions, please do not hesitate to call me  I look forward to following your patient along with you  Sincerely,        Rios Benavides MD        CC: No Recipients  Rios Benavides MD  2/13/2023  4:14 PM  Sign when Signing Visit  Assessment/Plan:    Problem List Items Addressed This Visit        Other    High grade squamous intraepithelial lesion (HGSIL), grade 3 JOSEPH, on biopsy of cervix - Primary     46-year-old status post robotic assisted total laparoscopic hysterectomy for JOSEPH-3 on 12/23/2022  Final pathology consistent with JOSEPH-2, negative margins  She is recovering well from surgery  Her performance status is 0   1   Repeat Pap smear in approximately 6 months  2   We discussed ongoing activity limitations  CHIEF COMPLAINT: Postoperative evaluation       Problem:  Cancer Staging   Malignant neoplasm of lower-outer quadrant of right breast of female, estrogen receptor positive ([facility] [address] )  Staging form: Breast, AJCC 8th Edition  - Clinical: cT1, cN0, cM0, ER: Positive, NH: Positive, HER2: Negative - Signed by Sarah Joseph MD on 7/2/2018  - Pathologic: pT1a, pN0(sn), cM0, ER: Positive, NH: Positive, HER2: Negative - Signed by Sarah Joseph MD on 8/21/2018        Previous therapy:  Oncology History    No history exists  Patient ID: Robbin Hill is a 50 y o  female  Who returns for postoperative evaluation  She has no new complaints  No interval change in medications or medical history since her last visit  She has returned to her normal activity        The following portions of the patient's history were reviewed and updated as appropriate: allergies, current medications, past family history, past medical history, past social history, past surgical history and problem list     Review of Systems      Current Outpatient Medications:   •  acetaminophen (TYLENOL) 325 mg tablet, Take 650 mg by mouth every 6 (six) hours as needed for mild pain, Disp: , Rfl:   •  albuterol (PROVENTIL HFA,VENTOLIN HFA) 90 mcg/act inhaler, , Disp: , Rfl:   •  anastrozole (ARIMIDEX) 1 mg tablet, TAKE 1 TABLET DAILY, Disp: 90 tablet, Rfl: 3  •  BIOTIN PO, Take 1 tablet by mouth every morning, Disp: , Rfl:   •  Calcium Carb-Cholecalciferol 1000-800 MG-UNIT TABS, calcium, Disp: , Rfl:   •  cyanocobalamin 50 MCG tablet, Take 50 mcg by mouth every other day, Disp: , Rfl:   •  Ergocalciferol (VITAMIN D2) 2000 units TABS, Vitamin D2 50,000 unit capsule, Disp: , Rfl:   •  ferrous sulfate 325 (65 Fe) mg tablet, Daily, Disp: , Rfl:   •  Lifitegrast (XIIDRA OP), Apply 1 vial to eye 2 (two) times a day, Disp: , Rfl:   •  loratadine (CLARITIN) 10 mg tablet, Take 10 mg by mouth as needed  , Disp: , Rfl:   •  meloxicam (Mobic) 15 mg tablet, Take 1 tablet (15 mg total) by mouth daily (Patient taking differently: Take 15 mg by mouth if needed), Disp: 30 tablet, Rfl: 1  •  methocarbamol (ROBAXIN) 500 mg tablet, Take 500 mg by mouth if needed, Disp: , Rfl:   •  montelukast (SINGULAIR) 10 mg tablet, Take 10 mg by mouth as needed  , Disp: , Rfl:   •  multivitamin (THERAGRAN) TABS, Take 1 tablet by mouth every morning  , Disp: , Rfl:   •  Naproxen Sodium 220 MG CAPS, as needed, Disp: , Rfl:   •  Omega-3 Fatty Acids (FISH OIL PO), Take by mouth in the morning, Disp: , Rfl:   •  pantoprazole (PROTONIX) 40 mg tablet, Take 40 mg by mouth every morning  , Disp: , Rfl:   •  phentermine 15 MG capsule,  , Disp: , Rfl:   •  pyridoxine (VITAMIN B6) 100 mg tablet, Take 100 mg by mouth daily, Disp: , Rfl:   •  tiZANidine (Zanaflex) 4 mg tablet, Take 0 5 tablets (2 mg total) by mouth every 8 (eight) hours as needed for muscle spasms, Disp: 60 tablet, Rfl: 1  •  topiramate (TOPAMAX) 25 mg tablet, TAKE 1 TABLET TWICE A DAY, Disp: 180 tablet, Rfl: 3  •  Xhance 93 MCG/ACT EXHU, INSTILL 1 SPRAY PER NOSTRIL TWICE A DAY, Disp: 16 mL, Rfl: 11  •  zolpidem (AMBIEN) 5 mg tablet, Take 1 tablet (5 mg total) by mouth daily at bedtime as needed for sleep, Disp: 30 tablet, Rfl: 3  •  calcium-vitamin D 250-100 MG-UNIT per tablet, Take 1 tablet by mouth, Disp: , Rfl:   •  Inulin-Calcium-Vitamin D 2-250-100 GM-MG-UNIT CHEW, Fiber Plus Mulitvitamin, Disp: , Rfl:   •  ketoconazole (NIZORAL) 2 % cream, , Disp: , Rfl:     Objective:    Blood pressure 120/78, pulse (!) 133, temperature (!) 97 3 °F (36 3 °C), temperature source Temporal, height 5' 8" (1 727 m), weight 98 4 kg (217 lb), last menstrual period 07/31/2018, SpO2 97 %  Body mass index is 32 99 kg/m²  Body surface area is 2 12 meters squared  Physical Exam  Vitals reviewed  Exam conducted with a chaperone present  Constitutional:       General: She is not in acute distress  Appearance: Normal appearance  HENT:      Head: Normocephalic and atraumatic  Mouth/Throat:      Mouth: Mucous membranes are moist    Abdominal:      Palpations: Abdomen is soft  There is no mass  Tenderness: There is no abdominal tenderness  Genitourinary:     Comments: The external female genitalia is normal  The bartholin's, uretheral and skenes glands are normal  The urethral meatus is normal (midline with no lesions)  Anus without fissure or lesion  Speculum exam reveals a grossly normal vagina  Vagina is intact, without dehiscense  No masses, lesions, discharge or bleeding  No significant cystocele or rectocele noted  Bimanual exam notes a surgical absent cervix, uterus and adnexal structures  No masses or fullness  Bladder is without fullness, mass or tenderness  Skin:     General: Skin is warm and dry  Comments: Surgical trocar sites are well healed      Neurological:      Mental Status: She is alert and oriented to person, place, and time  Psychiatric:         Mood and Affect: Mood normal          Behavior: Behavior normal          Thought Content:  Thought content normal          Judgment: Judgment normal

## 2023-02-13 NOTE — PROGRESS NOTES
Assessment/Plan:    Problem List Items Addressed This Visit        Other    High grade squamous intraepithelial lesion (HGSIL), grade 3 JOSEPH, on biopsy of cervix - Primary     81-year-old status post robotic assisted total laparoscopic hysterectomy for JOSEPH-3 on 12/23/2022  Final pathology consistent with JOSEPH-2, negative margins  She is recovering well from surgery  Her performance status is 0   1   Repeat Pap smear in approximately 6 months  2   We discussed ongoing activity limitations  CHIEF COMPLAINT: Postoperative evaluation       Problem:  Cancer Staging   Malignant neoplasm of lower-outer quadrant of right breast of female, estrogen receptor positive ([facility] [address] )  Staging form: Breast, AJCC 8th Edition  - Clinical: cT1, cN0, cM0, ER: Positive, WI: Positive, HER2: Negative - Signed by Hansel Nageotte, MD on 7/2/2018  - Pathologic: pT1a, pN0(sn), cM0, ER: Positive, WI: Positive, HER2: Negative - Signed by Hansel Nageotte, MD on 8/21/2018        Previous therapy:  Oncology History    No history exists  Patient ID: Daly Granados is a 50 y o  female  Who returns for postoperative evaluation  She has no new complaints  No interval change in medications or medical history since her last visit  She has returned to her normal activity        The following portions of the patient's history were reviewed and updated as appropriate: allergies, current medications, past family history, past medical history, past social history, past surgical history and problem list     Review of Systems      Current Outpatient Medications:   •  acetaminophen (TYLENOL) 325 mg tablet, Take 650 mg by mouth every 6 (six) hours as needed for mild pain, Disp: , Rfl:   •  albuterol (PROVENTIL HFA,VENTOLIN HFA) 90 mcg/act inhaler, , Disp: , Rfl:   •  anastrozole (ARIMIDEX) 1 mg tablet, TAKE 1 TABLET DAILY, Disp: 90 tablet, Rfl: 3  •  BIOTIN PO, Take 1 tablet by mouth every morning, Disp: , Rfl:   •  Calcium Carb-Cholecalciferol 1000-800 MG-UNIT TABS, calcium, Disp: , Rfl:   •  cyanocobalamin 50 MCG tablet, Take 50 mcg by mouth every other day, Disp: , Rfl:   •  Ergocalciferol (VITAMIN D2) 2000 units TABS, Vitamin D2 50,000 unit capsule, Disp: , Rfl:   •  ferrous sulfate 325 (65 Fe) mg tablet, Daily, Disp: , Rfl:   •  Lifitegrast (XIIDRA OP), Apply 1 vial to eye 2 (two) times a day, Disp: , Rfl:   •  loratadine (CLARITIN) 10 mg tablet, Take 10 mg by mouth as needed  , Disp: , Rfl:   •  meloxicam (Mobic) 15 mg tablet, Take 1 tablet (15 mg total) by mouth daily (Patient taking differently: Take 15 mg by mouth if needed), Disp: 30 tablet, Rfl: 1  •  methocarbamol (ROBAXIN) 500 mg tablet, Take 500 mg by mouth if needed, Disp: , Rfl:   •  montelukast (SINGULAIR) 10 mg tablet, Take 10 mg by mouth as needed  , Disp: , Rfl:   •  multivitamin (THERAGRAN) TABS, Take 1 tablet by mouth every morning  , Disp: , Rfl:   •  Naproxen Sodium 220 MG CAPS, as needed, Disp: , Rfl:   •  Omega-3 Fatty Acids (FISH OIL PO), Take by mouth in the morning, Disp: , Rfl:   •  pantoprazole (PROTONIX) 40 mg tablet, Take 40 mg by mouth every morning  , Disp: , Rfl:   •  phentermine 15 MG capsule,  , Disp: , Rfl:   •  pyridoxine (VITAMIN B6) 100 mg tablet, Take 100 mg by mouth daily, Disp: , Rfl:   •  tiZANidine (Zanaflex) 4 mg tablet, Take 0 5 tablets (2 mg total) by mouth every 8 (eight) hours as needed for muscle spasms, Disp: 60 tablet, Rfl: 1  •  topiramate (TOPAMAX) 25 mg tablet, TAKE 1 TABLET TWICE A DAY, Disp: 180 tablet, Rfl: 3  •  Xhance 93 MCG/ACT EXHU, INSTILL 1 SPRAY PER NOSTRIL TWICE A DAY, Disp: 16 mL, Rfl: 11  •  zolpidem (AMBIEN) 5 mg tablet, Take 1 tablet (5 mg total) by mouth daily at bedtime as needed for sleep, Disp: 30 tablet, Rfl: 3  •  calcium-vitamin D 250-100 MG-UNIT per tablet, Take 1 tablet by mouth, Disp: , Rfl:   •  Inulin-Calcium-Vitamin D 2-250-100 GM-MG-UNIT CHEW, Fiber Plus Mulitvitamin, Disp: , Rfl:   •  ketoconazole (NIZORAL) 2 % cream, , Disp: , Rfl:     Objective:    Blood pressure 120/78, pulse (!) 133, temperature (!) 97 3 °F (36 3 °C), temperature source Temporal, height 5' 8" (1 727 m), weight 98 4 kg (217 lb), last menstrual period 07/31/2018, SpO2 97 %  Body mass index is 32 99 kg/m²  Body surface area is 2 12 meters squared  Physical Exam  Vitals reviewed  Exam conducted with a chaperone present  Constitutional:       General: She is not in acute distress  Appearance: Normal appearance  HENT:      Head: Normocephalic and atraumatic  Mouth/Throat:      Mouth: Mucous membranes are moist    Abdominal:      Palpations: Abdomen is soft  There is no mass  Tenderness: There is no abdominal tenderness  Genitourinary:     Comments: The external female genitalia is normal  The bartholin's, uretheral and skenes glands are normal  The urethral meatus is normal (midline with no lesions)  Anus without fissure or lesion  Speculum exam reveals a grossly normal vagina  Vagina is intact, without dehiscense  No masses, lesions, discharge or bleeding  No significant cystocele or rectocele noted  Bimanual exam notes a surgical absent cervix, uterus and adnexal structures  No masses or fullness  Bladder is without fullness, mass or tenderness  Skin:     General: Skin is warm and dry  Comments: Surgical trocar sites are well healed  Neurological:      Mental Status: She is alert and oriented to person, place, and time  Psychiatric:         Mood and Affect: Mood normal          Behavior: Behavior normal          Thought Content:  Thought content normal          Judgment: Judgment normal

## 2023-03-17 ENCOUNTER — AMB VIDEO VISIT (OUTPATIENT)
Dept: OTHER | Facility: HOSPITAL | Age: 53
End: 2023-03-17

## 2023-03-17 VITALS — BODY MASS INDEX: 33.74 KG/M2 | WEIGHT: 215 LBS | HEIGHT: 67 IN

## 2023-03-17 DIAGNOSIS — U07.1 COVID: Primary | ICD-10-CM

## 2023-03-17 PROBLEM — Z13.79 ASHKENAZI JEWISH ANCESTRY REQUIRING POPULATION-SPECIFIC GENETIC SCREENING: Status: RESOLVED | Noted: 2018-07-02 | Resolved: 2023-03-17

## 2023-03-18 ENCOUNTER — AMB VIDEO VISIT (OUTPATIENT)
Dept: OTHER | Facility: HOSPITAL | Age: 53
End: 2023-03-18

## 2023-03-18 NOTE — PATIENT INSTRUCTIONS
As I explained, you have a high level of protection from severe disease from [place] being as you are vaccinated  You do not have any major risk factors for severe disease from COVID at this time and you are not experiencing any respiratory symptoms  You are not a candidate for Paxlovid at this time  Paxlovid has multiple medication interactions and can be harsh on the kidneys or liver among other unpleasant side effects  However, if your oxygen levels are dropping below 92% and you are within 5 days of symptom onset, please call us back, call your PCP or report to the emergency department for evaluation  Schedule a follow-up appointment with your primary care physician for recheck in 2-3 days  If you cannot see your PCP, you can schedule a follow up appointment at a [address] Now  Go to the emergency department if you develop any new or worsening symptoms including oxygen <90%, shortness of breath, or anything else that is concerning  Excuses can be found in "Letters" section of [employer] erinn  Can print if opened from a [address],[facility] [place] phone number is [phone] if you need assistance or have further questions    1 21 [phone] ([phone])  Schedule or Reschedule Outpatient Testing - Option 2  Billing - Option 3  General Info - Option 4  [facility] [employer] Help - Option 5  Comprehensive Spine Program - Option 6   COVID - Option 7    Please Be Courteous:  You have COVID-19  Day 0 is your first day of symptoms  Day 1 is the first full day after your symptoms started  You should isolate yourself away from other through day 5 since this is when you are likely most infectious  This means go home and stay home  In Your Home:  If you live with other people, trying to avoid common spaces and disinfect areas that you come into contact with  Per the CDC's recommendations: Use a separate bedroom and bathroom if feasible   If If other people in your home are concerned they may have covid, isolation should begin even before seeking testing and before test results become available  All household members should start wearing a mask in the home, particularly in shared spaces where appropriate distancing is not possible  Take Care of Yourself:  Schedule a virtual visit with your primary care physician as soon as possible  Try to sleep on your stomach as much as possible  If you have the ability to, take vitamin D3 2000 IU daily, vitamin-C 1000 mg twice a day, a multivitamin daily to help boost your immune system  You should check your temperature twice day  Go to the emergency department for any severe shortness of breath, chest pain or pulse ox less than 90%  Isolation: Everyone, regardless of vaccination status: You may end isolation after day 5 if:  You are fever-free for 24 hours (without the use of fever-reducing medication)    Your symptoms are improving    If you still have fever or your other symptoms have not improved, continue to isolate until they improve  If you had?moderate illness?(if you experienced shortness of breath or had difficulty breathing), or?severe illness?(you were hospitalized) due to COVID-19, or you have a weakened immune system, you need to isolate through day 10  If you had?severe illness?or have a weakened immune system, consult your doctor before ending isolation  Ending isolation without a viral test may not be an option for you   Burt moya

## 2023-03-18 NOTE — PROGRESS NOTES
Video Visit - Kinga Neal 50 y o  female MRN: [id number]    REQUIRED DOCUMENTATION:         1  This service was provided via [employer]  2  Provider located at [address] [place] [id number] [phone]  3  [facility] provider: Conchis Huston PA-C   4  Identify all parties in room with patient during [facility] visit:  No one else  5  After connecting through [employer], patient was identified by name and date of birth  Patient was then informed that this was a Telemedicine visit and that the exam was being conducted confidentially over secure lines  My office door was closed  No one else was in the room  Patient acknowledged consent and understanding of privacy and security of the Telemedicine visit  I informed the patient that I have reviewed their record in Epic and presented the opportunity for them to ask any questions regarding the visit today  The patient agreed to participate  VITALS: Heart Rate: 75 BPM, Respiratory Rate: 12 RPM, Temperature unavailable° F, Blood Pressure Unavailable mmHg, Pulse Ox 97-98 % on RA    HPI  Pt reports chills starting yesterday which she wasn't sure was due to cold class room or illness    Night prior woke up sweaty, but still no fever, thought may be due to hot flashes  Reports some post-nasal drip, dry scratchy throat today  Patient reports positive for COVID today  Drinking more water and taking vitamin c  Vaccinated x 4  Denies trouble breathing  Denies CP  Physical Exam  Constitutional:       General: She is not in acute distress  Appearance: Normal appearance  She is not toxic-appearing  HENT:      Head: Normocephalic and atraumatic  Nose: No rhinorrhea  Mouth/Throat:      Mouth: Mucous membranes are moist    Eyes:      Conjunctiva/sclera: Conjunctivae normal    Cardiovascular:      Rate and Rhythm: Normal rate  Pulmonary:      Effort: Pulmonary effort is normal  No respiratory distress  Breath sounds:  No wheezing (no gross audible wheeze through computer)  Comments: Speaking in full sentences  Musculoskeletal:      Cervical back: Normal range of motion  Comments: amb about home without difficulty   Lymphadenopathy:      Cervical: Cervical adenopathy present  Skin:     Findings: No rash (on face or neck)  Neurological:      Mental Status: She is alert  Cranial Nerves: No dysarthria or facial asymmetry  Psychiatric:         Mood and Affect: Mood normal          Behavior: Behavior normal        Patient with elevated BMI (33), otherwise no major risk factors for severe disease from [place] who also has had 4 vaccinations  Patient stops me from going through her PMH and is not interested in updating her medication list  Patient not on immune suppressive tx, no longer undergoing CA tx  Sx are minimal with chills and dry throat  Denies dyspnea  Oxygen 97% or better  I explained I would not recommend Paxlovid for her at this time  Patient disappointed  Declines work note  Diagnoses and all orders for this visit:    COVID      Patient Instructions   As I explained, you have a high level of protection from severe disease from [place] being as you are vaccinated  You do not have any major risk factors for severe disease from COVID at this time and you are not experiencing any respiratory symptoms  You are not a candidate for Paxlovid at this time  Paxlovid has multiple medication interactions and can be harsh on the kidneys or liver among other unpleasant side effects  However, if your oxygen levels are dropping below 92% and you are within 5 days of symptom onset, please call us back, call your PCP or report to the emergency department for evaluation  Schedule a follow-up appointment with your primary care physician for recheck in 2-3 days  If you cannot see your PCP, you can schedule a follow up appointment at a [address] Now   Go to the emergency department if you develop any new or worsening symptoms including oxygen <90%, shortness of breath, or anything else that is concerning  Excuses can be found in "Letters" section of [employer]inn  Can print if opened from a [address] phone number is [phone] if you need assistance or have further questions    1 21  ([phone])  Schedule or Reschedule Outpatient Testing - Option 2  Billing - Option 3  General Info - Option 4  MyChart Help - Option 5  Comprehensive Spine Program - Option 6   COVID - Option 7    Please Be Courteous:  You have COVID-19  Day 0 is your first day of symptoms  Day 1 is the first full day after your symptoms started  You should isolate yourself away from other through day 5 since this is when you are likely most infectious  This means go home and stay home  In Your Home:  If you live with other people, trying to avoid common spaces and disinfect areas that you come into contact with  Per the CDC's recommendations: Use a separate bedroom and bathroom if feasible  If If other people in your home are concerned they may have covid, isolation should begin even before seeking testing and before test results become available  All household members should start wearing a mask in the home, particularly in shared spaces where appropriate distancing is not possible  Take Care of Yourself:  Schedule a virtual visit with your primary care physician as soon as possible  Try to sleep on your stomach as much as possible  If you have the ability to, take vitamin D3 2000 IU daily, vitamin-C 1000 mg twice a day, a multivitamin daily to help boost your immune system  You should check your temperature twice day  Go to the emergency department for any severe shortness of breath, chest pain or pulse ox less than 90%  Isolation: Everyone, regardless of vaccination status:     You may end isolation after day 5 if:  · You are fever-free for 24 hours (without the use of fever-reducing medication)    · Your symptoms are improving    If you still have fever or your other symptoms have not improved, continue to isolate until they improve  · If you had?moderate illness?(if you experienced shortness of breath or had difficulty breathing), or?severe illness?(you were hospitalized) due to COVID-19, or you have a weakened immune system, you need to isolate through day 10  · If you had?severe illness?or have a weakened immune system, consult your doctor before ending isolation  Ending isolation without a viral test may not be an option for you   Burt moya

## 2023-03-18 NOTE — CARE ANYWHERE EVISITS
Visit Summary for [facility] - Gender: Female - Date of Birth: [id number]  Date: [id number] - Duration: 17 minutes  Patient: Nicholas Irma   [facility]  Provider: Dorina Ortiz PA-C    Patient Contact Information  Address  [facility]  [address] [place] [address]; [address] [place] [address]  [phone]    Visit Topics  Flu-Like Symptoms [Added By: Self - 2023-03-18]    Triage Questions   What is your current physical address in the event of a medical emergency? Answer []  Are you allergic to any medications? Answer []  Are you now or could you be pregnant? Answer []  Do you have any immune system compromise or chronic lung   disease? Answer []  Do you have any vulnerable family members in the home (infant, pregnant, cancer, elderly)? Answer []     Conversation Transcripts  [0A][0A] [Notification] You are connected with Dorina Ortiz PA-C, Urgent Care Specialist [0A][Notification] Milton Bethea is located in [place]  [0A][Notification] Milton Bethea has shared health history  Ileana Cuevas  [0A]    Diagnosis  COVID-19    Procedures  Value: [id number] Code: CPT-4 UNLISTED E&M SERVICE    Medications Prescribed    No prescriptions ordered    Electronically signed by: Mary Morgan(NPI [id number])

## 2023-06-05 ENCOUNTER — TELEPHONE (OUTPATIENT)
Dept: HEMATOLOGY ONCOLOGY | Facility: CLINIC | Age: 53
End: 2023-06-05

## 2023-06-05 NOTE — TELEPHONE ENCOUNTER
Appointment Change  Cancel, Reschedule, Change to Virtual      Who are you speaking with? Patient   If it is not the patient, are they listed on an active communication consent form? N/A   Which provider is the appointment scheduled with? LEÓN Rudd   When is the appointment scheduled? Please list date and time  07/18/23 9:30AM   At which location is the appointment scheduled to take place? [place]   Was the appointment rescheduled or changed from an in person visit to a virtual visit? If so, please list the details of the change  08/08/23 1PM   What is the reason for the appointment change? Patient will be out of the country   Was [facility] transport scheduled for this visit? No   Does [facility] transport need to be scheduled for the new visit (if applicable) No   Does the patient need an infusion appointment rescheduled? N/A   Does the patient have an infusion appointment scheduled? If so, when? No   Is the patient undergoing chemotherapy? No   Was the no-show policy reviewed for appointments being changed with less then 24 hours of notice?  N/A

## 2023-06-05 NOTE — TELEPHONE ENCOUNTER
Appointment Change  Cancel, Reschedule, Change to Virtual      Who are you speaking with? Patient   If it is not the patient, are they listed on an active communication consent form? N/A   Which provider is the appointment scheduled with? LEÓN Fernando   When is the appointment scheduled? Please list date and time  07/18/23 10:30AM   At which location is the appointment scheduled to take place? [facility]   Was the appointment rescheduled or changed from an in person visit to a virtual visit? If so, please list the details of the change  08/08/23 2PM   What is the reason for the appointment change? Patient will be out of the country   Was STAR transport scheduled for this visit? No   Does STAR transport need to be scheduled for the new visit (if applicable) N/A   Does the patient need an infusion appointment rescheduled? No   Does the patient have an infusion appointment scheduled? If so, when? No   Is the patient undergoing chemotherapy? No   Was the no-show policy reviewed for appointments being changed with less then 24 hours of notice?  N/A

## 2023-06-15 ENCOUNTER — AMB VIDEO VISIT (OUTPATIENT)
Dept: OTHER | Facility: HOSPITAL | Age: 53
End: 2023-06-15

## 2023-06-15 DIAGNOSIS — Z76.0 MEDICATION REFILL: ICD-10-CM

## 2023-06-15 DIAGNOSIS — N39.0 URINARY TRACT INFECTION WITHOUT HEMATURIA, SITE UNSPECIFIED: Primary | ICD-10-CM

## 2023-06-15 DIAGNOSIS — M62.838 MUSCLE SPASM: ICD-10-CM

## 2023-06-15 DIAGNOSIS — M79.18 MYOFASCIAL PAIN SYNDROME: ICD-10-CM

## 2023-06-15 PROCEDURE — ECARE PR SL URGENT CARE VIRTUAL VISIT: Performed by: NURSE PRACTITIONER

## 2023-06-15 RX ORDER — CEPHALEXIN 500 MG/1
500 CAPSULE ORAL EVERY 12 HOURS SCHEDULED
Qty: 14 CAPSULE | Refills: 0 | Status: SHIPPED | OUTPATIENT
Start: 2023-06-15 | End: 2023-06-22

## 2023-06-15 RX ORDER — MELOXICAM 15 MG/1
15 TABLET ORAL DAILY
Qty: 30 TABLET | Refills: 1 | Status: SHIPPED | OUTPATIENT
Start: 2023-06-15

## 2023-06-15 RX ORDER — MELOXICAM 15 MG/1
15 TABLET ORAL DAILY
Qty: 7 TABLET | Refills: 0 | Status: SHIPPED | OUTPATIENT
Start: 2023-06-15 | End: 2023-06-22

## 2023-06-15 NOTE — PROGRESS NOTES
Video Visit - Miguelito Mean 48 y o  female MRN: [id number]    REQUIRED DOCUMENTATION:         1  This service was provided via [facility]  2  Provider located at [address] [place] [id number] [phone]  3  [facility] provider: LEÓN Hubbard  4  Identify all parties in room with patient during [facility] visit:  patient   5  After connecting through [employer], patient was identified by name and date of birth  Patient was then informed that this was a Telemedicine visit and that the exam was being conducted confidentially over secure lines  My office door was closed  No one else was in the room  Patient acknowledged consent and understanding of privacy and security of the Telemedicine visit  I informed the patient that I have reviewed their record in Epic and presented the opportunity for them to ask any questions regarding the visit today  The patient agreed to participate  Video did not connect, preferred to procede via phone call  This is a 48year old female here today for video visit  She states she is having increased urgency and burning  She states this started several days ago  No fever, body aches or chills  She denies any nausea or vomiting  She denies any worsening back pain or abd pain  While reviewing medication with patient she is also requesting a refil on her mobic which has ran out  Review of Systems   Constitutional: Negative for activity change, chills and fatigue  Respiratory: Negative  Cardiovascular: Negative  Genitourinary: Positive for dysuria, frequency and urgency  Neurological: Negative  Psychiatric/Behavioral: Negative  There were no vitals filed for this visit  Physical Exam  Constitutional:       Comments: Video did not connect    Neurological:      Mental Status: She is alert         Diagnoses and all orders for this visit:    Urinary tract infection without hematuria, site unspecified  -     UA w Reflex to Microscopic w Reflex to Culture -Lab Collect; Future  -     cephalexin (KEFLEX) 500 mg capsule; Take 1 capsule (500 mg total) by mouth every 12 (twelve) hours for 7 days    Muscle spasm    Myofascial pain syndrome    Medication refill  -     meloxicam (Mobic) 15 mg tablet; Take 1 tablet (15 mg total) by mouth daily for 7 days  -     meloxicam (Mobic) 15 mg tablet; Take 1 tablet (15 mg total) by mouth daily      Patient Instructions     UA ordered  Start antibiotic  Take probiotic  Increase oral fluids  Tylenol or Motrin for pain or fever  Azo for bladder spasms  Follow up with PCP if no improvement  Go to ER with abd pain, flank pain or worsening symptoms  Urinary Tract Infection in Women   WHAT YOU NEED TO KNOW:   A urinary tract infection (UTI) is caused by bacteria that get inside your urinary tract  Most bacteria that enter your urinary tract come out when you urinate  If the bacteria stay in your urinary tract, you may get an infection  Your urinary tract includes your kidneys, ureters, bladder, and urethra  Urine is made in your kidneys, and it flows from the ureters to the bladder  Urine leaves the bladder through the urethra  A UTI is more common in your lower urinary tract, which includes your bladder and urethra  DISCHARGE INSTRUCTIONS:   Return to the emergency department if:   · You are urinating very little or not at all  · You have a high fever with shaking chills  · You have side or back pain that gets worse  Contact your healthcare provider if:   · You have a fever  · You do not feel better after 2 days of taking antibiotics  · You are vomiting  · You have questions or concerns about your condition or care  Medicines:   · Antibiotics  help fight a bacterial infection  · Medicines  may be given to decrease pain and burning when you urinate  They will also help decrease the feeling that you need to urinate often   These medicines will make your urine orange or red     · Take your medicine as directed  Contact your healthcare provider if you think your medicine is not helping or if you have side effects  Tell him or her if you are allergic to any medicine  Keep a list of the medicines, vitamins, and herbs you take  Include the amounts, and when and why you take them  Bring the list or the pill bottles to follow-up visits  Carry your medicine list with you in case of an emergency  Follow up with your healthcare provider as directed:  Write down your questions so you remember to ask them during your visits  Prevent another UTI:   · Empty your bladder often  Urinate and empty your bladder as soon as you feel the need  Do not hold your urine for long periods of time  · Wipe from front to back after you urinate or have a bowel movement  This will help prevent germs from getting into your urinary tract through your urethra  · Drink liquids as directed  Ask how much liquid to drink each day and which liquids are best for you  You may need to drink more liquids than usual to help flush out the bacteria  Do not drink alcohol, caffeine, or citrus juices  These can irritate your bladder and increase your symptoms  Your healthcare provider may recommend cranberry juice to help prevent a UTI  · Urinate after you have sex  This can help flush out bacteria passed during sex  · Do not douche or use feminine deodorants  These can change the chemical balance in your vagina  · Change sanitary pads or tampons often  This will help prevent germs from getting into your urinary tract  · Do pelvic muscle exercises often  Pelvic muscle exercises may help you start and stop urinating  Strong pelvic muscles may help you empty your bladder easier  Squeeze these muscles tightly for 5 seconds like you are trying to hold back urine  Then relax for 5 seconds  Gradually work up to squeezing for 10 seconds  Do 3 sets of 15 repetitions a day, or as directed    © 2017 [facility] [address] is for End User's use only and may not be sold, redistributed or otherwise used for commercial purposes  All illustrations and images included in CareNotes® are the copyrighted property of A D A M [employer]  or [employer]  The above information is an  only  It is not intended as medical advice for individual conditions or treatments  Talk to your doctor, nurse or pharmacist before following any medical regimen to see if it is safe and effective for you  Follow up with PCP if not improved, if symptoms are worse, go to the ER

## 2023-06-15 NOTE — CARE ANYWHERE EVISITS
Visit Summary for [facility] - Gender: Female - Date of Birth: [id number]  Date: [id number] - Duration: 3 minutes  Patient: Ami Sanchez   [facility]  Provider: [address] [facility]    Patient Contact Information  Address  [place] King; [address]  [phone]    Visit Topics  Uti [Added Aparna Page - 2023-06-15]    Triage Questions   What is your current physical address in the event of a medical emergency? Answer []  Are you allergic to any medications? Answer []  Are you now or could you be pregnant? Answer []  Do you have any immune system compromise or chronic lung   disease? Answer []  Do you have any vulnerable family members in the home (infant, pregnant, cancer, elderly)? Answer []     Conversation Transcripts  [0A][0A] [Notification] You are connected with Mindy Narayan, Urgent Care Specialist [0A][Notification] Drake Hastings is located in [place]  [0A][Notification] Drake Hastings has shared health history  [facility]  [0A][Notification] A phone connection is   being added  [0A][Notification] Calling Drake Hastings  [facility]  [0A][Notification] Calling [address] [place] [address]  [0A][Notification] Mindy Narayan is establishing connection [0A][Notification] Drake Hastings is establishing connection  [0A]    Diagnosis  Urinary tract infection, site not specified    Procedures  Value: [id number] Code: CPT-4 UNLISTED E&M SERVICE    Medications Prescribed    No prescriptions ordered    Electronically signed by: Mindy Jackson(NPI [id number])

## 2023-06-15 NOTE — PATIENT INSTRUCTIONS
UA ordered  Start antibiotic  Take probiotic  Increase oral fluids  Tylenol or Motrin for pain or fever  Azo for bladder spasms  Follow up with PCP if no improvement  Go to ER with abd pain, flank pain or worsening symptoms  Urinary Tract Infection in Women   WHAT YOU NEED TO KNOW:   A urinary tract infection (UTI) is caused by bacteria that get inside your urinary tract  Most bacteria that enter your urinary tract come out when you urinate  If the bacteria stay in your urinary tract, you may get an infection  Your urinary tract includes your kidneys, ureters, bladder, and urethra  Urine is made in your kidneys, and it flows from the ureters to the bladder  Urine leaves the bladder through the urethra  A UTI is more common in your lower urinary tract, which includes your bladder and urethra  DISCHARGE INSTRUCTIONS:   Return to the emergency department if:   You are urinating very little or not at all  You have a high fever with shaking chills  You have side or back pain that gets worse  Contact your healthcare provider if:   You have a fever  You do not feel better after 2 days of taking antibiotics  You are vomiting  You have questions or concerns about your condition or care  Medicines:   Antibiotics  help fight a bacterial infection  Medicines  may be given to decrease pain and burning when you urinate  They will also help decrease the feeling that you need to urinate often  These medicines will make your urine orange or red  Take your medicine as directed  Contact your healthcare provider if you think your medicine is not helping or if you have side effects  Tell him or her if you are allergic to any medicine  Keep a list of the medicines, vitamins, and herbs you take  Include the amounts, and when and why you take them  Bring the list or the pill bottles to follow-up visits  Carry your medicine list with you in case of an emergency    Follow up with your healthcare provider as directed:  Write down your questions so you remember to ask them during your visits  Prevent another UTI:   Empty your bladder often  Urinate and empty your bladder as soon as you feel the need  Do not hold your urine for long periods of time  Wipe from front to back after you urinate or have a bowel movement  This will help prevent germs from getting into your urinary tract through your urethra  Drink liquids as directed  Ask how much liquid to drink each day and which liquids are best for you  You may need to drink more liquids than usual to help flush out the bacteria  Do not drink alcohol, caffeine, or citrus juices  These can irritate your bladder and increase your symptoms  Your healthcare provider may recommend cranberry juice to help prevent a UTI  Urinate after you have sex  This can help flush out bacteria passed during sex  Do not douche or use feminine deodorants  These can change the chemical balance in your vagina  Change sanitary pads or tampons often  This will help prevent germs from getting into your urinary tract  Do pelvic muscle exercises often  Pelvic muscle exercises may help you start and stop urinating  Strong pelvic muscles may help you empty your bladder easier  Squeeze these muscles tightly for 5 seconds like you are trying to hold back urine  Then relax for 5 seconds  Gradually work up to squeezing for 10 seconds  Do 3 sets of 15 repetitions a day, or as directed  © 2017 [address]  Information is for End User's use only and may not be sold, redistributed or otherwise used for commercial purposes  All illustrations and images included in CareNotes® are the copyrighted property of A [employer] A M , Inc  or [employer]  The above information is an  only  It is not intended as medical advice for individual conditions or treatments   Talk to your doctor, nurse or pharmacist before following any medical regimen to see if it is safe and effective for you

## 2023-06-23 DIAGNOSIS — Z98.84 BARIATRIC SURGERY STATUS: ICD-10-CM

## 2023-07-27 RX ORDER — TOPIRAMATE 25 MG/1
TABLET ORAL
Qty: 180 TABLET | Refills: 3 | Status: SHIPPED | OUTPATIENT
Start: 2023-07-27

## 2023-08-08 ENCOUNTER — OFFICE VISIT (OUTPATIENT)
Dept: GYNECOLOGIC ONCOLOGY | Facility: CLINIC | Age: 53
End: 2023-08-08
Payer: COMMERCIAL

## 2023-08-08 ENCOUNTER — ONCOLOGY SURVIVORSHIP (OUTPATIENT)
Dept: SURGICAL ONCOLOGY | Facility: CLINIC | Age: 53
End: 2023-08-08
Payer: COMMERCIAL

## 2023-08-08 VITALS
HEIGHT: 68 IN | RESPIRATION RATE: 18 BRPM | SYSTOLIC BLOOD PRESSURE: 132 MMHG | WEIGHT: 219.6 LBS | BODY MASS INDEX: 33.28 KG/M2 | HEART RATE: 118 BPM | TEMPERATURE: 96.7 F | DIASTOLIC BLOOD PRESSURE: 86 MMHG | OXYGEN SATURATION: 97 %

## 2023-08-08 VITALS
BODY MASS INDEX: 33.28 KG/M2 | RESPIRATION RATE: 18 BRPM | OXYGEN SATURATION: 97 % | HEART RATE: 118 BPM | TEMPERATURE: 96.7 F | DIASTOLIC BLOOD PRESSURE: 86 MMHG | WEIGHT: 219.6 LBS | SYSTOLIC BLOOD PRESSURE: 132 MMHG | HEIGHT: 68 IN

## 2023-08-08 DIAGNOSIS — Z17.0 MALIGNANT NEOPLASM OF LOWER-OUTER QUADRANT OF RIGHT BREAST OF FEMALE, ESTROGEN RECEPTOR POSITIVE (HCC): Primary | ICD-10-CM

## 2023-08-08 DIAGNOSIS — C50.511 MALIGNANT NEOPLASM OF LOWER-OUTER QUADRANT OF RIGHT BREAST OF FEMALE, ESTROGEN RECEPTOR POSITIVE (HCC): Primary | ICD-10-CM

## 2023-08-08 DIAGNOSIS — Z98.84 BARIATRIC SURGERY STATUS: ICD-10-CM

## 2023-08-08 DIAGNOSIS — R63.5 WEIGHT GAIN: Primary | ICD-10-CM

## 2023-08-08 DIAGNOSIS — Z87.410 HISTORY OF CERVICAL DYSPLASIA: ICD-10-CM

## 2023-08-08 PROCEDURE — 99214 OFFICE O/P EST MOD 30 MIN: CPT | Performed by: NURSE PRACTITIONER

## 2023-08-08 PROCEDURE — 88175 CYTOPATH C/V AUTO FLUID REDO: CPT | Performed by: NURSE PRACTITIONER

## 2023-08-08 PROCEDURE — 99215 OFFICE O/P EST HI 40 MIN: CPT | Performed by: NURSE PRACTITIONER

## 2023-08-08 PROCEDURE — 87624 HPV HI-RISK TYP POOLED RSLT: CPT | Performed by: NURSE PRACTITIONER

## 2023-08-08 RX ORDER — TOPIRAMATE 25 MG/1
25 TABLET ORAL 2 TIMES DAILY
Qty: 180 TABLET | Refills: 0 | Status: SHIPPED | OUTPATIENT
Start: 2023-08-08

## 2023-08-08 RX ORDER — PHENTERMINE HYDROCHLORIDE 15 MG/1
15 CAPSULE ORAL EVERY MORNING
Qty: 90 CAPSULE | Refills: 0 | Status: SHIPPED | OUTPATIENT
Start: 2023-08-08

## 2023-08-08 NOTE — PROGRESS NOTES
Assessment/Plan:    Diagnoses and all orders for this visit:    Malignant neoplasm of lower-outer quadrant of right breast of female, estrogen receptor positive ([address])    Patient is a 57-year-old female that was diagnosed with a right-sided breast cancer in June 2018. Her pathology revealed invasive ductal carcinoma, grade 1, ER/%, HER2 negative. She underwent a right mastectomy and sentinel node biopsy with Dr. Salvador Nichols and had reconstruction with Dr. Patricia Herr.  She had a left prophylactic mastectomy. She also underwent a laparoscopic salpingo-oophorectomy at that time. She had genetic testing which was negative, VUS of . She is currently maintained on anastrozole. Breast cancer survivorship visit performed today and treatment summary and care plan were reviewed with patient. She has not appreciated any changes on herself breast exam and there are no concerns on exam today. She has stable nodularity/fat necrosis in the left upper inner quadrant of her reconstructed breast.  This was previously imaged. She reports several menopausal side effects. I have recommended that she schedule an appointment with her gynecologist, Dr. Jacquelin Tracey as she has one of her menopause specialists. She is up-to-date on colorectal cancer screening. She is not currently interested in the strength ABC program.  Information on the Thrive program was provided as well. We will plan to see her back in 1 year for a follow-up survivorship visit or sooner should the need arise. She was instructed to contact us with any new concerns or symptoms. All of her questions were answered today.       REASON FOR VISIT:   Survivorship      Previous therapy:  Oncology History   Malignant neoplasm of lower-outer quadrant of right breast of female, estrogen receptor positive ([address])   6/14/2018 Biopsy    Right breast biopsy:  - Invasive ductal carcinoma   Grade 1  %  %  HER2 negative     8/10/2018 Surgery    Right mastectomy with sentinel lymph node biopsy:  - Clear margins  - 0/1 lymph nodes    Left prophylactic mastectomy, bilateral implant/expander placement  Dr. Khang Celestin and Dr Rashida Conte    Laparoscopic salpingo-oophorectomy   Dr. Shane Pardo     8/2018 Genetic Testing    [facility]    ALK, APC, SHERIN, AXIN2, BAP1, BARD1, BLM, BMPR1A, BRCA1, BRCA2, BRIP1, CASR, CDC73, CDH1, CDK4, CDKN1B, CDKN1C, CDKN2A, CEBPA, CHEK2, DICER1, DIS3L2, EGFR, EPCAM, FLCN, GATA2, GPC3, GREM1, HOXB13, HRAS, KIT, MAX, MEN1, MET, MITF, MLH1, MSH2, MSH6, MUTYH, NBN, NF1, NF2 ,PALB2, PDGFRA, PHOX2B, PMS2, POLD1, POLE, POT1, ZRYAA9B, PTCH1, PTEN, RAD50, RAD51C, RAD51D, RB1, RECQL4, RET, RUNX1, SDHA, SDHAF2, SDHB, SDHC, SDHD, SMAD4, SMARCA4, SMARCB1, SMARCE1, STK11, SUFU, TERC, TERT, SJSH894, TP53, TSC1, TSC2, VHL, WRN, WT1     Likely Pathogenic Variant  (VUS) :  [id number]     9/2018 -  Hormone Therapy    Anastrozole  Dr. Todd York     11/30/2018 Surgery    Bilateral implant exchange     2/22/2019 Surgery    Bilateral breast mound revision and fat grafting           Patient ID: Jessi Schilling is a 48 y.o. female  Presenting today for a breast cancer survivorship visit. She notices no changes on herself breast exam.  She reports continued nodularity in the left upper inner quadrant but reports this is stable. She reports chronic arthralgias but denies any new or persistent headaches, back pains, bone pains, cough, shortness of breath, abdominal pain. She reports that she continues to have cording of the left axilla and continues to follow with Federico Dillon, oncology massage therapist. She reports several menopausal side effects- weight gain, hot flashes, vaginal dryness, decreased libido, arthralgias. Review of Systems   Constitutional: Positive for unexpected weight change (reports 40 lb weight gain). Negative for activity change, appetite change, chills, fatigue and fever. Respiratory: Negative for cough and shortness of breath.     Cardiovascular: Negative for chest pain.   Gastrointestinal: Negative for abdominal pain, constipation, diarrhea, nausea and vomiting. Endocrine: Positive for heat intolerance (hot flashes). Genitourinary: Positive for vaginal pain (vaginal dryness, especially since hysterectomy). Musculoskeletal: Positive for arthralgias and neck pain (spasms since surgery). Negative for back pain, gait problem and myalgias. Skin: Negative for color change and rash. Neurological: Negative for dizziness and headaches. Hematological: Negative for adenopathy. Psychiatric/Behavioral: Negative for agitation and confusion. All other systems reviewed and are negative. Objective:    Blood pressure 132/86, pulse (!) 118, temperature (!) 96.7 °F (35.9 °C), temperature source Temporal, resp. rate 18, height 5' 8" (1.727 m), weight 99.6 kg (219 lb 9.6 oz), last menstrual period 07/31/2018, SpO2 97 %. Body mass index is 33.39 kg/m². Physical Exam  Vitals reviewed. Constitutional:       General: She is not in acute distress. Appearance: Normal appearance. She is well-developed. She is not diaphoretic. HENT:      Head: Normocephalic and atraumatic. Cardiovascular:      Rate and Rhythm: Normal rate and regular rhythm. Heart sounds: Normal heart sounds. Pulmonary:      Effort: Pulmonary effort is normal.      Breath sounds: Normal breath sounds. Chest:      Chest wall: Tenderness (Left lateral chest wall) present. Comments: Bilateral reconstructed breast with implant present. Two palpable round firm masses consistent with previously imaged fat necrosis at the 10-11:00 position of the left reconstructed breast. No suspicious masses, skin changes or nodules. Abdominal:      Palpations: Abdomen is soft. There is no mass. Tenderness: There is no abdominal tenderness. Musculoskeletal:         General: Normal range of motion. Cervical back: Normal range of motion.    Lymphadenopathy:      Upper Body:      Right upper body: No supraclavicular or axillary adenopathy. Left upper body: No supraclavicular or axillary adenopathy. Skin:     General: Skin is warm and dry. Findings: No rash. Neurological:      Mental Status: She is alert and oriented to person, place, and time. Psychiatric:         Speech: Speech normal.         Discussed symptoms related to disease recurrence, Yes    Evaluated for late effects related to cancer treatment, Yes, describe: discussed effects of surgery, AI therapy    Screening current for cervical cancer, Yes, describe: recent hysterectomy, following with gyn onc    Screening current for colon cancer, Yes, describe: reports colonoscopy with [facility] 6/2022, repeat in 3 years    Cancer rehabilitation services addressed, Yes, describe: declines St[facility] at this time    Screening current for osteoporosis, Yes, describe: dxa scheduled 10/2023    Oncology Treatment Summary reviewed with patient and copy provided, Yes    Referral placed for psychosocial evaluation/screening to oncology social work  Yes    I have spent 45 minutes with Patient  today in which greater than 50% of this time was spent in counseling/coordination of care regarding breast cancer survivorship.

## 2023-08-08 NOTE — ASSESSMENT & PLAN NOTE
Patient previously took topomax and phentermine for weight loss, as recently as 2021. She is interested in restarting these. Advised that refills should come from Dr. Magui Quinn, who performed her weight loss surgery. As she is hoping to avoid another high copayment for specialty visit, I agreed to give her a 3 month supply until she is able to schedule an appointment with him. She understands that further refills will not be provided.

## 2023-08-08 NOTE — PROGRESS NOTES
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 She has had undesired weight gain since the time of her surgery. She has vaginal dryness. Minimal vasomotor symptoms. She continues on anastrozole for her history of breast cancer. Her PS is 0. Pap collected today. RTO in 6 months, or sooner pending results of her Pap smear. She will call in the interim with any concerns. Relevant Orders    Liquid-based pap, diagnostic    Weight gain - Primary     Patient previously took topomax and phentermine for weight loss, as recently as 2021. She is interested in restarting these. Advised that refills should come from Dr. Andriy Mc, who performed her weight loss surgery. As she is hoping to avoid another high copayment for specialty visit, I agreed to give her a 3 month supply until she is able to schedule an appointment with him. She understands that further refills will not be provided.          Relevant Medications    phentermine 15 MG capsule           CHIEF COMPLAINT: Cervical dysplasia surveillance      Subjective:     Problem:  Cancer Staging   Malignant neoplasm of lower-outer quadrant of right breast of female, estrogen receptor positive ([address])  Staging form: Breast, AJCC 8th Edition  - Clinical: cT1, cN0, cM0, ER: Positive, WI: Positive, HER2: Negative - Signed by Osiris Louis MD on 7/2/2018  - Pathologic: pT1a, pN0(sn), cM0, ER: Positive, WI: Positive, HER2: Negative - Signed by Osiris Louis MD on 8/21/2018      Previous therapy:  Oncology History   Malignant neoplasm of lower-outer quadrant of right breast of female, estrogen receptor positive ([address])   6/14/2018 Biopsy    Right breast biopsy:  - Invasive ductal carcinoma   Grade 1  %  %  HER2 negative     8/10/2018 Surgery    Right mastectomy with sentinel lymph node biopsy:  - Clear margins  - 0/1 lymph nodes    Left prophylactic mastectomy, bilateral implant/expander placement  Dr. Luis Felipe Alba and Dr Shirley Abel    Laparoscopic salpingo-oophorectomy   Dr. Luisa Torres     8/2018 Genetic Testing    [facility]    ALK, APC, SHERIN, AXIN2, BAP1, BARD1, BLM, BMPR1A, BRCA1, BRCA2, BRIP1, CASR, CDC73, CDH1, CDK4, CDKN1B, CDKN1C, CDKN2A, CEBPA, CHEK2, DICER1, DIS3L2, EGFR, EPCAM, FLCN, GATA2, GPC3, GREM1, HOXB13, HRAS, KIT, MAX, MEN1, MET, MITF, MLH1, MSH2, MSH6, MUTYH, NBN, NF1, NF2 ,PALB2, PDGFRA, PHOX2B, PMS2, POLD1, POLE, POT1, RYLYT6M, PTCH1, PTEN, RAD50, RAD51C, RAD51D, RB1, RECQL4, RET, RUNX1, SDHA, SDHAF2, SDHB, SDHC, SDHD, SMAD4, SMARCA4, SMARCB1, SMARCE1, STK11, SUFU, TERC, TERT, EYMK676, TP53, TSC1, TSC2, VHL, WRN, WT1     Likely Pathogenic Variant  (VUS) : FH [id number]     9/2018 -  Hormone Therapy    Anastrozole  Dr. Kaden Lee     11/30/2018 Surgery    Bilateral implant exchange     2/22/2019 Surgery    Bilateral breast mound revision and fat grafting           Patient ID: Michael Hussein is a 48 y.o. female     Amelia Rivers has been well in the interim since her last visit and is without acute complaints. She denies nausea or vomiting. Her appetite is appropriate. She is voiding and moving her bowels without difficulty. She denies abdominal or pelvic pain. The patient is without vaginal bleeding or discharge. She is ambulatory. Review of Systems   Constitutional: Positive for unexpected weight change. Negative for chills, fatigue and fever. HENT: Negative for nosebleeds. Eyes: Negative. Respiratory: Negative for cough, chest tightness, shortness of breath and wheezing. Cardiovascular: Negative for chest pain, palpitations and leg swelling.    Gastrointestinal: Negative for abdominal distention, abdominal pain, anal bleeding, blood in stool, constipation, diarrhea, nausea, rectal pain and vomiting. Endocrine: Negative. Genitourinary: Negative for difficulty urinating, dysuria, frequency, hematuria, pelvic pain, urgency, vaginal bleeding, vaginal discharge and vaginal pain. Vaginal dryness   Musculoskeletal: Negative for arthralgias and joint swelling. Skin: Negative for color change, pallor and rash. Neurological: Negative for dizziness, weakness, light-headedness, numbness and headaches. Hematological: Negative. Psychiatric/Behavioral: Negative.         Current Outpatient Medications   Medication Sig Dispense Refill   • acetaminophen (TYLENOL) 325 mg tablet Take 650 mg by mouth every 6 (six) hours as needed for mild pain     • albuterol (PROVENTIL HFA,VENTOLIN HFA) 90 mcg/act inhaler      • anastrozole (ARIMIDEX) 1 mg tablet TAKE 1 TABLET DAILY 90 tablet 3   • BIOTIN PO Take 1 tablet by mouth every morning     • Calcium Carb-Cholecalciferol 1000-800 MG-UNIT TABS calcium     • calcium-vitamin D 250-100 MG-UNIT per tablet Take 1 tablet by mouth     • cyanocobalamin 50 MCG tablet Take 50 mcg by mouth every other day     • Ergocalciferol (VITAMIN D2) 2000 units TABS Vitamin D2 50,000 unit capsule     • ferrous sulfate 325 (65 Fe) mg tablet Daily     • ketoconazole (NIZORAL) 2 % cream      • Lifitegrast (XIIDRA OP) Apply 1 vial to eye 2 (two) times a day     • loratadine (CLARITIN) 10 mg tablet Take 10 mg by mouth as needed       • meloxicam (Mobic) 15 mg tablet Take 1 tablet (15 mg total) by mouth daily 30 tablet 1   • methocarbamol (ROBAXIN) 500 mg tablet Take 500 mg by mouth if needed     • montelukast (SINGULAIR) 10 mg tablet Take 10 mg by mouth as needed       • multivitamin (THERAGRAN) TABS Take 1 tablet by mouth every morning       • Naproxen Sodium 220 MG CAPS as needed     • Omega-3 Fatty Acids (FISH OIL PO) Take by mouth in the morning     • pantoprazole (PROTONIX) 40 mg tablet Take 40 mg by mouth every morning       • phentermine 15 MG capsule Take 1 capsule (15 mg total) by mouth every morning 90 capsule 0   • pyridoxine (VITAMIN B6) 100 mg tablet Take 100 mg by mouth daily     • tiZANidine (Zanaflex) 4 mg tablet Take 0.5 tablets (2 mg total) by mouth every 8 (eight) hours as needed for muscle spasms 60 tablet 1   • topiramate (TOPAMAX) 25 mg tablet Take 1 tablet (25 mg total) by mouth 2 (two) times a day 180 tablet 0   • Xhance 93 MCG/ACT EXHU INSTILL 1 SPRAY PER NOSTRIL TWICE A DAY 16 mL 11     No current facility-administered medications for this visit. No Known Allergies    Past Medical History:   Diagnosis Date   • Abnormal Pap smear of cervix    • Arthritis    • Ashkenazi Christianity ancestry requiring population-specific genetic screening    • Asthma    • Back pain    • Breast cancer ([address] [facility]) 6/13/17   • Breathing difficulty     after gastric bypass 2014-"felt elephant on chest"   • Cancer [facility])    • Cervical cancer ([address] [facility]) 8/2022   • Cervical dysplasia    • Chest pain, non-cardiac     Seen in ER and is from esophageal spasms.    • Colon polyp    • Constipation     occasional   • DDD (degenerative disc disease), cervical    • Family history of breast cancer in female    • GERD (gastroesophageal reflux disease)    • History of MRSA infection     Leg   • History of UTI     treated 8/1/2018   • Human papilloma virus (HPV) infection    • Hx MRSA infection     on leg-2009 treated   • Neck pain    • Pollen allergies    • PONV (postoperative nausea and vomiting)     nausea   • Postural lightheadedness    • Raynaud's disease    • Seasonal allergies    • Sinus problem        Past Surgical History:   Procedure Laterality Date   • BILATERAL OOPHORECTOMY     • BLADDER SURGERY  2013    bladder lift   • BREAST BIOPSY Right 06/14/2018    IDC   • CERVICAL BIOPSY N/A 11/15/2022    Procedure: BIOPSY LEEP CERVIX;  Surgeon: Debbie Ochoa MD;  [facility];  Service: Gynecology Oncology   • CERVIX LESION DESTRUCTION     • COLONOSCOPY     • COLPOSCOPY W/ BIOPSY / CURETTAGE     • ENDOMETRIAL ABLATION     • EXAMINATION UNDER ANESTHESIA N/A 11/15/2022    Procedure: EXAM UNDER ANESTHESIA (EUA);   Surgeon: Evan Dickey MD;  [facility];  Service: Gynecology Oncology   • GASTRIC BYPASS  2014 2014 wt loss 90 lbs(regained 20 lbs)   • HYSTERECTOMY N/A 12/23/2022    Procedure: ([address] [facility]) W/ ROBOT;  Surgeon: Martha Plummer MD;  [facility];  Service: Gynecology Oncology   • HYSTEROSCOPIC STERILIZATION W/ IMPLANTS     • HYSTEROSCOPY W/ ENDOMETRIAL ABLATION  2013   • KNEE ARTHROSCOPY Right 1990   • LYMPH NODE BIOPSY Right 08/10/2018    Procedure: BIOPSY LYMPH NODE SENTINEL;  Surgeon: Sanford Parish MD;  [facility];  Service: Surgical Oncology   • MAMMO STEREOTACTIC BREAST BIOPSY RIGHT (ALL INC) Right 06/14/2018   • MASTECTOMY Bilateral    • NASAL SEPTUM SURGERY      2002, 2016   • OOPHORECTOMY Bilateral    • NE IMPLNT BIO IMPLNT FOR SOFT TISSUE REINFORCEMENT Bilateral 08/10/2018    Procedure: RECONSTRUCTION BREAST W/ IMPLANT;  Surgeon: Hugo Sanchez MD;  [facility];  Service: Plastics   • NE INSJ/RPLCMT BREAST IMPLANT SEP DAY MASTECTOMY Bilateral 11/30/2018    Procedure: BREAST IMPLANT EXCHANGE;  Surgeon: Hugo Sanchez MD;  [facility];  Service: Plastics   • NE LAPAROSCOPY W/RMVL ADNEXAL STRUCTURES N/A 08/10/2018    Procedure: SALPINGO-OOPHORECTOMY, LAPAROSCOPIC; PELVIC WASHINGS ;  Surgeon: Shell Beal MD;  [facility];  Service: Gynecology Oncology   • NE LAPS SURG CHOLECYSTECTOMY Leamon Abo N/A 03/19/2021    Procedure: CHOLECYSTECTOMY LAPAROSCOPIC W/ INTRAOP CHOLANGIOGRAM;  Surgeon: Megan Finley MD;  Location: [facility] MAIN OR;  Service: General   • NE MASTECTOMY SIMPLE COMPLETE Bilateral 08/10/2018    Procedure: MASTECTOMY SIMPLE;  Surgeon: Sanford Parish MD;  [facility];  Service: Surgical Oncology   • NE LENNIE-IMPLANT CAPSULECTOMY BREAST COMPLETE Bilateral 11/30/2018 Procedure: CAPSULECTOMY;  Surgeon: Cherelle Deutsch MD;  [facility];  Service: Plastics   • MA REVISION OF RECONSTRUCTED BREAST Bilateral 2019    Procedure: BREAST MOUND REVISION; FAT GRAFTING;  Surgeon: Cherelle Deutsch MD;  [facility];  Service: Plastics   • MA TISSUE EXPANDER PLACEMENT BREAST RECONSTRUCTION Bilateral 08/10/2018    Procedure: INSERTION/PLACEMENT TISSUE EXPANDER (EXCHANGE); Surgeon: Cherelle Deutsch MD;  [facility];  Service: Plastics   • SINUS SURGERY         OB History        2    Para   2    Term   2            AB        Living   2       SAB        IAB        Ectopic        Multiple        Live Births               Obstetric Comments   First pregnancy age 31  Menarche age 17  Hx birth control pills                Family History   Problem Relation Age of Onset   • Other Mother         BRCA negative   • Breast cancer Mother 72   • Ovarian cancer Mother 61   • Diabetes Father    • Heart disease Father    • Hypertension Father    • Migraines Sister    • Diabetes Brother    • Colon cancer Maternal Grandfather 80   • Diabetes type II Maternal Grandfather    • Heart disease Paternal Grandfather    • Colon cancer Maternal Aunt 57   • Skin cancer Maternal Aunt    • Colon cancer Maternal Aunt        The following portions of the patient's history were reviewed and updated as appropriate: allergies, current medications, past family history, past medical history, past social history, past surgical history and problem list.      Objective:    Blood pressure 132/86, pulse (!) 118, temperature (!) 96.7 °F (35.9 °C), temperature source Temporal, resp. rate 18, height 5' 8" (1.727 m), weight 99.6 kg (219 lb 9.6 oz), last menstrual period 2018, SpO2 97 %. Body mass index is 33.39 kg/m². Physical Exam  Vitals reviewed. Exam conducted with a chaperone present. Constitutional:       General: She is not in acute distress. Appearance: Normal appearance.  She is not ill-appearing. HENT:      Head: Normocephalic and atraumatic. Mouth/Throat:      Mouth: Mucous membranes are moist.   Eyes:      General:         Right eye: No discharge. Left eye: No discharge. Conjunctiva/sclera: Conjunctivae normal.   Pulmonary:      Effort: Pulmonary effort is normal.   Abdominal:      Palpations: Abdomen is soft. There is no mass. Tenderness: There is no abdominal tenderness. Hernia: No hernia is present. Genitourinary:     Comments: The external female genitalia is normal. The bartholin's, uretheral and skenes glands are normal. The urethral meatus is normal (midline with no lesions). Anus without fissure or lesion. Speculum exam reveals a grossly normal vagina. Pap collected. No masses, lesions,discharge or bleeding. No significant cystocele or rectocele noted. Bimanual exam notes a surgical absent cervix, uterus and adnexal structures. No masses or fullness. Bladder is without fullness, mass or tenderness. Musculoskeletal:      Right lower leg: No edema. Left lower leg: No edema. Skin:     General: Skin is warm and dry. Coloration: Skin is not jaundiced. Findings: No rash. Neurological:      General: No focal deficit present. Mental Status: She is alert and oriented to person, place, and time. Cranial Nerves: No cranial nerve deficit. Sensory: No sensory deficit. Motor: No weakness. Gait: Gait normal.   Psychiatric:         Mood and Affect: Mood normal.         Behavior: Behavior normal.         Thought Content:  Thought content normal.         Judgment: Judgment normal.           No results found for: ""  Lab Results   Component Value Date    WBC 5.14 12/20/2022    HGB 13.2 12/20/2022    HCT 41.9 12/20/2022    MCV 83 12/20/2022     (H) 12/20/2022     Lab Results   Component Value Date    K 4.4 12/20/2022     12/20/2022    CO2 27 12/20/2022    BUN 18 12/20/2022    CREATININE 0.77 12/20/2022 GLUCOSE 129 07/14/2016    GLUF 92 12/20/2022    CALCIUM 9.6 12/20/2022    AST 16 12/20/2022    ALT 11 12/20/2022    ALKPHOS 107 (H) 12/20/2022    EGFR 90 12/20/2022        Trend:  No results found for: ""

## 2023-08-08 NOTE — ASSESSMENT & PLAN NOTE
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 She has had undesired weight gain since the time of her surgery. She has vaginal dryness. Minimal vasomotor symptoms. She continues on anastrozole for her history of breast cancer. Her PS is 0. Pap collected today. RTO in 6 months, or sooner pending results of her Pap smear. She will call in the interim with any concerns.

## 2023-08-08 NOTE — PATIENT INSTRUCTIONS
1. Return to the office in 6 months for continued surveillance.    2. Contact the office in the interim if you develop any any new or concerning symptoms, including vaginal bleeding, discharge, abdominal or pelvic pain, etc.

## 2023-08-17 LAB
LAB AP GYN PRIMARY INTERPRETATION: NORMAL
Lab: NORMAL

## 2023-10-02 ENCOUNTER — HOSPITAL ENCOUNTER (OUTPATIENT)
Dept: BONE DENSITY | Facility: MEDICAL CENTER | Age: 53
Discharge: HOME | End: 2023-10-02
Payer: COMMERCIAL

## 2023-10-02 DIAGNOSIS — M85.80 OSTEOPENIA, UNSPECIFIED LOCATION: ICD-10-CM

## 2023-10-02 PROCEDURE — 77080 DXA BONE DENSITY AXIAL: CPT

## 2023-10-05 ENCOUNTER — TELEPHONE (OUTPATIENT)
Dept: HEMATOLOGY ONCOLOGY | Facility: CLINIC | Age: 53
End: 2023-10-05

## 2023-10-05 NOTE — TELEPHONE ENCOUNTER
From: Elen Mena PA-C  Sent: 10/5/2023   1:01 PM EDT  To: Valley Romberg, RN; Becka Fajardo RN    Bone density stable.   Continue Ca + Vit D

## 2023-12-11 DIAGNOSIS — Z17.0 MALIGNANT NEOPLASM OF LOWER-OUTER QUADRANT OF RIGHT BREAST OF FEMALE, ESTROGEN RECEPTOR POSITIVE: ICD-10-CM

## 2023-12-11 DIAGNOSIS — C50.511 MALIGNANT NEOPLASM OF LOWER-OUTER QUADRANT OF RIGHT BREAST OF FEMALE, ESTROGEN RECEPTOR POSITIVE: ICD-10-CM

## 2023-12-11 RX ORDER — ANASTROZOLE 1 MG/1
TABLET ORAL
Qty: 90 TABLET | Refills: 3 | Status: SHIPPED | OUTPATIENT
Start: 2023-12-11

## 2023-12-18 ENCOUNTER — AMB VIDEO VISIT (OUTPATIENT)
Dept: OTHER | Facility: HOSPITAL | Age: 53
End: 2023-12-18

## 2023-12-18 DIAGNOSIS — N39.0 URINARY TRACT INFECTION WITHOUT HEMATURIA, SITE UNSPECIFIED: Primary | ICD-10-CM

## 2023-12-18 PROCEDURE — ECARE PR SL URGENT CARE VIRTUAL VISIT: Performed by: NURSE PRACTITIONER

## 2023-12-18 RX ORDER — CEPHALEXIN 500 MG/1
500 CAPSULE ORAL EVERY 12 HOURS SCHEDULED
Qty: 10 CAPSULE | Refills: 0 | Status: SHIPPED | OUTPATIENT
Start: 2023-12-18 | End: 2023-12-23

## 2023-12-18 NOTE — CARE ANYWHERE EVISITS
Visit Summary for HERBERT DE LA O - Gender: Female - Date of Birth: 1970  Date: [id number] - Duration: 3 minutes  Patient: HERBERT DE LA O  Provider: Mindy TRAORE    Patient Contact Information  Address  [address] [place] [address] [address] [place]; [place] [address]  [phone]    Visit Topics  UTI [Added By: Self - 2023-12-18]    Triage Questions   What is your current physical address in the event of a medical emergency? Answer []  Are you allergic to any medications? Answer []  Are you now or could you be pregnant? Answer []  Do you have any immune system compromise or chronic lung   disease? Answer []  Do you have any vulnerable family members in the home (infant, pregnant, cancer, elderly)? Answer []     Conversation Transcripts  [0A][0A] [Notification] You are connected with Mindy TRAORE, Urgent Care Specialist.[0A][Notification] HERBERT OKEEFE is located in [place].[0A][Notification] HERBERT OKEEFE has shared health history...[0A]    Diagnosis  Urinary tract infection, site not specified    Procedures  Value: [id number] Code: CPT-4 UNLISTED E&M SERVICE    Medications Prescribed    No prescriptions ordered    Electronically signed by: Mindy Lorenz(NPI [id number])

## 2023-12-18 NOTE — PROGRESS NOTES
"Required Documentation:  Encounter provider LEÓN White    Provider located at [facility]  VIRTUAL CARE   [address] [facility] [id number]    Identify all parties in room with patient during virtual visit:  No one else    The patient was identified by name and date of birth. Jaylyn Kim was informed that this is a telemedicine visit and that the visit is being conducted through the [employer] platform. She agrees to proceed..  My office door was closed. No one else was in the room.  She acknowledged consent and understanding of privacy and security of the video platform. The patient has agreed to participate and understands they can discontinue the visit at any time.    Verification of patient location:    Patient is located at home in the following state in which I hold an active license [place]    Patient is aware this is a billable service.     Reason for visit is No chief complaint on file.       Subjective  This is a 53 year old female here today for video visit.  She states her urine is darker.  She is having urinary pressure.  She denies any fever, stock aches or chills.  She denies any abd pain or back pain.  She states symptoms started today.             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    Colon polyp     Constipation     occasional    DDD (degenerative disc disease), cervical     Family history of breast cancer in female     GERD (gastroesophageal reflux disease)     History of MRSA infection     Leg    History of UTI     treated 8/1/2018 "    Human papilloma virus (HPV) infection     Hx MRSA infection     on leg-2009 treated    Neck pain     Pollen allergies     PONV (postoperative nausea and vomiting)     nausea    Postural lightheadedness     Raynaud's disease     Seasonal allergies     Sinus problem        Past Surgical History:   Procedure Laterality Date    BILATERAL OOPHORECTOMY      BLADDER SURGERY  2013    bladder lift    BREAST BIOPSY Right 06/14/2018    [facility]    CERVICAL BIOPSY N/A 11/15/2022    Procedure: BIOPSY LEEP CERVIX;  Surgeon: Lawrence Li MD;  [facility];  Service: Gynecology Oncology    CERVIX LESION DESTRUCTION      COLONOSCOPY      COLPOSCOPY W/ BIOPSY / CURETTAGE      ENDOMETRIAL ABLATION      EXAMINATION UNDER ANESTHESIA N/A 11/15/2022    Procedure: EXAM UNDER ANESTHESIA (EUA);  Surgeon: Lawrence Li MD;  [facility];  Service: Gynecology Oncology    GASTRIC BYPASS  2014 2014 wt loss 90 lbs(regained 20 lbs)    HYSTERECTOMY N/A 12/23/2022    Procedure: (LTH) W/ ROBOT;  Surgeon: Viki Bhatti MD;  [facility];  Service: Gynecology Oncology    HYSTEROSCOPIC STERILIZATION W/ IMPLANTS      HYSTEROSCOPY W/ ENDOMETRIAL ABLATION  2013    KNEE ARTHROSCOPY Right 1990    LYMPH NODE BIOPSY Right 08/10/2018    Procedure: BIOPSY LYMPH NODE SENTINEL;  Surgeon: Nathalie Merino MD;  [facility];  Service: Surgical Oncology    MAMMO STEREOTACTIC BREAST BIOPSY RIGHT (ALL INC) Right 06/14/2018    MASTECTOMY Bilateral     NASAL SEPTUM SURGERY      2002, 2016    OOPHORECTOMY Bilateral     NY IMPLNT BIO IMPLNT FOR SOFT TISSUE REINFORCEMENT Bilateral 08/10/2018    Procedure: RECONSTRUCTION BREAST W/ IMPLANT;  Surgeon: Larissa Mccormick MD;  [facility];  Service: Plastics    NY INSJ/RPLCMT BREAST IMPLANT SEP DAY MASTECTOMY Bilateral 11/30/2018    Procedure: BREAST IMPLANT EXCHANGE;  Surgeon: Larissa Mccormick MD;  [facility];  Service: Plastics    NY LAPAROSCOPY W/RMVL ADNEXAL  STRUCTURES N/A 08/10/2018    Procedure: SALPINGO-OOPHORECTOMY, LAPAROSCOPIC; PELVIC WASHINGS ;  Surgeon: Lloyd Locke MD;  [facility];  Service: Gynecology Oncology    NE LAPS SURG CHOLECYSTECTOMY W/CHOLANGIOGRAPHY N/A 03/19/2021    Procedure: CHOLECYSTECTOMY LAPAROSCOPIC W/ INTRAOP CHOLANGIOGRAM;  Surgeon: Jorge Alberto Arrington MD;  Location: [facility];  Service: General    NE MASTECTOMY SIMPLE COMPLETE Bilateral 08/10/2018    Procedure: MASTECTOMY SIMPLE;  Surgeon: Nathalie Merino MD;  [facility];  Service: Surgical Oncology    NE LENNIE-IMPLANT CAPSULECTOMY BREAST COMPLETE Bilateral 11/30/2018    Procedure: CAPSULECTOMY;  Surgeon: Larissa Mccormick MD;  [facility];  Service: Plastics    NE REVISION OF RECONSTRUCTED BREAST Bilateral 02/22/2019    Procedure: BREAST MOUND REVISION; FAT GRAFTING;  Surgeon: Larissa Mccormick MD;  [facility];  Service: Plastics    NE TISSUE EXPANDER PLACEMENT BREAST RECONSTRUCTION Bilateral 08/10/2018    Procedure: INSERTION/PLACEMENT TISSUE EXPANDER (EXCHANGE);  Surgeon: Larissa Mccormick MD;  [facility];  Service: Plastics    SINUS SURGERY          No Known Allergies    Review of Systems   Constitutional:  Negative for activity change, chills, fatigue and fever.   Genitourinary:  Positive for dysuria and urgency.   Skin: Negative.    Neurological: Negative.    Psychiatric/Behavioral: Negative.         Video Exam    There were no vitals filed for this visit.    Physical Exam  Constitutional:       General: She is not in acute distress.     Appearance: Normal appearance. She is not ill-appearing or toxic-appearing.   HENT:      Head: Normocephalic and atraumatic.   Abdominal:      Tenderness: There is no abdominal tenderness.   Neurological:      Mental Status: She is alert.   Psychiatric:         Mood and Affect: Mood normal.         Behavior: Behavior normal.         Thought Content: Thought content normal.         Judgment: Judgment normal.         Visit  Time  Total Visit Duration: 6 minutes    Assessment/Plan:    Diagnoses and all orders for this visit:    Urinary tract infection without hematuria, site unspecified  -     cephalexin (KEFLEX) 500 mg capsule; Take 1 capsule (500 mg total) by mouth every 12 (twelve) hours for 5 days  -     Urine culture; Future        Patient Instructions   Urine culture as discussed.   Start antibiotic. Take probiotic.  Increase oral fluids.  Tylenol or Motrin for pain or fever.  Azo for bladder spasms.  Follow up with PCP if no improvement.  Go to ER with abd pain, flank pain or worsening symptoms.       Urinary Tract Infection in Women   WHAT YOU NEED TO KNOW:   A urinary tract infection (UTI) is caused by bacteria that get inside your urinary tract. Most bacteria that enter your urinary tract come out when you urinate. If the bacteria stay in your urinary tract, you may get an infection. Your urinary tract includes your kidneys, ureters, bladder, and urethra. Urine is made in your kidneys, and it flows from the ureters to the bladder. Urine leaves the bladder through the urethra. A UTI is more common in your lower urinary tract, which includes your bladder and urethra.        DISCHARGE INSTRUCTIONS:   Return to the emergency department if:   You are urinating very little or not at all.    You have a high fever with shaking chills.     You have side or back pain that gets worse.  Contact your healthcare provider if:   You have a fever.    You do not feel better after 2 days of taking antibiotics.    You are vomiting.     You have questions or concerns about your condition or care.  Medicines:   Antibiotics  help fight a bacterial infection.     Medicines  may be given to decrease pain and burning when you urinate. They will also help decrease the feeling that you need to urinate often. These medicines will make your urine orange or red.    Take your medicine as directed.  Contact your healthcare provider if you think your medicine  is not helping or if you have side effects. Tell him or her if you are allergic to any medicine. Keep a list of the medicines, vitamins, and herbs you take. Include the amounts, and when and why you take them. Bring the list or the pill bottles to follow-up visits. Carry your medicine list with you in case of an emergency.  Follow up with your healthcare provider as directed:  Write down your questions so you remember to ask them during your visits.   Prevent another UTI:   Empty your bladder often.  Urinate and empty your bladder as soon as you feel the need. Do not hold your urine for long periods of time.    Wipe from front to back after you urinate or have a bowel movement.  This will help prevent germs from getting into your urinary tract through your urethra.    Drink liquids as directed.  Ask how much liquid to drink each day and which liquids are best for you. You may need to drink more liquids than usual to help flush out the bacteria. Do not drink alcohol, caffeine, or citrus juices. These can irritate your bladder and increase your symptoms. Your healthcare provider may recommend cranberry juice to help prevent a UTI.    Urinate after you have sex.  This can help flush out bacteria passed during sex.    Do not douche or use feminine deodorants.  These can change the chemical balance in your vagina.    Change sanitary pads or tampons often.  This will help prevent germs from getting into your urinary tract.     Do pelvic muscle exercises often.  Pelvic muscle exercises may help you start and stop urinating. Strong pelvic muscles may help you empty your bladder easier. Squeeze these muscles tightly for 5 seconds like you are trying to hold back urine. Then relax for 5 seconds. Gradually work up to squeezing for 10 seconds. Do 3 sets of 15 repetitions a day, or as directed.  © 2017 [employer] Information is for End User's use only and may not be sold, redistributed or otherwise used for  commercial purposes. All illustrations and images included in CareNotes® are the copyrighted property of [employer] or [employer].  The above information is an  only. It is not intended as medical advice for individual conditions or treatments. Talk to your doctor, nurse or pharmacist before following any medical regimen to see if it is safe and effective for you.

## 2023-12-18 NOTE — PATIENT INSTRUCTIONS
Urine culture as discussed.   Start antibiotic. Take probiotic.  Increase oral fluids.  Tylenol or Motrin for pain or fever.  Azo for bladder spasms.  Follow up with PCP if no improvement.  Go to ER with abd pain, flank pain or worsening symptoms.       Urinary Tract Infection in Women   WHAT YOU NEED TO KNOW:   A urinary tract infection (UTI) is caused by bacteria that get inside your urinary tract. Most bacteria that enter your urinary tract come out when you urinate. If the bacteria stay in your urinary tract, you may get an infection. Your urinary tract includes your kidneys, ureters, bladder, and urethra. Urine is made in your kidneys, and it flows from the ureters to the bladder. Urine leaves the bladder through the urethra. A UTI is more common in your lower urinary tract, which includes your bladder and urethra.        DISCHARGE INSTRUCTIONS:   Return to the emergency department if:   You are urinating very little or not at all.    You have a high fever with shaking chills.     You have side or back pain that gets worse.  Contact your healthcare provider if:   You have a fever.    You do not feel better after 2 days of taking antibiotics.    You are vomiting.     You have questions or concerns about your condition or care.  Medicines:   Antibiotics  help fight a bacterial infection.     Medicines  may be given to decrease pain and burning when you urinate. They will also help decrease the feeling that you need to urinate often. These medicines will make your urine orange or red.    Take your medicine as directed.  Contact your healthcare provider if you think your medicine is not helping or if you have side effects. Tell him or her if you are allergic to any medicine. Keep a list of the medicines, vitamins, and herbs you take. Include the amounts, and when and why you take them. Bring the list or the pill bottles to follow-up visits. Carry your medicine list with you in case of an emergency.  Follow up with  your healthcare provider as directed:  Write down your questions so you remember to ask them during your visits.   Prevent another UTI:   Empty your bladder often.  Urinate and empty your bladder as soon as you feel the need. Do not hold your urine for long periods of time.    Wipe from front to back after you urinate or have a bowel movement.  This will help prevent germs from getting into your urinary tract through your urethra.    Drink liquids as directed.  Ask how much liquid to drink each day and which liquids are best for you. You may need to drink more liquids than usual to help flush out the bacteria. Do not drink alcohol, caffeine, or citrus juices. These can irritate your bladder and increase your symptoms. Your healthcare provider may recommend cranberry juice to help prevent a UTI.    Urinate after you have sex.  This can help flush out bacteria passed during sex.    Do not douche or use feminine deodorants.  These can change the chemical balance in your vagina.    Change sanitary pads or tampons often.  This will help prevent germs from getting into your urinary tract.     Do pelvic muscle exercises often.  Pelvic muscle exercises may help you start and stop urinating. Strong pelvic muscles may help you empty your bladder easier. Squeeze these muscles tightly for 5 seconds like you are trying to hold back urine. Then relax for 5 seconds. Gradually work up to squeezing for 10 seconds. Do 3 sets of 15 repetitions a day, or as directed.  © 2017 [employer] Information is for End User's use only and may not be sold, redistributed or otherwise used for commercial purposes. All illustrations and images included in CareNotes® are the copyrighted property of [employer] or [employer].  The above information is an  only. It is not intended as medical advice for individual conditions or treatments. Talk to your doctor, nurse or pharmacist before following any  medical regimen to see if it is safe and effective for you.

## 2024-01-15 ENCOUNTER — TELEPHONE (OUTPATIENT)
Dept: HEMATOLOGY ONCOLOGY | Facility: CLINIC | Age: 54
End: 2024-01-15

## 2024-01-15 ENCOUNTER — APPOINTMENT (OUTPATIENT)
Dept: LAB | Facility: CLINIC | Age: 54
End: 2024-01-15
Payer: COMMERCIAL

## 2024-01-15 ENCOUNTER — APPOINTMENT (OUTPATIENT)
Dept: LAB | Age: 54
End: 2024-01-15
Payer: COMMERCIAL

## 2024-01-15 DIAGNOSIS — D75.839 THROMBOCYTOSIS: ICD-10-CM

## 2024-01-15 DIAGNOSIS — C50.511 MALIGNANT NEOPLASM OF LOWER-OUTER QUADRANT OF RIGHT BREAST OF FEMALE, ESTROGEN RECEPTOR POSITIVE: ICD-10-CM

## 2024-01-15 DIAGNOSIS — Z17.0 MALIGNANT NEOPLASM OF LOWER-OUTER QUADRANT OF RIGHT BREAST OF FEMALE, ESTROGEN RECEPTOR POSITIVE: ICD-10-CM

## 2024-01-15 DIAGNOSIS — E61.1 HYPOFERREMIA: ICD-10-CM

## 2024-01-15 DIAGNOSIS — N39.0 URINARY TRACT INFECTION WITHOUT HEMATURIA, SITE UNSPECIFIED: ICD-10-CM

## 2024-01-15 DIAGNOSIS — Z98.84 HISTORY OF GASTRIC BYPASS: ICD-10-CM

## 2024-01-15 DIAGNOSIS — E53.8 B12 DEFICIENCY: ICD-10-CM

## 2024-01-15 DIAGNOSIS — Z98.84 HISTORY OF GASTRIC BYPASS: Primary | ICD-10-CM

## 2024-01-15 LAB
ALBUMIN SERPL BCP-MCNC: 4.4 G/DL (ref 3.5–5)
ALP SERPL-CCNC: 112 U/L (ref 34–104)
ALT SERPL W P-5'-P-CCNC: 16 U/L (ref 7–52)
ANION GAP SERPL CALCULATED.3IONS-SCNC: 11 MMOL/L
AST SERPL W P-5'-P-CCNC: 19 U/L (ref 13–39)
BACTERIA UR QL AUTO: ABNORMAL /HPF
BASOPHILS # BLD AUTO: 0.04 THOUSANDS/ÂΜL (ref 0–0.1)
BASOPHILS NFR BLD AUTO: 0 % (ref 0–1)
BILIRUB SERPL-MCNC: 0.7 MG/DL (ref 0.2–1)
BILIRUB UR QL STRIP: NEGATIVE
BUN SERPL-MCNC: 11 MG/DL (ref 5–25)
CALCIUM SERPL-MCNC: 9.8 MG/DL (ref 8.4–10.2)
CHLORIDE SERPL-SCNC: 99 MMOL/L (ref 96–108)
CLARITY UR: CLEAR
CO2 SERPL-SCNC: 27 MMOL/L (ref 21–32)
COLOR UR: ABNORMAL
CREAT SERPL-MCNC: 0.69 MG/DL (ref 0.6–1.3)
EOSINOPHIL # BLD AUTO: 0.18 THOUSAND/ÂΜL (ref 0–0.61)
EOSINOPHIL NFR BLD AUTO: 2 % (ref 0–6)
ERYTHROCYTE [DISTWIDTH] IN BLOOD BY AUTOMATED COUNT: 12 % (ref 11.6–15.1)
FERRITIN SERPL-MCNC: 144 NG/ML (ref 11–307)
GFR SERPL CREATININE-BSD FRML MDRD: 101 ML/MIN/1.73SQ M
GLUCOSE P FAST SERPL-MCNC: 101 MG/DL (ref 65–99)
GLUCOSE UR STRIP-MCNC: NEGATIVE MG/DL
HCT VFR BLD AUTO: 48.5 % (ref 34.8–46.1)
HGB BLD-MCNC: 16 G/DL (ref 11.5–15.4)
HGB UR QL STRIP.AUTO: NEGATIVE
HYALINE CASTS #/AREA URNS LPF: ABNORMAL /LPF
IMM GRANULOCYTES # BLD AUTO: 0.03 THOUSAND/UL (ref 0–0.2)
IMM GRANULOCYTES NFR BLD AUTO: 0 % (ref 0–2)
IRON SATN MFR SERPL: 12 % (ref 15–50)
IRON SERPL-MCNC: 43 UG/DL (ref 50–212)
KETONES UR STRIP-MCNC: NEGATIVE MG/DL
LEUKOCYTE ESTERASE UR QL STRIP: NEGATIVE
LYMPHOCYTES # BLD AUTO: 0.93 THOUSANDS/ÂΜL (ref 0.6–4.47)
LYMPHOCYTES NFR BLD AUTO: 10 % (ref 14–44)
MCH RBC QN AUTO: 31.4 PG (ref 26.8–34.3)
MCHC RBC AUTO-ENTMCNC: 33 G/DL (ref 31.4–37.4)
MCV RBC AUTO: 95 FL (ref 82–98)
MONOCYTES # BLD AUTO: 0.56 THOUSAND/ÂΜL (ref 0.17–1.22)
MONOCYTES NFR BLD AUTO: 6 % (ref 4–12)
MUCOUS THREADS UR QL AUTO: ABNORMAL
NEUTROPHILS # BLD AUTO: 7.67 THOUSANDS/ÂΜL (ref 1.85–7.62)
NEUTS SEG NFR BLD AUTO: 82 % (ref 43–75)
NITRITE UR QL STRIP: POSITIVE
NON-SQ EPI CELLS URNS QL MICRO: ABNORMAL /HPF
NRBC BLD AUTO-RTO: 0 /100 WBCS
PH UR STRIP.AUTO: 6.5 [PH]
PLATELET # BLD AUTO: 294 THOUSANDS/UL (ref 149–390)
PMV BLD AUTO: 10.7 FL (ref 8.9–12.7)
POTASSIUM SERPL-SCNC: 4 MMOL/L (ref 3.5–5.3)
PROT SERPL-MCNC: 7.4 G/DL (ref 6.4–8.4)
PROT UR STRIP-MCNC: NEGATIVE MG/DL
RBC # BLD AUTO: 5.09 MILLION/UL (ref 3.81–5.12)
RBC #/AREA URNS AUTO: ABNORMAL /HPF
SODIUM SERPL-SCNC: 137 MMOL/L (ref 135–147)
SP GR UR STRIP.AUTO: 1.01 (ref 1–1.03)
TIBC SERPL-MCNC: 357 UG/DL (ref 250–450)
UIBC SERPL-MCNC: 314 UG/DL (ref 155–355)
UROBILINOGEN UR STRIP-ACNC: <2 MG/DL
VIT B12 SERPL-MCNC: 448 PG/ML (ref 180–914)
WBC # BLD AUTO: 9.41 THOUSAND/UL (ref 4.31–10.16)
WBC #/AREA URNS AUTO: ABNORMAL /HPF

## 2024-01-15 PROCEDURE — 83550 IRON BINDING TEST: CPT

## 2024-01-15 PROCEDURE — 80053 COMPREHEN METABOLIC PANEL: CPT

## 2024-01-15 PROCEDURE — 83540 ASSAY OF IRON: CPT

## 2024-01-15 PROCEDURE — 85025 COMPLETE CBC W/AUTO DIFF WBC: CPT

## 2024-01-15 PROCEDURE — 36415 COLL VENOUS BLD VENIPUNCTURE: CPT

## 2024-01-15 PROCEDURE — 82607 VITAMIN B-12: CPT

## 2024-01-15 PROCEDURE — 81001 URINALYSIS AUTO W/SCOPE: CPT

## 2024-01-15 PROCEDURE — 82728 ASSAY OF FERRITIN: CPT

## 2024-01-15 NOTE — TELEPHONE ENCOUNTER
Received VM from patient asking for updated labs for appointment this week. Previous labs , new labs entered. Pt called and notified.

## 2024-01-17 ENCOUNTER — OFFICE VISIT (OUTPATIENT)
Dept: HEMATOLOGY ONCOLOGY | Facility: CLINIC | Age: 54
End: 2024-01-17
Payer: COMMERCIAL

## 2024-01-17 VITALS
SYSTOLIC BLOOD PRESSURE: 126 MMHG | WEIGHT: 213 LBS | BODY MASS INDEX: 32.28 KG/M2 | DIASTOLIC BLOOD PRESSURE: 82 MMHG | OXYGEN SATURATION: 97 % | HEART RATE: 79 BPM | HEIGHT: 68 IN | RESPIRATION RATE: 17 BRPM | TEMPERATURE: 97.7 F

## 2024-01-17 DIAGNOSIS — N30.00 ACUTE CYSTITIS WITHOUT HEMATURIA: ICD-10-CM

## 2024-01-17 DIAGNOSIS — F41.9 ANXIETY: ICD-10-CM

## 2024-01-17 DIAGNOSIS — Z98.84 HISTORY OF GASTRIC BYPASS: ICD-10-CM

## 2024-01-17 DIAGNOSIS — C50.511 MALIGNANT NEOPLASM OF LOWER-OUTER QUADRANT OF RIGHT BREAST OF FEMALE, ESTROGEN RECEPTOR POSITIVE: Primary | ICD-10-CM

## 2024-01-17 DIAGNOSIS — Z13.71 BRCA NEGATIVE: ICD-10-CM

## 2024-01-17 DIAGNOSIS — Z79.811 USE OF AROMATASE INHIBITORS: ICD-10-CM

## 2024-01-17 DIAGNOSIS — Z17.0 MALIGNANT NEOPLASM OF LOWER-OUTER QUADRANT OF RIGHT BREAST OF FEMALE, ESTROGEN RECEPTOR POSITIVE: Primary | ICD-10-CM

## 2024-01-17 DIAGNOSIS — E61.1 HYPOFERREMIA: ICD-10-CM

## 2024-01-17 PROCEDURE — 99215 OFFICE O/P EST HI 40 MIN: CPT | Performed by: PHYSICIAN ASSISTANT

## 2024-01-17 RX ORDER — NITROFURANTOIN 25; 75 MG/1; MG/1
100 CAPSULE ORAL 2 TIMES DAILY
Qty: 6 CAPSULE | Refills: 0 | Status: SHIPPED | OUTPATIENT
Start: 2024-01-17 | End: 2024-01-20

## 2024-01-17 RX ORDER — LORAZEPAM 0.5 MG/1
TABLET ORAL
Qty: 10 TABLET | Refills: 0 | Status: SHIPPED | OUTPATIENT
Start: 2024-01-17

## 2024-01-17 NOTE — PROGRESS NOTES
Hematology/Oncology Outpatient Follow- up Note  Jaylyn Kim 53 y.o. female MRN: @ Encounter: [id number]        Date:  1/17/2024      Assessment / Plan:    1. Stage IA invasive ductal carcinoma the right breast diagnosed on 06/2018 status post bilateral mastectomies (pT1a, N0, grade 1) %, %, HER2 negative. Left mastectomy was done for prophylactic measures - negative for malignancy.  She is status post immediate expanders and bilateral salpingo oophorectomy in August 2018.      Negative for BRCA1/2 mutation, she was found to have FH mutation of unknown significance.     Anastrozole 1 mg p.o. Daily initiated on 09/2018.  She elected to stay on treatment beyond 5 years.  Per discussion today, she reports she is going to discontinue treatment.            2. Osteopenia on the DEXA scan 9/2019, calcium plus vitamin-D 1-2 tablets every day  Repeat DEXA 9/27/21per Dr. Garcia - low bone mass, notable at the spine and femoral neck areas and the patient is considered at low risk for fracture.       F/U Dexa 10/2023- T score - 1.8 L spine.  continue CA+ vit D.       3.  Insomnia.   Uses ambien seldomly.       4.  Mild thrombocytosis.  Platelet count 363 8/21;  407 - 12/24/21. Reolved     5.  S/P gastric bypass surgery.  Iron saturation 16% 8/21.  She had been taking oral iron daily.  Hemoglobin 13.2 with MCV of 83 on December 20, 2022 labs, platelet count 399.  Iron saturation 13%, ferritin 14  1/2023 Venofer 300mg x 3 doses.       6.  Chronic muscle pain.  Works with pain management.  Exacerbated post mastectomy.  Discussed trial of different AI, tamoxifen or trial of AI hold.  She doesn't think AI contributing significantly.    She is s/p cholecystectomy with some improvement in posterior back pain.     7.  Elevated CLAUDIA, ESR.  Has seen rheumatology previously.  Treatment limited due to POTS exacerbation on prior therapy.          8.  S/p hysterecomy 12/22 for for high-grade squamous intraepithelial lesion      9.  Colonoscopy at [facility] 11/2021 - polyp removed in descending colon and ascending colon- tubular adenomas identified     10.  Low Normal B12.  B12 448 1/15/24.  Continue oral B12     11.  S/p silicone implant based reconstruction.  Last breast MRI 10/24/2020.    F/U breast MRI to assess for possible implant rupture ordered.        HPI: Jaylyn Kim was seen initially 9/7/2018 re: right sided, ER/%, her-2 negative stage IA breast cancer.  She is status post bilateral mastectomy.     She underwent screening mammogram 6/7/2018 which showed abnormal findings.  Diagnostic imaging led to right breast biopsy 6/14/2018 which identified invasive breast carcinoma.  Right breast core biopsy showed invasive breast carcinoma of no specific type, grade 1 with ductal carcinoma in situ present, comprising 45% of the lesion, % positive, % positive, her 2+ 1 by IHC  She elected to proceed with bilateral mastectomy and Bilateral oophorectomy performed by doctors Angelique and Chucky respectively 8/10/2018.  Expander placement/reconstruction per Dr. Mccormick     Final pathology showed invasive ductal carcinoma of no specific type, tumor size 3 mm, grade 1 no evidence of lymphovascular invasion stage IA ( pT1a, pN0, grade 1) with 2- sentinel lymph nodes     She has family history of breast and ovarian cancer in her mother.  Patient's genetic testing was negative for BRCA1 or 2 mutation, her mother was tested negative for BRCA1/2 mutation, the patient was found to have FH mutation of unknown significance     Initiated on anastrozole 1 mg p.o. Daily on 09/07/2018     She had a history of GERD, gastric bypass surgery, Raynaud phenomena, POTS.         Persistent mid thoracic back pain.  Started in PACU post mastectomy.       6/21:  Noncontrast brain MRI secondary to headache- No mass effect, recent infarct, intracranial hemorrhage, or hydrocephalus.  No parenchymal signal abnormality appreciated.  Status post internal  ethmoidectomies. Moderate mucosal thickening right  maxillary sinus is present        8/21:  Hemoglobin 14.6, white blood cell count 6, platelet count 363  Glucose 110, alk-phos 129 otherwise normal CMP folate greater than 17.5, hemoglobin A1c 5.2, no monoclonal band on SPEP or UPEP, vitamin B12 greater than 1450, TSH 0.82, iron saturation 16%,     9/8/21:  Neurology consult with Sherrell Judge MD at [facility] regarding numbness and tingling side of her face and neck that began in May 2021.  Radiates from the back of her neck, upper back, shoulder blades.  This can occur on either side.     She gave a history of back pain and midthoracic area with numbness and tingling.  Spine surgery was discussed.  She did have right upper extremity weakness which resolved        9/29/21 [facility]:  Noncontrast cervical spine MRI - Multilevel moderate to severe degenerative changes of the cervical spine    Most significant at:   C5-C6: LEFT paracentral disc protrusion measuring 5 mm in the anterior  posterior dimension with ventral mass effect on the cervical spinal cord leading to mild to moderate spinal canal stenosis. May be partially calcified and   represent acute or chronic degenerative change at this level. LEFT uncovertebral   joint hypertrophy. Moderate LEFT neuroforaminal stenosis. RIGHT uncovertebral   joint hypertrophy. Moderate RIGHT neuroforaminal stenosis.          10/2021- office visit with Dr. Merino- no mass or tenderness on breast exam.  6 month f/u requested.     Home Sleep study 11/21 per Dr. Lakhani     EMG 11/8/21 to evaluate mid back spasms associated with bilateral lower extremity pain at times for the last several months.  Patient is being evaluated for a lumbosacral radiculopathy versus focal neuropathy. - normal EMG of the bilateral lower extremities.     11/24/21:  Colonoscopy per Dr. Angela López- tubular adenomas     Receiving TPI per Dr. Irving with pain management     12/24/21:  Hemoglobin 13.9, white  blood cell count 5.4, platelet count 407  CLAUDIA positive with titer of 160, negative double-stranded DNA, Smith auto antibody, Scl -70, SSA and SSB antibody.  ESR 33 (ULN 20).  CRP 6.8 (ULN 7), negative Lyme antibody.  Normal vitamin-D level of 51       Has received trigger point injections for myofascial pain.       9/22 CT scan of the neck-normal in regards to no pathologic adenopathy or soft tissue mass in the neck noted.  Incidentally noted, partially visualized left breast mass     9/22 left breast u/s - What was thought to be a mass in the breast on the CT scan is actually just the top of the breast implant.   There are 3 small foci of fat necrosis seen on the ultrasound examination, 1 of which is also readily evident on the CT scan.  These do not require any specific surveillance.  The largest is about 8 mm.  These are in the 10-11 o'clock region above the implant.     10/22 CT chest to f/u thoracic aortic ectasia - Ascending thoracic aorta within normal limits in diameter.  No evidence of thoracic aortic aneurysm.     Robot-assisted total laparoscopic hysterectomy from December 23, 2022 by Dr. Bhatti for high-grade squamous intraepithelial lesion       Interval History:  chronic back ache and muscle aches.    Urinary burning, strong odor.      Review of Systems   Constitutional:  Negative for appetite change, chills, diaphoresis, fatigue, fever and unexpected weight change.   HENT:   Negative for mouth sores, nosebleeds, sore throat, tinnitus and voice change.    Eyes:  Negative for eye problems.   Respiratory:  Negative for chest tightness, cough, shortness of breath and wheezing.    Cardiovascular:  Negative for chest pain, leg swelling and palpitations.   Gastrointestinal:  Negative for abdominal distention, abdominal pain, blood in stool, constipation, diarrhea, nausea, rectal pain and vomiting.   Endocrine: Negative for hot flashes.   Genitourinary: Negative.     Musculoskeletal:  Negative for gait problem  "and myalgias.   Skin:  Negative for itching and rash.   Neurological:  Negative for dizziness, gait problem, headaches, light-headedness and numbness.   Hematological:  Negative for adenopathy.   Psychiatric/Behavioral:  Negative for confusion and sleep disturbance. The patient is not nervous/anxious.         Test Results:        Labs:   Lab Results   Component Value Date    HGB 16.0 (H) 01/15/2024    HCT 48.5 (H) 01/15/2024    MCV 95 01/15/2024     01/15/2024    WBC 9.41 01/15/2024    NRBC 0 01/15/2024     Lab Results   Component Value Date    K 4.0 01/15/2024    CL 99 01/15/2024    CO2 27 01/15/2024    BUN 11 01/15/2024    CREATININE 0.69 01/15/2024    GLUCOSE 129 07/14/2016    GLUF 101 (H) 01/15/2024    CALCIUM 9.8 01/15/2024    AST 19 01/15/2024    ALT 16 01/15/2024    ALKPHOS 112 (H) 01/15/2024    EGFR 101 01/15/2024           Imaging: No results found.          Allergies: No Known Allergies  Current Medications: Reviewed  PMH/FH/SH:  Reviewed      Physical Exam:    2.1 meters squared    Ht Readings from Last 3 Encounters:   01/17/24 5' 8\" (1.727 m)   08/08/23 5' 8\" (1.727 m)   08/08/23 5' 8\" (1.727 m)        Wt Readings from Last 3 Encounters:   01/17/24 96.6 kg (213 lb)   08/08/23 99.6 kg (219 lb 9.6 oz)   08/08/23 99.6 kg (219 lb 9.6 oz)        Temp Readings from Last 3 Encounters:   01/17/24 97.7 °F (36.5 °C)   08/08/23 (!) 96.7 °F (35.9 °C) (Temporal)   08/08/23 (!) 96.7 °F (35.9 °C) (Temporal)        BP Readings from Last 3 Encounters:   01/17/24 126/82   08/08/23 132/86   08/08/23 132/86             Physical Exam  Vitals reviewed.   Constitutional:       General: She is not in acute distress.     Appearance: She is well-developed. She is not diaphoretic.   HENT:      Head: Normocephalic and atraumatic.   Eyes:      Conjunctiva/sclera: Conjunctivae normal.   Neck:      Trachea: No tracheal deviation.   Cardiovascular:      Rate and Rhythm: Normal rate and regular rhythm.      Heart sounds: No " murmur heard.     No friction rub. No gallop.   Pulmonary:      Effort: Pulmonary effort is normal. No respiratory distress.      Breath sounds: Normal breath sounds. No wheezing or rales.   Chest:      Chest wall: No tenderness.   Abdominal:      General: There is no distension.      Palpations: Abdomen is soft.      Tenderness: There is no abdominal tenderness.   Musculoskeletal:      Cervical back: Normal range of motion and neck supple.   Lymphadenopathy:      Cervical: No cervical adenopathy.   Skin:     General: Skin is warm and dry.      Coloration: Skin is not pale.      Findings: No erythema.   Neurological:      Mental Status: She is alert and oriented to person, place, and time.   Psychiatric:         Behavior: Behavior normal.         Thought Content: Thought content normal.         Judgment: Judgment normal.         ECO      Emergency Contacts:    Extended Emergency Contact Information  Primary Emergency Contact: Griselda Damon   [facility]  Home Phone: [phone]  Mobile Phone: [phone]  Relation: Mother  Secondary Emergency Contact: MARIA G DE LA O  Mobile Phone: [phone]  Relation: Father

## 2024-01-20 ENCOUNTER — AMB VIDEO VISIT (OUTPATIENT)
Dept: OTHER | Facility: HOSPITAL | Age: 54
End: 2024-01-20

## 2024-01-20 VITALS — RESPIRATION RATE: 16 BRPM

## 2024-01-20 DIAGNOSIS — H10.022 PINK EYE DISEASE OF LEFT EYE: Primary | ICD-10-CM

## 2024-01-20 PROCEDURE — ECARE PR SL URGENT CARE VIRTUAL VISIT: Performed by: NURSE PRACTITIONER

## 2024-01-20 RX ORDER — TOBRAMYCIN 3 MG/ML
1 SOLUTION/ DROPS OPHTHALMIC
Qty: 5 ML | Refills: 0 | Status: SHIPPED | OUTPATIENT
Start: 2024-01-20 | End: 2024-01-27

## 2024-01-20 NOTE — CARE ANYWHERE EVISITS
Visit Summary for HERBERT DE LA O - Gender: Female - Date of Birth: 1970  Date: [id number] - Duration: 6 minutes  Patient: HERBERT DE LA O  Provider: Mindy TRAORE    Patient Contact Information  Address  [address] [place]; [place] [address]  [phone]    Visit Topics  Pink eye [Added By: Self - 2024-01-20]    Triage Questions   What is your current physical address in the event of a medical emergency? Answer []  Are you allergic to any medications? Answer []  Are you now or could you be pregnant? Answer []  Do you have any immune system compromise or chronic lung   disease? Answer []  Do you have any vulnerable family members in the home (infant, pregnant, cancer, elderly)? Answer []     Conversation Transcripts  [0A][0A] [Notification] You are connected with Mindy TRAORE, Urgent Care Specialist.[0A][Notification] [facility] [place]0A][Notification] [facility] history...[0A]    Diagnosis  Other mucopurulent conjunctivitis, left eye    Procedures  Value: 73007 Code: CPT-4 UNLISTED E&M SERVICE    Medications Prescribed    No prescriptions ordered    Electronically signed by: Mindy Lorenz(NPI [id number])

## 2024-01-20 NOTE — PATIENT INSTRUCTIONS
Warm compresses 3 times per day.  Start eye drops as prescribed.  Avoid rubbing the eye.  Follow up with PCP if no improvement.  Go to Er with any worsening symptoms.

## 2024-01-20 NOTE — PROGRESS NOTES
"Required Documentation:  Encounter provider LEÓN White    Provider located at [facility]  VIRTUAL CARE   [address] [id number]    Identify all parties in room with patient during virtual visit:  No one else    The patient was identified by name and date of birth. Jaylyn iKm was informed that this is a telemedicine visit and that the visit is being conducted through the [facility] [employer] [employer] platform. She agrees to proceed..  My office door was closed. No one else was in the room.  She acknowledged consent and understanding of privacy and security of the video platform. The patient has agreed to participate and understands they can discontinue the visit at any time.    Verification of patient location:    Patient is located at home in the following state in which I hold an active license [place]    Patient is aware this is a billable service.     Reason for visit is No chief complaint on file.       Subjective  This is a 53 year old female here today for video visit.  She states se woke over night with left eye crusted.  When she woke up today it was crusted again.  She states yesterday her eye was okay.   She denies any eye pain or change in vision.   NO cough or congestion.  NO uri symptoms.   Of note she noticed some yellow orange urine but was on macrobid.  Discussed this may cause this, no urinary symptoms now.            Past Medical History:   Diagnosis Date    Abnormal Pap smear of cervix     Arthritis     Ashkenazi Judaism ancestry requiring population-specific genetic screening     Asthma     Back pain     Breast cancer (HCC) 6/13/17    Breathing difficulty     after gastric bypass 2014-\"felt elephant on chest\"    Cancer (HCC)     Cervical cancer (HCC) 8/2022    Cervical dysplasia     Chest pain, non-cardiac     Seen in ER and is from esophageal spasms.    Colon polyp     Constipation     occasional    DDD (degenerative disc disease), " cervical     Family history of breast cancer in female     GERD (gastroesophageal reflux disease)     History of MRSA infection     Leg    History of UTI     treated 8/1/2018    Human papilloma virus (HPV) infection     Hx MRSA infection     on leg-2009 treated    Neck pain     Pollen allergies     PONV (postoperative nausea and vomiting)     nausea    Postural lightheadedness     Raynaud's disease     Seasonal allergies     Sinus problem        Past Surgical History:   Procedure Laterality Date    BILATERAL OOPHORECTOMY      BLADDER SURGERY  2013    bladder lift    BREAST BIOPSY Right 06/14/2018    [facility]    CERVICAL BIOPSY N/A 11/15/2022    Procedure: BIOPSY LEEP CERVIX;  Surgeon: Lawrence Li MD;  [facility]: [facility];  Service: Gynecology Oncology    CERVIX LESION DESTRUCTION      COLONOSCOPY      COLPOSCOPY W/ BIOPSY / CURETTAGE      ENDOMETRIAL ABLATION      EXAMINATION UNDER ANESTHESIA N/A 11/15/2022    Procedure: EXAM UNDER ANESTHESIA (EUA);  Surgeon: Lawrence Li MD;  [facility];  Service: Gynecology Oncology    GASTRIC BYPASS  2014 2014 wt loss 90 lbs(regained 20 lbs)    HYSTERECTOMY N/A 12/23/2022    Procedure: (LT) W/ ROBOT;  Surgeon: Viki Bhatti MD;  [facility];  Service: Gynecology Oncology    HYSTEROSCOPIC STERILIZATION W/ IMPLANTS      HYSTEROSCOPY W/ ENDOMETRIAL ABLATION  2013    KNEE ARTHROSCOPY Right 1990    LYMPH NODE BIOPSY Right 08/10/2018    Procedure: BIOPSY LYMPH NODE SENTINEL;  Surgeon: Nathalie Merino MD;  [facility];  Service: Surgical Oncology    MAMMO STEREOTACTIC BREAST BIOPSY RIGHT (ALL INC) Right 06/14/2018    MASTECTOMY Bilateral     NASAL SEPTUM SURGERY      2002, 2016    OOPHORECTOMY Bilateral     DE IMPLNT BIO IMPLNT FOR SOFT TISSUE REINFORCEMENT Bilateral 08/10/2018    Procedure: RECONSTRUCTION BREAST W/ IMPLANT;  Surgeon: Larissa Mccormick MD;  [facility];  Service: Plastics    DE INSJ/RPLCMT BREAST IMPLANT SEP DAY  MASTECTOMY Bilateral 11/30/2018    Procedure: BREAST IMPLANT EXCHANGE;  Surgeon: Larissa Mccormick MD;  [facility];  Service: Plastics    PA LAPAROSCOPY W/RMVL ADNEXAL STRUCTURES N/A 08/10/2018    Procedure: SALPINGO-OOPHORECTOMY, LAPAROSCOPIC; PELVIC WASHINGS ;  Surgeon: Lloyd Locke MD;  [facility];  Service: Gynecology Oncology    PA LAPS SURG CHOLECYSTECTOMY W/CHOLANGIOGRAPHY N/A 03/19/2021    Procedure: CHOLECYSTECTOMY LAPAROSCOPIC W/ INTRAOP CHOLANGIOGRAM;  Surgeon: Jorge Alberto Arrington MD;  Location: [facility];  Service: General    PA MASTECTOMY SIMPLE COMPLETE Bilateral 08/10/2018    Procedure: MASTECTOMY SIMPLE;  Surgeon: Nathalie Merino MD;  [facility];  Service: Surgical Oncology    PA LENNIE-IMPLANT CAPSULECTOMY BREAST COMPLETE Bilateral 11/30/2018    Procedure: CAPSULECTOMY;  Surgeon: Larissa Mccormick MD;  [facility];  Service: Plastics    PA REVISION OF RECONSTRUCTED BREAST Bilateral 02/22/2019    Procedure: BREAST MOUND REVISION; FAT GRAFTING;  Surgeon: Larissa Mccormick MD;  [facility];  Service: Plastics    PA TISSUE EXPANDER PLACEMENT BREAST RECONSTRUCTION Bilateral 08/10/2018    Procedure: INSERTION/PLACEMENT TISSUE EXPANDER (EXCHANGE);  Surgeon: Larissa Mccormick MD;  [facility];  Service: Plastics    SINUS SURGERY          No Known Allergies    Review of Systems   Constitutional:  Negative for activity change and chills.   Eyes:  Positive for discharge and redness.   Psychiatric/Behavioral: Negative.         Video Exam    Vitals:    01/20/24 1014   Resp: 16       Physical Exam  Constitutional:       General: She is not in acute distress.     Appearance: Normal appearance. She is not ill-appearing or toxic-appearing.   Eyes:      Comments: Left eye injected, there is some discharge noted.    Skin:     Comments: No rash on head or neck.    Neurological:      Mental Status: She is alert and oriented to person, place, and time.   Psychiatric:         Mood and Affect:  Mood normal.         Behavior: Behavior normal.         Thought Content: Thought content normal.         Judgment: Judgment normal.         Visit Time  Total Visit Duration: 8 minutes    Assessment/Plan:    Diagnoses and all orders for this visit:    Pink eye disease of left eye  -     tobramycin (TOBREX) 0.3 % SOLN; Administer 1 drop to the right eye every 4 (four) hours while awake for 7 days        Patient Instructions   Warm compresses 3 times per day.  Start eye drops as prescribed.  Avoid rubbing the eye.  Follow up with PCP if no improvement.  Go to Er with any worsening symptoms.

## 2024-01-21 ENCOUNTER — AMB VIDEO VISIT (OUTPATIENT)
Dept: OTHER | Facility: HOSPITAL | Age: 54
End: 2024-01-21

## 2024-01-21 VITALS — HEART RATE: 70 BPM | TEMPERATURE: 97.8 F | OXYGEN SATURATION: 97 %

## 2024-01-21 DIAGNOSIS — J02.9 PHARYNGITIS, UNSPECIFIED ETIOLOGY: Primary | ICD-10-CM

## 2024-01-21 PROCEDURE — ECARE PR SL URGENT CARE VIRTUAL VISIT: Performed by: PHYSICIAN ASSISTANT

## 2024-01-21 RX ORDER — AMOXICILLIN 500 MG/1
1000 CAPSULE ORAL EVERY 8 HOURS SCHEDULED
Qty: 60 CAPSULE | Refills: 0 | Status: SHIPPED | OUTPATIENT
Start: 2024-01-21 | End: 2024-01-31

## 2024-01-21 NOTE — PROGRESS NOTES
"Required Documentation:  Encounter provider Edmundo De Santiago PA-C    Provider located at [facility]  VIRTUAL CARE   [address] [phone]    Identify all parties in room with patient during virtual visit:  No one else    The patient was identified by name and date of birth. Jaylyn Kim was informed that this is a telemedicine visit and that the visit is being conducted through the [employer] 23press platform. She agrees to proceed..  My office door was closed. No one else was in the room.  She acknowledged consent and understanding of privacy and security of the video platform. The patient has agreed to participate and understands they can discontinue the visit at any time.    Verification of patient location:    Patient is located at home in the following state in which I hold an active license [place]    Patient is aware this is a billable service.     Reason for visit is   Chief Complaint   Patient presents with    Sore Throat        Subjective  HPI   Pt complains of sore throat began about 2 days ago. Has been clearing her throat and coughing. Tried sore throat without relief. No recorded fever but has been taking tylenol and NSAIDs. Has pain with solids, can tolerate PO fluids and solid. 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 Has had asthma flare for past couple of weeks. Never had tonsillectomy. No CP or SOB.     Past Medical History:   Diagnosis Date    Abnormal Pap smear of cervix     Arthritis     Ashkenazi Lutheran ancestry requiring population-specific genetic screening     Asthma     Back pain     Breast cancer (HCC) 6/13/17    Breathing difficulty     after gastric bypass 2014-\"felt elephant on chest\"    Cancer (HCC)     Cervical cancer (HCC) 8/2022    Cervical dysplasia     Chest pain, non-cardiac     Seen in ER and is from esophageal spasms.    Colon polyp     Constipation     occasional    DDD (degenerative disc disease), cervical  "    Family history of breast cancer in female     GERD (gastroesophageal reflux disease)     History of MRSA infection     Leg    History of UTI     treated 8/1/2018    Human papilloma virus (HPV) infection     Hx MRSA infection     on leg-2009 treated    Neck pain     Pollen allergies     PONV (postoperative nausea and vomiting)     nausea    Postural lightheadedness     Raynaud's disease     Seasonal allergies     Sinus problem        Past Surgical History:   Procedure Laterality Date    BILATERAL OOPHORECTOMY      BLADDER SURGERY  2013    bladder lift    BREAST BIOPSY Right 06/14/2018    [facility]    CERVICAL BIOPSY N/A 11/15/2022    Procedure: BIOPSY LEEP CERVIX;  Surgeon: Lawrence Li MD;  [facility];  Service: Gynecology Oncology    CERVIX LESION DESTRUCTION      COLONOSCOPY      COLPOSCOPY W/ BIOPSY / CURETTAGE      ENDOMETRIAL ABLATION      EXAMINATION UNDER ANESTHESIA N/A 11/15/2022    Procedure: EXAM UNDER ANESTHESIA (EUA);  Surgeon: Lawrence Li MD;  [facility];  Service: Gynecology Oncology    GASTRIC BYPASS  2014 2014 wt loss 90 lbs(regained 20 lbs)    HYSTERECTOMY N/A 12/23/2022    Procedure: (LTH) W/ ROBOT;  Surgeon: Viki Bhatti MD;  [facility];  Service: Gynecology Oncology    HYSTEROSCOPIC STERILIZATION W/ IMPLANTS      HYSTEROSCOPY W/ ENDOMETRIAL ABLATION  2013    KNEE ARTHROSCOPY Right 1990    LYMPH NODE BIOPSY Right 08/10/2018    Procedure: BIOPSY LYMPH NODE SENTINEL;  Surgeon: Nathalie Merino MD;  [facility];  Service: Surgical Oncology    MAMMO STEREOTACTIC BREAST BIOPSY RIGHT (ALL INC) Right 06/14/2018    MASTECTOMY Bilateral     NASAL SEPTUM SURGERY      2002, 2016    OOPHORECTOMY Bilateral     MT IMPLNT BIO IMPLNT FOR SOFT TISSUE REINFORCEMENT Bilateral 08/10/2018    Procedure: RECONSTRUCTION BREAST W/ IMPLANT;  Surgeon: Larissa Mccormick MD;  [facility];  Service: Plastics    MT INSJ/RPLCMT BREAST IMPLANT SEP DAY MASTECTOMY  Bilateral 11/30/2018    Procedure: BREAST IMPLANT EXCHANGE;  Surgeon: Larissa Mccormick MD;  [facility];  Service: Plastics    TX LAPAROSCOPY W/RMVL ADNEXAL STRUCTURES N/A 08/10/2018    Procedure: SALPINGO-OOPHORECTOMY, LAPAROSCOPIC; PELVIC WASHINGS ;  Surgeon: Lloyd Locke MD;  [facility];  Service: Gynecology Oncology    TX LAPS SURG CHOLECYSTECTOMY W/CHOLANGIOGRAPHY N/A 03/19/2021    Procedure: CHOLECYSTECTOMY LAPAROSCOPIC W/ INTRAOP CHOLANGIOGRAM;  Surgeon: Jorge Alberto Arrington MD;  Location: [facility];  Service: General    TX MASTECTOMY SIMPLE COMPLETE Bilateral 08/10/2018    Procedure: MASTECTOMY SIMPLE;  Surgeon: Nathalie Merino MD;  [facility];  Service: Surgical Oncology    TX LENNIE-IMPLANT CAPSULECTOMY BREAST COMPLETE Bilateral 11/30/2018    Procedure: CAPSULECTOMY;  Surgeon: Larissa Mccormick MD;  [facility];  Service: Plastics    TX REVISION OF RECONSTRUCTED BREAST Bilateral 02/22/2019    Procedure: BREAST MOUND REVISION; FAT GRAFTING;  Surgeon: Larissa Mccormick MD;  [facility];  Service: Plastics    TX TISSUE EXPANDER PLACEMENT BREAST RECONSTRUCTION Bilateral 08/10/2018    Procedure: INSERTION/PLACEMENT TISSUE EXPANDER (EXCHANGE);  Surgeon: Larissa Mccormick MD;  [facility];  Service: Plastics    SINUS SURGERY          No Known Allergies    Review of Systems   Constitutional:  Negative for chills and fever.   HENT:  Positive for sore throat. Negative for congestion, ear pain, sinus pressure, sinus pain, trouble swallowing and voice change.    Eyes:  Negative for pain, discharge and redness.   Respiratory:  Positive for cough. Negative for shortness of breath.    Cardiovascular:  Negative for chest pain.   Skin:  Negative for rash and wound.   Neurological:  Negative for dizziness, numbness and headaches.   Psychiatric/Behavioral:  Negative for behavioral problems.        Video Exam    Vitals:    01/21/24 0810   Pulse: 70   Temp: 97.8 °F (36.6 °C)   TempSrc: Skin   SpO2: 97%        Physical Exam  Constitutional:       General: She is not in acute distress.     Appearance: Normal appearance. She is not toxic-appearing.   HENT:      Head: Normocephalic and atraumatic.      Nose: No rhinorrhea.      Mouth/Throat:      Mouth: Mucous membranes are moist.      Pharynx: Posterior oropharyngeal erythema present. No oropharyngeal exudate.   Eyes:      Conjunctiva/sclera: Conjunctivae normal.   Cardiovascular:      Rate and Rhythm: Normal rate.   Pulmonary:      Effort: No respiratory distress.   Musculoskeletal:      Cervical back: Neck supple.   Neurological:      Mental Status: She is alert and oriented to person, place, and time.      Cranial Nerves: No dysarthria or facial asymmetry.   Psychiatric:         Mood and Affect: Mood normal.         Behavior: Behavior normal.         Visit Time  Total Visit Duration: 20 minutes    Assessment/Plan:    Jaylyn was seen today for sore throat.    Diagnoses and all orders for this visit:    Pharyngitis, unspecified etiology  -     amoxicillin (AMOXIL) 500 mg capsule; Take 2 capsules (1,000 mg total) by mouth every 8 (eight) hours for 10 days  -     POCT rapid ANTIGEN strepA  -     Streptococcus, Group A Culture; Future    53-year-old female with 3-day history of sore throat.  History and limited virtual examination consistent with a nonspecific pharyngitis.  Explained to the patient that with her cough from her asthma and her taking antipyretics consistently throughout the day that this does cloud the picture of the Centor's criteria but still suspect this is more likely viral rather than streptococcal in nature given her lack of fever and exudates.  Given the limited virtual examination and the other confounding factors in her history, will initiate treatment with amoxicillin for strep pharyngitis but we will also send her for rapid strep and strep culture.  If the rapid strep is positive she will continue full course of antibiotics.  If the rapid strep  is negative we will wait for the results of the culture if that is negative she was instructed to stop the amoxicillin.  Advised her to take some form of pre or probiotic along with the antibiotic.    There are no Patient Instructions on file for this visit.

## 2024-01-21 NOTE — CARE ANYWHERE EVISITS
Visit Summary for HERBERT DE LA O - Gender: Female - Date of Birth: 1970  Date: [id number] - Duration: 20 minutes  Patient: HERBERT DE LA O  Provider: Edmundo De Santiago PA-C    Patient Contact Information  [address]  [address] [address] [place]; [place] [address]  [phone]    Visit Topics  Cold [Added By: Self - 2024-01-21]  Strep (already got the pink eye med yesterday) [Added By: Self - 2024-01-21]    Triage Questions   What is your current physical address in the event of a medical emergency? Answer []  Are you allergic to any medications? Answer []  Are you now or could you be pregnant? Answer []  Do you have any immune system compromise or chronic lung   disease? Answer []  Do you have any vulnerable family members in the home (infant, pregnant, cancer, elderly)? Answer []     Conversation Transcripts  [0A][0A] [Notification] You are connected with Edmundo De Santiago PA-C, Urgent Care Specialist.[0A][Notification] HERBERT OKEEFE is located in [place].[0A][Notification] HERBERT DE LA O has shared health history...[0A]    Diagnosis  Acute pharyngitis, unspecified    Procedures  Value: [id number] Code: CPT-4 UNLISTED E&M SERVICE    Medications Prescribed    No prescriptions ordered    Electronically signed by: Edmundo De Santiago PA-C(NPI [id number])

## 2024-02-06 ENCOUNTER — OFFICE VISIT (OUTPATIENT)
Dept: SURGERY | Facility: CLINIC | Age: 54
End: 2024-02-06
Payer: COMMERCIAL

## 2024-02-06 VITALS
HEIGHT: 68 IN | DIASTOLIC BLOOD PRESSURE: 78 MMHG | TEMPERATURE: 98.3 F | BODY MASS INDEX: 32.28 KG/M2 | HEART RATE: 82 BPM | WEIGHT: 213 LBS | SYSTOLIC BLOOD PRESSURE: 122 MMHG | OXYGEN SATURATION: 97 %

## 2024-02-06 DIAGNOSIS — Z98.84 BARIATRIC SURGERY STATUS: Primary | ICD-10-CM

## 2024-02-06 PROCEDURE — 99213 OFFICE O/P EST LOW 20 MIN: CPT | Performed by: SPECIALIST

## 2024-02-06 RX ORDER — PHENTERMINE AND TOPIRAMATE 7.5; 46 MG/1; MG/1
7.5 CAPSULE, EXTENDED RELEASE ORAL ONCE
Qty: 30 CAPSULE | Refills: 2 | Status: SHIPPED | OUTPATIENT
Start: 2024-02-06 | End: 2024-02-06

## 2024-02-06 RX ORDER — PHENTERMINE AND TOPIRAMATE 7.5; 46 MG/1; MG/1
7.5 CAPSULE, EXTENDED RELEASE ORAL ONCE
Qty: 90 CAPSULE | Refills: 2 | Status: SHIPPED | OUTPATIENT
Start: 2024-02-06 | End: 2024-02-06

## 2024-02-06 NOTE — PROGRESS NOTES
Jaylyn presents today in the office for follow-up visit status post laparoscopic Bennett-en-Y gastric bypass for morbid obesity.    Multiple things have occurred since her last visit.  She has undergone among other things laparoscopic NICHOLAS, BSO, breast implant placement etc.    Her weight has waxed and waned during these travails.  She currently weighs 213 pounds.  She weighed 246 pounds prior to undergoing her bariatric procedure.    Today in the office she looks well.  She has no specific complaints.  She is taking her vitamins.  She is eating primarily protein foods trying to eat 3 times a day.  She says she is eating breakfast which is essential.    Physical exam: Young adult white female awake alert no distress.    Abdomen multiple laparoscopic scars are noted with no evidence of hernia.  No tenderness is present.    Impression: As above.  She request medical manipulation to be restarted in an attempt to jumpstart her weight loss.    Plan: At this point medication has been E scripted for her and we will see how she does.  Any problems she is to give us a call.    It was good to see her again.  We have not seen her in a while.

## 2024-02-12 ENCOUNTER — TELEPHONE (OUTPATIENT)
Dept: HEMATOLOGY ONCOLOGY | Facility: CLINIC | Age: 54
End: 2024-02-12

## 2024-02-12 NOTE — TELEPHONE ENCOUNTER
Appointment Change  Cancel, Reschedule, Change to Virtual      Who are you speaking with? Patient   If it is not the patient, is the caller listed on the communication consent form? N/A   Which provider is the appointment scheduled with? LEÓN Urrutia   When was the original appointment scheduled?    Please list date and time 02/13/2024 @ 8AM    At which location is the appointment scheduled to take place? [place]   Was the appointment rescheduled?     Was the appointment changed from an in person visit to a virtual visit?    If so, please list the details of the change. Yes, 02/19/2024 @1PM in [place]    What is the reason for the appointment change? Weather

## 2024-02-19 ENCOUNTER — OFFICE VISIT (OUTPATIENT)
Dept: GYNECOLOGIC ONCOLOGY | Facility: CLINIC | Age: 54
End: 2024-02-19
Payer: COMMERCIAL

## 2024-02-19 VITALS
WEIGHT: 214 LBS | SYSTOLIC BLOOD PRESSURE: 130 MMHG | BODY MASS INDEX: 32.54 KG/M2 | DIASTOLIC BLOOD PRESSURE: 80 MMHG | OXYGEN SATURATION: 98 % | TEMPERATURE: 98 F | HEART RATE: 118 BPM

## 2024-02-19 DIAGNOSIS — Z87.410 HISTORY OF CERVICAL DYSPLASIA: Primary | ICD-10-CM

## 2024-02-19 PROCEDURE — 99213 OFFICE O/P EST LOW 20 MIN: CPT | Performed by: NURSE PRACTITIONER

## 2024-02-19 NOTE — ASSESSMENT & PLAN NOTE
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 She had a normal Pap (+HPV) in August 2023. She has been feeling well and has no concerns. She continues to take anastrozole, but plans to discontinue this is 6 months as she is more than 5 years from her diagnosis of breast cancer. Her PS is 0.    Return to the office in 6 months for repeat pap. In the absence of any high-grade findings, will continue with surveillance. She will call in the interim with any concerns.

## 2024-02-19 NOTE — PROGRESS NOTES
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 She had a normal Pap (+HPV) in August 2023. She has been feeling well and has no concerns. She continues to take anastrozole, but plans to discontinue this is 6 months as she is more than 5 years from her diagnosis of breast cancer. Her PS is 0.    Return to the office in 6 months for repeat pap. In the absence of any high-grade findings, will continue with surveillance. She will call in the interim with any concerns.                CHIEF COMPLAINT: Cervical dysplasia surveillance      Subjective:     Problem:  Cancer Staging   Malignant neoplasm of lower-outer quadrant of right breast of female, estrogen receptor positive   Staging form: Breast, AJCC 8th Edition  - Clinical: cT1, cN0, cM0, ER: Positive, RI: Positive, HER2: Negative - Signed by Nathalie Merino MD on 7/2/2018  - Pathologic: pT1a, pN0(sn), cM0, ER: Positive, RI: Positive, HER2: Negative - Signed by Nathalie Merino MD on 8/21/2018      Previous therapy:  Oncology History   Malignant neoplasm of lower-outer quadrant of right breast of female, estrogen receptor positive    6/14/2018 Biopsy    Right breast biopsy:  - Invasive ductal carcinoma   Grade 1  %  %  HER2 negative     8/10/2018 Surgery    Right mastectomy with sentinel lymph node biopsy:  - Clear margins  - 0/1 lymph nodes    Left prophylactic mastectomy, bilateral implant/expander placement  Dr. Merino and Dr Mccormick    Laparoscopic salpingo-oophorectomy   Dr. Locke     8/2018 Genetic Testing    [facility]    ALK, APC, SHERIN, AXIN2, BAP1, BARD1, BLM, BMPR1A, BRCA1, BRCA2, BRIP1, CASR, CDC73, CDH1, CDK4, CDKN1B, CDKN1C, CDKN2A, CEBPA, CHEK2, DICER1, DIS3L2, EGFR, EPCAM, FLCN, GATA2, GPC3, GREM1, HOXB13, HRAS, KIT, MAX, MEN1, MET, MITF, MLH1,  MSH2, MSH6, MUTYH, NBN, NF1, NF2 ,PALB2, PDGFRA, PHOX2B, PMS2, POLD1, POLE, POT1, RFIQB3R, PTCH1, PTEN, RAD50, RAD51C, RAD51D, RB1, RECQL4, RET, RUNX1, SDHA, SDHAF2, SDHB, SDHC, SDHD, SMAD4, SMARCA4, SMARCB1, SMARCE1, STK11, SUFU, TERC, TERT, BTEH845, TP53, TSC1, TSC2, VHL, WRN, WT1     Likely Pathogenic Variant  (VUS) :  [id number]     9/2018 -  Hormone Therapy    Anastrozole  Dr. Mckee     11/30/2018 Surgery    Bilateral implant exchange     2/22/2019 Surgery    Bilateral breast mound revision and fat grafting           Patient ID: Jaylyn Kim is a 53 y.o. female  Patient has no new concerns since her last visit. She denies nausea or vomiting. Her appetite is appropriate. Normal bowel and bladder function. She denies abdominal or pelvic pain. She is without vaginal bleeding or discharge. She is ambulatory.        Review of Systems   Constitutional:  Negative for chills, fatigue, fever and unexpected weight change.   HENT:  Negative for nosebleeds.    Eyes: Negative.    Respiratory:  Negative for cough, chest tightness, shortness of breath and wheezing.    Cardiovascular:  Negative for chest pain, palpitations and leg swelling.   Gastrointestinal:  Negative for abdominal distention, abdominal pain, anal bleeding, blood in stool, constipation, diarrhea, nausea, rectal pain and vomiting.   Endocrine: Negative.    Genitourinary:  Negative for difficulty urinating, dysuria, frequency, hematuria, pelvic pain, urgency, vaginal bleeding, vaginal discharge and vaginal pain.   Musculoskeletal:  Negative for arthralgias and joint swelling.   Skin:  Negative for color change, pallor and rash.   Neurological:  Negative for dizziness, weakness, light-headedness, numbness and headaches.   Hematological: Negative.    Psychiatric/Behavioral: Negative.         Current Outpatient Medications   Medication Sig Dispense Refill    acetaminophen (TYLENOL) 325 mg tablet Take 650 mg by mouth every 6 (six) hours as needed for  mild pain      albuterol (PROVENTIL HFA,VENTOLIN HFA) 90 mcg/act inhaler       BIOTIN PO Take 1 tablet by mouth every morning      Calcium Carb-Cholecalciferol 1000-800 MG-UNIT TABS calcium      calcium-vitamin D 250-100 MG-UNIT per tablet Take 1 tablet by mouth      cyanocobalamin 50 MCG tablet Take 50 mcg by mouth every other day      Ergocalciferol (VITAMIN D2) 2000 units TABS Vitamin D2 50,000 unit capsule      ferrous sulfate 325 (65 Fe) mg tablet Daily      ketoconazole (NIZORAL) 2 % cream       Lifitegrast (XIIDRA OP) Apply 1 vial to eye 2 (two) times a day      loratadine (CLARITIN) 10 mg tablet Take 10 mg by mouth as needed        LORazepam (Ativan) 0.5 mg tablet 1 by mouth 1 hour prior to procedure prn anxiety 10 tablet 0    meloxicam (Mobic) 15 mg tablet Take 1 tablet (15 mg total) by mouth daily 30 tablet 1    methocarbamol (ROBAXIN) 500 mg tablet Take 500 mg by mouth if needed      montelukast (SINGULAIR) 10 mg tablet Take 10 mg by mouth as needed        multivitamin (THERAGRAN) TABS Take 1 tablet by mouth every morning        Naproxen Sodium 220 MG CAPS as needed      Omega-3 Fatty Acids (FISH OIL PO) Take by mouth in the morning      pantoprazole (PROTONIX) 40 mg tablet Take 40 mg by mouth every morning        phentermine 15 MG capsule Take 1 capsule (15 mg total) by mouth every morning 90 capsule 0    PSYLLIUM HUSK PO Take by mouth      pyridoxine (VITAMIN B6) 100 mg tablet Take 100 mg by mouth daily      tiZANidine (Zanaflex) 4 mg tablet Take 0.5 tablets (2 mg total) by mouth every 8 (eight) hours as needed for muscle spasms 60 tablet 1    topiramate (TOPAMAX) 25 mg tablet Take 1 tablet (25 mg total) by mouth 2 (two) times a day 180 tablet 0    Xhance 93 MCG/ACT EXHU INSTILL 1 SPRAY PER NOSTRIL TWICE A DAY 16 mL 11    Phentermine-Topiramate (Qsymia) 7.5-46 MG CP24 Take 7.5 mg by mouth once for 1 dose 90 capsule 2     No current facility-administered medications for this visit.       No Known  "Allergies    Past Medical History:   Diagnosis Date    Abnormal Pap smear of cervix     Arthritis     Ashkenazi Mu-ism ancestry requiring population-specific genetic screening     Asthma     Back pain     Breast cancer (HCC) 6/13/17    Breathing difficulty     after gastric bypass 2014-\"felt elephant on chest\"    Cancer (HCC)     Cervical cancer (HCC) 8/2022    Cervical dysplasia     Chest pain, non-cardiac     Seen in ER and is from esophageal spasms.    Colon polyp     Constipation     occasional    DDD (degenerative disc disease), cervical     Family history of breast cancer in female     GERD (gastroesophageal reflux disease)     History of MRSA infection     Leg    History of UTI     treated 8/1/2018    Human papilloma virus (HPV) infection     Hx MRSA infection     on leg-2009 treated    Neck pain     Pollen allergies     PONV (postoperative nausea and vomiting)     nausea    Postural lightheadedness     Raynaud's disease     Seasonal allergies     Sinus problem        Past Surgical History:   Procedure Laterality Date    BILATERAL OOPHORECTOMY      BLADDER SURGERY  2013    bladder lift    BREAST BIOPSY Right 06/14/2018    [facility]    CERVICAL BIOPSY N/A 11/15/2022    Procedure: BIOPSY LEEP CERVIX;  Surgeon: Lawrence Li MD;  [facility];  Service: Gynecology Oncology    CERVIX LESION DESTRUCTION      COLONOSCOPY      COLPOSCOPY W/ BIOPSY / CURETTAGE      ENDOMETRIAL ABLATION      EXAMINATION UNDER ANESTHESIA N/A 11/15/2022    Procedure: EXAM UNDER ANESTHESIA (EUA);  Surgeon: Lawrence Li MD;  [facility];  Service: Gynecology Oncology    GASTRIC BYPASS  2014 2014 wt loss 90 lbs(regained 20 lbs)    HYSTERECTOMY N/A 12/23/2022    Procedure: (LTH) W/ ROBOT;  Surgeon: Viki Bhatti MD;  [facility];  Service: Gynecology Oncology    HYSTEROSCOPIC STERILIZATION W/ IMPLANTS      HYSTEROSCOPY W/ ENDOMETRIAL ABLATION  2013    KNEE ARTHROSCOPY Right 1990    LYMPH NODE " BIOPSY Right 08/10/2018    Procedure: BIOPSY LYMPH NODE SENTINEL;  Surgeon: Nathalie Merino MD;  [facility];  Service: Surgical Oncology    MAMMO STEREOTACTIC BREAST BIOPSY RIGHT (ALL INC) Right 2018    MASTECTOMY Bilateral     NASAL SEPTUM SURGERY      ,     OOPHORECTOMY Bilateral     IN IMPLNT BIO IMPLNT FOR SOFT TISSUE REINFORCEMENT Bilateral 08/10/2018    Procedure: RECONSTRUCTION BREAST W/ IMPLANT;  Surgeon: Larissa Mccormick MD;  [facility];  Service: Plastics    IN INSJ/RPLCMT BREAST IMPLANT SEP DAY MASTECTOMY Bilateral 2018    Procedure: BREAST IMPLANT EXCHANGE;  Surgeon: Larissa Mccormick MD;  [facility];  Service: Plastics    IN LAPAROSCOPY W/RMVL ADNEXAL STRUCTURES N/A 08/10/2018    Procedure: SALPINGO-OOPHORECTOMY, LAPAROSCOPIC; PELVIC WASHINGS ;  Surgeon: Lloyd Locke MD;  [facility];  Service: Gynecology Oncology    IN LAPS SURG CHOLECYSTECTOMY W/CHOLANGIOGRAPHY N/A 2021    Procedure: CHOLECYSTECTOMY LAPAROSCOPIC W/ INTRAOP CHOLANGIOGRAM;  Surgeon: Jorge Alberto Arrington MD;  [facility];  Service: General    IN MASTECTOMY SIMPLE COMPLETE Bilateral 08/10/2018    Procedure: MASTECTOMY SIMPLE;  Surgeon: Nathalie Merino MD;  [facility];  Service: Surgical Oncology    IN LENNIE-IMPLANT CAPSULECTOMY BREAST COMPLETE Bilateral 2018    Procedure: CAPSULECTOMY;  Surgeon: Larissa Mccormick MD;  [facility];  Service: Plastics    IN REVISION OF RECONSTRUCTED BREAST Bilateral 2019    Procedure: BREAST MOUND REVISION; FAT GRAFTING;  Surgeon: Larissa Mccormick MD;  [facility];  Service: Plastics    IN TISSUE EXPANDER PLACEMENT BREAST RECONSTRUCTION Bilateral 08/10/2018    Procedure: INSERTION/PLACEMENT TISSUE EXPANDER (EXCHANGE);  Surgeon: Larissa Mccormick MD;  [facility];  Service: Plastics    SINUS SURGERY         OB History          2    Para   2    Term   2            AB        Living   2         SAB        IAB         Ectopic        Multiple        Live Births               Obstetric Comments   First pregnancy age 36  Menarche age 15  Hx birth control pills                  Family History   Problem Relation Age of Onset    Other Mother         BRCA negative    Breast cancer Mother 65    Ovarian cancer Mother 60    Diabetes Father     Heart disease Father     Hypertension Father     Migraines Sister     Diabetes Brother     Colon cancer Maternal Grandfather 88    Diabetes type II Maternal Grandfather     Heart disease Paternal Grandfather     Colon cancer Maternal Aunt 57    Skin cancer Maternal Aunt     Colon cancer Maternal Aunt        The following portions of the patient's history were reviewed and updated as appropriate: allergies, current medications, past family history, past medical history, past social history, past surgical history, and problem list.      Objective:    Blood pressure 130/80, pulse (!) 118, temperature 98 °F (36.7 °C), temperature source Temporal, weight 97.1 kg (214 lb), last menstrual period 07/31/2018, SpO2 98%.  Body mass index is 32.54 kg/m².    Physical Exam  Vitals reviewed. Exam conducted with a chaperone present.   Constitutional:       General: She is not in acute distress.     Appearance: Normal appearance. She is not ill-appearing.   HENT:      Head: Normocephalic and atraumatic.      Mouth/Throat:      Mouth: Mucous membranes are moist.   Eyes:      General:         Right eye: No discharge.         Left eye: No discharge.      Conjunctiva/sclera: Conjunctivae normal.   Pulmonary:      Effort: Pulmonary effort is normal.   Abdominal:      Palpations: Abdomen is soft. There is no mass.      Tenderness: There is no abdominal tenderness.      Hernia: No hernia is present.   Genitourinary:     Comments: The external female genitalia is normal. The bartholin's, uretheral and skenes glands are normal. The urethral meatus is normal (midline with no lesions). Anus without fissure or lesion.  "Speculum exam reveals a grossly normal vagina. No masses, lesions,discharge or bleeding. No significant cystocele or rectocele noted. Bimanual exam notes a surgical absent cervix, uterus and adnexal structures. No masses or fullness. Bladder is without fullness, mass or tenderness.  Musculoskeletal:      Right lower leg: No edema.      Left lower leg: No edema.   Skin:     General: Skin is warm and dry.      Coloration: Skin is not jaundiced.      Findings: No rash.   Neurological:      General: No focal deficit present.      Mental Status: She is alert and oriented to person, place, and time.      Cranial Nerves: No cranial nerve deficit.      Sensory: No sensory deficit.      Motor: No weakness.      Gait: Gait normal.   Psychiatric:         Mood and Affect: Mood normal.         Behavior: Behavior normal.         Thought Content: Thought content normal.         Judgment: Judgment normal.           No results found for: \"\"  Lab Results   Component Value Date    WBC 9.41 01/15/2024    HGB 16.0 (H) 01/15/2024    HCT 48.5 (H) 01/15/2024    MCV 95 01/15/2024     01/15/2024     Lab Results   Component Value Date    K 4.0 01/15/2024    CL 99 01/15/2024    CO2 27 01/15/2024    BUN 11 01/15/2024    CREATININE 0.69 01/15/2024    GLUCOSE 129 07/14/2016    GLUF 101 (H) 01/15/2024    CALCIUM 9.8 01/15/2024    AST 19 01/15/2024    ALT 16 01/15/2024    ALKPHOS 112 (H) 01/15/2024    EGFR 101 01/15/2024        Trend:  No results found for: \"\"    "

## 2024-02-21 PROBLEM — N39.0 UTI (URINARY TRACT INFECTION): Status: RESOLVED | Noted: 2018-08-03 | Resolved: 2024-02-21

## 2024-03-07 ENCOUNTER — HOSPITAL ENCOUNTER (OUTPATIENT)
Dept: MRI IMAGING | Facility: HOSPITAL | Age: 54
End: 2024-03-07
Payer: COMMERCIAL

## 2024-03-07 DIAGNOSIS — C50.511 MALIGNANT NEOPLASM OF LOWER-OUTER QUADRANT OF RIGHT BREAST OF FEMALE, ESTROGEN RECEPTOR POSITIVE: ICD-10-CM

## 2024-03-07 DIAGNOSIS — Z17.0 MALIGNANT NEOPLASM OF LOWER-OUTER QUADRANT OF RIGHT BREAST OF FEMALE, ESTROGEN RECEPTOR POSITIVE: ICD-10-CM

## 2024-03-07 PROCEDURE — 77047 MRI BREAST C- BILATERAL: CPT

## 2024-03-11 ENCOUNTER — TELEPHONE (OUTPATIENT)
Age: 54
End: 2024-03-11

## 2024-03-11 ENCOUNTER — TELEPHONE (OUTPATIENT)
Dept: HEMATOLOGY ONCOLOGY | Facility: CLINIC | Age: 54
End: 2024-03-11

## 2024-03-11 NOTE — TELEPHONE ENCOUNTER
Patient is asking if the Phentermine-Topiramate script can be sent over to the pharmacy as two separate med scripts as it is too expensive.    Phentermine has to be sent into the local pharmacy and topiramate into the mail order pharmacy.

## 2024-03-12 ENCOUNTER — TELEPHONE (OUTPATIENT)
Dept: HEMATOLOGY ONCOLOGY | Facility: CLINIC | Age: 54
End: 2024-03-12

## 2024-03-12 DIAGNOSIS — T85.43XA BREAST IMPLANT RUPTURE, INITIAL ENCOUNTER: Primary | ICD-10-CM

## 2024-03-12 NOTE — TELEPHONE ENCOUNTER
Called and spoke with patient re: MRI findings.      She called Dr. Mccormick's office, informed he no longer performs breast surgeries.  She has an appointment with another provider on Thursday.      She is interested in going flat.      Discussed evaluation with Dr. Garcia - she is agreeable.  Referral made.

## 2024-03-12 NOTE — TELEPHONE ENCOUNTER
Patient Call    Who are you speaking with? Patient    If it is not the patient, are they listed on an active communication consent form? N/A   What is the reason for this call? Patient is calling back returning Janell Rosen's call   Does this require a call back? Yes   If a call back is required, please list [facility] call back number [phone]   If a call back is required, advise that a message will be forwarded to their care team and someone will return their call as soon as possible.   Did you relay this information to the patient? Yes

## 2024-03-14 ENCOUNTER — TELEPHONE (OUTPATIENT)
Dept: PLASTIC SURGERY | Facility: CLINIC | Age: 54
End: 2024-03-14

## 2024-03-14 ENCOUNTER — TELEPHONE (OUTPATIENT)
Age: 54
End: 2024-03-14

## 2024-03-14 NOTE — TELEPHONE ENCOUNTER
Received call from patient asking to [facility] consult for Breast implant rupture. She has a referral    She stated she had them put in due to breast cancer back in 08/2018

## 2024-03-19 ENCOUNTER — TELEPHONE (OUTPATIENT)
Age: 54
End: 2024-03-19

## 2024-03-19 NOTE — TELEPHONE ENCOUNTER
A nurse from Hem/Onc called asking to call this pt due to referral for Breast implant rupture;     Please reach out to the patient

## 2024-03-20 ENCOUNTER — TELEPHONE (OUTPATIENT)
Dept: PLASTIC SURGERY | Facility: CLINIC | Age: 54
End: 2024-03-20

## 2024-03-20 NOTE — TELEPHONE ENCOUNTER
Place second call to patient and was able to talk and schedule consultation with Dr. Garcia for March 26th, ruptured L implant.

## 2024-03-26 ENCOUNTER — CONSULT (OUTPATIENT)
Dept: PLASTIC SURGERY | Facility: CLINIC | Age: 54
End: 2024-03-26
Payer: COMMERCIAL

## 2024-03-26 VITALS — BODY MASS INDEX: 32.43 KG/M2 | WEIGHT: 214 LBS | HEIGHT: 68 IN

## 2024-03-26 DIAGNOSIS — T85.43XA BREAST IMPLANT RUPTURE, INITIAL ENCOUNTER: ICD-10-CM

## 2024-03-26 PROCEDURE — 99205 OFFICE O/P NEW HI 60 MIN: CPT | Performed by: STUDENT IN AN ORGANIZED HEALTH CARE EDUCATION/TRAINING PROGRAM

## 2024-03-27 ENCOUNTER — PREP FOR PROCEDURE (OUTPATIENT)
Dept: PLASTIC SURGERY | Facility: CLINIC | Age: 54
End: 2024-03-27

## 2024-03-27 DIAGNOSIS — Z85.3 HISTORY OF BREAST CANCER: Primary | ICD-10-CM

## 2024-03-28 NOTE — PROGRESS NOTES
"Assessment and Plan:  Ms. Kim is a 53 y.o. female presenting for eval for removal of her implants s/p breast reconstruction.    We discussed the procedure, risks, benefits, alternatives and postoperative instructions and expectations.  We did discuss the option of implant replacement in a prepectoral plane but the patient would just like them removed.  She is not concerned how this will effect her tattoos.  We also discussed the bra clinic and external prosthetic options in the future.  All patient's questions were answered and she voiced understanding.  Booking form created.    History of Present Illness:   Ms. Kim is a 53 y.o. female presenting for eval for removal of her implants s/p breast reconstruction.  She underwent bilateral mastectomy with implant based reconstruction in 2018/2019 with Dr. Mccormick.  She has had fat transfer additionally.  She had decorative tattoos performed bilaterally.  She has already decided she would like her implants removed.  She dislikes the firmness and discomfort on her right.  She denies nicotine use.  She is actively being worked up for a thyroid nodule.      Review of Systems:  A 12 point ROS was performed and negative except per HPI.    Past Medical History:  Past Medical History:   Diagnosis Date    Abnormal Pap smear of cervix     Arthritis     Ashkenazi Jain ancestry requiring population-specific genetic screening     Asthma     Back pain     Breast cancer (HCC) 6/13/17    Breathing difficulty     after gastric bypass 2014-\"felt elephant on chest\"    Cancer (HCC)     Cervical cancer (HCC) 8/2022    Cervical dysplasia     Chest pain, non-cardiac     Seen in ER and is from esophageal spasms.    Colon polyp     Constipation     occasional    DDD (degenerative disc disease), cervical     Family history of breast cancer in female     GERD (gastroesophageal reflux disease)     History of MRSA infection     Leg    History of UTI     treated 8/1/2018    Human papilloma virus " (HPV) infection     Hx MRSA infection     on leg-2009 treated    Neck pain     Pollen allergies     PONV (postoperative nausea and vomiting)     nausea    Postural lightheadedness     Raynaud's disease     Seasonal allergies     Sinus problem        Past Surgical History:  Past Surgical History:   Procedure Laterality Date    BILATERAL OOPHORECTOMY      BLADDER SURGERY  2013    bladder lift    BREAST BIOPSY Right 06/14/2018    [facility]    CERVICAL BIOPSY N/A 11/15/2022    Procedure: BIOPSY LEEP CERVIX;  Surgeon: Lawrence Li MD;  [facility];  Service: Gynecology Oncology    CERVIX LESION DESTRUCTION      COLONOSCOPY      COLPOSCOPY W/ BIOPSY / CURETTAGE      ENDOMETRIAL ABLATION      EXAMINATION UNDER ANESTHESIA N/A 11/15/2022    Procedure: EXAM UNDER ANESTHESIA (EUA);  Surgeon: Lawrence Li MD;  [facility];  Service: Gynecology Oncology    GASTRIC BYPASS  2014 2014 wt loss 90 lbs(regained 20 lbs)    HYSTERECTOMY N/A 12/23/2022    Procedure: (LTH) W/ ROBOT;  Surgeon: Viki Bhatti MD;  [facility];  Service: Gynecology Oncology    HYSTEROSCOPIC STERILIZATION W/ IMPLANTS      HYSTEROSCOPY W/ ENDOMETRIAL ABLATION  2013    KNEE ARTHROSCOPY Right 1990    LYMPH NODE BIOPSY Right 08/10/2018    Procedure: BIOPSY LYMPH NODE SENTINEL;  Surgeon: Nathalie Merino MD;  [facility];  Service: Surgical Oncology    MAMMO STEREOTACTIC BREAST BIOPSY RIGHT (ALL INC) Right 06/14/2018    MASTECTOMY Bilateral     NASAL SEPTUM SURGERY      2002, 2016    OOPHORECTOMY Bilateral     VA IMPLNT BIO IMPLNT FOR SOFT TISSUE REINFORCEMENT Bilateral 08/10/2018    Procedure: RECONSTRUCTION BREAST W/ IMPLANT;  Surgeon: Larissa Mccormick MD;  [facility];  Service: Plastics    VA INSJ/RPLCMT BREAST IMPLANT SEP DAY MASTECTOMY Bilateral 11/30/2018    Procedure: BREAST IMPLANT EXCHANGE;  Surgeon: Larissa Mccormick MD;  [facility];  Service: Plastics    VA LAPAROSCOPY W/RMVL ADNEXAL STRUCTURES  N/A 08/10/2018    Procedure: SALPINGO-OOPHORECTOMY, LAPAROSCOPIC; PELVIC WASHINGS ;  Surgeon: Lloyd Locke MD;  [facility];  Service: Gynecology Oncology    FL LAPS SURG CHOLECYSTECTOMY W/CHOLANGIOGRAPHY N/A 03/19/2021    Procedure: CHOLECYSTECTOMY LAPAROSCOPIC W/ INTRAOP CHOLANGIOGRAM;  Surgeon: Jorge Alberto Arrington MD;  [facility];  Service: General    FL MASTECTOMY SIMPLE COMPLETE Bilateral 08/10/2018    Procedure: MASTECTOMY SIMPLE;  Surgeon: Nathalie Merino MD;  [facility];  Service: Surgical Oncology    FL LENNIE-IMPLANT CAPSULECTOMY BREAST COMPLETE Bilateral 11/30/2018    Procedure: CAPSULECTOMY;  Surgeon: Larissa Mccormick MD;  [facility];  Service: Plastics    FL REVISION OF RECONSTRUCTED BREAST Bilateral 02/22/2019    Procedure: BREAST MOUND REVISION; FAT GRAFTING;  Surgeon: Larissa Mccormick MD;  [facility];  Service: Plastics    FL TISSUE EXPANDER PLACEMENT BREAST RECONSTRUCTION Bilateral 08/10/2018    Procedure: INSERTION/PLACEMENT TISSUE EXPANDER (EXCHANGE);  Surgeon: Larissa Mccormick MD;  [facility];  Service: Plastics    SINUS SURGERY         Social History:  Social History     Tobacco Use    Smoking status: Never    Smokeless tobacco: Never   Vaping Use    Vaping status: Never Used   Substance Use Topics    Alcohol use: Yes     Alcohol/week: 8.0 standard drinks of alcohol     Types: 8 Glasses of wine per week     Comment: 1-2 GLASS OF WINE nightly    Drug use: Yes     Types: Marijuana     Comment: edible-sparingly- last used x 1 week ago       Family History:  Family History   Problem Relation Age of Onset    Other Mother         BRCA negative    Breast cancer Mother 65    Ovarian cancer Mother 60    Diabetes Father     Heart disease Father     Hypertension Father     Migraines Sister     Diabetes Brother     Colon cancer Maternal Grandfather 88    Diabetes type II Maternal Grandfather     Heart disease Paternal Grandfather     Colon cancer Maternal Aunt 57    Skin  cancer Maternal Aunt     Colon cancer Maternal Aunt        Allergies:  No Known Allergies    Medications:  Current Outpatient Medications on File Prior to Visit   Medication Sig Dispense Refill    acetaminophen (TYLENOL) 325 mg tablet Take 650 mg by mouth every 6 (six) hours as needed for mild pain      albuterol (PROVENTIL HFA,VENTOLIN HFA) 90 mcg/act inhaler       BIOTIN PO Take 1 tablet by mouth every morning      Calcium Carb-Cholecalciferol 1000-800 MG-UNIT TABS calcium      calcium-vitamin D 250-100 MG-UNIT per tablet Take 1 tablet by mouth      cyanocobalamin 50 MCG tablet Take 50 mcg by mouth every other day      Ergocalciferol (VITAMIN D2) 2000 units TABS Vitamin D2 50,000 unit capsule      ferrous sulfate 325 (65 Fe) mg tablet Daily      ketoconazole (NIZORAL) 2 % cream       Lifitegrast (XIIDRA OP) Apply 1 vial to eye 2 (two) times a day      loratadine (CLARITIN) 10 mg tablet Take 10 mg by mouth as needed        LORazepam (Ativan) 0.5 mg tablet 1 by mouth 1 hour prior to procedure prn anxiety 10 tablet 0    meloxicam (Mobic) 15 mg tablet Take 1 tablet (15 mg total) by mouth daily 30 tablet 1    methocarbamol (ROBAXIN) 500 mg tablet Take 500 mg by mouth if needed      montelukast (SINGULAIR) 10 mg tablet Take 10 mg by mouth as needed        multivitamin (THERAGRAN) TABS Take 1 tablet by mouth every morning        Naproxen Sodium 220 MG CAPS as needed      Omega-3 Fatty Acids (FISH OIL PO) Take by mouth in the morning      pantoprazole (PROTONIX) 40 mg tablet Take 40 mg by mouth every morning        phentermine 15 MG capsule Take 1 capsule (15 mg total) by mouth every morning 90 capsule 0    Phentermine-Topiramate (Qsymia) 7.5-46 MG CP24 Take 7.5 mg by mouth once for 1 dose 90 capsule 2    PSYLLIUM HUSK PO Take by mouth      pyridoxine (VITAMIN B6) 100 mg tablet Take 100 mg by mouth daily      tiZANidine (Zanaflex) 4 mg tablet Take 0.5 tablets (2 mg total) by mouth every 8 (eight) hours as needed for  "muscle spasms 60 tablet 1    topiramate (TOPAMAX) 25 mg tablet Take 1 tablet (25 mg total) by mouth 2 (two) times a day 180 tablet 0    Xhance 93 MCG/ACT EXHU INSTILL 1 SPRAY PER NOSTRIL TWICE A DAY 16 mL 11     No current facility-administered medications on file prior to visit.         Physical Examination:  Ht 5' 8\" (1.727 m)   Wt 97.1 kg (214 lb)   LMP 07/31/2018   BMI 32.54 kg/m²   Estimated body mass index is 32.54 kg/m² as calculated from the following:    Height as of this encounter: 5' 8\" (1.727 m).    Weight as of this encounter: 97.1 kg (214 lb).  General: NAD, well appearing, AAOx3  HEENT: NCAT, EOMI, MMM, supple  Resp: Nonlabored  Heart: RRR  Abdomen: Soft, ND, NT  Extremities/MSK: no LE edema, no obvious deficits in ROM  Neuro: grossly intact with no obvious deficits  Skin: no obvious lesions or rashes  Breast: no palpable mass, no palpable axillary lymphadenopathy, fullness of right upper chest likely 2/2 h/o fat transfer, right Fernandez III, left Baker I, mild axillary excess, animation deformity, bilateral decorative tattoos    Rhea Garcia, DO  Plastic and Reconstructive Surgery    "

## 2024-05-28 ENCOUNTER — OFFICE VISIT (OUTPATIENT)
Dept: PLASTIC SURGERY | Facility: CLINIC | Age: 54
End: 2024-05-28
Payer: COMMERCIAL

## 2024-05-28 VITALS
WEIGHT: 225 LBS | DIASTOLIC BLOOD PRESSURE: 96 MMHG | HEART RATE: 114 BPM | HEIGHT: 67 IN | TEMPERATURE: 97.3 F | BODY MASS INDEX: 35.31 KG/M2 | SYSTOLIC BLOOD PRESSURE: 117 MMHG

## 2024-05-28 DIAGNOSIS — C50.511 MALIGNANT NEOPLASM OF LOWER-OUTER QUADRANT OF RIGHT BREAST OF FEMALE, ESTROGEN RECEPTOR POSITIVE (HCC): Primary | ICD-10-CM

## 2024-05-28 DIAGNOSIS — Z17.0 MALIGNANT NEOPLASM OF LOWER-OUTER QUADRANT OF RIGHT BREAST OF FEMALE, ESTROGEN RECEPTOR POSITIVE (HCC): Primary | ICD-10-CM

## 2024-05-28 PROCEDURE — 99213 OFFICE O/P EST LOW 20 MIN: CPT | Performed by: PHYSICIAN ASSISTANT

## 2024-05-28 NOTE — H&P (VIEW-ONLY)
H&P- Plastic Surgery  [facility] MRN: [id number]  Unit/Bed#:  Encounter: [id number]            ASSESSMENT/PLAN:    54 year old female presenting for pre op appointment for scheduled bilateral implant removal, capsulectomy, aesthetic flat closure, and Prevena placement 6/11/24    - consent obtained. Discussed possible risks including bleeding, infection, scarring, delayed wound healing, need for subsequent procedures. We also discussed her current breast tattoos. We will attempt to align skin as best as possible. She expresses she is not concerned about the appearance of her tattoos and would much rather appropriate contour.   - discussed post op medications. These will be sent to [facility]. She would also like zofran due to hx of nausea with anesthesia.   - we reviewed her most recent thyroid ultrasound. Thankfully her one nodule is stable and the other nodule is no longer present  - Hibiclens provided  - will have patient schedule her 1 and 2 week post op appointment  - patient to call with any questions/concerns prior to surgery    Medical Problems       Problem List       Family history of breast cancer in female    Overview Signed 6/13/2018 11:26 AM by Silvana Branch RN     mother         Malignant neoplasm of lower-outer quadrant of right breast of female, estrogen receptor positive (HCC)    Status post bilateral salpingo-oophorectomy (BSO)    Use of aromatase inhibitors    Deformity of reconstructed breast    Nasal polyposis    History of gastric bypass    BRCA negative    DDD (degenerative disc disease), cervical    Breast variant    Bariatric surgery status    Other chronic sinusitis    Suspected sleep apnea    Muscle spasm    KRISHNA (obstructive sleep apnea)    Snoring    Thrombocytosis    Insomnia    Osteopenia of hip    Positive CLAUDIA (antinuclear antibody)    History of cervical dysplasia    Localized swelling, mass or lump of neck    High grade squamous intraepithelial lesion (HGSIL), grade 3  "JOSEPH, on biopsy of cervix    Hypoferremia    Axillary web syndrome    Weight gain    Anxiety          Reason for Consult / Principal Problem: pre op appointment    HPI: Jaylyn Kim is a 54 y.o. year old female who presents for pre op appointment for bilateral implant removal, capsulectomy, aesthetic flat closure, and Prevena placement 6/11/24. We discussed post op medications and post op instructions. Prevena wound VAC was discussed in detail. We discussed the appearance of her tattoos post op. We will attempt to align as best as possible but ultimately will focus on contour. Questions were answered to patient's satisfaction. Ultrasound of her thyroid was reviewed. She does not smoke. She is not on a blood thinner.       Review of Systems   Constitutional:  Negative for chills and fever.   HENT:  Negative for congestion and sore throat.    Respiratory:  Negative for shortness of breath.    Cardiovascular:  Negative for leg swelling.   Gastrointestinal:  Negative for abdominal pain.   Skin:  Negative for rash and wound.   Hematological:  Does not bruise/bleed easily.   Psychiatric/Behavioral:  Negative for confusion and decreased concentration.          Past Medical History:  Past Medical History:   Diagnosis Date    Abnormal Pap smear of cervix     Arthritis     Ashkenazi Christian ancestry requiring population-specific genetic screening     Asthma     Back pain     Breast cancer (HCC) 6/13/17    Breathing difficulty     after gastric bypass 2014-\"felt elephant on chest\"    Cancer (HCC)     Cervical cancer (HCC) 8/2022    Cervical dysplasia     Chest pain, non-cardiac     Seen in ER and is from esophageal spasms.    Colon polyp     Constipation     occasional    DDD (degenerative disc disease), cervical     Family history of breast cancer in female     GERD (gastroesophageal reflux disease)     History of MRSA infection     Leg    History of UTI     treated 8/1/2018    Human papilloma virus (HPV) infection     Hx " MRSA infection     on leg-2009 treated    Neck pain     Pollen allergies     PONV (postoperative nausea and vomiting)     nausea    Postural lightheadedness     Raynaud's disease     Seasonal allergies     Sinus problem        Past Surgical History:  Past Surgical History:   Procedure Laterality Date    BILATERAL OOPHORECTOMY      BLADDER SURGERY  2013    bladder lift    BREAST BIOPSY Right 06/14/2018    [facility]    CERVICAL BIOPSY N/A 11/15/2022    Procedure: BIOPSY LEEP CERVIX;  Surgeon: Lawrence Li MD;  [facility];  Service: Gynecology Oncology    CERVIX LESION DESTRUCTION      COLONOSCOPY      COLPOSCOPY W/ BIOPSY / CURETTAGE      ENDOMETRIAL ABLATION      EXAMINATION UNDER ANESTHESIA N/A 11/15/2022    Procedure: EXAM UNDER ANESTHESIA (EUA);  Surgeon: Lawrence Li MD;  [facility];  Service: Gynecology Oncology    GASTRIC BYPASS  2014 2014 wt loss 90 lbs(regained 20 lbs)    HYSTERECTOMY N/A 12/23/2022    Procedure: (LTH) W/ ROBOT;  Surgeon: Viki Bhatti MD;  [facility];  Service: Gynecology Oncology    HYSTEROSCOPIC STERILIZATION W/ IMPLANTS      HYSTEROSCOPY W/ ENDOMETRIAL ABLATION  2013    KNEE ARTHROSCOPY Right 1990    LYMPH NODE BIOPSY Right 08/10/2018    Procedure: BIOPSY LYMPH NODE SENTINEL;  Surgeon: Nathalie Merino MD;  [facility];  Service: Surgical Oncology    MAMMO STEREOTACTIC BREAST BIOPSY RIGHT (ALL INC) Right 06/14/2018    MASTECTOMY Bilateral     NASAL SEPTUM SURGERY      2002, 2016    OOPHORECTOMY Bilateral     NC IMPLNT BIO IMPLNT FOR SOFT TISSUE REINFORCEMENT Bilateral 08/10/2018    Procedure: RECONSTRUCTION BREAST W/ IMPLANT;  Surgeon: Larissa Mccormick MD;  [facility];  Service: Plastics    NC INSJ/RPLCMT BREAST IMPLANT SEP DAY MASTECTOMY Bilateral 11/30/2018    Procedure: BREAST IMPLANT EXCHANGE;  Surgeon: Larissa Mccormick MD;  [facility];  Service: Plastics    NC LAPAROSCOPY W/RMVL ADNEXAL STRUCTURES N/A 08/10/2018     Procedure: SALPINGO-OOPHORECTOMY, LAPAROSCOPIC; PELVIC WASHINGS ;  Surgeon: Lloyd Locke MD;  [facility];  Service: Gynecology Oncology    IL LAPS SURG CHOLECYSTECTOMY W/CHOLANGIOGRAPHY N/A 03/19/2021    Procedure: CHOLECYSTECTOMY LAPAROSCOPIC W/ INTRAOP CHOLANGIOGRAM;  Surgeon: Jorge Alberto Arrington MD;  [facility];  Service: General    IL MASTECTOMY SIMPLE COMPLETE Bilateral 08/10/2018    Procedure: MASTECTOMY SIMPLE;  Surgeon: Nathalie Merino MD;  [facility];  Service: Surgical Oncology    IL LENNIE-IMPLANT CAPSULECTOMY BREAST COMPLETE Bilateral 11/30/2018    Procedure: CAPSULECTOMY;  Surgeon: Larissa Mccormick MD;  [facility];  Service: Plastics    IL REVISION OF RECONSTRUCTED BREAST Bilateral 02/22/2019    Procedure: BREAST MOUND REVISION; FAT GRAFTING;  Surgeon: Larissa Mccormick MD;  [facility];  Service: Plastics    IL TISSUE EXPANDER PLACEMENT BREAST RECONSTRUCTION Bilateral 08/10/2018    Procedure: INSERTION/PLACEMENT TISSUE EXPANDER (EXCHANGE);  Surgeon: Larissa Mccormick MD;  [facility];  Service: Plastics    SINUS SURGERY         Social History:  Social History     Substance and Sexual Activity   Alcohol Use Yes    Alcohol/week: 8.0 standard drinks of alcohol    Types: 8 Glasses of wine per week    Comment: 1-2 GLASS OF WINE nightly     Social History     Substance and Sexual Activity   Drug Use Not Currently    Types: Marijuana    Comment: edible-sparingly- last used x 1 week ago     Social History     Tobacco Use   Smoking Status Never    Passive exposure: Never   Smokeless Tobacco Never       Family History:  Family History   Problem Relation Age of Onset    Other Mother         BRCA negative    Breast cancer Mother 65    Ovarian cancer Mother 60    Diabetes Father     Heart disease Father     Hypertension Father     Migraines Sister     Diabetes Brother     Colon cancer Maternal Grandfather 88    Diabetes type II Maternal Grandfather     Heart disease Paternal  "Grandfather     Colon cancer Maternal Aunt 57    Skin cancer Maternal Aunt     Colon cancer Maternal Aunt        Allergies:  No Known Allergies    Medications:  Not in a hospital admission.  No current facility-administered medications for this visit.       Vitals:  /96   Pulse (!) 114   Temp (!) 97.3 °F (36.3 °C)   Ht 5' 7\" (1.702 m)   Wt 102 kg (225 lb)   LMP 07/31/2018   BMI 35.24 kg/m²   Body mass index is 35.24 kg/m².  Weight (last 2 days)       Date/Time Weight    05/28/24 1300 102 (225)            PHYSICAL EXAM  General appearance: alert and oriented, in no acute distress  Skin: Skin color, texture, turgor normal. No rashes or lesions  Head: Normocephalic, without obvious abnormality  Heart: regular rate and rhythm, S1, S2 normal, no murmur, click, rub or gallop  Lungs: clear to auscultation bilaterally  Abdomen: soft, non-tender; bowel sounds normal; no masses,  no organomegaly  Neurological: normal without focal findings and mental status, speech normal, alert and oriented x3  Breasts: no palpable mass, no palpable axillary lymphadenopathy, fullness of right upper chest likely 2/2 h/o fat transfer, right Fernandez III, left Baker I, mild axillary excess, animation deformity, bilateral decorative tattoos    Lab Results and Cultures:   CBC with diff:   Lab Results   Component Value Date    WBC 9.41 01/15/2024    HGB 16.0 (H) 01/15/2024    HCT 48.5 (H) 01/15/2024    MCV 95 01/15/2024     01/15/2024    RBC 5.09 01/15/2024    MCH 31.4 01/15/2024    MCHC 33.0 01/15/2024    RDW 12.0 01/15/2024    MPV 10.7 01/15/2024    NRBC 0 01/15/2024      BMP/CMP:  Lab Results   Component Value Date    K 4.0 01/15/2024    K 4.6 10/06/2022    CL 99 01/15/2024     10/06/2022    CO2 27 01/15/2024    CO2 27 10/06/2022    BUN 11 01/15/2024    BUN 12 10/06/2022    CREATININE 0.69 01/15/2024    CREATININE 0.69 10/06/2022    GLUCOSE 129 07/14/2016    CALCIUM 9.8 01/15/2024    CALCIUM 8.7 10/06/2022    AST 19 " "01/15/2024    AST 14 10/06/2022    ALT 16 01/15/2024    ALT 20 10/06/2022    ALKPHOS 112 (H) 01/15/2024    ALKPHOS 131 (H) 10/06/2022    EGFR 101 01/15/2024    EGFR 106 10/06/2022    EGFR 104 10/29/2019   ,     Coags:   Lab Results   Component Value Date    PT 13.7 04/06/2018    PTT 31 08/03/2018    INR 0.98 08/03/2018    INR 1.1 04/06/2018   ,          Lipid Panel: No results found for: \"CHOL\"  No results found for: \"HDL\"  No results found for: \"HDL\"  No results found for: \"LDLCALC\"  No results found for: \"TRIG\"    HgbA1c:   Lab Results   Component Value Date    HGBA1C 5.2 12/20/2022    HGBA1C 5.2 08/17/2021       Blood Culture: No results found for: \"BLOODCX\",   Urinalysis:   Lab Results   Component Value Date    COLORU Light Yellow 01/15/2024    CLARITYU Clear 01/15/2024    SPECGRAV 1.014 01/15/2024    PHUR 6.5 01/15/2024    PHUR 5.5 08/03/2018    LEUKOCYTESUR Negative 01/15/2024    NITRITE Positive (A) 01/15/2024    GLUCOSEU Negative 01/15/2024    KETONESU Negative 01/15/2024    BILIRUBINUR Negative 01/15/2024    BLOODU Negative 01/15/2024   ,   Urine Culture:   Lab Results   Component Value Date    URINECX >100,000 cfu/ml Escherichia coli ESBL (A) 02/27/2018   ,   Wound Culure:  No results found for: \"WOUNDCULT\"    Counseling / Coordination of Care  Total time spent today  30 minutes. Greater than 50% of total time was spent with the patient and / or family counseling and / or coordination of care.     Thank you for allowing me to participate in the care of Jaylyn Kim. Please don't hesitate to call, text, email, or TigerText with any questions.     Kath Roca PA-C       "

## 2024-05-28 NOTE — PROGRESS NOTES
H&P- Plastic Surgery  [facility] 54 [facility] MRN: [id number]  Unit/Bed#:  Encounter: [id number]            ASSESSMENT/PLAN:    54 year old female presenting for pre op appointment for scheduled bilateral implant removal, capsulectomy, aesthetic flat closure, and Prevena placement 6/11/24    - consent obtained. Discussed possible risks including bleeding, infection, scarring, delayed wound healing, need for subsequent procedures. We also discussed her current breast tattoos. We will attempt to align skin as best as possible. She expresses she is not concerned about the appearance of her tattoos and would much rather appropriate contour.   - discussed post op medications. These will be sent to [facility]. She would also like zofran due to hx of nausea with anesthesia.   - we reviewed her most recent thyroid ultrasound. Thankfully her one nodule is stable and the other nodule is no longer present  - Hibiclens provided  - will have patient schedule her 1 and 2 week post op appointment  - patient to call with any questions/concerns prior to surgery    Medical Problems       Problem List       Family history of breast cancer in female    Overview Signed 6/13/2018 11:26 AM by Silvana Branch RN     mother         Malignant neoplasm of lower-outer quadrant of right breast of female, estrogen receptor positive (HCC)    Status post bilateral salpingo-oophorectomy (BSO)    Use of aromatase inhibitors    Deformity of reconstructed breast    Nasal polyposis    History of gastric bypass    BRCA negative    DDD (degenerative disc disease), cervical    Breast variant    Bariatric surgery status    Other chronic sinusitis    Suspected sleep apnea    Muscle spasm    KRISHNA (obstructive sleep apnea)    Snoring    Thrombocytosis    Insomnia    Osteopenia of hip    Positive CLAUDIA (antinuclear antibody)    History of cervical dysplasia    Localized swelling, mass or lump of neck    High grade squamous intraepithelial lesion (HGSIL), grade 3  "JOSEPH, on biopsy of cervix    Hypoferremia    Axillary web syndrome    Weight gain    Anxiety          Reason for Consult / Principal Problem: pre op appointment    HPI: Jaylyn Kim is a 54 y.o. year old female who presents for pre op appointment for bilateral implant removal, capsulectomy, aesthetic flat closure, and Prevena placement 6/11/24. We discussed post op medications and post op instructions. Prevena wound VAC was discussed in detail. We discussed the appearance of her tattoos post op. We will attempt to align as best as possible but ultimately will focus on contour. Questions were answered to patient's satisfaction. Ultrasound of her thyroid was reviewed. She does not smoke. She is not on a blood thinner.       Review of Systems   Constitutional:  Negative for chills and fever.   HENT:  Negative for congestion and sore throat.    Respiratory:  Negative for shortness of breath.    Cardiovascular:  Negative for leg swelling.   Gastrointestinal:  Negative for abdominal pain.   Skin:  Negative for rash and wound.   Hematological:  Does not bruise/bleed easily.   Psychiatric/Behavioral:  Negative for confusion and decreased concentration.          Past Medical History:  Past Medical History:   Diagnosis Date    Abnormal Pap smear of cervix     Arthritis     Ashkenazi Denominational ancestry requiring population-specific genetic screening     Asthma     Back pain     Breast cancer (HCC) 6/13/17    Breathing difficulty     after gastric bypass 2014-\"felt elephant on chest\"    Cancer (HCC)     Cervical cancer (HCC) 8/2022    Cervical dysplasia     Chest pain, non-cardiac     Seen in ER and is from esophageal spasms.    Colon polyp     Constipation     occasional    DDD (degenerative disc disease), cervical     Family history of breast cancer in female     GERD (gastroesophageal reflux disease)     History of MRSA infection     Leg    History of UTI     treated 8/1/2018    Human papilloma virus (HPV) infection     Hx " MRSA infection     on leg-2009 treated    Neck pain     Pollen allergies     PONV (postoperative nausea and vomiting)     nausea    Postural lightheadedness     Raynaud's disease     Seasonal allergies     Sinus problem        Past Surgical History:  Past Surgical History:   Procedure Laterality Date    BILATERAL OOPHORECTOMY      BLADDER SURGERY  2013    bladder lift    BREAST BIOPSY Right 06/14/2018    [facility]    CERVICAL BIOPSY N/A 11/15/2022    Procedure: BIOPSY LEEP CERVIX;  Surgeon: Lawrence Li MD;  [facility];  Service: Gynecology Oncology    CERVIX LESION DESTRUCTION      COLONOSCOPY      COLPOSCOPY W/ BIOPSY / CURETTAGE      ENDOMETRIAL ABLATION      EXAMINATION UNDER ANESTHESIA N/A 11/15/2022    Procedure: EXAM UNDER ANESTHESIA (EUA);  Surgeon: Lawrence Li MD;  [facility];  Service: Gynecology Oncology    GASTRIC BYPASS  2014 2014 wt loss 90 lbs(regained 20 lbs)    HYSTERECTOMY N/A 12/23/2022    Procedure: (LTH) W/ ROBOT;  Surgeon: Viki Bhatti MD;  [facility];  Service: Gynecology Oncology    HYSTEROSCOPIC STERILIZATION W/ IMPLANTS      HYSTEROSCOPY W/ ENDOMETRIAL ABLATION  2013    KNEE ARTHROSCOPY Right 1990    LYMPH NODE BIOPSY Right 08/10/2018    Procedure: BIOPSY LYMPH NODE SENTINEL;  Surgeon: Nathalie Merino MD;  [facility];  Service: Surgical Oncology    MAMMO STEREOTACTIC BREAST BIOPSY RIGHT (ALL INC) Right 06/14/2018    MASTECTOMY Bilateral     NASAL SEPTUM SURGERY      2002, 2016    OOPHORECTOMY Bilateral     MT IMPLNT BIO IMPLNT FOR SOFT TISSUE REINFORCEMENT Bilateral 08/10/2018    Procedure: RECONSTRUCTION BREAST W/ IMPLANT;  Surgeon: Larissa Mccormick MD;  [facility];  Service: Plastics    MT INSJ/RPLCMT BREAST IMPLANT SEP DAY MASTECTOMY Bilateral 11/30/2018    Procedure: BREAST IMPLANT EXCHANGE;  Surgeon: Larissa Mccormick MD;  [facility];  Service: Plastics    MT LAPAROSCOPY W/RMVL ADNEXAL STRUCTURES N/A 08/10/2018     Procedure: SALPINGO-OOPHORECTOMY, LAPAROSCOPIC; PELVIC WASHINGS ;  Surgeon: Lloyd Locke MD;  [facility];  Service: Gynecology Oncology    RI LAPS SURG CHOLECYSTECTOMY W/CHOLANGIOGRAPHY N/A 03/19/2021    Procedure: CHOLECYSTECTOMY LAPAROSCOPIC W/ INTRAOP CHOLANGIOGRAM;  Surgeon: Jorge Alberto Arrington MD;  [facility];  Service: General    RI MASTECTOMY SIMPLE COMPLETE Bilateral 08/10/2018    Procedure: MASTECTOMY SIMPLE;  Surgeon: Nathalie Merino MD;  [facility];  Service: Surgical Oncology    RI LENNIE-IMPLANT CAPSULECTOMY BREAST COMPLETE Bilateral 11/30/2018    Procedure: CAPSULECTOMY;  Surgeon: Larissa Mccormick MD;  [facility];  Service: Plastics    RI REVISION OF RECONSTRUCTED BREAST Bilateral 02/22/2019    Procedure: BREAST MOUND REVISION; FAT GRAFTING;  Surgeon: Larissa Mccormick MD;  [facility];  Service: Plastics    RI TISSUE EXPANDER PLACEMENT BREAST RECONSTRUCTION Bilateral 08/10/2018    Procedure: INSERTION/PLACEMENT TISSUE EXPANDER (EXCHANGE);  Surgeon: Larissa Mccormick MD;  [facility];  Service: Plastics    SINUS SURGERY         Social History:  Social History     Substance and Sexual Activity   Alcohol Use Yes    Alcohol/week: 8.0 standard drinks of alcohol    Types: 8 Glasses of wine per week    Comment: 1-2 GLASS OF WINE nightly     Social History     Substance and Sexual Activity   Drug Use Not Currently    Types: Marijuana    Comment: edible-sparingly- last used x 1 week ago     Social History     Tobacco Use   Smoking Status Never    Passive exposure: Never   Smokeless Tobacco Never       Family History:  Family History   Problem Relation Age of Onset    Other Mother         BRCA negative    Breast cancer Mother 65    Ovarian cancer Mother 60    Diabetes Father     Heart disease Father     Hypertension Father     Migraines Sister     Diabetes Brother     Colon cancer Maternal Grandfather 88    Diabetes type II Maternal Grandfather     Heart disease Paternal  "Grandfather     Colon cancer Maternal Aunt 57    Skin cancer Maternal Aunt     Colon cancer Maternal Aunt        Allergies:  No Known Allergies    Medications:  Not in a hospital admission.  No current facility-administered medications for this visit.       Vitals:  /96   Pulse (!) 114   Temp (!) 97.3 °F (36.3 °C)   Ht 5' 7\" (1.702 m)   Wt 102 kg (225 lb)   LMP 07/31/2018   BMI 35.24 kg/m²   Body mass index is 35.24 kg/m².  Weight (last 2 days)       Date/Time Weight    05/28/24 1300 102 (225)            PHYSICAL EXAM  General appearance: alert and oriented, in no acute distress  Skin: Skin color, texture, turgor normal. No rashes or lesions  Head: Normocephalic, without obvious abnormality  Heart: regular rate and rhythm, S1, S2 normal, no murmur, click, rub or gallop  Lungs: clear to auscultation bilaterally  Abdomen: soft, non-tender; bowel sounds normal; no masses,  no organomegaly  Neurological: normal without focal findings and mental status, speech normal, alert and oriented x3  Breasts: no palpable mass, no palpable axillary lymphadenopathy, fullness of right upper chest likely 2/2 h/o fat transfer, right Fernandez III, left Baker I, mild axillary excess, animation deformity, bilateral decorative tattoos    Lab Results and Cultures:   CBC with diff:   Lab Results   Component Value Date    WBC 9.41 01/15/2024    HGB 16.0 (H) 01/15/2024    HCT 48.5 (H) 01/15/2024    MCV 95 01/15/2024     01/15/2024    RBC 5.09 01/15/2024    MCH 31.4 01/15/2024    MCHC 33.0 01/15/2024    RDW 12.0 01/15/2024    MPV 10.7 01/15/2024    NRBC 0 01/15/2024      BMP/CMP:  Lab Results   Component Value Date    K 4.0 01/15/2024    K 4.6 10/06/2022    CL 99 01/15/2024     10/06/2022    CO2 27 01/15/2024    CO2 27 10/06/2022    BUN 11 01/15/2024    BUN 12 10/06/2022    CREATININE 0.69 01/15/2024    CREATININE 0.69 10/06/2022    GLUCOSE 129 07/14/2016    CALCIUM 9.8 01/15/2024    CALCIUM 8.7 10/06/2022    AST 19 " "01/15/2024    AST 14 10/06/2022    ALT 16 01/15/2024    ALT 20 10/06/2022    ALKPHOS 112 (H) 01/15/2024    ALKPHOS 131 (H) 10/06/2022    EGFR 101 01/15/2024    EGFR 106 10/06/2022    EGFR 104 10/29/2019   ,     Coags:   Lab Results   Component Value Date    PT 13.7 04/06/2018    PTT 31 08/03/2018    INR 0.98 08/03/2018    INR 1.1 04/06/2018   ,          Lipid Panel: No results found for: \"CHOL\"  No results found for: \"HDL\"  No results found for: \"HDL\"  No results found for: \"LDLCALC\"  No results found for: \"TRIG\"    HgbA1c:   Lab Results   Component Value Date    HGBA1C 5.2 12/20/2022    HGBA1C 5.2 08/17/2021       Blood Culture: No results found for: \"BLOODCX\",   Urinalysis:   Lab Results   Component Value Date    COLORU Light Yellow 01/15/2024    CLARITYU Clear 01/15/2024    SPECGRAV 1.014 01/15/2024    PHUR 6.5 01/15/2024    PHUR 5.5 08/03/2018    LEUKOCYTESUR Negative 01/15/2024    NITRITE Positive (A) 01/15/2024    GLUCOSEU Negative 01/15/2024    KETONESU Negative 01/15/2024    BILIRUBINUR Negative 01/15/2024    BLOODU Negative 01/15/2024   ,   Urine Culture:   Lab Results   Component Value Date    URINECX >100,000 cfu/ml Escherichia coli ESBL (A) 02/27/2018   ,   Wound Culure:  No results found for: \"WOUNDCULT\"    Counseling / Coordination of Care  Total time spent today  30 minutes. Greater than 50% of total time was spent with the patient and / or family counseling and / or coordination of care.     Thank you for allowing me to participate in the care of Jaylyn Kim. Please don't hesitate to call, text, email, or TigerText with any questions.     Kath Roca PA-C       "

## 2024-05-30 ENCOUNTER — APPOINTMENT (OUTPATIENT)
Dept: LAB | Age: 54
End: 2024-05-30
Payer: COMMERCIAL

## 2024-05-30 DIAGNOSIS — Z85.3 HISTORY OF BREAST CANCER: ICD-10-CM

## 2024-05-30 LAB
ANION GAP SERPL CALCULATED.3IONS-SCNC: 9 MMOL/L (ref 4–13)
BUN SERPL-MCNC: 16 MG/DL (ref 5–25)
CALCIUM SERPL-MCNC: 8.9 MG/DL (ref 8.4–10.2)
CHLORIDE SERPL-SCNC: 104 MMOL/L (ref 96–108)
CO2 SERPL-SCNC: 28 MMOL/L (ref 21–32)
CREAT SERPL-MCNC: 0.71 MG/DL (ref 0.6–1.3)
GFR SERPL CREATININE-BSD FRML MDRD: 98 ML/MIN/1.73SQ M
GLUCOSE P FAST SERPL-MCNC: 106 MG/DL (ref 65–99)
POTASSIUM SERPL-SCNC: 3.5 MMOL/L (ref 3.5–5.3)
SODIUM SERPL-SCNC: 141 MMOL/L (ref 135–147)

## 2024-05-30 PROCEDURE — 36415 COLL VENOUS BLD VENIPUNCTURE: CPT

## 2024-05-30 PROCEDURE — 80048 BASIC METABOLIC PNL TOTAL CA: CPT

## 2024-05-31 ENCOUNTER — APPOINTMENT (OUTPATIENT)
Dept: LAB | Facility: AMBULARY SURGERY CENTER | Age: 54
End: 2024-05-31
Payer: COMMERCIAL

## 2024-05-31 DIAGNOSIS — Z85.3 HISTORY OF BREAST CANCER: ICD-10-CM

## 2024-05-31 LAB
BASOPHILS # BLD AUTO: 0.05 THOUSANDS/ÂΜL (ref 0–0.1)
BASOPHILS NFR BLD AUTO: 1 % (ref 0–1)
EOSINOPHIL # BLD AUTO: 0.21 THOUSAND/ÂΜL (ref 0–0.61)
EOSINOPHIL NFR BLD AUTO: 3 % (ref 0–6)
ERYTHROCYTE [DISTWIDTH] IN BLOOD BY AUTOMATED COUNT: 11.5 % (ref 11.6–15.1)
HCT VFR BLD AUTO: 44.9 % (ref 34.8–46.1)
HGB BLD-MCNC: 15.1 G/DL (ref 11.5–15.4)
IMM GRANULOCYTES # BLD AUTO: 0.01 THOUSAND/UL (ref 0–0.2)
IMM GRANULOCYTES NFR BLD AUTO: 0 % (ref 0–2)
LYMPHOCYTES # BLD AUTO: 1.78 THOUSANDS/ÂΜL (ref 0.6–4.47)
LYMPHOCYTES NFR BLD AUTO: 29 % (ref 14–44)
MCH RBC QN AUTO: 32.3 PG (ref 26.8–34.3)
MCHC RBC AUTO-ENTMCNC: 33.6 G/DL (ref 31.4–37.4)
MCV RBC AUTO: 96 FL (ref 82–98)
MONOCYTES # BLD AUTO: 0.59 THOUSAND/ÂΜL (ref 0.17–1.22)
MONOCYTES NFR BLD AUTO: 10 % (ref 4–12)
NEUTROPHILS # BLD AUTO: 3.54 THOUSANDS/ÂΜL (ref 1.85–7.62)
NEUTS SEG NFR BLD AUTO: 57 % (ref 43–75)
NRBC BLD AUTO-RTO: 0 /100 WBCS
PLATELET # BLD AUTO: 312 THOUSANDS/UL (ref 149–390)
PMV BLD AUTO: 11.1 FL (ref 8.9–12.7)
RBC # BLD AUTO: 4.67 MILLION/UL (ref 3.81–5.12)
WBC # BLD AUTO: 6.18 THOUSAND/UL (ref 4.31–10.16)

## 2024-05-31 PROCEDURE — 36415 COLL VENOUS BLD VENIPUNCTURE: CPT

## 2024-05-31 PROCEDURE — 85025 COMPLETE CBC W/AUTO DIFF WBC: CPT

## 2024-05-31 NOTE — PRE-PROCEDURE INSTRUCTIONS
Pre-Surgery Instructions:   Medication Instructions    acetaminophen (TYLENOL) 325 mg tablet Hold day of surgery.    albuterol (PROVENTIL HFA,VENTOLIN HFA) 90 mcg/act inhaler Uses PRN- OK to take day of surgery    BIOTIN PO Stop taking 7 days prior to surgery.    Calcium Carb-Cholecalciferol 1000-800 MG-UNIT TABS Stop taking 7 days prior to surgery.    cyanocobalamin 50 MCG tablet Stop taking 7 days prior to surgery.    ferrous sulfate 325 (65 Fe) mg tablet Hold day of surgery.    Lifitegrast (XIIDRA OP) Take day of surgery.    loratadine (CLARITIN) 10 mg tablet Hold day of surgery.    methocarbamol (ROBAXIN) 500 mg tablet Uses PRN- OK to take day of surgery    montelukast (SINGULAIR) 10 mg tablet Take night before surgery    multivitamin (THERAGRAN) TABS Stop taking 7 days prior to surgery.    Naproxen Sodium 220 MG CAPS Stop taking 7 days prior to surgery.    Omega-3 Fatty Acids (FISH OIL PO) Stop taking 7 days prior to surgery.    pantoprazole (PROTONIX) 40 mg tablet Take day of surgery.    PSYLLIUM HUSK PO Hold day of surgery.    pyridoxine (VITAMIN B6) 100 mg tablet Stop taking 7 days prior to surgery.    tiZANidine (Zanaflex) 4 mg tablet Uses PRN- OK to take day of surgery    topiramate (TOPAMAX) 25 mg tablet Stop taking 7 days prior to surgery.    Xhance 93 MCG/ACT EXHU Uses PRN- OK to take day of surgery    Pt instructed to stop nsaids and supplements one week prior to surgery. Per pt she will stop topamax one week prior to surgery since this is for weight loss.  She will also contact her gastric bypass surgeon about this. Pt verbalized understanding of shower and med instructions. Medication instructions for day surgery reviewed. Please use only a sip of water to take your instructed medications. Avoid all over the counter vitamins, supplements and NSAIDS for one week prior to surgery per anesthesia guidelines. Tylenol is ok to take as needed.     You will receive a call one business day prior to surgery  with an arrival time and hospital directions. If your surgery is scheduled on a Monday, the hospital will be calling you on the Friday prior to your surgery. If you have not heard from anyone by 8pm, please call the hospital supervisor through the hospital  at [phone]. ([place] [phone] or [place] [phone]).    Do not eat or drink anything after midnight the night before your surgery, including candy, mints, lifesavers, or chewing gum. Do not drink alcohol 24hrs before your surgery. Try not to smoke at least 24hrs before your surgery.       Follow the pre surgery showering instructions as listed in the “My Surgical Experience Booklet” or otherwise provided by your surgeon's office. Do not use a blade to shave the surgical area 1 week before surgery. It is okay to use a clean electric clippers up to 24 hours before surgery. Do not apply any lotions, creams, including makeup, cologne, deodorant, or perfumes after showering on the day of your surgery. Do not use dry shampoo, hair spray, hair gel, or any type of hair products.     No contact lenses, eye make-up, or artificial eyelashes. Remove nail polish, including gel polish, and any artificial, gel, or acrylic nails if possible. Remove all jewelry including rings and body piercing jewelry.     Wear causal clothing that is easy to take on and off. Consider your type of surgery.    Keep any valuables, jewelry, piercings at home. Please bring any specially ordered equipment (sling, braces) if indicated.    Arrange for a responsible person to drive you to and from the hospital on the day of your surgery. Please confirm the visitor policy for the day of your procedure when you receive your phone call with an arrival time.     Call the surgeon's office with any new illnesses, exposures, or additional questions prior to surgery.    Please reference your “My Surgical Experience Booklet” for additional information to prepare for your upcoming surgery.

## 2024-06-07 ENCOUNTER — TELEPHONE (OUTPATIENT)
Age: 54
End: 2024-06-07

## 2024-06-07 ENCOUNTER — ANESTHESIA EVENT (OUTPATIENT)
Dept: PERIOP | Facility: AMBULARY SURGERY CENTER | Age: 54
End: 2024-06-07
Payer: COMMERCIAL

## 2024-06-07 NOTE — TELEPHONE ENCOUNTER
Patient called asking for medications that were supposed to be send to her Pharmacy([facility]) prior to her surgery.  She is scheduled for surgery next week 6/11    Per office visit note on 5/28 medications were discussed.    Thank you

## 2024-06-10 DIAGNOSIS — C50.511 MALIGNANT NEOPLASM OF LOWER-OUTER QUADRANT OF RIGHT BREAST OF FEMALE, ESTROGEN RECEPTOR POSITIVE (HCC): Primary | ICD-10-CM

## 2024-06-10 DIAGNOSIS — Z17.0 MALIGNANT NEOPLASM OF LOWER-OUTER QUADRANT OF RIGHT BREAST OF FEMALE, ESTROGEN RECEPTOR POSITIVE (HCC): Primary | ICD-10-CM

## 2024-06-10 RX ORDER — OXYCODONE HYDROCHLORIDE 5 MG/1
5 TABLET ORAL EVERY 6 HOURS PRN
Qty: 10 TABLET | Refills: 0 | Status: SHIPPED | OUTPATIENT
Start: 2024-06-10

## 2024-06-10 RX ORDER — SENNOSIDES 8.6 MG
650 CAPSULE ORAL EVERY 6 HOURS
Qty: 20 TABLET | Refills: 0 | Status: SHIPPED | OUTPATIENT
Start: 2024-06-10 | End: 2024-06-15

## 2024-06-10 RX ORDER — GABAPENTIN 300 MG/1
300 CAPSULE ORAL 3 TIMES DAILY
Qty: 15 CAPSULE | Refills: 0 | Status: SHIPPED | OUTPATIENT
Start: 2024-06-10 | End: 2024-06-15

## 2024-06-10 RX ORDER — IBUPROFEN 800 MG/1
800 TABLET ORAL EVERY 8 HOURS PRN
Qty: 15 TABLET | Refills: 0 | Status: SHIPPED | OUTPATIENT
Start: 2024-06-10

## 2024-06-10 RX ORDER — TRAMADOL HYDROCHLORIDE 50 MG/1
50 TABLET ORAL EVERY 6 HOURS PRN
Qty: 20 TABLET | Refills: 0 | Status: SHIPPED | OUTPATIENT
Start: 2024-06-10

## 2024-06-11 ENCOUNTER — ANESTHESIA (OUTPATIENT)
Dept: PERIOP | Facility: AMBULARY SURGERY CENTER | Age: 54
End: 2024-06-11
Payer: COMMERCIAL

## 2024-06-11 ENCOUNTER — HOSPITAL ENCOUNTER (OUTPATIENT)
Facility: AMBULARY SURGERY CENTER | Age: 54
Setting detail: OUTPATIENT SURGERY
Discharge: HOME | End: 2024-06-11
Attending: STUDENT IN AN ORGANIZED HEALTH CARE EDUCATION/TRAINING PROGRAM | Admitting: STUDENT IN AN ORGANIZED HEALTH CARE EDUCATION/TRAINING PROGRAM
Payer: COMMERCIAL

## 2024-06-11 VITALS
SYSTOLIC BLOOD PRESSURE: 117 MMHG | OXYGEN SATURATION: 99 % | RESPIRATION RATE: 18 BRPM | WEIGHT: 221 LBS | DIASTOLIC BLOOD PRESSURE: 64 MMHG | TEMPERATURE: 98.2 F | BODY MASS INDEX: 33.49 KG/M2 | HEART RATE: 68 BPM | HEIGHT: 68 IN

## 2024-06-11 DIAGNOSIS — Z85.3 HISTORY OF BREAST CANCER: ICD-10-CM

## 2024-06-11 PROCEDURE — 14301 TIS TRNFR ANY 30.1-60 SQ CM: CPT | Performed by: STUDENT IN AN ORGANIZED HEALTH CARE EDUCATION/TRAINING PROGRAM

## 2024-06-11 PROCEDURE — 14301 TIS TRNFR ANY 30.1-60 SQ CM: CPT | Performed by: PHYSICIAN ASSISTANT

## 2024-06-11 PROCEDURE — 19371 PERI-IMPLT CAPSLC BRST COMPL: CPT | Performed by: PHYSICIAN ASSISTANT

## 2024-06-11 PROCEDURE — 14302 TIS TRNFR ADDL 30 SQ CM: CPT | Performed by: STUDENT IN AN ORGANIZED HEALTH CARE EDUCATION/TRAINING PROGRAM

## 2024-06-11 PROCEDURE — 88300 SURGICAL PATH GROSS: CPT | Performed by: PATHOLOGY

## 2024-06-11 PROCEDURE — 14302 TIS TRNFR ADDL 30 SQ CM: CPT | Performed by: PHYSICIAN ASSISTANT

## 2024-06-11 PROCEDURE — 19371 PERI-IMPLT CAPSLC BRST COMPL: CPT | Performed by: STUDENT IN AN ORGANIZED HEALTH CARE EDUCATION/TRAINING PROGRAM

## 2024-06-11 PROCEDURE — 88305 TISSUE EXAM BY PATHOLOGIST: CPT | Performed by: PATHOLOGY

## 2024-06-11 PROCEDURE — C9290 INJ, BUPIVACAINE LIPOSOME: HCPCS | Performed by: PHYSICIAN ASSISTANT

## 2024-06-11 RX ORDER — ACETAMINOPHEN 325 MG/1
975 TABLET ORAL ONCE
Status: COMPLETED | OUTPATIENT
Start: 2024-06-11 | End: 2024-06-11

## 2024-06-11 RX ORDER — SCOLOPAMINE TRANSDERMAL SYSTEM 1 MG/1
1 PATCH, EXTENDED RELEASE TRANSDERMAL
Status: DISCONTINUED | OUTPATIENT
Start: 2024-06-11 | End: 2024-06-11 | Stop reason: HOSPADM

## 2024-06-11 RX ORDER — GABAPENTIN 300 MG/1
300 CAPSULE ORAL ONCE
Status: COMPLETED | OUTPATIENT
Start: 2024-06-11 | End: 2024-06-11

## 2024-06-11 RX ORDER — SODIUM CHLORIDE, SODIUM LACTATE, POTASSIUM CHLORIDE, CALCIUM CHLORIDE 600; 310; 30; 20 MG/100ML; MG/100ML; MG/100ML; MG/100ML
INJECTION, SOLUTION INTRAVENOUS CONTINUOUS PRN
Status: DISCONTINUED | OUTPATIENT
Start: 2024-06-11 | End: 2024-06-11

## 2024-06-11 RX ORDER — ALBUTEROL SULFATE 2.5 MG/3ML
2.5 SOLUTION RESPIRATORY (INHALATION) ONCE
Status: COMPLETED | OUTPATIENT
Start: 2024-06-11 | End: 2024-06-11

## 2024-06-11 RX ORDER — DEXAMETHASONE SODIUM PHOSPHATE 10 MG/ML
INJECTION, SOLUTION INTRAMUSCULAR; INTRAVENOUS AS NEEDED
Status: DISCONTINUED | OUTPATIENT
Start: 2024-06-11 | End: 2024-06-11

## 2024-06-11 RX ORDER — FENTANYL CITRATE/PF 50 MCG/ML
50 SYRINGE (ML) INJECTION
Status: DISCONTINUED | OUTPATIENT
Start: 2024-06-11 | End: 2024-06-11 | Stop reason: HOSPADM

## 2024-06-11 RX ORDER — CEFAZOLIN SODIUM 2 G/50ML
2000 SOLUTION INTRAVENOUS ONCE
Status: COMPLETED | OUTPATIENT
Start: 2024-06-11 | End: 2024-06-11

## 2024-06-11 RX ORDER — HYDROMORPHONE HCL/PF 1 MG/ML
0.5 SYRINGE (ML) INJECTION
Status: DISCONTINUED | OUTPATIENT
Start: 2024-06-11 | End: 2024-06-11 | Stop reason: HOSPADM

## 2024-06-11 RX ORDER — PROPOFOL 10 MG/ML
INJECTION, EMULSION INTRAVENOUS CONTINUOUS PRN
Status: DISCONTINUED | OUTPATIENT
Start: 2024-06-11 | End: 2024-06-11

## 2024-06-11 RX ORDER — FENTANYL CITRATE 50 UG/ML
INJECTION, SOLUTION INTRAMUSCULAR; INTRAVENOUS AS NEEDED
Status: DISCONTINUED | OUTPATIENT
Start: 2024-06-11 | End: 2024-06-11

## 2024-06-11 RX ORDER — ROCURONIUM BROMIDE 10 MG/ML
INJECTION, SOLUTION INTRAVENOUS AS NEEDED
Status: DISCONTINUED | OUTPATIENT
Start: 2024-06-11 | End: 2024-06-11

## 2024-06-11 RX ORDER — MAGNESIUM HYDROXIDE 1200 MG/15ML
LIQUID ORAL AS NEEDED
Status: DISCONTINUED | OUTPATIENT
Start: 2024-06-11 | End: 2024-06-11 | Stop reason: HOSPADM

## 2024-06-11 RX ORDER — OXYCODONE HYDROCHLORIDE 5 MG/1
5 TABLET ORAL ONCE AS NEEDED
Status: COMPLETED | OUTPATIENT
Start: 2024-06-11 | End: 2024-06-11

## 2024-06-11 RX ORDER — ONDANSETRON 2 MG/ML
4 INJECTION INTRAMUSCULAR; INTRAVENOUS ONCE AS NEEDED
Status: COMPLETED | OUTPATIENT
Start: 2024-06-11 | End: 2024-06-11

## 2024-06-11 RX ORDER — LIDOCAINE HYDROCHLORIDE 20 MG/ML
INJECTION, SOLUTION EPIDURAL; INFILTRATION; INTRACAUDAL; PERINEURAL AS NEEDED
Status: DISCONTINUED | OUTPATIENT
Start: 2024-06-11 | End: 2024-06-11

## 2024-06-11 RX ORDER — ONDANSETRON 2 MG/ML
INJECTION INTRAMUSCULAR; INTRAVENOUS AS NEEDED
Status: DISCONTINUED | OUTPATIENT
Start: 2024-06-11 | End: 2024-06-11

## 2024-06-11 RX ORDER — MIDAZOLAM HYDROCHLORIDE 2 MG/2ML
INJECTION, SOLUTION INTRAMUSCULAR; INTRAVENOUS AS NEEDED
Status: DISCONTINUED | OUTPATIENT
Start: 2024-06-11 | End: 2024-06-11

## 2024-06-11 RX ORDER — PROPOFOL 10 MG/ML
INJECTION, EMULSION INTRAVENOUS AS NEEDED
Status: DISCONTINUED | OUTPATIENT
Start: 2024-06-11 | End: 2024-06-11

## 2024-06-11 RX ADMIN — ONDANSETRON 4 MG: 2 INJECTION INTRAMUSCULAR; INTRAVENOUS at 12:54

## 2024-06-11 RX ADMIN — SCOPALAMINE 1 PATCH: 1 PATCH, EXTENDED RELEASE TRANSDERMAL at 10:42

## 2024-06-11 RX ADMIN — SUGAMMADEX 200 MG: 100 INJECTION, SOLUTION INTRAVENOUS at 14:30

## 2024-06-11 RX ADMIN — FENTANYL CITRATE 50 MCG: 50 INJECTION INTRAMUSCULAR; INTRAVENOUS at 15:24

## 2024-06-11 RX ADMIN — DEXAMETHASONE SODIUM PHOSPHATE 10 MG: 10 INJECTION INTRAMUSCULAR; INTRAVENOUS at 12:54

## 2024-06-11 RX ADMIN — PROPOFOL 150 MCG/KG/MIN: 10 INJECTION, EMULSION INTRAVENOUS at 12:53

## 2024-06-11 RX ADMIN — CEFAZOLIN SODIUM 2000 MG: 2 SOLUTION INTRAVENOUS at 12:53

## 2024-06-11 RX ADMIN — ACETAMINOPHEN 975 MG: 325 TABLET, FILM COATED ORAL at 10:41

## 2024-06-11 RX ADMIN — GABAPENTIN 300 MG: 300 CAPSULE ORAL at 10:41

## 2024-06-11 RX ADMIN — ROCURONIUM BROMIDE 50 MG: 10 INJECTION INTRAVENOUS at 12:50

## 2024-06-11 RX ADMIN — ONDANSETRON 4 MG: 2 INJECTION INTRAMUSCULAR; INTRAVENOUS at 14:55

## 2024-06-11 RX ADMIN — MIDAZOLAM HYDROCHLORIDE 2 MG: 1 INJECTION, SOLUTION INTRAMUSCULAR; INTRAVENOUS at 12:44

## 2024-06-11 RX ADMIN — FENTANYL CITRATE 50 MCG: 50 INJECTION INTRAMUSCULAR; INTRAVENOUS at 13:32

## 2024-06-11 RX ADMIN — OXYCODONE HYDROCHLORIDE 5 MG: 5 TABLET ORAL at 16:38

## 2024-06-11 RX ADMIN — LIDOCAINE HYDROCHLORIDE 100 MG: 20 INJECTION, SOLUTION EPIDURAL; INFILTRATION; INTRACAUDAL; PERINEURAL at 12:50

## 2024-06-11 RX ADMIN — SODIUM CHLORIDE, SODIUM LACTATE, POTASSIUM CHLORIDE, AND CALCIUM CHLORIDE: .6; .31; .03; .02 INJECTION, SOLUTION INTRAVENOUS at 12:42

## 2024-06-11 RX ADMIN — PROPOFOL 200 MG: 10 INJECTION, EMULSION INTRAVENOUS at 12:50

## 2024-06-11 RX ADMIN — ALBUTEROL SULFATE 2.5 MG: 2.5 SOLUTION RESPIRATORY (INHALATION) at 10:59

## 2024-06-11 RX ADMIN — FENTANYL CITRATE 50 MCG: 50 INJECTION INTRAMUSCULAR; INTRAVENOUS at 12:50

## 2024-06-11 RX ADMIN — FENTANYL CITRATE 50 MCG: 50 INJECTION INTRAMUSCULAR; INTRAVENOUS at 14:59

## 2024-06-11 NOTE — ANESTHESIA POSTPROCEDURE EVALUATION
Post-Op Assessment Note    CV Status:  Stable  Pain Score: 0    Pain management: adequate       Mental Status:  Sleepy and arousable   Hydration Status:  Euvolemic   PONV Controlled:  Controlled   Airway Patency:  Patent     Post Op Vitals Reviewed: Yes    No anethesia notable event occurred.    Staff: CRNA, Anesthesiologist               /63 (06/11/24 1446)    Temp 97.7 °F (36.5 °C) (06/11/24 1446)    Pulse 67 (06/11/24 1446)   Resp 12 (06/11/24 1446)    SpO2 99 % (06/11/24 1446)

## 2024-06-11 NOTE — ANESTHESIA PREPROCEDURE EVALUATION
Procedure:  REMOVAL OF BILATERAL IMPLANTS, CAPSULECTOMY, AESTHETIC FLAT CLOSURE, PREVENA PLACEMENT (Bilateral: Breast)    Relevant Problems   GYN   (+) Malignant neoplasm of lower-outer quadrant of right breast of female, estrogen receptor positive (HCC)      MUSCULOSKELETAL   (+) DDD (degenerative disc disease), cervical      NEURO/PSYCH   (+) Anxiety      PULMONARY   (+) KRISHNA (obstructive sleep apnea)      PONV    Asthma    GERD      Physical Exam    Airway    Mallampati score: II  TM Distance: >3 FB  Neck ROM: full     Dental   No notable dental hx     Cardiovascular  Cardiovascular exam normal    Pulmonary  Pulmonary exam normal     Other Findings  post-pubertal.      Anesthesia Plan  ASA Score- 2     Anesthesia Type- general with ASA Monitors.         Additional Monitors:     Airway Plan: ETT.           Plan Factors-Exercise tolerance (METS): >4 METS.    Chart reviewed.  Imaging results reviewed. Existing labs reviewed. Patient summary reviewed.    Patient is not a current smoker.      There is medical exclusion for perioperative obstructive sleep apnea risk education.        Induction- intravenous.    Postoperative Plan-     Perioperative Resuscitation Plan - Level 1 - Full Code.       Informed Consent- Anesthetic plan and risks discussed with patient.  I personally reviewed this patient with the CRNA. Discussed and agreed on the Anesthesia Plan with the CRNA..

## 2024-06-11 NOTE — DISCHARGE INSTR - AVS FIRST PAGE
Surgery Date: 6/11/2024                Patient: Jaylyn Kim  Surgeon: Dr. Garcia     Postoperative Instructions   Implant Removal/Aesthetic Flat Closure     Dressings:  [x] A Prevena wound VAC was placed over your incisions. Please do not get this wet. You may sponge bathe around the dressing. If Prevena alarms, please use extra tape supplied to patch air leak. Please call the office if you are unable to fix the leak. Please keep Prevena attached to power supply while you are non mobile to charge.   [] Remove dressing the second morning following your surgery and bathe as directed.  Please leave tegaderm (clear dressing over your drains) and steristrips in place.  [] No dressings are required but you may cover the incision with gauze for comfort.  [] Wear your surgical bra at all times except while showering.  [] Strip and record your NIDHI drain output as instructed.  Be sure to bring the output log to your follow up appointment.  [] Other instructions:      Bathing:  [x] You may sponge bathe around the Prevena dressing.   [x] No submerging incision in bathtub, pool, hot tub and/or lake.     Activity:  [x] No heavy lifting (> 10lbs).  [x] No strenuous exercise.  [x] Walking is mandatory daily.  [x] Sleeping may be more comfortable with your head elevated.  [x] No driving until off pain medications and you have resumed full range of motion.     Diet and Medication:  [x] Resume diet as tolerated.  High protein diet is important for healing.  Remain well hydrated with water and minimize sodium intake.  [x] Resume preoperative medications.  [x] Pain medications as prescribed.  You may also begin to use ibuprofen 48 hours after surgery.  [] Finish all antibiotics as prescribed.  [x] Apply ice to area as needed for pain.  Do not place ice directly on skin.  Do not use heat.  [] Other instructions:      It is expected to have some bruising, swelling and mild oozing at the incision site and of the surrounding area.  If  there is more than you expect, an enlarging area or you suspect an infection, please call the office.     Some patients may experience a low-grade fever after surgery.  If it is above 100.4, please call the office.     If you do not have a postoperative office appointment scheduled, please call the office today and let the staff know you are to be seen in 7 days.      Please call [phone] (Dr. Garcia's office) with any questions, concerns or changes.

## 2024-06-11 NOTE — OP NOTE
OPERATIVE REPORT  PATIENT NAME: Jaylyn Kim    :  1970  MRN: [id number]  Pt Location: [facility] OR [facility]OM 04    SURGERY DATE: 2024    Surgeons and Role:     * Rhea ZhaoDO fede - Primary  Kath Roca PA-C    Preop Diagnosis:  History of breast cancer [Z85.3]    Post-Op Diagnosis Codes:     * History of breast cancer [Z85.3]    Procedure(s):  Bilateral - REMOVAL OF BILATERAL IMPLANTS. CAPSULECTOMY. AESTHETIC FLAT CLOSURE. PREVENA PLACEMENT    Specimen(s):  ID Type Source Tests Collected by Time Destination   1 : LEFT BREAST IMPLANT Tissue Surgical Hardware/Implant TISSUE EXAM Rhea Garcia,  2024 1315    2 : RIGHT BREAST IMPLANT Tissue Surgical Hardware/Implant TISSUE EXAM Rhea Garcia,  2024 1316    3 : LEFT BREAST TISSUE AND CAPSULE Tissue Breast, Left TISSUE EXAM Rhea Garcia,  2024 1335    4 : RIGHT BREAST TISSUE AND CAPSULE Tissue Breast, Right TISSUE EXAM Rhea Garcia,  2024 1335        Estimated Blood Loss:   25 ml    Drains:  None    Anesthesia Type:   General/TIVA    Operative Indications:  55 yo female presents for removal of her implants following breast reconstruction in 2018.  She had undergone second stage as well as fat transfer and is unhappy with her current reconstruction.  She has a sequelae of symptoms which I discussed may or may not be effected by the removal of her implants.  We discussed alternative reconstruction options but the patient opted for removal and flat closure.  She does understand that lipodystrophy of the axillary region may be improved by this operation but is not the primary focus and may require revision procedures should this contour not be ideal.    Operative Findings:  Removal of bilateral implants - right intact, left ruptured  Bilateral total capsulectomy  Excision of excess skin bilaterally  Bilateral revision Elvin flaps 14x5 cm  250 cm2 prevena placement    Complications:    None    Procedure and Technique:  The patient was seen preoperatively.  The procedure, risks, benefits and alternatives were discussed.  Informed consent was obtained.  The patient was site marked preoperatively.  The patient was brought to the operating room where she was positioned supine with all of her pressure points appropriately padded.  Anesthesia commenced.  A timeout was performed and verified.     The patient was prepped and draped in usual sterile fashion.  I first turned my attention to the left side.   I incised just superior to her former incision.  This was dissected down to implant capsule.  The implant was noted to be ruptured and thus I meticulously dissected the capsule from the surrounding tissue as to not spill the ruptured silicone.  The entire capsule was removed as well as the implant.  The defect was inspected and some errant fat, likely from her former fat transfer was also identified and removed.  Hemostasis was ensured.  A pec block was performed using exparel.  The abdominal wall was advanced as a Elvin flap superiorly in progressive fashion.  2-0 PDS then used was used to reinforce/reconstruct the inframammary fold with this Elvin flap to the rib periosteum as well as alieviate tension from the closure.  The inferior mastectomy flap excess was excised.  Closure commenced using 3-0 monocryl and insorb staples for the deep dermis and 4-0 PDS to approximate the skin.  The same procedure was performed on the right except that implant was not ruptured.     The incisions were cleansed and dried.  The Prevena wound vac was fashioned along the entire length of the defect and secured using the adhesive strips.  Adequate seal was obtained.     The instrument, sponge and needle count was correct an verified prior to completion of the case.     The patient emerged from anesthesia and was transferred to the recovery room in stable condition.     Patient Disposition:  PACU       SIGNATURE: Rhea  Steff Garcia DO  DATE: June 11, 2024  TIME: 9:52 PM    No qualified plastic surgery resident was available for the case.  The PA-C provided assistance with retraction and suturing.

## 2024-06-12 ENCOUNTER — TELEPHONE (OUTPATIENT)
Age: 54
End: 2024-06-12

## 2024-06-12 NOTE — TELEPHONE ENCOUNTER
Patient called the RX Refill Line. Message is being forwarded to the office.     Patient called and said the combo med that was prescribed was too expensive. Would like to know if she can get the Topamax 25 mg and Phentermine 15 mg refilled or get a script for Rybelsus would be 3 month supply $80.     Please contact patient at [phone] to let her know what the doctor recommends.

## 2024-06-13 NOTE — TELEPHONE ENCOUNTER
Good morning,     Please see below. Patient's message was routed to incorrect provider.    Thanks!    [place]

## 2024-06-14 PROCEDURE — 88300 SURGICAL PATH GROSS: CPT | Performed by: PATHOLOGY

## 2024-06-14 PROCEDURE — 88305 TISSUE EXAM BY PATHOLOGIST: CPT | Performed by: PATHOLOGY

## 2024-06-17 ENCOUNTER — OFFICE VISIT (OUTPATIENT)
Dept: PLASTIC SURGERY | Facility: CLINIC | Age: 54
End: 2024-06-17

## 2024-06-17 DIAGNOSIS — C50.511 MALIGNANT NEOPLASM OF LOWER-OUTER QUADRANT OF RIGHT BREAST OF FEMALE, ESTROGEN RECEPTOR POSITIVE (HCC): Primary | ICD-10-CM

## 2024-06-17 DIAGNOSIS — Z17.0 MALIGNANT NEOPLASM OF LOWER-OUTER QUADRANT OF RIGHT BREAST OF FEMALE, ESTROGEN RECEPTOR POSITIVE (HCC): Primary | ICD-10-CM

## 2024-06-17 LAB
DME PARACHUTE DELIVERY DATE REQUESTED: NORMAL
DME PARACHUTE ITEM DESCRIPTION: NORMAL
DME PARACHUTE ORDER STATUS: NORMAL
DME PARACHUTE SUPPLIER NAME: NORMAL
DME PARACHUTE SUPPLIER PHONE: NORMAL

## 2024-06-17 PROCEDURE — 99024 POSTOP FOLLOW-UP VISIT: CPT | Performed by: PHYSICIAN ASSISTANT

## 2024-06-18 ENCOUNTER — TELEPHONE (OUTPATIENT)
Dept: OBGYN CLINIC | Facility: OTHER | Age: 54
End: 2024-06-18

## 2024-06-18 NOTE — PROGRESS NOTES
Assessment and Plan:  Jaylyn Kim 54 y.o. female s/p removal of bilateral implants, capsulectomy, aesthetic flat closure with prevena placement, presenting for first post op visit    - prevena removed without difficulty  - patient with bilateral seromas. Right aspirated 140cc serosanguinous, left aspirated 200cc serosanguinous  - encouraged ace wrap for this week. Also encouraged low salt diet. We discussed ibuprofen to help with swelling as well  - bra clinic referral sent  - return in 1 week for suture end removal. Call if questions/concerns prior    Subjective:    Patient has been doing well. Pain has been controlled. Prevena dressing was alarming last night but she was able to fix air leak. She has been hearing sloshing in her chest which she has a visible bilateral seroma which was aspirated. Minimal bruising.     Objective:  NAD, AAOx3  Bilateral incisions C/D/I, palpable seromas, right side aspirated 140cc serosanguinous, left aspirated 200cc serosanguinous    Kath Roca PA-C

## 2024-06-19 ENCOUNTER — TELEPHONE (OUTPATIENT)
Dept: OBGYN CLINIC | Facility: OTHER | Age: 54
End: 2024-06-19

## 2024-06-26 ENCOUNTER — OFFICE VISIT (OUTPATIENT)
Dept: PLASTIC SURGERY | Facility: CLINIC | Age: 54
End: 2024-06-26

## 2024-06-26 ENCOUNTER — TELEPHONE (OUTPATIENT)
Dept: PLASTIC SURGERY | Facility: CLINIC | Age: 54
End: 2024-06-26

## 2024-06-26 DIAGNOSIS — C50.511 MALIGNANT NEOPLASM OF LOWER-OUTER QUADRANT OF RIGHT BREAST OF FEMALE, ESTROGEN RECEPTOR POSITIVE (HCC): Primary | ICD-10-CM

## 2024-06-26 DIAGNOSIS — Z17.0 MALIGNANT NEOPLASM OF LOWER-OUTER QUADRANT OF RIGHT BREAST OF FEMALE, ESTROGEN RECEPTOR POSITIVE (HCC): Primary | ICD-10-CM

## 2024-06-26 DIAGNOSIS — I89.0 LYMPHEDEMA: ICD-10-CM

## 2024-06-26 PROCEDURE — 99024 POSTOP FOLLOW-UP VISIT: CPT | Performed by: PHYSICIAN ASSISTANT

## 2024-06-26 RX ORDER — IBUPROFEN 800 MG/1
800 TABLET ORAL EVERY 8 HOURS PRN
Qty: 270 TABLET | Refills: 0 | Status: SHIPPED | OUTPATIENT
Start: 2024-06-26

## 2024-06-26 NOTE — PROGRESS NOTES
Assessment and Plan:  Jaylyn Kim 54 y.o. female s/p removal of bilateral implants, capsulectomy, aesthetic flat closure with prevena placement, presenting for post op visit    - patient with fluid bilaterally. Right aspirated for 180cc serosanguinous, left 175cc serosanguinous  - lipo foam placed over bilateral incisions and sealed with tegaderm. Compression binder placed over foam. Instructed her to replace tegaderm if foam becomes wet.   - will attempt to place lymphedema compression order for compression vest/bra.   - continue low salt diet  - return 1-2 weeks for follow up    Subjective:    Pain continues to be controlled. She has noticed she had continued fluid after last appointment. Bilateral chest aspirated in office today. She has been using an abdominal binder for compression of her chest. She had received a call from the bra clinic but they will not see her for 6 weeks for bra fitting.      Objective:  NAD, AAOx3  Bilateral incisions C/D/I, palpable seromas, right side aspirated 180cc serosanguinous, left aspirated 175cc serosanguinous. No erythema     Kath Roca PA-C

## 2024-06-26 NOTE — TELEPHONE ENCOUNTER
LVM for patient to inform her that we placed a DME order for a compression vest. Requested she return call so that we are ankita to see where she would like it sent.

## 2024-06-27 ENCOUNTER — TELEPHONE (OUTPATIENT)
Age: 54
End: 2024-06-27

## 2024-06-27 NOTE — TELEPHONE ENCOUNTER
"Patient called with lymphedema code for script. The code is I89.0 and the script needs to say \"In need of compression garment\".     Please fax script to [phone]. And if their are any questions or concerns please reach out to [phone], option 4 for more information.    Patient requesting that we email her a copy of the script as well. Email on file is correct [email or web address]    She also wanted to make us aware that she is scheduled for a an appointment with [facility] on Monday. They specialize in compression garments, lymphedema, mastectomies.   "

## 2024-06-30 DIAGNOSIS — Z00.6 ENCOUNTER FOR EXAMINATION FOR NORMAL COMPARISON OR CONTROL IN CLINICAL RESEARCH PROGRAM: ICD-10-CM

## 2024-07-01 ENCOUNTER — TELEPHONE (OUTPATIENT)
Age: 54
End: 2024-07-01

## 2024-07-01 NOTE — TELEPHONE ENCOUNTER
Patient called requesting a referral to be send  as son as possible to [facility] phone number :[phone] fax number :[phone] ,Codes: I89.0 her appointment is today at 12:00 pm .   She also would like a copy of it to be emailed to her   [email or web address]

## 2024-07-02 ENCOUNTER — OFFICE VISIT (OUTPATIENT)
Dept: PLASTIC SURGERY | Facility: CLINIC | Age: 54
End: 2024-07-02

## 2024-07-02 DIAGNOSIS — C50.511 MALIGNANT NEOPLASM OF LOWER-OUTER QUADRANT OF RIGHT BREAST OF FEMALE, ESTROGEN RECEPTOR POSITIVE (HCC): Primary | ICD-10-CM

## 2024-07-02 DIAGNOSIS — Z17.0 MALIGNANT NEOPLASM OF LOWER-OUTER QUADRANT OF RIGHT BREAST OF FEMALE, ESTROGEN RECEPTOR POSITIVE (HCC): Primary | ICD-10-CM

## 2024-07-02 PROCEDURE — 99024 POSTOP FOLLOW-UP VISIT: CPT | Performed by: PHYSICIAN ASSISTANT

## 2024-07-02 NOTE — PROGRESS NOTES
Assessment and Plan:  Jaylyn Kim 54 y.o. female s/p removal of bilateral implants, capsulectomy, aesthetic flat closure with prevena placement 6/11/24, presenting for post op visit    - patient with much smaller palpable collections today. Bilateral aspirated for approximately 50cc serosanguinous  - patient was seen and given a compression vest. She will continue to wear this for support  - continue compression, low salt diet  - return in approximately 1 week        Subjective:     Patient wore foam for compression for the past week. She was fitted for a compression vest yesterday. Fluid buildup much less than last week.      Objective:  NAD, AAOx3  Bilateral incisions C/D/I, palpable seromas, bilateral sides aspirated for 50cc. No erythema     Kath Roca PA-C

## 2024-07-10 ENCOUNTER — OFFICE VISIT (OUTPATIENT)
Dept: PLASTIC SURGERY | Facility: CLINIC | Age: 54
End: 2024-07-10

## 2024-07-10 DIAGNOSIS — C50.511 MALIGNANT NEOPLASM OF LOWER-OUTER QUADRANT OF RIGHT BREAST OF FEMALE, ESTROGEN RECEPTOR POSITIVE (HCC): Primary | ICD-10-CM

## 2024-07-10 DIAGNOSIS — Z17.0 MALIGNANT NEOPLASM OF LOWER-OUTER QUADRANT OF RIGHT BREAST OF FEMALE, ESTROGEN RECEPTOR POSITIVE (HCC): Primary | ICD-10-CM

## 2024-07-10 PROCEDURE — 99024 POSTOP FOLLOW-UP VISIT: CPT | Performed by: PHYSICIAN ASSISTANT

## 2024-07-10 NOTE — PROGRESS NOTES
Assessment and Plan:  Jaylyn Kim 54 y.o. female s/p removal of bilateral implants, capsulectomy, aesthetic flat closure with prevena placement 6/11/24, presenting for post op visit    - continues with smaller collections, today 30cc aspirated from right, 35cc from left  - continue compression/low salt  - return in 1 week to determine if additional aspiration is needed  - call with any questions/concerns prior    Subjective:    Has been wearing compression vest. Smaller fluid collections this week.      Objective:  NAD, AAOx3  Bilateral incisions C/D/I, palpable seromas. Right 30cc serosanguinous, left 35cc serosanguinous. No erythema     Kath Roca PA-C

## 2024-07-17 ENCOUNTER — OFFICE VISIT (OUTPATIENT)
Dept: PLASTIC SURGERY | Facility: CLINIC | Age: 54
End: 2024-07-17

## 2024-07-17 DIAGNOSIS — Z90.13 ACQUIRED ABSENCE OF BILATERAL BREASTS AND NIPPLES: Primary | ICD-10-CM

## 2024-07-17 PROCEDURE — 99024 POSTOP FOLLOW-UP VISIT: CPT | Performed by: STUDENT IN AN ORGANIZED HEALTH CARE EDUCATION/TRAINING PROGRAM

## 2024-07-23 ENCOUNTER — TELEPHONE (OUTPATIENT)
Dept: OBGYN CLINIC | Facility: OTHER | Age: 54
End: 2024-07-23

## 2024-07-24 ENCOUNTER — TELEPHONE (OUTPATIENT)
Age: 54
End: 2024-07-24

## 2024-07-24 NOTE — TELEPHONE ENCOUNTER
Lula calling to check on prescription order receipt for mastectomy bras etc    Advised I did not see it scanned into chart, she stated they faxed it on the 9th and the 18th    I verified fax # with her, and provided alternative number to try, she said she would send again to [phone]    Advised I would have Kimberly call her back    Please call Lula back at [phone]

## 2024-07-25 PROBLEM — Z90.13 ACQUIRED ABSENCE OF BILATERAL BREASTS AND NIPPLES: Status: ACTIVE | Noted: 2024-07-25

## 2024-07-25 NOTE — PROGRESS NOTES
Patient seen and examined.  Doing well.  Notes only small amount of seroma.      Incisions c/d/I and healing well, <20 cc of serous fluid drained, flaps are adherent, skin tyesha nicely, mild scar fibrosis    Continue compression, scar massage and moisturization.  I do think the body with reabsorb any additional fluid however if the patient notes this to be more significant or not occurring, she will let us know sooner rather than later for aspiration.  RTC in 4-6 weeks or sooner should any issues arise.  I re-emphasized that full healing/contour/scar maturation does not occur for months to years postoperatively.    Rhea Garcia, DO  Plastic and Reconstructive Surgery

## 2024-07-26 NOTE — TELEPHONE ENCOUNTER
[facility] [address], [place] [address] office: [phone] fax: [phone]      Physical Therapy: TREATMENT/PROGRESS NOTE   Patient: Juliocesar Freire (45 y.o. male)   Examination Date: 2024   :  1978 MRN: [id number]   Visit #: 15   Insurance Allowable Auth Needed   30 []Yes    []No    Insurance: Payor: GISELE / Plan: GISELE / Product Type: *No Product type* /   Insurance ID: [id number] - (Commercial)  Secondary Insurance (if applicable):    Treatment Diagnosis:    No diagnosis found.     Medical Diagnosis:  Radiculopathy, lumbosacral region [M54.17]   Referring Physician: Patrick Conde MD  PCP: Felicita Brooks MD       Plan of care signed (Y/N):     Date of Patient follow up with Physician:      POC update due: (10 visits /OR AUTH LIMITS, whichever is less)   24                                            Precautions/ Contra-indications:           Latex allergy:  NO  Pacemaker:    NO  Contraindications for Manipulation: None and NA  Date of Surgery:    Other:    Red Flags:  None    C-SSRS Triggered by Intake questionnaire:   [x] No, Questionnaire did not trigger screening.   [] Yes, Patient intake triggered further evaluation      [] C-SSRS Screening completed  [] PCP notified via Plan of Care  [] Emergency services notified     Preferred Language for Healthcare:   [x] English       [] other:    SUBJECTIVE EXAMINATION     Patient stated complaint: Pt        Test used Initial score  2024   Pain Summary VAS 7/10 0/10   Functional questionnaire Modified Oswestry 62%    Other:              Pain:  Pain location: left leg and low back  Patient describes pain to be constant and intermittent  Pain decreases with:     Pain increases with: Standing, Walking, Sitting, and [place] movements, self care, stairs, lifting     Relevant Medical History: depression, neuropathy, tobacco use,     Occupation/School:  Work/School Status:  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

## 2024-08-06 ENCOUNTER — ONCOLOGY SURVIVORSHIP (OUTPATIENT)
Dept: SURGICAL ONCOLOGY | Facility: CLINIC | Age: 54
End: 2024-08-06
Payer: COMMERCIAL

## 2024-08-06 VITALS
RESPIRATION RATE: 16 BRPM | BODY MASS INDEX: 33.65 KG/M2 | WEIGHT: 222 LBS | HEART RATE: 94 BPM | SYSTOLIC BLOOD PRESSURE: 116 MMHG | HEIGHT: 68 IN | OXYGEN SATURATION: 97 % | TEMPERATURE: 97.4 F | DIASTOLIC BLOOD PRESSURE: 84 MMHG

## 2024-08-06 DIAGNOSIS — Z08 ENCOUNTER FOR FOLLOW-UP SURVEILLANCE OF BREAST CANCER: ICD-10-CM

## 2024-08-06 DIAGNOSIS — Z85.3 ENCOUNTER FOR FOLLOW-UP SURVEILLANCE OF BREAST CANCER: ICD-10-CM

## 2024-08-06 DIAGNOSIS — Z85.3 HISTORY OF RIGHT BREAST CANCER: Primary | ICD-10-CM

## 2024-08-06 PROCEDURE — 99213 OFFICE O/P EST LOW 20 MIN: CPT | Performed by: NURSE PRACTITIONER

## 2024-08-06 NOTE — PROGRESS NOTES
Assessment/Plan:    Diagnoses and all orders for this visit:    History of right breast cancer    Encounter for follow-up surveillance of breast cancer      Patient is a 54-year-old female that was diagnosed with a right-sided breast cancer in June 2018.  Her pathology revealed invasive ductal carcinoma, grade 1, ER/%, HER2 negative.  She underwent a right mastectomy and sentinel node biopsy with Dr. Merino and had reconstruction with Dr. Mccormick.  She had a left prophylactic mastectomy.  She also underwent a laparoscopic salpingo-oophorectomy at that time.  She had genetic testing which was negative, VUS of .  She completed anastrozole therapy and is now on observation. Follow up breast cancer survivorship visit performed today.  Patient had bilateral implants removed with Dr Garcia. She is still experiencing some chest wall tenderness on the left which extends to the back.  She is following with oncology massage and has an appointment later today.  She also reports intermittent blurry vision for the last week.  She notices this is most prevalent when she is tired or straining her eyes on the screen.  Since this is new, I instructed her to monitor and to follow-up with her eye doctor if her symptoms do not improve.  She should also contact our office if her symptoms are persistent and do not improve.  No worrisome findings on her clinical exam today. Patient was questioning why she does not receive routine PET scans, reviewed this is not standard of care per national guidelines.  We will plan to see her back in 1 year for a follow-up survivorship visit she was instructed to contact us with any changes or concerns in the interim.  All of her questions were answered today.    REASON FOR VISIT:   Survivorship      Previous therapy:  Oncology History   Malignant neoplasm of lower-outer quadrant of right breast of female, estrogen receptor positive (HCC)   6/14/2018 Biopsy    Right breast biopsy:  - Invasive ductal  carcinoma   Grade 1  %  %  HER2 negative     8/10/2018 Surgery    Right mastectomy with sentinel lymph node biopsy:  - Clear margins  - 0/1 lymph nodes    Left prophylactic mastectomy, bilateral implant/expander placement  Dr. Merino and Dr Mccormick    Laparoscopic salpingo-oophorectomy   Dr. Locke     8/2018 Genetic Testing    [facility]    ALK, APC, SHERIN, AXIN2, BAP1, BARD1, BLM, BMPR1A, BRCA1, BRCA2, BRIP1, CASR, CDC73, CDH1, CDK4, CDKN1B, CDKN1C, CDKN2A, CEBPA, CHEK2, DICER1, DIS3L2, EGFR, EPCAM, FLCN, GATA2, GPC3, GREM1, HOXB13, HRAS, KIT, MAX, MEN1, MET, MITF, MLH1, MSH2, MSH6, MUTYH, NBN, NF1, NF2 ,PALB2, PDGFRA, PHOX2B, PMS2, POLD1, POLE, POT1, FPUSY4V, PTCH1, PTEN, RAD50, RAD51C, RAD51D, RB1, RECQL4, RET, RUNX1, SDHA, SDHAF2, SDHB, SDHC, SDHD, SMAD4, SMARCA4, SMARCB1, SMARCE1, STK11, SUFU, TERC, TERT, [id number], TP53, TSC1, TSC2, VHL, WRN, WT1     Likely Pathogenic Variant  (VUS) : FH [id number]     9/2018 -  Hormone Therapy    Anastrozole  Dr. Mckee     11/30/2018 Surgery    Bilateral implant exchange     2/22/2019 Surgery    Bilateral breast mound revision and fat grafting           Patient ID: Jaylyn Kim is a 54 y.o. female  Patient had bilateral implants removed. She continues to experience some left chest wall tenderness which extends into the scapular region. She is following with Blanca Lott- oncology massage. She states she is still seeing plastic surgery regularly and seromas are being drained. Reports some blurry vision over the course of the last week- most notable when she is tired or binge watching tv. Otherwise denies persistent headaches, back pain, bone pain, cough, SOB, abdominal pain. She is frustrated because she states other institutions order PET scans as part of oncology follow up.          Review of Systems   Constitutional:  Negative for activity change, appetite change, chills, fatigue, fever and unexpected weight change.   Eyes:  Positive for visual disturbance  "(intermittently over past week, worse when looking at screen or fatigued).   Respiratory:  Negative for cough and shortness of breath.    Cardiovascular:  Negative for chest pain.   Gastrointestinal:  Negative for abdominal pain, constipation, diarrhea, nausea and vomiting.   Musculoskeletal:  Positive for myalgias (reports chest wall pain extending to the scapular region). Negative for arthralgias, back pain and gait problem.   Skin:  Negative for color change and rash.   Neurological:  Positive for headaches (chronic). Negative for dizziness.   Hematological:  Negative for adenopathy.   Psychiatric/Behavioral:  Negative for agitation and confusion.    All other systems reviewed and are negative.      Objective:    Blood pressure 116/84, pulse 94, temperature (!) 97.4 °F (36.3 °C), temperature source Temporal, resp. rate 16, height 5' 8\" (1.727 m), weight 101 kg (222 lb), last menstrual period 07/31/2018, SpO2 97%.  Body mass index is 33.75 kg/m².      Physical Exam  Vitals reviewed.   Constitutional:       General: She is not in acute distress.     Appearance: Normal appearance. She is well-developed. She is not diaphoretic.   HENT:      Head: Normocephalic and atraumatic.   Neck:      Comments: Slight puffiness left neck- had CT scan for this in the past, no worrisome findings. This is stable. No palpable masses or lymphadenopathy.  Cardiovascular:      Rate and Rhythm: Normal rate and regular rhythm.      Heart sounds: Normal heart sounds.   Pulmonary:      Effort: Pulmonary effort is normal.      Breath sounds: Normal breath sounds.   Chest:      Comments: Bilateral mastectomy sites free of dominant masses, skin changes or nodules. No cording in left axilla.  Abdominal:      Palpations: Abdomen is soft. There is no mass.      Tenderness: There is no abdominal tenderness.   Musculoskeletal:         General: Normal range of motion.      Cervical back: Normal range of motion.   Lymphadenopathy:      Upper Body:    "   Right upper body: No supraclavicular or axillary adenopathy.      Left upper body: No supraclavicular or axillary adenopathy.   Skin:     General: Skin is warm and dry.      Findings: No rash.   Neurological:      Mental Status: She is alert and oriented to person, place, and time.   Psychiatric:         Speech: Speech normal.             Discussed symptoms related to disease recurrence, Yes    Evaluated for late effects related to cancer treatment, Yes    Screening current for cervical cancer, Yes, describe: follows with gyn onc    Screening current for colon cancer, Yes, describe: up to date    Cancer rehabilitation services addressed, No    Screening current for osteoporosis, Yes, describe: up to date

## 2024-08-08 ENCOUNTER — TELEPHONE (OUTPATIENT)
Age: 54
End: 2024-08-08

## 2024-08-08 NOTE — TELEPHONE ENCOUNTER
Pt called in inquiring a script adjustment (Phentermine). Pt stated med prescribed is expensive and would like to go back to the prev medication. Pt wanted to talk with one of the Dr. Wall's medical team member directly. Warm transfer to the practice.   Detail Level: Zone Detail Level: Generalized Detail Level: Detailed

## 2024-08-13 ENCOUNTER — OFFICE VISIT (OUTPATIENT)
Dept: GYNECOLOGIC ONCOLOGY | Facility: CLINIC | Age: 54
End: 2024-08-13
Payer: COMMERCIAL

## 2024-08-13 VITALS
WEIGHT: 224.4 LBS | DIASTOLIC BLOOD PRESSURE: 70 MMHG | HEART RATE: 75 BPM | BODY MASS INDEX: 34.01 KG/M2 | RESPIRATION RATE: 18 BRPM | SYSTOLIC BLOOD PRESSURE: 122 MMHG | HEIGHT: 68 IN | TEMPERATURE: 98.7 F | OXYGEN SATURATION: 97 %

## 2024-08-13 DIAGNOSIS — N89.3 VAGINAL DYSPLASIA: Primary | ICD-10-CM

## 2024-08-13 DIAGNOSIS — Z87.410 HISTORY OF CERVICAL DYSPLASIA: ICD-10-CM

## 2024-08-13 PROCEDURE — 88175 CYTOPATH C/V AUTO FLUID REDO: CPT | Performed by: NURSE PRACTITIONER

## 2024-08-13 PROCEDURE — 99214 OFFICE O/P EST MOD 30 MIN: CPT | Performed by: NURSE PRACTITIONER

## 2024-08-13 NOTE — PATIENT INSTRUCTIONS
1. Return to the office in 1 year for continued surveillance.   2. Contact the office in the interim if you develop any any new or concerning symptoms, including vaginal bleeding, discharge, pain, etc.

## 2024-08-13 NOTE — PROGRESS NOTES
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 She had a normal Pap (+HPV) in August 2023. She presents today for f/u and repeat Pap. She has a history of breast cancer and is s/p removal of b/l implants in June of this year. She has recovered well. No gyn concerns. Her pelvic exam is normal. Pap collected. Her PS is 0.    Return to the office in 1 year for repeat pap, pending resilts. In the absence of any high-grade findings, will continue with annual surveillance. She will call in the interim with any concerns.         Relevant Orders    Liquid-based pap, diagnostic           CHIEF COMPLAINT: Vaginal dysplasia, hx JOSEPH 3      Subjective:     Problem:  Cancer Staging   Malignant neoplasm of lower-outer quadrant of right breast of female, estrogen receptor positive (HCC)  Staging form: Breast, AJCC 8th Edition  - Clinical: cT1, cN0, cM0, ER: Positive, UT: Positive, HER2: Negative - Signed by Nathalie Merino MD on 7/2/2018  - Pathologic: pT1a, pN0(sn), cM0, ER: Positive, UT: Positive, HER2: Negative - Signed by Nathalie Merino MD on 8/21/2018      Previous therapy:  Oncology History   Malignant neoplasm of lower-outer quadrant of right breast of female, estrogen receptor positive (HCC)   6/14/2018 Biopsy    Right breast biopsy:  - Invasive ductal carcinoma   Grade 1  %  %  HER2 negative     8/10/2018 Surgery    Right mastectomy with sentinel lymph node biopsy:  - Clear margins  - 0/1 lymph nodes    Left prophylactic mastectomy, bilateral implant/expander placement  Dr. Merino and Dr Mccormick    Laparoscopic salpingo-oophorectomy   Dr. Locke     8/2018 Genetic Testing    [facility]    ALK, APC, SHERIN, AXIN2, BAP1, BARD1, BLM, BMPR1A, BRCA1, BRCA2, BRIP1,  CASR, CDC73, CDH1, CDK4, CDKN1B, CDKN1C, CDKN2A, CEBPA, CHEK2, DICER1, DIS3L2, EGFR, EPCAM, FLCN, GATA2, GPC3, GREM1, HOXB13, HRAS, KIT, MAX, MEN1, MET, MITF, MLH1, MSH2, MSH6, MUTYH, NBN, NF1, NF2 ,PALB2, PDGFRA, PHOX2B, PMS2, POLD1, POLE, POT1, DWGYQ2R, PTCH1, PTEN, RAD50, RAD51C, RAD51D, RB1, RECQL4, RET, RUNX1, SDHA, SDHAF2, SDHB, SDHC, SDHD, SMAD4, SMARCA4, SMARCB1, SMARCE1, STK11, SUFU, TERC, TERT, TBEE361, TP53, TSC1, TSC2, VHL, WRN, WT1     Likely Pathogenic Variant  (VUS) :  [id number]     9/2018 -  Hormone Therapy    Anastrozole  Dr. Mckee     11/30/2018 Surgery    Bilateral implant exchange     2/22/2019 Surgery    Bilateral breast mound revision and fat grafting           Patient ID: Jaylyn Kim is a 54 y.o. female    Patient has no interval concerns. Denies nausea/vomiting, constipation/diarrhea, abdominal pain, pelvic pain, changes in bladder function, and vaginal bleeding/discharge. Endorses a normal appetite and is without SOB, CP, and bloating. She is ambulatory and independent at home. She has recovered uneventfully from her recent breast surgery.          Review of Systems   Constitutional:  Negative for chills, fatigue, fever and unexpected weight change.   HENT:  Negative for nosebleeds.    Eyes: Negative.    Respiratory:  Negative for cough, chest tightness, shortness of breath and wheezing.    Cardiovascular:  Negative for chest pain, palpitations and leg swelling.   Gastrointestinal:  Negative for abdominal distention, abdominal pain, anal bleeding, blood in stool, constipation, diarrhea, nausea, rectal pain and vomiting.   Endocrine: Negative.    Genitourinary:  Negative for difficulty urinating, dysuria, frequency, hematuria, pelvic pain, urgency, vaginal bleeding, vaginal discharge and vaginal pain.   Musculoskeletal:  Negative for arthralgias and joint swelling.   Skin:  Negative for color change, pallor and rash.   Neurological:  Negative for dizziness, weakness,  light-headedness, numbness and headaches.   Hematological: Negative.    Psychiatric/Behavioral: Negative.         Current Outpatient Medications   Medication Sig Dispense Refill    acetaminophen (TYLENOL) 325 mg tablet Take 650 mg by mouth every 6 (six) hours as needed for mild pain      albuterol (PROVENTIL HFA,VENTOLIN HFA) 90 mcg/act inhaler every 4 (four) hours as needed      BIOTIN PO Take 1 tablet by mouth every morning      Calcium Carb-Cholecalciferol 1000-800 MG-UNIT TABS in the morning      ferrous sulfate 325 (65 Fe) mg tablet Daily      ibuprofen (MOTRIN) 800 mg tablet Take 1 tablet (800 mg total) by mouth every 8 (eight) hours as needed for mild pain (DO NOT BEGIN UNTIL 48 HOURS AFTER SURGERY) 270 tablet 0    Lifitegrast (XIIDRA OP) Apply 1 vial to eye 2 (two) times a day      loratadine (CLARITIN) 10 mg tablet Take 10 mg by mouth as needed        meloxicam (Mobic) 15 mg tablet Take 1 tablet (15 mg total) by mouth daily 30 tablet 1    methocarbamol (ROBAXIN) 500 mg tablet Take 500 mg by mouth if needed      montelukast (SINGULAIR) 10 mg tablet Take 10 mg by mouth as needed        multivitamin (THERAGRAN) TABS Take 1 tablet by mouth every morning        Naproxen Sodium 220 MG CAPS as needed      Omega-3 Fatty Acids (FISH OIL PO) Take by mouth in the morning      pantoprazole (PROTONIX) 40 mg tablet Take 40 mg by mouth every morning        phentermine 15 MG capsule Take 1 capsule (15 mg total) by mouth every morning 90 capsule 0    PSYLLIUM HUSK PO Take by mouth if needed      pyridoxine (VITAMIN B6) 100 mg tablet Take 100 mg by mouth daily      tiZANidine (Zanaflex) 4 mg tablet Take 0.5 tablets (2 mg total) by mouth every 8 (eight) hours as needed for muscle spasms 60 tablet 1    topiramate (TOPAMAX) 25 mg tablet Take 1 tablet (25 mg total) by mouth 2 (two) times a day 180 tablet 0    Xhance 93 MCG/ACT EXHU INSTILL 1 SPRAY PER NOSTRIL TWICE A DAY 16 mL 11    cyanocobalamin 50 MCG tablet Take 50 mcg by  "mouth every other day      gabapentin (Neurontin) 300 mg capsule Take 1 capsule (300 mg total) by mouth 3 (three) times a day for 5 days 15 capsule 0    ibuprofen (MOTRIN) 800 mg tablet Take 1 tablet (800 mg total) by mouth every 8 (eight) hours as needed for mild pain (DO NOT BEGIN UNTIL 48 HOURS AFTER SURGERY) 15 tablet 0    oxyCODONE (Roxicodone) 5 immediate release tablet Take 1 tablet (5 mg total) by mouth every 6 (six) hours as needed for moderate pain Max Daily Amount: 20 mg (Patient not taking: Reported on 8/13/2024) 10 tablet 0    traMADol (Ultram) 50 mg tablet Take 1 tablet (50 mg total) by mouth every 6 (six) hours as needed for moderate pain (Patient not taking: Reported on 8/13/2024) 20 tablet 0     No current facility-administered medications for this visit.       No Known Allergies    Past Medical History:   Diagnosis Date    Abnormal Pap smear of cervix     Arthritis     Ashkenazi [employer] ancestry requiring population-specific genetic screening     Asthma     Back pain     Breast cancer (HCC) 6/13/17    Breathing difficulty     after gastric bypass 2014-\"felt elephant on chest\"    Cancer (HCC)     Cervical cancer (HCC) 8/2022    Cervical dysplasia     Chest pain, non-cardiac     Seen in ER and is from esophageal spasms.    Colon polyp     Constipation     occasional    DDD (degenerative disc disease), cervical     Family history of breast cancer in female     GERD (gastroesophageal reflux disease)     History of MRSA infection     Leg    History of UTI     treated 8/1/2018    Human papilloma virus (HPV) infection     Hx MRSA infection     on leg-2009 treated    Neck pain     Pollen allergies     PONV (postoperative nausea and vomiting)     nausea    Postural lightheadedness     Raynaud's disease     Seasonal allergies     Sinus problem        Past Surgical History:   Procedure Laterality Date    BILATERAL OOPHORECTOMY      BLADDER SURGERY  2013    bladder lift    BREAST BIOPSY Right 06/14/2018    IDC "    BREAST SURGERY Bilateral 6/11/2024    Procedure: REMOVAL OF BILATERAL IMPLANTS, CAPSULECTOMY, AESTHETIC FLAT CLOSURE, PREVENA PLACEMENT;  Surgeon: Rhea Garcia DO;  Location: [facility];  Service: Plastics    CERVICAL BIOPSY N/A 11/15/2022    Procedure: BIOPSY LEEP CERVIX;  Surgeon: Lawrence Li MD;  Location[facility];  Service: Gynecology Oncology    CERVIX LESION DESTRUCTION      COLONOSCOPY      COLPOSCOPY W/ BIOPSY / CURETTAGE      ENDOMETRIAL ABLATION      EXAMINATION UNDER ANESTHESIA N/A 11/15/2022    Procedure: EXAM UNDER ANESTHESIA (EUA);  Surgeon: Lawrence Li MD;  [facility];  Service: Gynecology Oncology    GASTRIC BYPASS  2014 2014 wt loss 90 lbs(regained 20 lbs)    HYSTERECTOMY N/A 12/23/2022    Procedure: (LTH) W/ ROBOT;  Surgeon: Viki Bhatti MD;  Location[facility];  Service: Gynecology Oncology    HYSTEROSCOPIC STERILIZATION W/ IMPLANTS      HYSTEROSCOPY W/ ENDOMETRIAL ABLATION  2013    KNEE ARTHROSCOPY Right 1990    LYMPH NODE BIOPSY Right 08/10/2018    Procedure: BIOPSY LYMPH NODE SENTINEL;  Surgeon: Nathalie Merino MD;  [facility];  Service: Surgical Oncology    MAMMO STEREOTACTIC BREAST BIOPSY RIGHT (ALL INC) Right 06/14/2018    MASTECTOMY Bilateral     NASAL SEPTUM SURGERY      2002, 2016    OOPHORECTOMY Bilateral     ME IMPLNT BIO IMPLNT FOR SOFT TISSUE REINFORCEMENT Bilateral 08/10/2018    Procedure: RECONSTRUCTION BREAST W/ IMPLANT;  Surgeon: Larissa Mccormick MD;  [facility];  Service: Plastics    ME INSJ/RPLCMT BREAST IMPLANT SEP DAY MASTECTOMY Bilateral 11/30/2018    Procedure: BREAST IMPLANT EXCHANGE;  Surgeon: Larissa Mccormick MD;  [facility];  Service: Plastics    ME LAPAROSCOPY W/RMVL ADNEXAL STRUCTURES N/A 08/10/2018    Procedure: SALPINGO-OOPHORECTOMY, LAPAROSCOPIC; PELVIC WASHINGS ;  Surgeon: Lloyd Locke MD;  [facility];  Service: Gynecology Oncology    ME LAPS SURG CHOLECYSTECTOMY  W/CHOLANGIOGRAPHY N/A 2021    Procedure: CHOLECYSTECTOMY LAPAROSCOPIC W/ INTRAOP CHOLANGIOGRAM;  Surgeon: Jorge Alberto Arrington MD;  Location: [facility];  Service: General    NC MASTECTOMY SIMPLE COMPLETE Bilateral 08/10/2018    Procedure: MASTECTOMY SIMPLE;  Surgeon: Nathalie Merino MD;  [facility];  Service: Surgical Oncology    NC LENNIE-IMPLANT CAPSULECTOMY BREAST COMPLETE Bilateral 2018    Procedure: CAPSULECTOMY;  Surgeon: Larissa Mccormick MD;  [facility];  Service: Plastics    NC REVISION OF RECONSTRUCTED BREAST Bilateral 2019    Procedure: BREAST MOUND REVISION; FAT GRAFTING;  Surgeon: Larissa Mccormick MD;  [facility];  Service: Plastics    NC TISSUE EXPANDER PLACEMENT BREAST RECONSTRUCTION Bilateral 08/10/2018    Procedure: INSERTION/PLACEMENT TISSUE EXPANDER (EXCHANGE);  Surgeon: Larissa Mccormick MD;  [facility];  Service: Plastics    SINUS SURGERY         OB History          2    Para   2    Term   2            AB        Living   2         SAB        IAB        Ectopic        Multiple        Live Births               Obstetric Comments   First pregnancy age 36  Menarche age 15  Hx birth control pills                  Family History   Problem Relation Age of Onset    Other Mother         BRCA negative    Breast cancer Mother 65    Ovarian cancer Mother 60    Diabetes Father     Heart disease Father     Hypertension Father     Migraines Sister     Diabetes Brother     Colon cancer Maternal Grandfather 88    Diabetes type II Maternal Grandfather     Heart disease Paternal Grandfather     Colon cancer Maternal Aunt 57    Skin cancer Maternal Aunt     Colon cancer Maternal Aunt        The following portions of the patient's history were reviewed and updated as appropriate: allergies, current medications, past family history, past medical history, past social history, past surgical history, and problem list.      Objective:    Blood pressure 122/70, pulse 75,  "temperature 98.7 °F (37.1 °C), resp. rate 18, height 5' 8\" (1.727 m), weight 102 kg (224 lb 6.4 oz), last menstrual period 07/31/2018, SpO2 97%.  Body mass index is 34.12 kg/m².    Physical Exam  Vitals reviewed. Exam conducted with a chaperone present.   Constitutional:       General: She is not in acute distress.     Appearance: Normal appearance. She is not ill-appearing.   HENT:      Head: Normocephalic and atraumatic.      Mouth/Throat:      Mouth: Mucous membranes are moist.   Eyes:      General:         Right eye: No discharge.         Left eye: No discharge.      Conjunctiva/sclera: Conjunctivae normal.   Pulmonary:      Effort: Pulmonary effort is normal.   Abdominal:      Palpations: Abdomen is soft. There is no mass.      Tenderness: There is no abdominal tenderness.      Hernia: No hernia is present.   Genitourinary:     Comments: The external female genitalia is normal. The bartholin's, uretheral and skenes glands are normal. The urethral meatus is normal (midline with no lesions). Anus without fissure or lesion. Speculum exam reveals a grossly normal vagina. No masses, lesions,discharge or bleeding. No significant cystocele or rectocele noted. Bimanual exam notes a surgical absent cervix, uterus and adnexal structures. No masses or fullness. Bladder is without fullness, mass or tenderness.  Musculoskeletal:      Right lower leg: No edema.      Left lower leg: No edema.   Skin:     General: Skin is warm and dry.      Coloration: Skin is not jaundiced.      Findings: No rash.   Neurological:      General: No focal deficit present.      Mental Status: She is alert and oriented to person, place, and time.      Cranial Nerves: No cranial nerve deficit.      Sensory: No sensory deficit.      Motor: No weakness.      Gait: Gait normal.   Psychiatric:         Mood and Affect: Mood normal.         Behavior: Behavior normal.         Thought Content: Thought content normal.         Judgment: Judgment normal. " "          No results found for: \"\"  Lab Results   Component Value Date    WBC 6.18 05/31/2024    HGB 15.1 05/31/2024    HCT 44.9 05/31/2024    MCV 96 05/31/2024     05/31/2024     Lab Results   Component Value Date    K 3.5 05/30/2024     05/30/2024    CO2 28 05/30/2024    BUN 16 05/30/2024    CREATININE 0.71 05/30/2024    GLUCOSE 129 07/14/2016    GLUF 106 (H) 05/30/2024    CALCIUM 8.9 05/30/2024    AST 19 01/15/2024    ALT 16 01/15/2024    ALKPHOS 112 (H) 01/15/2024    EGFR 98 05/30/2024        Trend:  No results found for: \"\"    "

## 2024-08-13 NOTE — ASSESSMENT & PLAN NOTE
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 She had a normal Pap (+HPV) in August 2023. She presents today for f/u and repeat Pap. She has a history of breast cancer and is s/p removal of b/l implants in June of this year. She has recovered well. No gyn concerns. Her pelvic exam is normal. Pap collected. Her PS is 0.    Return to the office in 1 year for repeat pap, pending resilts. In the absence of any high-grade findings, will continue with annual surveillance. She will call in the interim with any concerns.

## 2024-08-14 ENCOUNTER — OFFICE VISIT (OUTPATIENT)
Dept: PLASTIC SURGERY | Facility: CLINIC | Age: 54
End: 2024-08-14

## 2024-08-14 DIAGNOSIS — Z90.13 ACQUIRED ABSENCE OF BILATERAL BREASTS AND NIPPLES: ICD-10-CM

## 2024-08-14 DIAGNOSIS — M62.838 MUSCLE SPASM: Primary | ICD-10-CM

## 2024-08-14 PROCEDURE — 99024 POSTOP FOLLOW-UP VISIT: CPT | Performed by: PHYSICIAN ASSISTANT

## 2024-08-14 RX ORDER — TIZANIDINE HYDROCHLORIDE 4 MG/1
4 CAPSULE, GELATIN COATED ORAL 3 TIMES DAILY
Qty: 20 CAPSULE | Refills: 0 | Status: SHIPPED | OUTPATIENT
Start: 2024-08-14

## 2024-08-17 NOTE — PROGRESS NOTES
Assessment and Plan:  Jaylyn Kim 54 y.o. female s/p removal of bilateral implants, capsulectomy, aesthetic flat closure with prevena placement 6/11/24, presenting for post op visit    - small amount of palpable fluid. 25cc removed from right, 20cc removed from left  - she had an episode of severe muscle spasm. Will prescribe a short amount of tizanidine. If pain continues, would suggest she follow up with her PCP  - continue compression as needed  - return in 6 weeks     Subjective:    Patient noticed a small amount of fluid accumulation start forming after last office visit. Accumulation stayed minimal. She has sometimes been wearing her compression vest as it rolls up and is uncomfortable.      Objective:  NAD, AAOx3  Bilateral incisions C/D/I, palpable seromas. Right 25cc serosanguinous, left 20cc serosanguinous. No erythema     Kath Roca PA-C

## 2024-08-22 LAB
LAB AP GYN PRIMARY INTERPRETATION: NORMAL
Lab: NORMAL

## 2024-08-30 DIAGNOSIS — Z98.84 BARIATRIC SURGERY STATUS: Primary | ICD-10-CM

## 2024-08-30 RX ORDER — PHENTERMINE HYDROCHLORIDE 15 MG/1
15 CAPSULE ORAL EVERY MORNING
Qty: 30 CAPSULE | Refills: 3 | Status: SHIPPED | OUTPATIENT
Start: 2024-08-30

## 2024-08-30 RX ORDER — TOPIRAMATE 25 MG/1
25 TABLET, FILM COATED ORAL 2 TIMES DAILY
Qty: 60 TABLET | Refills: 3 | Status: SHIPPED | OUTPATIENT
Start: 2024-08-30

## 2024-09-30 ENCOUNTER — OFFICE VISIT (OUTPATIENT)
Dept: PLASTIC SURGERY | Facility: CLINIC | Age: 54
End: 2024-09-30
Payer: COMMERCIAL

## 2024-09-30 ENCOUNTER — TELEPHONE (OUTPATIENT)
Age: 54
End: 2024-09-30

## 2024-09-30 DIAGNOSIS — Z90.13 ACQUIRED ABSENCE OF BILATERAL BREASTS AND NIPPLES: Primary | ICD-10-CM

## 2024-09-30 PROCEDURE — 99214 OFFICE O/P EST MOD 30 MIN: CPT | Performed by: STUDENT IN AN ORGANIZED HEALTH CARE EDUCATION/TRAINING PROGRAM

## 2024-09-30 NOTE — TELEPHONE ENCOUNTER
Received call from patient stating she will be 7 minutes late.    Patient is aware of the late policy.

## 2024-10-01 NOTE — PROGRESS NOTES
Patient seen and examined.  Just about 3 months out from surgery.  States performing massage and using compression.  No compression bra today.    Incisions well healed.  No seroma noted.  Mild standing cone deformity medially and excess of skin laterally.    I think it is reasonable to plan for in office standing cone excision medially.  The patient would like to do this prior to the end of the year for deductible purposes.  I would hesitate to address the lateral aspect until further out from surgery as I do thin most of this will improve with time.  Continue daily massage and compression.  Booking form created.    Rhea Garcia, DO  Plastic and Reconstructive Surgery

## 2024-11-22 ENCOUNTER — TELEPHONE (OUTPATIENT)
Dept: GYNECOLOGIC ONCOLOGY | Facility: CLINIC | Age: 54
End: 2024-11-22

## 2024-11-22 NOTE — TELEPHONE ENCOUNTER
Called to try and reschedule pt's appointment due to provider being marisol. Patient wants a call back next summer to reschedule this appointment when she knows her school schedule. I will send myself a remind me.

## 2025-01-21 ENCOUNTER — APPOINTMENT (OUTPATIENT)
Dept: LAB | Facility: AMBULARY SURGERY CENTER | Age: 55
End: 2025-01-21
Payer: COMMERCIAL

## 2025-01-21 DIAGNOSIS — E78.00 PURE HYPERCHOLESTEROLEMIA: ICD-10-CM

## 2025-01-21 DIAGNOSIS — C50.511 MALIGNANT NEOPLASM OF LOWER-OUTER QUADRANT OF RIGHT BREAST OF FEMALE, ESTROGEN RECEPTOR POSITIVE (HCC): ICD-10-CM

## 2025-01-21 DIAGNOSIS — Z00.6 ENCOUNTER FOR EXAMINATION FOR NORMAL COMPARISON OR CONTROL IN CLINICAL RESEARCH PROGRAM: ICD-10-CM

## 2025-01-21 DIAGNOSIS — Z17.0 MALIGNANT NEOPLASM OF LOWER-OUTER QUADRANT OF RIGHT BREAST OF FEMALE, ESTROGEN RECEPTOR POSITIVE (HCC): ICD-10-CM

## 2025-01-21 DIAGNOSIS — Z98.84 HISTORY OF GASTRIC BYPASS: ICD-10-CM

## 2025-01-21 DIAGNOSIS — E04.1 NONTOXIC UNINODULAR GOITER: ICD-10-CM

## 2025-01-21 DIAGNOSIS — E61.1 HYPOFERREMIA: ICD-10-CM

## 2025-01-21 LAB
ALBUMIN SERPL BCG-MCNC: 4.2 G/DL (ref 3.5–5)
ALP SERPL-CCNC: 135 U/L (ref 34–104)
ALT SERPL W P-5'-P-CCNC: 20 U/L (ref 7–52)
ANION GAP SERPL CALCULATED.3IONS-SCNC: 11 MMOL/L (ref 4–13)
AST SERPL W P-5'-P-CCNC: 19 U/L (ref 13–39)
BASOPHILS # BLD AUTO: 0.04 THOUSANDS/ΜL (ref 0–0.1)
BASOPHILS NFR BLD AUTO: 1 % (ref 0–1)
BILIRUB SERPL-MCNC: 0.79 MG/DL (ref 0.2–1)
BUN SERPL-MCNC: 14 MG/DL (ref 5–25)
CALCIUM SERPL-MCNC: 9.8 MG/DL (ref 8.4–10.2)
CHLORIDE SERPL-SCNC: 101 MMOL/L (ref 96–108)
CHOLEST SERPL-MCNC: 232 MG/DL (ref ?–200)
CO2 SERPL-SCNC: 28 MMOL/L (ref 21–32)
CREAT SERPL-MCNC: 0.89 MG/DL (ref 0.6–1.3)
EOSINOPHIL # BLD AUTO: 0.23 THOUSAND/ΜL (ref 0–0.61)
EOSINOPHIL NFR BLD AUTO: 4 % (ref 0–6)
ERYTHROCYTE [DISTWIDTH] IN BLOOD BY AUTOMATED COUNT: 11.4 % (ref 11.6–15.1)
FERRITIN SERPL-MCNC: 98 NG/ML (ref 11–307)
GFR SERPL CREATININE-BSD FRML MDRD: 74 ML/MIN/1.73SQ M
GLUCOSE P FAST SERPL-MCNC: 105 MG/DL (ref 65–99)
HCT VFR BLD AUTO: 49.8 % (ref 34.8–46.1)
HDLC SERPL-MCNC: 99 MG/DL
HGB BLD-MCNC: 16.3 G/DL (ref 11.5–15.4)
IMM GRANULOCYTES # BLD AUTO: 0.01 THOUSAND/UL (ref 0–0.2)
IMM GRANULOCYTES NFR BLD AUTO: 0 % (ref 0–2)
IRON SATN MFR SERPL: 36 % (ref 15–50)
IRON SERPL-MCNC: 167 UG/DL (ref 50–212)
LDLC SERPL CALC-MCNC: 97 MG/DL (ref 0–100)
LYMPHOCYTES # BLD AUTO: 1.85 THOUSANDS/ΜL (ref 0.6–4.47)
LYMPHOCYTES NFR BLD AUTO: 29 % (ref 14–44)
MCH RBC QN AUTO: 30.5 PG (ref 26.8–34.3)
MCHC RBC AUTO-ENTMCNC: 32.7 G/DL (ref 31.4–37.4)
MCV RBC AUTO: 93 FL (ref 82–98)
MONOCYTES # BLD AUTO: 0.34 THOUSAND/ΜL (ref 0.17–1.22)
MONOCYTES NFR BLD AUTO: 5 % (ref 4–12)
NEUTROPHILS # BLD AUTO: 3.94 THOUSANDS/ΜL (ref 1.85–7.62)
NEUTS SEG NFR BLD AUTO: 61 % (ref 43–75)
NONHDLC SERPL-MCNC: 133 MG/DL
NRBC BLD AUTO-RTO: 0 /100 WBCS
PLATELET # BLD AUTO: 369 THOUSANDS/UL (ref 149–390)
PMV BLD AUTO: 10.8 FL (ref 8.9–12.7)
POTASSIUM SERPL-SCNC: 4.3 MMOL/L (ref 3.5–5.3)
PROT SERPL-MCNC: 7.2 G/DL (ref 6.4–8.4)
RBC # BLD AUTO: 5.34 MILLION/UL (ref 3.81–5.12)
SODIUM SERPL-SCNC: 140 MMOL/L (ref 135–147)
TIBC SERPL-MCNC: 459.2 UG/DL (ref 250–450)
TRANSFERRIN SERPL-MCNC: 328 MG/DL (ref 203–362)
TRIGL SERPL-MCNC: 181 MG/DL (ref ?–150)
TSH SERPL DL<=0.05 MIU/L-ACNC: 1.46 UIU/ML (ref 0.45–4.5)
UIBC SERPL-MCNC: 292 UG/DL (ref 155–355)
VIT B12 SERPL-MCNC: 426 PG/ML (ref 180–914)
WBC # BLD AUTO: 6.41 THOUSAND/UL (ref 4.31–10.16)

## 2025-01-21 PROCEDURE — 36415 COLL VENOUS BLD VENIPUNCTURE: CPT

## 2025-01-21 PROCEDURE — 83550 IRON BINDING TEST: CPT

## 2025-01-21 PROCEDURE — 84443 ASSAY THYROID STIM HORMONE: CPT

## 2025-01-21 PROCEDURE — 82607 VITAMIN B-12: CPT

## 2025-01-21 PROCEDURE — 85025 COMPLETE CBC W/AUTO DIFF WBC: CPT

## 2025-01-21 PROCEDURE — 83540 ASSAY OF IRON: CPT

## 2025-01-21 PROCEDURE — 80061 LIPID PANEL: CPT

## 2025-01-21 PROCEDURE — 80053 COMPREHEN METABOLIC PANEL: CPT

## 2025-01-21 PROCEDURE — 82728 ASSAY OF FERRITIN: CPT

## 2025-01-22 ENCOUNTER — OFFICE VISIT (OUTPATIENT)
Dept: HEMATOLOGY ONCOLOGY | Facility: CLINIC | Age: 55
End: 2025-01-22
Payer: COMMERCIAL

## 2025-01-22 VITALS
TEMPERATURE: 97.3 F | DIASTOLIC BLOOD PRESSURE: 80 MMHG | HEART RATE: 111 BPM | BODY MASS INDEX: 33.65 KG/M2 | SYSTOLIC BLOOD PRESSURE: 130 MMHG | HEIGHT: 68 IN | RESPIRATION RATE: 18 BRPM | OXYGEN SATURATION: 97 % | WEIGHT: 222 LBS

## 2025-01-22 DIAGNOSIS — Z17.0 MALIGNANT NEOPLASM OF LOWER-OUTER QUADRANT OF RIGHT BREAST OF FEMALE, ESTROGEN RECEPTOR POSITIVE (HCC): ICD-10-CM

## 2025-01-22 DIAGNOSIS — G47.00 INSOMNIA, UNSPECIFIED TYPE: ICD-10-CM

## 2025-01-22 DIAGNOSIS — R76.8 POSITIVE ANA (ANTINUCLEAR ANTIBODY): ICD-10-CM

## 2025-01-22 DIAGNOSIS — Z98.84 HISTORY OF GASTRIC BYPASS: ICD-10-CM

## 2025-01-22 DIAGNOSIS — Z85.3 ENCOUNTER FOR FOLLOW-UP SURVEILLANCE OF BREAST CANCER: Primary | ICD-10-CM

## 2025-01-22 DIAGNOSIS — Z08 ENCOUNTER FOR FOLLOW-UP SURVEILLANCE OF BREAST CANCER: Primary | ICD-10-CM

## 2025-01-22 DIAGNOSIS — J01.10 ACUTE FRONTAL SINUSITIS, RECURRENCE NOT SPECIFIED: ICD-10-CM

## 2025-01-22 DIAGNOSIS — Z90.722 STATUS POST BILATERAL SALPINGO-OOPHORECTOMY (BSO): ICD-10-CM

## 2025-01-22 DIAGNOSIS — C50.511 MALIGNANT NEOPLASM OF LOWER-OUTER QUADRANT OF RIGHT BREAST OF FEMALE, ESTROGEN RECEPTOR POSITIVE (HCC): ICD-10-CM

## 2025-01-22 DIAGNOSIS — E61.1 HYPOFERREMIA: ICD-10-CM

## 2025-01-22 DIAGNOSIS — R06.83 SNORING: ICD-10-CM

## 2025-01-22 PROBLEM — Z79.811 USE OF AROMATASE INHIBITORS: Status: RESOLVED | Noted: 2018-11-27 | Resolved: 2025-01-22

## 2025-01-22 PROCEDURE — 99215 OFFICE O/P EST HI 40 MIN: CPT | Performed by: PHYSICIAN ASSISTANT

## 2025-01-22 RX ORDER — AMOXICILLIN 875 MG/1
875 TABLET, COATED ORAL 2 TIMES DAILY
Qty: 14 TABLET | Refills: 0 | Status: SHIPPED | OUTPATIENT
Start: 2025-01-22 | End: 2025-01-29

## 2025-01-22 NOTE — PROGRESS NOTES
Hematology/Oncology Outpatient Follow- up Note  Jaylyn Kim 54 y.o. female MRN: @ Encounter: [id number]        Date:  1/22/2025      Assessment / Plan:    1. 06/2018 Stage IA invasive ductal carcinoma the right breast   %, %, HER2 negative.  Status post bilateral mastectomies (pT1a, N0, grade 1)   Left mastectomy was done for prophylactic measures - negative for malignancy.    She is status post immediate expanders and bilateral salpingo oophorectomy in August 2018.      Negative for BRCA1/2 mutation, she was found to have FH mutation of unknown significance.     09/2018 - 1/2024 Anastrozole 1 mg p.o. Daily         2.  9/2019 Osteopenia on the DEXA scan, calcium plus vitamin-D 1-2 tablets every day  9/27/21 Repeat DEXA per Dr. Garcia - low bone mass, notable at the spine and femoral neck areas and the patient is considered at low risk for fracture.    10/2023 F/U Dexa- T score - 1.8 L spine.  continue CA+ vit D.       3.  Insomnia.   Uses ambien seldomly.  Discussed sleep study.  She is agreeable.  Referral made.     4.  S/P gastric bypass surgery.    8/2021 Iron saturation 16%   She had been taking oral iron daily.    December 20, 2022 Hemoglobin 13.2 with MCV of 83, platelet count 399.  Iron saturation 13%, ferritin 14    1/2023 Venofer 300mg x 3 doses.      1/2025 iron saturation 36%; ferritin 98       5.  Elevated CLAUDIA  1/2018 CLAUDIA + 1:160, ESR 8  12/2021 CLAUDIA + 1:160 homogenous pattern;  ESR 33  Has seen rheumatology previously.  Treatment limited due to POTS exacerbation on prior therapy.          6.  S/p hysterectomy 12/22 for for high-grade squamous intraepithelial lesion     7.  Colonoscopy 1/2/25 at [facility]     8.  Low Normal B12.  B12 426 1/21/25.  Continue oral B12     9.  Sinusitis.  Amoxil rx          HPI: Jaylyn Kim was seen initially 9/7/2018 re: right sided, ER/%, her-2 negative stage IA breast cancer.  She is status post bilateral mastectomy.     She underwent screening  mammogram 6/7/2018 which showed abnormal findings.  Diagnostic imaging led to right breast biopsy 6/14/2018 which identified invasive breast carcinoma.  Right breast core biopsy showed invasive breast carcinoma of no specific type, grade 1 with ductal carcinoma in situ present, comprising 45% of the lesion, % positive, % positive, her 2+ 1 by IHC  She elected to proceed with bilateral mastectomy and Bilateral oophorectomy performed by doctors Angelique and Chucky respectively 8/10/2018.  Expander placement/reconstruction per Dr. Mccormick     Final pathology showed invasive ductal carcinoma of no specific type, tumor size 3 mm, grade 1 no evidence of lymphovascular invasion stage IA ( pT1a, pN0, grade 1) with 2- sentinel lymph nodes     She has family history of breast and ovarian cancer in her mother.  Patient's genetic testing was negative for BRCA1 or 2 mutation, her mother was tested negative for BRCA1/2 mutation, the patient was found to have FH mutation of unknown significance     Initiated on anastrozole 1 mg p.o. Daily on 09/07/2018     She had a history of GERD, gastric bypass surgery, Raynaud phenomena, POTS.         Persistent mid thoracic back pain.  Started in PACU post mastectomy.       6/21:  Noncontrast brain MRI secondary to headache- No mass effect, recent infarct, intracranial hemorrhage, or hydrocephalus.  No parenchymal signal abnormality appreciated.  Status post internal ethmoidectomies. Moderate mucosal thickening right  maxillary sinus is present        8/21:  Hemoglobin 14.6, white blood cell count 6, platelet count 363  Glucose 110, alk-phos 129 otherwise normal CMP folate greater than 17.5, hemoglobin A1c 5.2, no monoclonal band on SPEP or UPEP, vitamin B12 greater than 1450, TSH 0.82, iron saturation 16%,     9/8/21:  Neurology consult with Sherrell Judge MD at [facility] regarding numbness and tingling side of her face and neck that began in May 2021.  Radiates from the back of her neck,  upper back, shoulder blades.  This can occur on either side.     She gave a history of back pain and midthoracic area with numbness and tingling.  Spine surgery was discussed.  She did have right upper extremity weakness which resolved        9/29/21 [facility]:  Noncontrast cervical spine MRI - Multilevel moderate to severe degenerative changes of the cervical spine    Most significant at:   C5-C6: LEFT paracentral disc protrusion measuring 5 mm in the anterior  posterior dimension with ventral mass effect on the cervical spinal cord leading to mild to moderate spinal canal stenosis. May be partially calcified and   represent acute or chronic degenerative change at this level. LEFT uncovertebral   joint hypertrophy. Moderate LEFT neuroforaminal stenosis. RIGHT uncovertebral   joint hypertrophy. Moderate RIGHT neuroforaminal stenosis.          10/2021- office visit with Dr. Merino- no mass or tenderness on breast exam.  6 month f/u requested.     Home Sleep study 11/21 per Dr. Lakhani     EMG 11/8/21 to evaluate mid back spasms associated with bilateral lower extremity pain at times for the last several months.  Patient is being evaluated for a lumbosacral radiculopathy versus focal neuropathy. - normal EMG of the bilateral lower extremities.     11/24/21:  Colonoscopy per Dr. Angela López- tubular adenomas     Receiving TPI per Dr. Irving with pain management     12/24/21:  Hemoglobin 13.9, white blood cell count 5.4, platelet count 407  CLAUDIA positive with titer of 160, negative double-stranded DNA, Smith auto antibody, Scl -70, SSA and SSB antibody.  ESR 33 (ULN 20).  CRP 6.8 (ULN 7), negative Lyme antibody.  Normal vitamin-D level of 51        Has received trigger point injections for myofascial pain.       9/22 CT scan of the neck-normal in regards to no pathologic adenopathy or soft tissue mass in the neck noted.  Incidentally noted, partially visualized left breast mass     9/22 left breast u/s - What was  thought to be a mass in the breast on the CT scan is actually just the top of the breast implant.    There are 3 small foci of fat necrosis seen on the ultrasound examination, 1 of which is also readily evident on the CT scan.  These do not require any specific surveillance.  The largest is about 8 mm.  These are in the 10-11 o'clock region above the implant.     10/22 CT chest to f/u thoracic aortic ectasia - Ascending thoracic aorta within normal limits in diameter.  No evidence of thoracic aortic aneurysm.     Robot-assisted total laparoscopic hysterectomy from December 23, 2022 by Dr. Bhatti for high-grade squamous intraepithelial lesion     3/7/24 MRII breasts-  bilateral total mastectomy with subpectoral implant based reconstruction.   left implant has intracapsular rupture     6/11/24 s/p implant explantation      Review of Systems   Constitutional:  Negative for appetite change, chills, diaphoresis, fatigue, fever and unexpected weight change.   HENT:   Negative for mouth sores, nosebleeds, sore throat, tinnitus and voice change.    Eyes:  Negative for eye problems.   Respiratory:  Negative for chest tightness, cough, shortness of breath and wheezing.    Cardiovascular:  Negative for chest pain, leg swelling and palpitations.   Gastrointestinal:  Negative for abdominal distention, abdominal pain, blood in stool, constipation, diarrhea, nausea, rectal pain and vomiting.   Endocrine: Negative for hot flashes.   Genitourinary: Negative.     Musculoskeletal:  Positive for arthralgias. Negative for gait problem and myalgias.   Skin:  Negative for itching and rash.   Neurological:  Negative for dizziness, gait problem, headaches, light-headedness and numbness.   Hematological:  Negative for adenopathy.   Psychiatric/Behavioral:  Negative for confusion and sleep disturbance. The patient is not nervous/anxious.         Test Results:        Labs:   Lab Results   Component Value Date    HGB 16.3 (H) 01/21/2025    HCT  "49.8 (H) 01/21/2025    MCV 93 01/21/2025     01/21/2025    WBC 6.41 01/21/2025    NRBC 0 01/21/2025     Lab Results   Component Value Date    K 4.3 01/21/2025     01/21/2025    CO2 28 01/21/2025    BUN 14 01/21/2025    CREATININE 0.89 01/21/2025    GLUCOSE 129 07/14/2016    GLUF 105 (H) 01/21/2025    CALCIUM 9.8 01/21/2025    AST 19 01/21/2025    ALT 20 01/21/2025    ALKPHOS 135 (H) 01/21/2025    EGFR 74 01/21/2025           Imaging: No results found.          Allergies: No Known Allergies  Current Medications: Reviewed  PMH/FH/SH:  Reviewed      Physical Exam:    2.14 meters squared    Ht Readings from Last 3 Encounters:   01/22/25 5' 8\" (1.727 m)   08/13/24 5' 8\" (1.727 m)   08/06/24 5' 8\" (1.727 m)        Wt Readings from Last 3 Encounters:   01/22/25 101 kg (222 lb)   08/13/24 102 kg (224 lb 6.4 oz)   08/06/24 101 kg (222 lb)        Temp Readings from Last 3 Encounters:   01/22/25 (!) 97.3 °F (36.3 °C) (Temporal)   08/13/24 98.7 °F (37.1 °C)   08/06/24 (!) 97.4 °F (36.3 °C) (Temporal)        BP Readings from Last 3 Encounters:   01/22/25 130/80   08/13/24 122/70   08/06/24 116/84             Physical Exam  Vitals reviewed.   Constitutional:       General: She is not in acute distress.     Appearance: She is well-developed. She is not diaphoretic.   HENT:      Head: Normocephalic and atraumatic.   Eyes:      Conjunctiva/sclera: Conjunctivae normal.   Neck:      Trachea: No tracheal deviation.   Cardiovascular:      Rate and Rhythm: Normal rate and regular rhythm.      Heart sounds: No murmur heard.     No friction rub. No gallop.   Pulmonary:      Effort: Pulmonary effort is normal. No respiratory distress.      Breath sounds: Normal breath sounds. No wheezing or rales.   Chest:      Chest wall: No tenderness.   Abdominal:      General: There is no distension.      Palpations: Abdomen is soft.      Tenderness: There is no abdominal tenderness.   Musculoskeletal:      Cervical back: Normal range of " motion and neck supple.   Lymphadenopathy:      Cervical: No cervical adenopathy.   Skin:     General: Skin is warm and dry.      Coloration: Skin is not pale.      Findings: No erythema.   Neurological:      Mental Status: She is alert.   Psychiatric:         Behavior: Behavior normal.         Thought Content: Thought content normal.         ECOG:       Emergency Contacts:    Extended Emergency Contact Information  Primary Emergency Contact: Efren Damon  Mobile Phone: [phone]  Relation: Father  Secondary Emergency Contact: Griselda Damon   [facility]  Home Phone: [phone]  Mobile Phone: [phone]  Relation: Mother

## 2025-02-04 ENCOUNTER — TELEPHONE (OUTPATIENT)
Dept: SURGICAL ONCOLOGY | Facility: CLINIC | Age: 55
End: 2025-02-04

## 2025-02-04 NOTE — TELEPHONE ENCOUNTER
Spoke with pt regarding the need to cancel/reschedule appt on 8/6/25 @ 10 AM due to provider being on vacation.  Provided phone# [phone] for the pt to reschedule.  She is going to call back in Sept to reschedule this appt.

## 2025-02-09 LAB
APOB+LDLR+PCSK9 GENE MUT ANL BLD/T: NOT DETECTED
BRCA1+BRCA2 DEL+DUP + FULL MUT ANL BLD/T: NOT DETECTED
MLH1+MSH2+MSH6+PMS2 GN DEL+DUP+FUL M: NOT DETECTED

## 2025-02-17 ENCOUNTER — PROCEDURE VISIT (OUTPATIENT)
Age: 55
End: 2025-02-17
Payer: COMMERCIAL

## 2025-02-17 ENCOUNTER — TELEPHONE (OUTPATIENT)
Age: 55
End: 2025-02-17

## 2025-02-17 DIAGNOSIS — Z90.13 ACQUIRED ABSENCE OF BILATERAL BREASTS AND NIPPLES: Primary | ICD-10-CM

## 2025-02-17 PROCEDURE — 11406 EXC TR-EXT B9+MARG >4.0 CM: CPT | Performed by: STUDENT IN AN ORGANIZED HEALTH CARE EDUCATION/TRAINING PROGRAM

## 2025-02-17 PROCEDURE — 11404 EXC TR-EXT B9+MARG 3.1-4 CM: CPT | Performed by: STUDENT IN AN ORGANIZED HEALTH CARE EDUCATION/TRAINING PROGRAM

## 2025-02-17 NOTE — TELEPHONE ENCOUNTER
Patient forgot to ask if there were any shower restrictions after having seen Dr Garcia today and getting stitches.

## 2025-02-18 NOTE — PROGRESS NOTES
"Skin excision    Date/Time: 2/17/2025 1:00 PM    Performed by: Rhea Garcia DO  Authorized by: Rhea Garcia DO  Universal Protocol:  Consent: Verbal consent obtained.  Consent given by: patient  Time out: Immediately prior to procedure a \"time out\" was called to verify the correct patient, procedure, equipment, support staff and site/side marked as required.    Procedure Details - Skin Excision:     Number of Lesions:  2  Lesion 1:     Body area:  Trunk    Trunk location:  Chest    Malignancy: benign lesion            Final defect size (mm):  50    Repair type:  Linear closure     Repair Comments: After informed consent, the area of planned excision was marked.  Local anesthetic was infiltrated and allowed time to set.  I explained that when the patient bends over or flexes, this will always depict extra skin and tissue positionally.  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  The patient was prepped and draped in usual sterile fashion.  The lesion was excised in elliptical fashion and removed in entirety.  The peripheral skin was undermined to provide a tension free closure.  Hemostasis was obtained.  The defect was then closed in layers using 3-0 vicryl for the deep dermis.  The skin was approximated using 4-0 monocryl.  The area was cleansed.  Glue and steristrips were applied.  The same procedure was performed for the right and left standing cone deformities.    The instrument, sponge and needle count were correct and verified upon completion of the case.    The patient tolerated the procedure well.    Lesion 2:     Body area:  Trunk    Trunk location:  Chest                                                        Malignancy: benign lesion        Final defect size (mm):  40      "

## 2025-03-05 ENCOUNTER — OFFICE VISIT (OUTPATIENT)
Age: 55
End: 2025-03-05

## 2025-03-05 DIAGNOSIS — Z90.13 ACQUIRED ABSENCE OF BILATERAL BREASTS AND NIPPLES: Primary | ICD-10-CM

## 2025-03-05 PROCEDURE — 99024 POSTOP FOLLOW-UP VISIT: CPT

## 2025-03-05 NOTE — PROGRESS NOTES
"[facility]  [address], [address], [place] [address]  [phone]    Patient Identification: Jaylyn Kim is a 54 y.o. female     History of Present Illness: The patient is a 54 y.o. female  who presents to the office for follow-up and suture removal. She had medial R&L standing cone deformity excision on 2/17 by Dr. Garcia. Patient is doing well with no complaints. Reports adequate pain control. Denies fever, chills, nausea, vomiting, bleeding, malaise, swelling, incisional erythema, or signs of infection.        The following portions of the patient's history were reviewed: allergies, current medications, past medical history, past surgical history, and past social history    Past Medical History:   Diagnosis Date    Abnormal Pap smear of cervix     Arthritis     Ashkenazi Christian ancestry requiring population-specific genetic screening     Asthma     Back pain     Breast cancer (HCC) 6/13/17    Breathing difficulty     after gastric bypass 2014-\"felt elephant on chest\"    Cancer (HCC)     Cervical cancer (HCC) 8/2022    Cervical dysplasia     Chest pain, non-cardiac     Seen in ER and is from esophageal spasms.    Colon polyp     Constipation     occasional    DDD (degenerative disc disease), cervical     Family history of breast cancer in female     GERD (gastroesophageal reflux disease)     History of MRSA infection     Leg    History of UTI     treated 8/1/2018    Human papilloma virus (HPV) infection     Hx MRSA infection     on leg-2009 treated    Neck pain     Pollen allergies     PONV (postoperative nausea and vomiting)     nausea    Postural lightheadedness     Raynaud's disease     Seasonal allergies     Sinus problem         Past Surgical History:   Procedure Laterality Date    BILATERAL OOPHORECTOMY      BLADDER SURGERY  2013    bladder lift    BREAST BIOPSY Right 06/14/2018    ID[facility]    BREAST SURGERY Bilateral 6/11/2024    Procedure: REMOVAL OF BILATERAL " IMPLANTS, CAPSULECTOMY, AESTHETIC FLAT CLOSURE, PREVENA PLACEMENT;  Surgeon: Rhea Garcia DO;  [facility];  Service: Plastics    CERVICAL BIOPSY N/A 11/15/2022    Procedure: BIOPSY LEEP CERVIX;  Surgeon: Lawrence Li MD;  [facility];  Service: Gynecology Oncology    CERVIX LESION DESTRUCTION      COLONOSCOPY      COLPOSCOPY W/ BIOPSY / CURETTAGE      ENDOMETRIAL ABLATION      EXAMINATION UNDER ANESTHESIA N/A 11/15/2022    Procedure: EXAM UNDER ANESTHESIA (EUA);  Surgeon: Lawrence Li MD;  [facility];  Service: Gynecology Oncology    GASTRIC BYPASS  2014 2014 wt loss 90 lbs(regained 20 lbs)    HYSTERECTOMY N/A 12/23/2022    Procedure: (LTH) W/ ROBOT;  Surgeon: Viki Bhatti MD;  [facility];  Service: Gynecology Oncology    HYSTEROSCOPIC STERILIZATION W/ IMPLANTS      HYSTEROSCOPY W/ ENDOMETRIAL ABLATION  2013    KNEE ARTHROSCOPY Right 1990    LYMPH NODE BIOPSY Right 08/10/2018    Procedure: BIOPSY LYMPH NODE SENTINEL;  Surgeon: Nathalie Merino MD;  [facility];  Service: Surgical Oncology    MAMMO STEREOTACTIC BREAST BIOPSY RIGHT (ALL INC) Right 06/14/2018    MASTECTOMY Bilateral     NASAL SEPTUM SURGERY      2002, 2016    OOPHORECTOMY Bilateral     IA IMPLNT BIO IMPLNT FOR SOFT TISSUE REINFORCEMENT Bilateral 08/10/2018    Procedure: RECONSTRUCTION BREAST W/ IMPLANT;  Surgeon: Larissa Mccormick MD;  [facility];  Service: Plastics    IA INSJ/RPLCMT BREAST IMPLANT SEP DAY MASTECTOMY Bilateral 11/30/2018    Procedure: BREAST IMPLANT EXCHANGE;  Surgeon: Larissa Mccormick MD;  [facility];  Service: Plastics    IA LAPAROSCOPY W/RMVL ADNEXAL STRUCTURES N/A 08/10/2018    Procedure: SALPINGO-OOPHORECTOMY, LAPAROSCOPIC; PELVIC WASHINGS ;  Surgeon: Lloyd Locke MD;  [facility];  Service: Gynecology Oncology    IA LAPS SURG CHOLECYSTECTOMY W/CHOLANGIOGRAPHY N/A 03/19/2021    Procedure: CHOLECYSTECTOMY LAPAROSCOPIC W/  INTRAOP CHOLANGIOGRAM;  Surgeon: Jorge Alberto Arrington MD;  Location: [facility];  Service: General    NH MASTECTOMY SIMPLE COMPLETE Bilateral 08/10/2018    Procedure: MASTECTOMY SIMPLE;  Surgeon: Nathalie Merino MD;  [facility];  Service: Surgical Oncology    NH LENNIE-IMPLANT CAPSULECTOMY BREAST COMPLETE Bilateral 11/30/2018    Procedure: CAPSULECTOMY;  Surgeon: Larissa Mccormick MD;  [facility];  Service: Plastics    NH REVISION OF RECONSTRUCTED BREAST Bilateral 02/22/2019    Procedure: BREAST MOUND REVISION; FAT GRAFTING;  Surgeon: Larissa Mccormick MD;  [facility];  Service: Plastics    NH TISSUE EXPANDER PLACEMENT BREAST RECONSTRUCTION Bilateral 08/10/2018    Procedure: INSERTION/PLACEMENT TISSUE EXPANDER (EXCHANGE);  Surgeon: Larissa Mccormick MD;  [facility];  Service: Plastics    SINUS SURGERY         No Known Allergies       Current Outpatient Medications:     acetaminophen (TYLENOL) 325 mg tablet, Take 650 mg by mouth every 6 (six) hours as needed for mild pain, Disp: , Rfl:     albuterol (PROVENTIL HFA,VENTOLIN HFA) 90 mcg/act inhaler, every 4 (four) hours as needed, Disp: , Rfl:     BIOTIN PO, Take 1 tablet by mouth every morning, Disp: , Rfl:     Calcium Carb-Cholecalciferol 1000-800 MG-UNIT TABS, in the morning, Disp: , Rfl:     cyanocobalamin 50 MCG tablet, Take 50 mcg by mouth every other day, Disp: , Rfl:     ferrous sulfate 325 (65 Fe) mg tablet, Daily, Disp: , Rfl:     gabapentin (Neurontin) 300 mg capsule, Take 1 capsule (300 mg total) by mouth 3 (three) times a day for 5 days, Disp: 15 capsule, Rfl: 0    ibuprofen (MOTRIN) 800 mg tablet, Take 1 tablet (800 mg total) by mouth every 8 (eight) hours as needed for mild pain (DO NOT BEGIN UNTIL 48 HOURS AFTER SURGERY), Disp: 15 tablet, Rfl: 0    ibuprofen (MOTRIN) 800 mg tablet, Take 1 tablet (800 mg total) by mouth every 8 (eight) hours as needed for mild pain (DO NOT BEGIN UNTIL 48 HOURS AFTER SURGERY), Disp: 270 tablet, Rfl: 0     Lifitegrast (XIIDRA OP), Apply 1 vial to eye 2 (two) times a day, Disp: , Rfl:     loratadine (CLARITIN) 10 mg tablet, Take 10 mg by mouth as needed  , Disp: , Rfl:     meloxicam (Mobic) 15 mg tablet, Take 1 tablet (15 mg total) by mouth daily, Disp: 30 tablet, Rfl: 1    methocarbamol (ROBAXIN) 500 mg tablet, Take 500 mg by mouth if needed (Patient not taking: Reported on 1/22/2025), Disp: , Rfl:     montelukast (SINGULAIR) 10 mg tablet, Take 10 mg by mouth as needed  , Disp: , Rfl:     multivitamin (THERAGRAN) TABS, Take 1 tablet by mouth every morning  , Disp: , Rfl:     Naproxen Sodium 220 MG CAPS, as needed, Disp: , Rfl:     Omega-3 Fatty Acids (FISH OIL PO), Take by mouth in the morning, Disp: , Rfl:     oxyCODONE (Roxicodone) 5 immediate release tablet, Take 1 tablet (5 mg total) by mouth every 6 (six) hours as needed for moderate pain Max Daily Amount: 20 mg (Patient not taking: Reported on 8/13/2024), Disp: 10 tablet, Rfl: 0    pantoprazole (PROTONIX) 40 mg tablet, Take 40 mg by mouth every morning  , Disp: , Rfl:     phentermine 15 MG capsule, Take 1 capsule (15 mg total) by mouth every morning, Disp: 90 capsule, Rfl: 0    phentermine 15 MG capsule, Take 1 capsule (15 mg total) by mouth every morning, Disp: 30 capsule, Rfl: 3    PSYLLIUM HUSK PO, Take by mouth if needed, Disp: , Rfl:     pyridoxine (VITAMIN B6) 100 mg tablet, Take 100 mg by mouth daily, Disp: , Rfl:     TiZANidine (ZANAFLEX) 4 MG capsule, Take 1 capsule (4 mg total) by mouth 3 (three) times a day (Patient not taking: Reported on 1/22/2025), Disp: 20 capsule, Rfl: 0    tiZANidine (Zanaflex) 4 mg tablet, Take 0.5 tablets (2 mg total) by mouth every 8 (eight) hours as needed for muscle spasms, Disp: 60 tablet, Rfl: 1    topiramate (TOPAMAX) 25 mg tablet, Take 1 tablet (25 mg total) by mouth 2 (two) times a day (Patient not taking: Reported on 1/22/2025), Disp: 180 tablet, Rfl: 0    topiramate (Topamax) 25 mg tablet, Take 1 tablet (25 mg total)  by mouth 2 (two) times a day, Disp: 60 tablet, Rfl: 3    traMADol (Ultram) 50 mg tablet, Take 1 tablet (50 mg total) by mouth every 6 (six) hours as needed for moderate pain (Patient not taking: Reported on 8/13/2024), Disp: 20 tablet, Rfl: 0    Xhance 93 MCG/ACT EXHU, INSTILL 1 SPRAY PER NOSTRIL TWICE A DAY, Disp: 16 mL, Rfl: 11      Social History     Socioeconomic History    Marital status: Single     Spouse name: Not on file    Number of children: 2    Years of education: Not on file    Highest education level: Not on file   Occupational History    Not on file   Tobacco Use    Smoking status: Never     Passive exposure: Never    Smokeless tobacco: Never   Vaping Use    Vaping status: Never Used   Substance and Sexual Activity    Alcohol use: Yes     Alcohol/week: 14.0 standard drinks of alcohol     Types: 14 Glasses of wine per week    Drug use: Not Currently     Types: Marijuana    Sexual activity: Yes     Partners: Male     Birth control/protection: Condom Male, Female Sterilization   Other Topics Concern    Not on file   Social History Narrative    Uses safety equipment, seatbelts     Social Drivers of Health     Financial Resource Strain: Not on file   Food Insecurity: Not on file   Transportation Needs: Not on file   Physical Activity: Not on file   Stress: Not on file   Social Connections: Not on file   Intimate Partner Violence: Not on file   Housing Stability: Not on file          Review of Systems  Constitutional: Denies fevers, chills, nausea, vomiting, malaise, or pain.  Skin: Denies any bleeding, warmth, erythema, edema, mucopurulent drainage, or signs of infection.     Physical Exam  General: pleasant and well appearing, in no acute distress.   Skin: Incisions clean, dry, intact. Healing appropriately without erythema, wound dehiscence, edema, or palpable fluid collections. Expected incisional ecchymosis to right incision, suture ends present to bilateral ends of the incisions. Small opening from  embedded suture to right breast incisions after removal. No tenderness to palpation, warmth, odors, discharge, drainage, or gross signs of infection. No fluctuance or palpable fluid collections. Incisions clean, dry, and intact.     SEE MEDIA      Assessment and Plan:  The patient is a 54 y.o. female  who presents to the office for follow-up and suture removal. She had medial R&L standing cone deformity excision on 2/17 by Dr. Garcia.     -Suture removal performed in clinic today. Can leave incision open to air.   -Aquaphor for moisture to incisions  -Discussed scar maturation with patient, which will take approximately 12 months. Discussed scar massage and using silicone-based scar products  -Can apply a small amount of Bacitracin to small opening on right incision   -The patient chooses to return in 2 weeks for scar check  -Gently wash sites daily with mild soap and water (please use gentle/hypoallergenic soap like Dial, no scrubbing). Pat dry. Do not let direct water pressure from shower hit your incision(s). No swimming or soaking in bathtub for 6-8 weeks, or until wound is fully healed.   -Continue monitoring for signs of infection including increased redness, swelling, fever (temperature > 100.4 F), pus or foul-smelling drainage from your incisions. Please call immediately with any of these symptoms.   -The patient is to call the office with any questions or concerns. All of the patient's questions were answered at this time and they agree with the plan of care.      Kailey Brown PA-C  [facility]

## 2025-03-24 ENCOUNTER — OFFICE VISIT (OUTPATIENT)
Age: 55
End: 2025-03-24

## 2025-03-24 DIAGNOSIS — Z90.13 ACQUIRED ABSENCE OF BILATERAL BREASTS AND NIPPLES: Primary | ICD-10-CM

## 2025-03-24 PROCEDURE — 99024 POSTOP FOLLOW-UP VISIT: CPT

## 2025-03-25 NOTE — PROGRESS NOTES
"[facility]  [phone]    Patient Identification: Jaylyn Kim is a 54 y.o. female     History of Present Illness: The patient is a 54 y.o.  year-old female  who presents to the office for scar check. Patient is 286 days s/p Removal Of Bilateral Implants, Capsulectomy, Aesthetic Flat Closure, Prevena Placement - Bilateral  on 6/11/2024 by Dr. Garcia. 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 Garcia. Patient reports no issues or complaints at this time. Verbalizes that her sutures were removed at her previous visit. She is overall happy with how she is healing and the appearance of her chest. She states some displeasure about her axillary excess but does not wish to undergo more surgery at this time.     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    Colon polyp     Constipation     occasional    DDD (degenerative disc disease), cervical     Family history of breast cancer in female     GERD (gastroesophageal reflux disease)     History of MRSA infection     Leg    History of UTI     treated 8/1/2018    Human papilloma virus (HPV) infection     Hx MRSA infection     on leg-2009 treated    Neck pain     Pollen allergies     PONV (postoperative nausea and vomiting)     nausea    Postural lightheadedness     Raynaud's disease     Seasonal allergies     Sinus problem           Review of Systems  Constitutional: Denies fevers, chills or pain.  Skin: Denies any warmth, erythema, edema or mucopurulent drainage. "     Physical Exam  General: AAOx3, NAD  Breast: Surgical incisions are soft and healing well bilaterally. Spitting suture notes on L chest incision, removed. Mild amount of axillary excess. See media    Assessment and Plan:  The patient is an 54 y.o.  year-old female who presents to the office for follow up. Patient is 286 days s/p Removal Of Bilateral Implants, Capsulectomy, Aesthetic Flat Closure, Prevena Placement - Bilateral  on 6/11/2024 by Dr. Garcia. Patient underwent in office procedure on 2/17/25 to remove b/l medial standing cone deformity. Patient had suture end removal 3/5/25.      -At today's visit one spitting suture removed from L chest incision.  -Discussed removal of axillary excess may need to be performed in the operating room. Patient verbalized not wanting more anesthesia. Offered patient to come back in 3 months for an appointment to discuss further, patient declined.  -May start using silicone scar tape on incisions  -Continue to apply Aquaphor to incisions for moisture  -Continue to perform daily scar massage  -The patient is to return as needed with any concerns.  -The patient is to call the office with any questions or concerns. All of the patient's questions were answered at this time and they agree with the plan of care.      Janice Higgins PA-C  [facility]

## 2025-05-30 ENCOUNTER — OCCMED (OUTPATIENT)
Dept: URGENT CARE | Age: 55
End: 2025-05-30
Payer: OTHER MISCELLANEOUS

## 2025-05-30 ENCOUNTER — APPOINTMENT (OUTPATIENT)
Dept: RADIOLOGY | Age: 55
End: 2025-05-30
Payer: COMMERCIAL

## 2025-05-30 DIAGNOSIS — S99.921A INJURY OF RIGHT FOOT, INITIAL ENCOUNTER: ICD-10-CM

## 2025-05-30 DIAGNOSIS — S99.921A INJURY OF RIGHT FOOT, INITIAL ENCOUNTER: Primary | ICD-10-CM

## 2025-05-30 PROCEDURE — G0382 LEV 3 HOSP TYPE B ED VISIT: HCPCS | Performed by: NURSE PRACTITIONER

## 2025-05-30 PROCEDURE — 99283 EMERGENCY DEPT VISIT LOW MDM: CPT | Performed by: NURSE PRACTITIONER

## 2025-05-30 PROCEDURE — 73610 X-RAY EXAM OF ANKLE: CPT

## 2025-05-30 PROCEDURE — 73630 X-RAY EXAM OF FOOT: CPT

## 2025-06-05 ENCOUNTER — APPOINTMENT (OUTPATIENT)
Age: 55
End: 2025-06-05
Payer: OTHER MISCELLANEOUS

## 2025-06-05 PROCEDURE — 99215 OFFICE O/P EST HI 40 MIN: CPT | Performed by: PREVENTIVE MEDICINE

## 2025-06-16 ENCOUNTER — APPOINTMENT (OUTPATIENT)
Age: 55
End: 2025-06-16

## 2025-06-25 ENCOUNTER — APPOINTMENT (OUTPATIENT)
Age: 55
End: 2025-06-25
Payer: OTHER MISCELLANEOUS

## 2025-06-25 PROCEDURE — 99214 OFFICE O/P EST MOD 30 MIN: CPT | Performed by: STUDENT IN AN ORGANIZED HEALTH CARE EDUCATION/TRAINING PROGRAM

## 2025-07-05 DIAGNOSIS — Z98.84 BARIATRIC SURGERY STATUS: ICD-10-CM

## 2025-07-07 ENCOUNTER — TELEPHONE (OUTPATIENT)
Dept: GYNECOLOGIC ONCOLOGY | Facility: CLINIC | Age: 55
End: 2025-07-07

## 2025-07-07 NOTE — TELEPHONE ENCOUNTER
Called patient to reschedule her 1 year follow-up with provider. I have asked patient to call the office back to reschedule this appointment. I have provided the office number for patient to call back.

## 2025-07-07 NOTE — TELEPHONE ENCOUNTER
Refill must be reviewed and completed by the office or provider. The refill is unable to be approved or denied by the medication management team.    Refill can not be delegated       Patient Id Prescription # Sold Filled Written Drug Label Qty Days Strength MME* Prescriber Pharmacy Payment REFILL #/Auth State Detail  1 [id number] 01/13/2025 01/13/2025 10/01/2024 Phentermine Hcl (Capsule) 90.0 90 15 MG NA [facility] Commercial Insurance 1 / 1 [place]   1 [id number] 12/10/2024 12/08/2024 08/30/2024 Phentermine Hcl (Capsule) 30.0 30 15 MG NA ANH DEWE[facility] [employer] Other 1 / 3 [place]

## 2025-07-25 RX ORDER — PHENTERMINE HYDROCHLORIDE 15 MG/1
15 CAPSULE ORAL EVERY MORNING
Qty: 30 CAPSULE | Refills: 0 | OUTPATIENT
Start: 2025-07-25

## 2025-08-13 ENCOUNTER — APPOINTMENT (OUTPATIENT)
Dept: URGENT CARE | Age: 55
End: 2025-08-13
Payer: OTHER MISCELLANEOUS

## 2025-08-13 PROCEDURE — 99214 OFFICE O/P EST MOD 30 MIN: CPT | Performed by: PHYSICIAN ASSISTANT

## (undated) DEVICE — 3M™ STERI-STRIP™ COMPOUND BENZOIN TINCTURE 40 BAGS/CARTON 4 CARTONS/CASE C1544: Brand: 3M™ STERI-STRIP™

## (undated) DEVICE — SYRINGE 50ML LL

## (undated) DEVICE — CHEST/BREAST DRAPE: Brand: CONVERTORS

## (undated) DEVICE — ENSEAL LAPAROSCOPIC TISSUE SEALER G2 CURVED JAW FOR USE WITH G2 GENERATOR 5MM DIAMETER 35CM SHAFT LENGTH: Brand: ENSEAL

## (undated) DEVICE — SYRINGE 30ML LL

## (undated) DEVICE — SKIN MARKER DUAL TIP WITH RULER CAP, FLEXIBLE RULER AND LABELS: Brand: DEVON

## (undated) DEVICE — DRAPE SHEET THREE QUARTER

## (undated) DEVICE — BETHLEHEM UNIVERSAL MINOR VAG: Brand: CARDINAL HEALTH

## (undated) DEVICE — VIOLET BRAIDED (POLYGLACTIN 910), SYNTHETIC ABSORBABLE SUTURE: Brand: COATED VICRYL

## (undated) DEVICE — ENDOPATH PNEUMONEEDLE INSUFFLATION NEEDLES WITH LUER LOCK CONNECTORS 120MM: Brand: ENDOPATH

## (undated) DEVICE — TRAY FOLEY 16FR URIMETER SILICONE SURESTEP

## (undated) DEVICE — HIGH PROFILE, HP 750CC GEL SIZER: Brand: MENTOR MEMORYGEL RESTERILIZABLE GEL SIZER

## (undated) DEVICE — TUBE EXTENSION MALE LUER 35IN

## (undated) DEVICE — SUT VICRYL PLUS 3-0 RB-1 CR/8 18 IN VCP713D

## (undated) DEVICE — BETHLEHEM UNIVERSAL LAPAROTOMY: Brand: CARDINAL HEALTH

## (undated) DEVICE — GLOVE INDICATOR PI UNDERGLOVE SZ 7.5 BLUE

## (undated) DEVICE — ENDOPATH 5MM CURVED SCISSORS WITH MONOPOLAR CAUTERY: Brand: ENDOPATH

## (undated) DEVICE — TROCAR PORT ACCESS 8 X100MML W BLDLS OPTICAL TIP AIRSEAL

## (undated) DEVICE — INTENDED FOR TISSUE SEPARATION, AND OTHER PROCEDURES THAT REQUIRE A SHARP SURGICAL BLADE TO PUNCTURE OR CUT.: Brand: BARD-PARKER ® CARBON RIB-BACK BLADES

## (undated) DEVICE — CHLORAPREP HI-LITE 26ML ORANGE

## (undated) DEVICE — PROXIMATE SKIN STAPLERS (35 WIDE) CONTAINS 35 STAINLESS STEEL STAPLES (FIXED HEAD): Brand: PROXIMATE

## (undated) DEVICE — GLOVE SRG BIOGEL 7

## (undated) DEVICE — Device

## (undated) DEVICE — SUT VICRYL 0 CT-1 36 IN J946H

## (undated) DEVICE — GLOVE SRG BIOGEL 7.5

## (undated) DEVICE — SUT MONOCRYL 4-0 PS-2 27 IN Y426H

## (undated) DEVICE — TIP COVER ACCESSORY

## (undated) DEVICE — ELECTRODE BLADE MOD  E-Z CLEAN 6.5IN -0014M

## (undated) DEVICE — IRRIG ENDO FLO TUBING

## (undated) DEVICE — SYRINGE 20ML LL

## (undated) DEVICE — SUT VICRYL 3-0 SH 27 IN J416H

## (undated) DEVICE — KIT, BETHLEHEM ROBOTIC PROST: Brand: CARDINAL HEALTH

## (undated) DEVICE — COLUMN DRAPE

## (undated) DEVICE — UNDER BUTTOCKS DRAPE W/FLUID CONTROL POUCH: Brand: CONVERTORS

## (undated) DEVICE — GLOVE INDICATOR PI UNDERGLOVE SZ 8.5 BLUE

## (undated) DEVICE — GLOVE SRG BIOGEL 6

## (undated) DEVICE — TROCAR: Brand: KII® SLEEVE

## (undated) DEVICE — ENDOPATH XCEL BLADELESS TROCARS WITH STABILITY SLEEVES: Brand: ENDOPATH XCEL

## (undated) DEVICE — STERILE POLYISOPRENE POWDER-FREE SURGICAL GLOVES: Brand: PROTEXIS

## (undated) DEVICE — DRAPE C-ARM X-RAY

## (undated) DEVICE — GLOVE INDICATOR PI UNDERGLOVE SZ 7 BLUE

## (undated) DEVICE — GLOVE PI ULTRA TOUCH SZ.6.5

## (undated) DEVICE — SUT VICRYL 0 UR-6 27 IN J603H

## (undated) DEVICE — TOWEL SET X-RAY

## (undated) DEVICE — SUT ETHILON 3-0 PS-1 18 IN 1663G

## (undated) DEVICE — SYNCHROSEAL: Brand: DA VINCI ENERGY

## (undated) DEVICE — SUT PDS II 2-0 SH 27 IN Z317H

## (undated) DEVICE — SUT VICRYL 3-0 RB-1 27 IN J215H

## (undated) DEVICE — TOWEL SURG XR DETECT GREEN STRL RFD

## (undated) DEVICE — SUT STRATAFIX SPIRAL 4-0 PGA/PCL 30 X 30 CM SXMD2B409

## (undated) DEVICE — SUT PDS II 2-0 CT-1 27 IN Z339H

## (undated) DEVICE — VISUALIZATION SYSTEM: Brand: CLEARIFY

## (undated) DEVICE — STERILE 8 INCH PROCTO SWAB: Brand: CARDINAL HEALTH

## (undated) DEVICE — 3M™ TEGADERM™ TRANSPARENT FILM DRESSING FRAME STYLE, 1626W, 4 IN X 4-3/4 IN (10 CM X 12 CM), 50/CT 4CT/CASE: Brand: 3M™ TEGADERM™

## (undated) DEVICE — INSULATED BLADE ELECTRODE;CAUTION: FOR MANUFACTURING, PROCESSING, OR REPACKING.: Brand: EDGE

## (undated) DEVICE — PENCIL ELECTROSURG E-Z CLEAN -0035H

## (undated) DEVICE — ADHESIVE SKN CLSR HISTOACRYL FLEX 0.5ML LF

## (undated) DEVICE — MEDI-VAC YANKAUER SUCTION HANDLE W/BULBOUS AND CONTROL VENT: Brand: CARDINAL HEALTH

## (undated) DEVICE — BAG DECANTER

## (undated) DEVICE — DRAPE LAPAROTOMY W/POUCHES

## (undated) DEVICE — 2000CC GUARDIAN II: Brand: GUARDIAN

## (undated) DEVICE — INSULATED BLADE ELECTRODE: Brand: EDGE

## (undated) DEVICE — BULB SYRINGE,IRRIGATION WITH PROTECTIVE CAP: Brand: DOVER

## (undated) DEVICE — HYGIENE-FILTER  FOR SINGLE USE

## (undated) DEVICE — SPECIMEN TRAP: Brand: ARGYLE

## (undated) DEVICE — 3M™ STERI-STRIP™ REINFORCED ADHESIVE SKIN CLOSURES, R1547, 1/2 IN X 4 IN (12 MM X 100 MM), 6 STRIPS/ENVELOPE: Brand: 3M™ STERI-STRIP™

## (undated) DEVICE — PACK UNIVERSAL DRAPES SUB-Q ICD

## (undated) DEVICE — GLOVE PI ULTRA TOUCH SZ.7.5

## (undated) DEVICE — NEEDLE BLUNT 18 G X 1 1/2IN

## (undated) DEVICE — BINDER ABDOMINAL 46-62 IN

## (undated) DEVICE — MEDI-VAC YANK SUCT HNDL W/TPRD BULBOUS TIP: Brand: CARDINAL HEALTH

## (undated) DEVICE — MONOPOLAR CURVED SCISSORS: Brand: ENDOWRIST

## (undated) DEVICE — SUT SILK 2-0 SH 30 IN K833H

## (undated) DEVICE — KERLIX BANDAGE ROLL: Brand: KERLIX

## (undated) DEVICE — BETHLEHEM UNIVERSAL BREAST PK: Brand: CARDINAL HEALTH

## (undated) DEVICE — BLUE HEAT SCOPE WARMER

## (undated) DEVICE — SPONGE STICK WITH PVP-I: Brand: KENDALL

## (undated) DEVICE — DRAPE EQUIPMENT RF WAND

## (undated) DEVICE — NEEDLE 25G X 1 1/2

## (undated) DEVICE — SCD SEQUENTIAL COMPRESSION COMFORT SLEEVE MEDIUM KNEE LENGTH: Brand: KENDALL SCD

## (undated) DEVICE — NEEDLE 23G X 1 1/2 SAFETY-GLIDE THIN WALL

## (undated) DEVICE — STERILE POLYISOPRENE POWDER-FREE SURGICAL GLOVES WITH EMOLLIENT COATING: Brand: PROTEXIS

## (undated) DEVICE — SWABSTCK, BENZOIN TINCTURE, 1/PK, STRL: Brand: APLICARE

## (undated) DEVICE — INTENDED FOR TISSUE SEPARATION, AND OTHER PROCEDURES THAT REQUIRE A SHARP SURGICAL BLADE TO PUNCTURE OR CUT.: Brand: BARD-PARKER SAFETY BLADES SIZE 11, STERILE

## (undated) DEVICE — 3M™ TEGADERM™ TRANSPARENT FILM DRESSING FRAME STYLE, 1627, 4 IN X 10 IN (10 CM X 25 CM), 20/CT 4CT/CASE: Brand: 3M™ TEGADERM™

## (undated) DEVICE — SMOKE EVAC BOVIE PENCIL BUTTON

## (undated) DEVICE — TROCAR: Brand: KII FIOS FIRST ENTRY

## (undated) DEVICE — 450 ML BOTTLE OF 0.05% CHLORHEXIDINE GLUCONATE IN 99.95% STERILE WATER FOR IRRIGATION, USP AND APPLICATOR.: Brand: IRRISEPT ANTIMICROBIAL WOUND LAVAGE

## (undated) DEVICE — BOWL: 16OZ PEELPOUCH 75/CS 16/PLT: Brand: MEDEGEN MEDICAL PRODUCTS, LLC

## (undated) DEVICE — LIGAMAX 5 MM ENDOSCOPIC MULTIPLE CLIP APPLIER: Brand: LIGAMAX

## (undated) DEVICE — SUT PDS II 4-0 PS-2 18 IN Z496G

## (undated) DEVICE — 3000CC GUARDIAN II: Brand: GUARDIAN

## (undated) DEVICE — BETHLEHEM UNIVERSAL MINOR GEN: Brand: CARDINAL HEALTH

## (undated) DEVICE — SUT PDS II 2-0 CT-2 27 IN Z333H

## (undated) DEVICE — SYRINGE CATH TIP 50ML

## (undated) DEVICE — PLUMEPEN PRO 10FT

## (undated) DEVICE — ENDOPATH XCEL UNIVERSAL TROCAR STABLILITY SLEEVES: Brand: ENDOPATH XCEL

## (undated) DEVICE — JP CHAN DRN SIL HUBLESS 15FR W/TRO: Brand: CARDINAL HEALTH

## (undated) DEVICE — BETHLEHEM UNIVERSAL GYN LAP PK: Brand: CARDINAL HEALTH

## (undated) DEVICE — SINGLE PORT MANIFOLD: Brand: NEPTUNE 2

## (undated) DEVICE — INTENDED FOR TISSUE SEPARATION, AND OTHER PROCEDURES THAT REQUIRE A SHARP SURGICAL BLADE TO PUNCTURE OR CUT.: Brand: BARD-PARKER SAFETY BLADES SIZE 10, STERILE

## (undated) DEVICE — Device: Brand: TISSUE RETRIEVAL SYSTEM

## (undated) DEVICE — GLOVE INDICATOR PI UNDERGLOVE SZ 6.5 BLUE

## (undated) DEVICE — DISPOSABLE OR TOWEL: Brand: CARDINAL HEALTH

## (undated) DEVICE — SUT STRATAFIX SPIRAL 2-0 CT-1 30 CM SXPP1B410

## (undated) DEVICE — TUBING SET W. FILTER, GAS FLOW 30 L/MIN: Brand: N.A.

## (undated) DEVICE — MAYO STAND COVER: Brand: CONVERTORS

## (undated) DEVICE — AIRSEAL TUBE SMOKE EVAC LUMENX3 FILTERED

## (undated) DEVICE — PREMIUM DRY TRAY LF: Brand: MEDLINE INDUSTRIES, INC.

## (undated) DEVICE — OCCLUDER COLPO-PNEUMO

## (undated) DEVICE — DRAPE PROBE NEO-PROBE/ULTRASOUND

## (undated) DEVICE — SUT SILK 3-0 PS-1 18 IN 1684G

## (undated) DEVICE — UTERINE MANIPULATOR RUMI 5.1 X 6 CM

## (undated) DEVICE — [HIGH FLOW INSUFFLATOR,  DO NOT USE IF PACKAGE IS DAMAGED,  KEEP DRY,  KEEP AWAY FROM SUNLIGHT,  PROTECT FROM HEAT AND RADIOACTIVE SOURCES.]: Brand: PNEUMOSURE

## (undated) DEVICE — CANNULA SEAL

## (undated) DEVICE — LARGE NEEDLE DRIVER: Brand: ENDOWRIST

## (undated) DEVICE — INTENDED FOR TISSUE SEPARATION, AND OTHER PROCEDURES THAT REQUIRE A SHARP SURGICAL BLADE TO PUNCTURE OR CUT.: Brand: BARD-PARKER SAFETY BLADES SIZE 15, STERILE

## (undated) DEVICE — ALLENTOWN LAP CHOLE APP PACK: Brand: CARDINAL HEALTH

## (undated) DEVICE — CAUTERY TIP POLISHER: Brand: DEVON

## (undated) DEVICE — NEEDLE 18 G X 1 1/2

## (undated) DEVICE — SUT STRATAFIX SPIRAL PLUS 3-0 PS-2 30 X 30 CM SXMP2B408

## (undated) DEVICE — SUT VICRYL 0 CT-1 CR/8 27 IN JJ41G

## (undated) DEVICE — SPONGE LAP 18 X 18 IN

## (undated) DEVICE — BLADELESS OBTURATOR: Brand: WECK VISTA

## (undated) DEVICE — GLOVE INDICATOR PI UNDERGLOVE SZ 8 BLUE

## (undated) DEVICE — CHLORHEXIDINE 4PCT 4 OZ

## (undated) DEVICE — PAD GROUNDING DUAL ADULT

## (undated) DEVICE — LUBRICANT INST ELECTROLUBE ANTISTK WO PAD

## (undated) DEVICE — STAPLER INSORB SUBCUTICULAR 30 SINGLE USE

## (undated) DEVICE — JACKSON-PRATT 100CC BULB RESERVOIR: Brand: CARDINAL HEALTH

## (undated) DEVICE — EXOFIN PRECISION PEN HIGH VISCOSITY TOPICAL SKIN ADHESIVE: Brand: EXOFIN PRECISION PEN, 1G

## (undated) DEVICE — TAUT CATH INTRODUCER 4.5 FR

## (undated) DEVICE — DRAPE TOWEL: Brand: CONVERTORS

## (undated) DEVICE — SUT MONOCRYL 3-0 SH 27 IN Y416H

## (undated) DEVICE — ELECTRODE BLADE MOD E-Z CLEAN  2.75IN 7CM -0012AM

## (undated) DEVICE — 40595 XL TRENDELENBURG POSITIONING KIT: Brand: 40595 XL TRENDELENBURG POSITIONING KIT

## (undated) DEVICE — ELECTRODE BLADE MOD E-Z CLEAN 2.5IN 6.4CM -0012M

## (undated) DEVICE — PROGRASP FORCEPS: Brand: ENDOWRIST

## (undated) DEVICE — ELECTRODE BALL E-Z CLEAN 2IN -0015

## (undated) DEVICE — 10FR FRAZIER SUCTION HANDLE: Brand: CARDINAL HEALTH

## (undated) DEVICE — POV-IOD SOLUTION 4OZ BT

## (undated) DEVICE — TUBING LIPOSUCTION ASPIRATION 12FT STERILE

## (undated) DEVICE — PREVENA PLUS INCISION MANAGEMENT SYSTEM: Brand: PREVENA PLUS™

## (undated) DEVICE — ADHESIVE SKIN HIGH VISCOSITY EXOFIN 1ML

## (undated) DEVICE — TUBING SUCTION 5MM X 12 FT

## (undated) DEVICE — FUNNEL E KELLER 2 DELIVERY DEVICE

## (undated) DEVICE — GLOVE SRG BIOGEL 8.5

## (undated) DEVICE — PROVE COVER: Brand: UNBRANDED

## (undated) DEVICE — ARM DRAPE

## (undated) DEVICE — IV CATH 14 G X 1.75

## (undated) DEVICE — Device: Brand: REVOLVE ADVANCED ADIPOSE SYSTEM

## (undated) DEVICE — ASTOUND STANDARD SURGICAL GOWN, XXL: Brand: CONVERTORS